# Patient Record
Sex: MALE | Race: WHITE | NOT HISPANIC OR LATINO | Employment: OTHER | URBAN - METROPOLITAN AREA
[De-identification: names, ages, dates, MRNs, and addresses within clinical notes are randomized per-mention and may not be internally consistent; named-entity substitution may affect disease eponyms.]

---

## 2017-04-17 ENCOUNTER — HOSPITAL ENCOUNTER (OUTPATIENT)
Dept: LAB | Facility: MEDICAL CENTER | Age: 77
End: 2017-04-17
Attending: INTERNAL MEDICINE
Payer: MEDICARE

## 2017-04-17 LAB
25(OH)D3 SERPL-MCNC: 30 NG/ML (ref 30–100)
AMMONIA PLAS-SCNC: 55 UMOL/L (ref 11–45)
APPEARANCE UR: CLEAR
BASOPHILS # BLD AUTO: 0.8 % (ref 0–1.8)
BASOPHILS # BLD: 0.03 K/UL (ref 0–0.12)
BILIRUB UR QL STRIP.AUTO: NEGATIVE
COLOR UR: YELLOW
COMMENT 1642: NORMAL
EOSINOPHIL # BLD AUTO: 0.07 K/UL (ref 0–0.51)
EOSINOPHIL NFR BLD: 1.8 % (ref 0–6.9)
ERYTHROCYTE [DISTWIDTH] IN BLOOD BY AUTOMATED COUNT: 42.8 FL (ref 35.9–50)
GLUCOSE UR STRIP.AUTO-MCNC: NEGATIVE MG/DL
HCT VFR BLD AUTO: 46.5 % (ref 42–52)
HGB BLD-MCNC: 15.8 G/DL (ref 14–18)
IMM GRANULOCYTES # BLD AUTO: 0.01 K/UL (ref 0–0.11)
IMM GRANULOCYTES NFR BLD AUTO: 0.3 % (ref 0–0.9)
KETONES UR STRIP.AUTO-MCNC: NEGATIVE MG/DL
LEUKOCYTE ESTERASE UR QL STRIP.AUTO: NEGATIVE
LYMPHOCYTES # BLD AUTO: 0.62 K/UL (ref 1–4.8)
LYMPHOCYTES NFR BLD: 16.1 % (ref 22–41)
MCH RBC QN AUTO: 29.8 PG (ref 27–33)
MCHC RBC AUTO-ENTMCNC: 34 G/DL (ref 33.7–35.3)
MCV RBC AUTO: 87.6 FL (ref 81.4–97.8)
MICRO URNS: NORMAL
MONOCYTES # BLD AUTO: 0.2 K/UL (ref 0–0.85)
MONOCYTES NFR BLD AUTO: 5.2 % (ref 0–13.4)
MORPHOLOGY BLD-IMP: NORMAL
NEUTROPHILS # BLD AUTO: 2.91 K/UL (ref 1.82–7.42)
NEUTROPHILS NFR BLD: 75.8 % (ref 44–72)
NITRITE UR QL STRIP.AUTO: NEGATIVE
NRBC # BLD AUTO: 0 K/UL
NRBC BLD AUTO-RTO: 0 /100 WBC
PH UR STRIP.AUTO: 6 [PH]
PLATELET # BLD AUTO: 80 K/UL (ref 164–446)
PMV BLD AUTO: 14 FL (ref 9–12.9)
PROT UR QL STRIP: NEGATIVE MG/DL
RBC # BLD AUTO: 5.31 M/UL (ref 4.7–6.1)
RBC UR QL AUTO: NEGATIVE
SP GR UR STRIP.AUTO: 1.01
T4 FREE SERPL-MCNC: 1.1 NG/DL (ref 0.53–1.43)
TSH SERPL DL<=0.005 MIU/L-ACNC: 2.48 UIU/ML (ref 0.3–3.7)
WBC # BLD AUTO: 3.7 K/UL (ref 4.8–10.8)

## 2017-04-17 PROCEDURE — 82306 VITAMIN D 25 HYDROXY: CPT

## 2017-04-17 PROCEDURE — 84439 ASSAY OF FREE THYROXINE: CPT

## 2017-04-17 PROCEDURE — 36415 COLL VENOUS BLD VENIPUNCTURE: CPT

## 2017-04-17 PROCEDURE — 85025 COMPLETE CBC W/AUTO DIFF WBC: CPT

## 2017-04-17 PROCEDURE — 84443 ASSAY THYROID STIM HORMONE: CPT

## 2017-04-17 PROCEDURE — 81003 URINALYSIS AUTO W/O SCOPE: CPT

## 2017-04-17 PROCEDURE — 82140 ASSAY OF AMMONIA: CPT

## 2017-05-02 ENCOUNTER — NON-PROVIDER VISIT (OUTPATIENT)
Dept: CARDIOLOGY | Facility: MEDICAL CENTER | Age: 77
End: 2017-05-02
Payer: MEDICARE

## 2017-05-02 ENCOUNTER — OFFICE VISIT (OUTPATIENT)
Dept: CARDIOLOGY | Facility: MEDICAL CENTER | Age: 77
End: 2017-05-02
Payer: MEDICARE

## 2017-05-02 VITALS
HEART RATE: 89 BPM | OXYGEN SATURATION: 94 % | BODY MASS INDEX: 30.34 KG/M2 | HEIGHT: 72 IN | SYSTOLIC BLOOD PRESSURE: 118 MMHG | WEIGHT: 224 LBS | DIASTOLIC BLOOD PRESSURE: 70 MMHG

## 2017-05-02 DIAGNOSIS — K74.60 CIRRHOSIS OF LIVER WITHOUT ASCITES, UNSPECIFIED HEPATIC CIRRHOSIS TYPE (HCC): ICD-10-CM

## 2017-05-02 DIAGNOSIS — I49.5 SICK SINUS SYNDROME (HCC): ICD-10-CM

## 2017-05-02 DIAGNOSIS — I49.5 SINOATRIAL NODE DYSFUNCTION (HCC): Chronic | ICD-10-CM

## 2017-05-02 DIAGNOSIS — D69.6 THROMBOCYTOPENIA (HCC): ICD-10-CM

## 2017-05-02 DIAGNOSIS — I48.0 PAROXYSMAL ATRIAL FIBRILLATION (HCC): Chronic | ICD-10-CM

## 2017-05-02 DIAGNOSIS — Z95.0 CARDIAC PACEMAKER IN SITU: ICD-10-CM

## 2017-05-02 PROCEDURE — 4040F PNEUMOC VAC/ADMIN/RCVD: CPT | Mod: 8P | Performed by: INTERNAL MEDICINE

## 2017-05-02 PROCEDURE — 99214 OFFICE O/P EST MOD 30 MIN: CPT | Mod: 25 | Performed by: INTERNAL MEDICINE

## 2017-05-02 PROCEDURE — 1101F PT FALLS ASSESS-DOCD LE1/YR: CPT | Mod: 8P | Performed by: INTERNAL MEDICINE

## 2017-05-02 PROCEDURE — G8419 CALC BMI OUT NRM PARAM NOF/U: HCPCS | Performed by: INTERNAL MEDICINE

## 2017-05-02 PROCEDURE — 93280 PM DEVICE PROGR EVAL DUAL: CPT | Performed by: INTERNAL MEDICINE

## 2017-05-02 PROCEDURE — 1036F TOBACCO NON-USER: CPT | Performed by: INTERNAL MEDICINE

## 2017-05-02 PROCEDURE — G8432 DEP SCR NOT DOC, RNG: HCPCS | Performed by: INTERNAL MEDICINE

## 2017-05-02 RX ORDER — ROPINIROLE 0.25 MG/1
0.5 TABLET, FILM COATED ORAL
Refills: 3 | Status: ON HOLD | COMMUNITY
Start: 2017-02-14 | End: 2022-04-15

## 2017-05-02 NOTE — Clinical Note
"     [facility]   [address], [facility] [address]  [place] [address]  Phone: [phone]  Fax: [phone]              Luigi Walters  1940    Encounter Date: 5/2/2017    Chaitanya Walters M.D.          PROGRESS NOTE:  Subjective:   Luigi Waletrs is a 76 y.o. male who presents today with recent esophageal dilatation. No bleeding. Low Plt count. No real a fib. No syncope or presyncope. Liver function stable. No alcohol for years.      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   • Pain       L shoulder=6/10>chronic   • Dental disorder      Poor dentition   • Jaundice    • Indigestion    • Prostate cancer (CMS-HCC) 2000         • Bronchitis 1970   • Pneumonia 1972   • Infectious disease 2008,2014     C. Difficile   • Seizure (CMS-HCC)      \"with Dobutamine Echo\"     Past Surgical History   Procedure Laterality Date   • Penile prosthesis ams inflatable     • Shoulder arthroplasty total     • Pacemaker insertion  2007         • Other orthopedic surgery  2010     L Shoulder Replacement   • Other       Endoscopy every 3-6 months to track esophageal varices   • Penile prosthesis ams inflatable  10/13/2014     Performed by Sedrick Pittman M.D. at SURGERY [facility]   • Recovery  3/18/2015     [place]formed [place] [facility] [place] [facility] [place]   • Pacemaker insertion  March 2015     Generator replacement with  Medtronic Adapta ADDRL1 implanted by Dr. Chaitanya Walters. Original device implanted in 2007.     Family History   Problem Relation Age of Onset   • Cancer Father      Prostate CA     History   Smoking status   • Former Smoker -- 0.50 packs/day for 14 years   • Types: Cigarettes   • Quit date: 01/01/1970   Smokeless tobacco   • Never Used     Comment: quit in 1970     Allergies   Allergen Reactions   • Dobutamine      siezures     Outpatient Encounter Prescriptions as of 5/2/2017   Medication Sig Dispense Refill   • Multiple Vitamin (MULTI VITAMIN DAILY PO) Take  by mouth.     • ropinirole (REQUIP) 0.25 MG Tab TK 1 T PO qd  3   • spironolactone (ALDACTONE) 50 MG Tab   0   • oxycodone immediate release (ROXICODONE) 10 MG immediate release tablet   0   • LORATADINE PO Take  by mouth.     • acetaminophen (TYLENOL) 325 MG TABS Take 325 mg by mouth 2 Times a Day.     • lactulose 10 GM/15ML SOLN Take 30 g by mouth every day. Indications: Impaired Brain Function due to Liver Disease     • meclizine (ANTIVERT) 25 MG TABS Take 25 mg by mouth 3 times a day as needed.     • tamsulosin (FLOMAX) 0.4 MG capsule Take 0.4 mg by mouth ONE-HALF HOUR AFTER BREAKFAST.     • omeprazole (PRILOSEC) 40 MG capsule Take 40 mg by mouth every day.     • calcitRIOL (ROCALTROL) 0.25 MCG CAPS Take 0.25 mcg by mouth every day.     • doxycycline (VIBRAMYCIN) 100 MG Tab Take 1 Tab by mouth 2 times a day. 20 Tab 0   • VITAMIN A PO Take 5,000 Units by mouth every day.     • Cholecalciferol (VITAMIN D3 PO) Take 500 Units by mouth.     • Non Formulary Request MG + Peotien 266mg PO daily       No facility-administered encounter medications on file as of 5/2/2017.     ROS     Objective:   /70 mmHg  Pulse 89  Ht 1.829 m (6' 0.01\")  Wt 101.606 kg (224 lb)  BMI 30.37 kg/m2  SpO2 94%    Physical Exam   Constitutional: He is oriented to person, place, and time. He " appears well-developed and well-nourished.   HENT:   Mouth/Throat: Oropharynx is clear and moist.   Eyes: Conjunctivae and EOM are normal.   Neck: No JVD present. No thyroid mass present.   Cardiovascular: Normal rate, regular rhythm, S1 normal, S2 normal and normal pulses.  PMI is not displaced.  Exam reveals no gallop.    No murmur heard.  Pulses:       Carotid pulses are 2+ on the right side, and 2+ on the left side.       Radial pulses are 2+ on the right side, and 2+ on the left side.        Femoral pulses are 2+ on the right side, and 2+ on the left side.       Dorsalis pedis pulses are 2+ on the right side, and 2+ on the left side.   No peripheral edema.   Pulmonary/Chest: Effort normal and breath sounds normal.   Abdominal: Soft. Normal appearance. He exhibits no abdominal bruit. There is no hepatosplenomegaly. There is no tenderness.   Musculoskeletal: Normal range of motion. He exhibits no edema.        Thoracic back: He exhibits no tenderness and no spasm.   Neurological: He is alert and oriented to person, place, and time.   Skin: No rash noted. No cyanosis. Nails show no clubbing.   Psychiatric: He has a normal mood and affect.       Assessment:     1. Cirrhosis of liver without ascites, unspecified hepatic cirrhosis type (CMS-HCC)     2. Thrombocytopenia (CMS-HCC)     3. Sinoatrial node dysfunction (CMS-HCC)     4. Paroxysmal atrial fibrillation (CMS-HCC)         Medical Decision Making:  Today's Assessment / Status / Plan:     1. SSS nl PPM function.  2. Hepatic cirrhosis in remission.  3. Low Plt stable,.  4. PAF minimal not a candidate for blood thinners.  5. Device check in 6 months.      Denver J Miller, M.D.  [address]  [place] [address]  VIA Facsimile: [phone]     Andrzej Darling M.D.  [address] [place] [address]  VIA Facsimile: [phone]

## 2017-05-02 NOTE — MR AVS SNAPSHOT
"        Luigi Inman Romeo   2017 1:20 PM   Office Visit   MRN: [id number]    Department:  [facility]   Dept Phone:  [phone]    Description:  Male : 1940   Provider:  Chaitanya Walters M.D.           Reason for Visit     Follow-Up           Allergies as of 2017     Allergen Noted Reactions    Dobutamine 09/10/2013       siezures      You were diagnosed with     Cirrhosis of liver without ascites, unspecified hepatic cirrhosis type (CMS-HCC)   [2290440]       Thrombocytopenia (CMS-HCC)   [425433]       Sinoatrial node dysfunction (CMS-HCC)   [427.81.ICD-9-CM]       Paroxysmal atrial fibrillation (CMS-HCC)   [028494]         Vital Signs     Blood Pressure Pulse Height Weight Body Mass Index Oxygen Saturation    118/70 mmHg 89 1.829 m (6' 0.01\") 101.606 kg (224 lb) 30.37 kg/m2 94%    Smoking Status                   Former Smoker           Basic Information     Date Of Birth Sex Race Ethnicity Preferred Language    1940 Male White Non- English      Problem List              ICD-10-CM Priority Class Noted - Resolved    Esophageal varices in alcoholic cirrhosis    Unknown - Present    Thrombocytopenia (CMS-HCC) D69.6   Unknown - Present    Osteoarthritis (Chronic) M19.90   Unknown - Present    Paroxysmal atrial fibrillation (CMS-HCC) (Chronic) I48.0 High  Unknown - Present    Presence of permanent cardiac pacemaker (Chronic) Z95.0 High  Unknown - Present    Sinoatrial node dysfunction (CMS-HCC) (Chronic) I49.5 High  Unknown - Present    Impotence of organic origin N52.9   10/13/2014 - Present    Cirrhosis of liver without ascites (CMS-HCC) K74.60   2017 - Present      Health Maintenance        Date Due Completion Dates    IMM DTaP/Tdap/Td Vaccine (1 - Tdap) 1959 ---    COLONOSCOPY 1990 ---    IMM ZOSTER VACCINE 2000 ---    IMM PNEUMOCOCCAL 65+ (ADULT) LOW/MEDIUM RISK SERIES (1 of 2 - PCV13) 2005 ---            Current Immunizations     No immunizations on " file.      Below and/or attached are the medications your provider expects you to take. Review all of your home medications and newly ordered medications with your provider and/or pharmacist. Follow medication instructions as directed by your provider and/or pharmacist. Please keep your medication list with you and share with your provider. Update the information when medications are discontinued, doses are changed, or new medications (including over-the-counter products) are added; and carry medication information at all times in the event of emergency situations     Allergies:  DOBUTAMINE - (reactions not documented)               Medications  Valid as of: May 02, 2017 -  5:05 PM    Generic Name Brand Name Tablet Size Instructions for use    Acetaminophen (Tab) TYLENOL 325 MG Take 325 mg by mouth 2 Times a Day.        Calcitriol (Cap) ROCALTROL 0.25 MCG Take 0.25 mcg by mouth every day.        Cholecalciferol   Take 500 Units by mouth.        Doxycycline Hyclate (Tab) VIBRAMYCIN 100 MG Take 1 Tab by mouth 2 times a day.        Lactulose (Solution) lactulose 10 GM/15ML Take 30 g by mouth every day. Indications: Impaired Brain Function due to Liver Disease        Loratadine   Take  by mouth.        Meclizine HCl (Tab) ANTIVERT 25 MG Take 25 mg by mouth 3 times a day as needed.        Multiple Vitamin   Take  by mouth.        Non Formulary Request Non Formulary Request  MG + Peotien 266mg PO daily        Omeprazole (CAPSULE DELAYED RELEASE) PRILOSEC 40 MG Take 40 mg by mouth every day.        OxyCODONE HCl (Tab) ROXICODONE 10 MG         ROPINIRole HCl (Tab) REQUIP 0.25 MG TK 1 T PO qd        Spironolactone (Tab) ALDACTONE 50 MG         Tamsulosin HCl (Cap) FLOMAX 0.4 MG Take 0.4 mg by mouth ONE-HALF HOUR AFTER BREAKFAST.        Vitamin A   Take 5,000 Units by mouth every day.        .                 Medicines prescribed today were sent to:     [id number] - [place], [place] - [address] AT [facility] OF  [address]    [address] [place] [address]    Phone: [phone] Fax: [phone]    Open 24 Hours?: No      Medication refill instructions:       If your prescription bottle indicates you have medication refills left, it is not necessary to call your provider’s office. Please contact your pharmacy and they will refill your medication.    If your prescription bottle indicates you do not have any refills left, you may request refills at any time through one of the following ways: The online [employer] system (except Urgent Care), by calling your provider’s office, or by asking your pharmacy to contact your provider’s office with a refill request. Medication refills are processed only during regular business hours and may not be available until the next business day. Your provider may request additional information or to have a follow-up visit with you prior to refilling your medication.   *Please Note: Medication refills are assigned a new Rx number when refilled electronically. Your pharmacy may indicate that no refills were authorized even though a new prescription for the same medication is available at the pharmacy. Please request the medicine by name with the pharmacy before contacting your provider for a refill.           [employer] Access Code: [email or web address]  Expires: 5/17/2017  1:05 PM    [employer]  A secure, online tool to manage your health information     [employer]® is a secure, online tool that connects you to your personalized health information from the privacy of your home -- day or night - making it very easy for you to manage your healthcare. Once the activation process is completed, you can even access your medical information using the [employer] jorge, which is available for free in the Apple Jorge store or Google Play store.     [employer] provides the following levels of access (as shown below):   My Chart Features   [facility] Primary [facility] Doctor [facility]  Specialists  [facility]  Urgent  Care [facility]  Primary Care  Doctor   Email your healthcare team securely and privately 24/7 X X X    Manage appointments: schedule your next appointment; view details of past/upcoming appointments X      Request prescription refills. X      View recent personal medical records, including lab and immunizations X X X X   View health record, including health history, allergies, medications X X X X   Read reports about your outpatient visits, procedures, consult and ER notes X X X X   See your discharge summary, which is a recap of your hospital and/or ER visit that includes your diagnosis, lab results, and care plan. X X       How to register for [employer]:  1. Go to  [email or web address].  2. Click on the Sign Up Now box, which takes you to the New Member Sign Up page. You will need to provide the following information:  a. Enter your [employer] Access Code exactly as it appears at the top of this page. (You will not need to use this code after you’ve completed the sign-up process. If you do not sign up before the expiration date, you must request a new code.)   b. Enter your date of birth.   c. Enter your home email address.   d. Click Submit, and follow the next screen’s instructions.  3. Create a [employer] ID. This will be your [employer] login ID and cannot be changed, so think of one that is secure and easy to remember.  4. Create a [employer] password. You can change your password at any time.  5. Enter your Password Reset Question and Answer. This can be used at a later time if you forget your password.   6. Enter your e-mail address. This allows you to receive e-mail notifications when new information is available in [employer].  7. Click Sign Up. You can now view your health information.    For assistance activating your [employer] account, call [phone]

## 2017-05-02 NOTE — PROGRESS NOTES
"Subjective:   Luigi Walters is a 76 y.o. male who presents today with recent esophageal dilatation. No bleeding. Low Plt count. No real a fib. No syncope or presyncope. Liver function stable. No alcohol for years.      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   • Pain       L shoulder=6/10>chronic   • Dental disorder      Poor dentition   • Jaundice    • Indigestion    • Prostate cancer (CMS-HCC) 2000         • Bronchitis 1970   • Pneumonia 1972   • Infectious disease 2008,2014     C. Difficile   • Seizure (CMS-HCC)      \"with Dobutamine Echo\"     Past Surgical History   Procedure Laterality Date   • Penile prosthesis ams inflatable     • Shoulder arthroplasty total     • Pacemaker insertion  2007         • Other orthopedic surgery  2010     L Shoulder Replacement   • Other       Endoscopy every 3-6 months to track esophageal varices   • Penile prosthesis ams inflatable  10/13/2014     Performed by Sedrick Pittman M.D. at SURGERY [facility]   • Recovery  3/18/2015     Performed [place] [facility] [place] [facility] [place]   • Pacemaker insertion  March 2015     Generator replacement with  MedActualSun Adapta ADDRL1 implanted by Dr. Chaitanya Walters. Original device implanted in 2007.     Family History   Problem Relation Age of Onset   • Cancer Father      Prostate CA " "    History   Smoking status   • Former Smoker -- 0.50 packs/day for 14 years   • Types: Cigarettes   • Quit date: 01/01/1970   Smokeless tobacco   • Never Used     Comment: quit in 1970     Allergies   Allergen Reactions   • Dobutamine      lupe     Outpatient Encounter Prescriptions as of 5/2/2017   Medication Sig Dispense Refill   • Multiple Vitamin (MULTI VITAMIN DAILY PO) Take  by mouth.     • ropinirole (REQUIP) 0.25 MG Tab TK 1 T PO qd  3   • spironolactone (ALDACTONE) 50 MG Tab   0   • oxycodone immediate release (ROXICODONE) 10 MG immediate release tablet   0   • LORATADINE PO Take  by mouth.     • acetaminophen (TYLENOL) 325 MG TABS Take 325 mg by mouth 2 Times a Day.     • lactulose 10 GM/15ML SOLN Take 30 g by mouth every day. Indications: Impaired Brain Function due to Liver Disease     • meclizine (ANTIVERT) 25 MG TABS Take 25 mg by mouth 3 times a day as needed.     • tamsulosin (FLOMAX) 0.4 MG capsule Take 0.4 mg by mouth ONE-HALF HOUR AFTER BREAKFAST.     • omeprazole (PRILOSEC) 40 MG capsule Take 40 mg by mouth every day.     • calcitRIOL (ROCALTROL) 0.25 MCG CAPS Take 0.25 mcg by mouth every day.     • doxycycline (VIBRAMYCIN) 100 MG Tab Take 1 Tab by mouth 2 times a day. 20 Tab 0   • VITAMIN A PO Take 5,000 Units by mouth every day.     • Cholecalciferol (VITAMIN D3 PO) Take 500 Units by mouth.     • Non Formulary Request MG + Peotien 266mg PO daily       No facility-administered encounter medications on file as of 5/2/2017.     ROS     Objective:   /70 mmHg  Pulse 89  Ht 1.829 m (6' 0.01\")  Wt 101.606 kg (224 lb)  BMI 30.37 kg/m2  SpO2 94%    Physical Exam   Constitutional: He is oriented to person, place, and time. He appears well-developed and well-nourished.   HENT:   Mouth/Throat: Oropharynx is clear and moist.   Eyes: Conjunctivae and EOM are normal.   Neck: No JVD present. No thyroid mass present.   Cardiovascular: Normal rate, regular rhythm, S1 normal, S2 normal and normal " pulses.  PMI is not displaced.  Exam reveals no gallop.    No murmur heard.  Pulses:       Carotid pulses are 2+ on the right side, and 2+ on the left side.       Radial pulses are 2+ on the right side, and 2+ on the left side.        Femoral pulses are 2+ on the right side, and 2+ on the left side.       Dorsalis pedis pulses are 2+ on the right side, and 2+ on the left side.   No peripheral edema.   Pulmonary/Chest: Effort normal and breath sounds normal.   Abdominal: Soft. Normal appearance. He exhibits no abdominal bruit. There is no hepatosplenomegaly. There is no tenderness.   Musculoskeletal: Normal range of motion. He exhibits no edema.        Thoracic back: He exhibits no tenderness and no spasm.   Neurological: He is alert and oriented to person, place, and time.   Skin: No rash noted. No cyanosis. Nails show no clubbing.   Psychiatric: He has a normal mood and affect.       Assessment:     1. Cirrhosis of liver without ascites, unspecified hepatic cirrhosis type (CMS-HCC)     2. Thrombocytopenia (CMS-HCC)     3. Sinoatrial node dysfunction (CMS-HCC)     4. Paroxysmal atrial fibrillation (CMS-HCC)         Medical Decision Making:  Today's Assessment / Status / Plan:     1. SSS nl PPM function.  2. Hepatic cirrhosis in remission.  3. Low Plt stable,.  4. PAF minimal not a candidate for blood thinners.  5. Device check in 6 months.

## 2017-09-20 ENCOUNTER — OFFICE VISIT (OUTPATIENT)
Dept: URGENT CARE | Facility: PHYSICIAN GROUP | Age: 77
End: 2017-09-20
Payer: MEDICARE

## 2017-09-20 ENCOUNTER — HOSPITAL ENCOUNTER (OUTPATIENT)
Facility: MEDICAL CENTER | Age: 77
End: 2017-09-20
Attending: EMERGENCY MEDICINE
Payer: MEDICARE

## 2017-09-20 VITALS
TEMPERATURE: 97.8 F | OXYGEN SATURATION: 96 % | RESPIRATION RATE: 14 BRPM | SYSTOLIC BLOOD PRESSURE: 130 MMHG | BODY MASS INDEX: 29.42 KG/M2 | HEART RATE: 76 BPM | DIASTOLIC BLOOD PRESSURE: 78 MMHG | HEIGHT: 73 IN | WEIGHT: 222 LBS

## 2017-09-20 DIAGNOSIS — Z23 FLU VACCINE NEED: ICD-10-CM

## 2017-09-20 DIAGNOSIS — R21 RASH: ICD-10-CM

## 2017-09-20 PROCEDURE — 87070 CULTURE OTHR SPECIMN AEROBIC: CPT

## 2017-09-20 PROCEDURE — G0008 ADMIN INFLUENZA VIRUS VAC: HCPCS | Performed by: EMERGENCY MEDICINE

## 2017-09-20 PROCEDURE — 87077 CULTURE AEROBIC IDENTIFY: CPT

## 2017-09-20 PROCEDURE — 87186 SC STD MICRODIL/AGAR DIL: CPT

## 2017-09-20 PROCEDURE — 90662 IIV NO PRSV INCREASED AG IM: CPT | Performed by: EMERGENCY MEDICINE

## 2017-09-20 PROCEDURE — 87205 SMEAR GRAM STAIN: CPT

## 2017-09-20 PROCEDURE — 99214 OFFICE O/P EST MOD 30 MIN: CPT | Mod: 25 | Performed by: EMERGENCY MEDICINE

## 2017-09-20 ASSESSMENT — ENCOUNTER SYMPTOMS
HEADACHES: 0
BACK PAIN: 0
EYE DISCHARGE: 0
SENSORY CHANGE: 0
CHILLS: 0
EYE REDNESS: 0
NAUSEA: 0
VOMITING: 0
NECK PAIN: 0
FEVER: 0

## 2017-09-20 NOTE — PROGRESS NOTES
Subjective:      Luigi Walters is a 76 y.o. male who presents with Rash (L Armpit, itchy x 10 days) and Flu Vaccine (Pt states would like flu shot today)            HPI  Patient is a pleasant 76-year-old male complaining of a pruritic red rash in his he's been using alcohol on it for the past 10 days and I recommended against that. Patient denies having diabetes. He has no lesions in his right axilla.    Social History     Social History   • Marital status:      Spouse name: N/A   • Number of children: N/A   • Years of education: N/A     Occupational History   • Not on file.     Social History Main Topics   • Smoking status: Former Smoker     Packs/day: 0.50     Years: 14.00     Types: Cigarettes     Quit date: 1/1/1970   • Smokeless tobacco: Never Used      Comment: quit in 1970   • Alcohol use 0.5 oz/week     1 Glasses of wine per week      Comment: Hx of ETOH abuse   • Drug use: No   • Sexual activity: Not Currently     Other Topics Concern   • Not on file     Social History Narrative   • No narrative on file   ......  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 Difficile   • Jaundice    • Melena    • Melena    • Osteoarthritis    • Pain      L shoulder=6/10>chronic   • Polycythemia    • Presence of permanent cardiac pacemaker 2007/2015   • Prostate cancer (CMS-HCC)    • Renal disorder      Insufficiency....med related?   • Seizure  "(CMS-HCC)     \"with Dobutamine Echo\"   • Thrombocytopenia (CMS-HCC)      Current Outpatient Prescriptions on File Prior to Visit   Medication Sig Dispense Refill   • Multiple Vitamin (MULTI VITAMIN DAILY PO) Take  by mouth.     • ropinirole (REQUIP) 0.25 MG Tab TK 1 T PO qd  3   • doxycycline (VIBRAMYCIN) 100 MG Tab Take 1 Tab by mouth 2 times a day. 20 Tab 0   • spironolactone (ALDACTONE) 50 MG Tab   0   • oxycodone immediate release (ROXICODONE) 10 MG immediate release tablet   0   • LORATADINE PO Take  by mouth.     • VITAMIN A PO Take 5,000 Units by mouth every day.     • Cholecalciferol (VITAMIN D3 PO) Take 500 Units by mouth.     • Non Formulary Request MG + Peotien 266mg PO daily     • acetaminophen (TYLENOL) 325 MG TABS Take 325 mg by mouth 2 Times a Day.     • lactulose 10 GM/15ML SOLN Take 30 g by mouth every day. Indications: Impaired Brain Function due to Liver Disease     • meclizine (ANTIVERT) 25 MG TABS Take 25 mg by mouth 3 times a day as needed.     • tamsulosin (FLOMAX) 0.4 MG capsule Take 0.4 mg by mouth ONE-HALF HOUR AFTER BREAKFAST.     • omeprazole (PRILOSEC) 40 MG capsule Take 40 mg by mouth every day.     • calcitRIOL (ROCALTROL) 0.25 MCG CAPS Take 0.25 mcg by mouth every day.       No current facility-administered medications on file prior to visit.      Family History   Problem Relation Age of Onset   • Cancer Father      Prostate CA   .a  Review of Systems   Constitutional: Negative for chills and fever.   Eyes: Negative for discharge and redness.   Cardiovascular: Negative for chest pain.   Gastrointestinal: Negative for nausea and vomiting.   Genitourinary: Negative.    Musculoskeletal: Negative for back pain and neck pain.   Skin: Positive for itching and rash.   Neurological: Negative for sensory change and headaches.          Objective:     /78   Pulse 76   Temp 36.6 °C (97.8 °F)   Resp 14   Ht 1.854 m (6' 1\")   Wt 100.7 kg (222 lb)   SpO2 96%   BMI 29.29 kg/m²  "     Physical Exam   Constitutional: He appears well-developed and well-nourished. No distress.       HENT:   Head: Normocephalic and atraumatic.   Right Ear: External ear normal.   Eyes: Right eye exhibits no discharge. Left eye exhibits no discharge.   Cardiovascular: Normal rate.    Pulmonary/Chest: Effort normal and breath sounds normal.   Musculoskeletal: He exhibits no edema.   Neurological: He is alert.   Skin: Skin is warm. Rash noted. He is not diaphoretic. There is erythema.   Patient has a 6 cm circular rash in his left axilla very erythematous and inflamed non-weeping.   Psychiatric: He has a normal mood and affect.   Vitals reviewed.              Assessment/Plan:     1. Rash    - CULTURE WOUND W/ GRAM STAIN; Future  - miconazole (MICOTIN) 2 % Cream; Apply 1 Application to affected area(s) 2 times a day.  Dispense: 1 Tube; Refill: 1  Patiently use miconazole twice a day I will call him with the culture results in 2-3 days. Patient will use warm water to clean the wound with avoid alcohol and hydrogen peroxide.  2. Flu vaccine need    - Flu Quad Inj >3 Year Pre-Filled PF

## 2017-09-21 LAB
GRAM STN SPEC: NORMAL
SIGNIFICANT IND 70042: NORMAL
SITE SITE: NORMAL
SOURCE SOURCE: NORMAL

## 2017-09-23 LAB
BACTERIA WND AEROBE CULT: ABNORMAL
BACTERIA WND AEROBE CULT: ABNORMAL
GRAM STN SPEC: ABNORMAL
SIGNIFICANT IND 70042: ABNORMAL
SITE SITE: ABNORMAL
SOURCE SOURCE: ABNORMAL

## 2017-09-25 ENCOUNTER — TELEPHONE (OUTPATIENT)
Dept: URGENT CARE | Facility: PHYSICIAN GROUP | Age: 77
End: 2017-09-25

## 2017-09-25 RX ORDER — AMOXICILLIN AND CLAVULANATE POTASSIUM 875; 125 MG/1; MG/1
1 TABLET, FILM COATED ORAL 2 TIMES DAILY
Qty: 20 TAB | Refills: 0 | Status: SHIPPED | OUTPATIENT
Start: 2017-09-25 | End: 2017-10-05

## 2017-09-25 NOTE — TELEPHONE ENCOUNTER
I contacted the patient with a rash under his left axilla he states is approximately 60% improved. He is currently being treated for yeast infection. Cultures grew a former staff will add Augmentin in that it is sensitive to Augmentin called into the pharmacy discussed with the patient.

## 2017-11-17 ENCOUNTER — HOSPITAL ENCOUNTER (OUTPATIENT)
Dept: LAB | Facility: MEDICAL CENTER | Age: 77
End: 2017-11-17
Attending: INTERNAL MEDICINE
Payer: MEDICARE

## 2017-11-17 LAB
ALBUMIN SERPL BCP-MCNC: 4.2 G/DL (ref 3.2–4.9)
ALBUMIN/GLOB SERPL: 1.1 G/DL
ALP SERPL-CCNC: 52 U/L (ref 30–99)
ALT SERPL-CCNC: 16 U/L (ref 2–50)
ANION GAP SERPL CALC-SCNC: 10 MMOL/L (ref 0–11.9)
AST SERPL-CCNC: 24 U/L (ref 12–45)
BASOPHILS # BLD AUTO: 0.6 % (ref 0–1.8)
BASOPHILS # BLD: 0.02 K/UL (ref 0–0.12)
BILIRUB SERPL-MCNC: 1 MG/DL (ref 0.1–1.5)
BUN SERPL-MCNC: 20 MG/DL (ref 8–22)
CALCIUM SERPL-MCNC: 10.1 MG/DL (ref 8.5–10.5)
CHLORIDE SERPL-SCNC: 104 MMOL/L (ref 96–112)
CO2 SERPL-SCNC: 24 MMOL/L (ref 20–33)
CREAT SERPL-MCNC: 1.22 MG/DL (ref 0.5–1.4)
EOSINOPHIL # BLD AUTO: 0.15 K/UL (ref 0–0.51)
EOSINOPHIL NFR BLD: 4.4 % (ref 0–6.9)
ERYTHROCYTE [DISTWIDTH] IN BLOOD BY AUTOMATED COUNT: 44.5 FL (ref 35.9–50)
GFR SERPL CREATININE-BSD FRML MDRD: 58 ML/MIN/1.73 M 2
GLOBULIN SER CALC-MCNC: 4 G/DL (ref 1.9–3.5)
GLUCOSE SERPL-MCNC: 100 MG/DL (ref 65–99)
HCT VFR BLD AUTO: 45.1 % (ref 42–52)
HGB BLD-MCNC: 15.3 G/DL (ref 14–18)
IMM GRANULOCYTES # BLD AUTO: 0.01 K/UL (ref 0–0.11)
IMM GRANULOCYTES NFR BLD AUTO: 0.3 % (ref 0–0.9)
LYMPHOCYTES # BLD AUTO: 0.57 K/UL (ref 1–4.8)
LYMPHOCYTES NFR BLD: 16.7 % (ref 22–41)
MCH RBC QN AUTO: 30.7 PG (ref 27–33)
MCHC RBC AUTO-ENTMCNC: 33.9 G/DL (ref 33.7–35.3)
MCV RBC AUTO: 90.6 FL (ref 81.4–97.8)
MONOCYTES # BLD AUTO: 0.21 K/UL (ref 0–0.85)
MONOCYTES NFR BLD AUTO: 6.1 % (ref 0–13.4)
NEUTROPHILS # BLD AUTO: 2.46 K/UL (ref 1.82–7.42)
NEUTROPHILS NFR BLD: 71.9 % (ref 44–72)
NRBC # BLD AUTO: 0 K/UL
NRBC BLD AUTO-RTO: 0 /100 WBC
PLATELET # BLD AUTO: 86 K/UL (ref 164–446)
PMV BLD AUTO: 12.7 FL (ref 9–12.9)
POTASSIUM SERPL-SCNC: 4.4 MMOL/L (ref 3.6–5.5)
PROT SERPL-MCNC: 8.2 G/DL (ref 6–8.2)
RBC # BLD AUTO: 4.98 M/UL (ref 4.7–6.1)
SODIUM SERPL-SCNC: 138 MMOL/L (ref 135–145)
WBC # BLD AUTO: 3.4 K/UL (ref 4.8–10.8)

## 2017-11-17 PROCEDURE — 82105 ALPHA-FETOPROTEIN SERUM: CPT

## 2017-11-17 PROCEDURE — 36415 COLL VENOUS BLD VENIPUNCTURE: CPT

## 2017-11-17 PROCEDURE — 85025 COMPLETE CBC W/AUTO DIFF WBC: CPT

## 2017-11-17 PROCEDURE — 80053 COMPREHEN METABOLIC PANEL: CPT

## 2017-11-19 LAB — AFP-TM SERPL-MCNC: 1 NG/ML (ref 0–9)

## 2017-11-29 ENCOUNTER — APPOINTMENT (RX ONLY)
Dept: URBAN - METROPOLITAN AREA CLINIC 4 | Facility: CLINIC | Age: 77
Setting detail: DERMATOLOGY
End: 2017-11-29

## 2017-11-29 DIAGNOSIS — L82.1 OTHER SEBORRHEIC KERATOSIS: ICD-10-CM

## 2017-11-29 DIAGNOSIS — L82.0 INFLAMED SEBORRHEIC KERATOSIS: ICD-10-CM

## 2017-11-29 PROBLEM — D48.5 NEOPLASM OF UNCERTAIN BEHAVIOR OF SKIN: Status: ACTIVE | Noted: 2017-11-29

## 2017-11-29 PROCEDURE — 11100: CPT | Mod: 59

## 2017-11-29 PROCEDURE — 99212 OFFICE O/P EST SF 10 MIN: CPT | Mod: 25

## 2017-11-29 PROCEDURE — ? LIQUID NITROGEN

## 2017-11-29 PROCEDURE — ? BIOPSY BY SHAVE METHOD

## 2017-11-29 ASSESSMENT — LOCATION DETAILED DESCRIPTION DERM
LOCATION DETAILED: LEFT LATERAL MALAR CHEEK
LOCATION DETAILED: LEFT INFERIOR LATERAL NECK
LOCATION DETAILED: LEFT DISTAL DORSAL FOREARM
LOCATION DETAILED: RIGHT PROXIMAL RADIAL DORSAL FOREARM

## 2017-11-29 ASSESSMENT — LOCATION SIMPLE DESCRIPTION DERM
LOCATION SIMPLE: RIGHT FOREARM
LOCATION SIMPLE: LEFT FOREARM
LOCATION SIMPLE: LEFT ANTERIOR NECK
LOCATION SIMPLE: LEFT CHEEK

## 2017-11-29 ASSESSMENT — LOCATION ZONE DERM
LOCATION ZONE: NECK
LOCATION ZONE: FACE
LOCATION ZONE: ARM

## 2017-11-29 NOTE — PROCEDURE: LIQUID NITROGEN

## 2017-11-29 NOTE — PROCEDURE: BIOPSY BY SHAVE METHOD
Electrodesiccation And Curettage Text: The wound bed was treated with electrodesiccation and curettage after the biopsy was performed.
X Size Of Lesion In Cm: 0
Biopsy Type: H and E
Bill 73802 For Specimen Handling/Conveyance To Laboratory?: no
Biopsy Method: Personna blade
Billing Type: Third-Party Bill
Anticipated Plan (Based On Presumed Biopsy Results): C+d if +.
Curettage Text: The wound bed was treated with curettage after the biopsy was performed.
Notification Instructions: Patient will be notified of biopsy results. However, patient instructed to call the office if not contacted within 2 weeks.
Electrodesiccation Text: The wound bed was treated with electrodesiccation after the biopsy was performed.
Render Post-Care Instructions In Note?: yes
Detail Level: Detailed
Consent: Written consent was obtained and risks were reviewed including but not limited to scarring, infection, bleeding, scabbing, incomplete removal, nerve damage and allergy to anesthesia.
Post-Care Instructions: I reviewed with the patient in detail post-care instructions. Patient is to keep the biopsy site dry overnight, and then apply vasaline twice daily until healed.
Type Of Destruction Used: Curettage
Anesthesia Type: 1% lidocaine with epinephrine and a 1:10 solution of 8.4% sodium bicarbonate
Anesthesia Volume In Cc: 3
Lab Facility: 
Dressing: Band-Aid
Lab: [id number]
Wound Care: Vaseline
Hemostasis: Drysol and Electrocautery
Path Notes (To The Dermatopathologist): Exc if +.

## 2017-12-04 ENCOUNTER — HOSPITAL ENCOUNTER (OUTPATIENT)
Dept: RADIOLOGY | Facility: MEDICAL CENTER | Age: 77
End: 2017-12-04
Attending: INTERNAL MEDICINE
Payer: MEDICARE

## 2017-12-04 DIAGNOSIS — K70.30 ALCOHOLIC CIRRHOSIS OF LIVER WITHOUT ASCITES (HCC): ICD-10-CM

## 2017-12-04 PROCEDURE — 76700 US EXAM ABDOM COMPLETE: CPT

## 2017-12-15 ENCOUNTER — HOSPITAL ENCOUNTER (OUTPATIENT)
Dept: RADIOLOGY | Facility: MEDICAL CENTER | Age: 77
End: 2017-12-15
Attending: INTERNAL MEDICINE
Payer: MEDICARE

## 2017-12-15 DIAGNOSIS — K74.60 CIRRHOSIS OF LIVER WITHOUT ASCITES, UNSPECIFIED HEPATIC CIRRHOSIS TYPE (HCC): ICD-10-CM

## 2017-12-15 PROCEDURE — 74160 CT ABDOMEN W/CONTRAST: CPT

## 2017-12-15 PROCEDURE — 700117 HCHG RX CONTRAST REV CODE 255: Performed by: INTERNAL MEDICINE

## 2017-12-15 RX ADMIN — IOHEXOL 100 ML: 350 INJECTION, SOLUTION INTRAVENOUS at 16:40

## 2018-01-09 ENCOUNTER — APPOINTMENT (RX ONLY)
Dept: URBAN - METROPOLITAN AREA CLINIC 4 | Facility: CLINIC | Age: 78
Setting detail: DERMATOLOGY
End: 2018-01-09

## 2018-01-09 PROBLEM — C44.629 SQUAMOUS CELL CARCINOMA OF SKIN OF LEFT UPPER LIMB, INCLUDING SHOULDER: Status: ACTIVE | Noted: 2018-01-09

## 2018-01-09 PROCEDURE — 11602 EXC TR-EXT MAL+MARG 1.1-2 CM: CPT

## 2018-01-09 PROCEDURE — 12032 INTMD RPR S/A/T/EXT 2.6-7.5: CPT

## 2018-01-09 PROCEDURE — ? EXCISION

## 2018-01-09 NOTE — PROCEDURE: EXCISION
Partial Purse String (Simple) Text: Given the location of the defect and the characteristics of the surrounding skin a simple purse string closure was deemed most appropriate.  Undermining was performed circumferentially around the surgical defect.  A purse string suture was then placed and tightened. Wound tension of the circular defect prevented complete closure of the wound.
Complex Repair Preamble Text (Leave Blank If You Do Not Want): Extensive wide undermining was performed.
Mucosal Advancement Flap Text: Given the location of the defect, shape of the defect and the proximity to free margins a mucosal advancement flap was deemed most appropriate. Incisions were made with a 15 blade scalpel in the appropriate fashion along the cutaneous vermilion border and the mucosal lip. The remaining actinically damaged mucosal tissue was excised.  The mucosal advancement flap was then elevated to the gingival sulcus with care taken to preserve the neurovascular structures and advanced into the primary defect. Care was taken to ensure that precise realignment of the vermilion border was achieved.
Estimated Blood Loss (Cc): minimal
W Plasty Text: The lesion was extirpated to the level of the fat with a #15 scalpel blade.  Given the location of the defect, shape of the defect and the proximity to free margins a W-plasty was deemed most appropriate for repair.  Using a sterile surgical marker, the appropriate transposition arms of the W-plasty were drawn incorporating the defect and placing the expected incisions within the relaxed skin tension lines where possible.    The area thus outlined was incised deep to adipose tissue with a #15 scalpel blade.  The skin margins were undermined to an appropriate distance in all directions utilizing iris scissors.  The opposing transposition arms were then transposed into place in opposite direction and anchored with interrupted buried subcutaneous sutures.
Anesthesia Type: 1% lidocaine with 1:100,000 epinephrine and a 1:10 solution of 8.4% sodium bicarbonate
Graft Donor Site Bandage (Optional-Leave Blank If You Don't Want In Note): Steri-strips and a pressure bandage were applied to the donor site.
Transposition Flap Text: The defect edges were debeveled with a #15 scalpel blade.  Given the location of the defect and the proximity to free margins a transposition flap was deemed most appropriate.  Using a sterile surgical marker, an appropriate transposition flap was drawn incorporating the defect.    The area thus outlined was incised deep to adipose tissue with a #15 scalpel blade.  The skin margins were undermined to an appropriate distance in all directions utilizing iris scissors.
X Size Of Lesion In Cm (Optional): 0
S Plasty Text: Given the location and shape of the defect, and the orientation of relaxed skin tension lines, an S-plasty was deemed most appropriate for repair.  Using a sterile surgical marker, the appropriate outline of the S-plasty was drawn, incorporating the defect and placing the expected incisions within the relaxed skin tension lines where possible.  The area thus outlined was incised deep to adipose tissue with a #15 scalpel blade.  The skin margins were undermined to an appropriate distance in all directions utilizing iris scissors. The skin flaps were advanced over the defect.  The opposing margins were then approximated with interrupted buried subcutaneous sutures.
Posterior Auricular Interpolation Flap Text: A decision was made to reconstruct the defect utilizing an interpolation axial flap and a staged reconstruction.  A telfa template was made of the defect.  This telfa template was then used to outline the posterior auricular interpolation flap.  The donor area for the pedicle flap was then injected with anesthesia.  The flap was excised through the skin and subcutaneous tissue down to the layer of the underlying musculature.  The pedicle flap was carefully excised within this deep plane to maintain its blood supply.  The edges of the donor site were undermined.   The donor site was closed in a primary fashion.  The pedicle was then rotated into position and sutured.  Once the tube was sutured into place, adequate blood supply was confirmed with blanching and refill.  The pedicle was then wrapped with xeroform gauze and dressed appropriately with a telfa and gauze bandage to ensure continued blood supply and protect the attached pedicle.
Bill For Surgical Tray: no
Interpolation Flap Text: A decision was made to reconstruct the defect utilizing an interpolation axial flap and a staged reconstruction.  A telfa template was made of the defect.  This telfa template was then used to outline the interpolation flap.  The donor area for the pedicle flap was then injected with anesthesia.  The flap was excised through the skin and subcutaneous tissue down to the layer of the underlying musculature.  The interpolation flap was carefully excised within this deep plane to maintain its blood supply.  The edges of the donor site were undermined.   The donor site was closed in a primary fashion.  The pedicle was then rotated into position and sutured.  Once the tube was sutured into place, adequate blood supply was confirmed with blanching and refill.  The pedicle was then wrapped with xeroform gauze and dressed appropriately with a telfa and gauze bandage to ensure continued blood supply and protect the attached pedicle.
Intermediate / Complex Repair - Final Wound Length In Cm: 3.3
Bilobed Flap Text: The defect edges were debeveled with a #15 scalpel blade.  Given the location of the defect and the proximity to free margins a bilobe flap was deemed most appropriate.  Using a sterile surgical marker, an appropriate bilobe flap drawn around the defect.    The area thus outlined was incised deep to adipose tissue with a #15 scalpel blade.  The skin margins were undermined to an appropriate distance in all directions utilizing iris scissors.
O-L Flap Text: The defect edges were debeveled with a #15 scalpel blade.  Given the location of the defect, shape of the defect and the proximity to free margins an O-L flap was deemed most appropriate.  Using a sterile surgical marker, an appropriate advancement flap was drawn incorporating the defect and placing the expected incisions within the relaxed skin tension lines where possible.    The area thus outlined was incised deep to adipose tissue with a #15 scalpel blade.  The skin margins were undermined to an appropriate distance in all directions utilizing iris scissors.
V-Y Plasty Text: The defect edges were debeveled with a #15 scalpel blade.  Given the location of the defect, shape of the defect and the proximity to free margins an V-Y advancement flap was deemed most appropriate.  Using a sterile surgical marker, an appropriate advancement flap was drawn incorporating the defect and placing the expected incisions within the relaxed skin tension lines where possible.    The area thus outlined was incised deep to adipose tissue with a #15 scalpel blade.  The skin margins were undermined to an appropriate distance in all directions utilizing iris scissors.
Complex Repair And Transposition Flap Text: The defect edges were debeveled with a #15 scalpel blade.  The primary defect was closed partially with a complex linear closure.  Given the location of the remaining defect, shape of the defect and the proximity to free margins a transposition flap was deemed most appropriate for complete closure of the defect.  Using a sterile surgical marker, an appropriate advancement flap was drawn incorporating the defect and placing the expected incisions within the relaxed skin tension lines where possible.    The area thus outlined was incised deep to adipose tissue with a #15 scalpel blade.  The skin margins were undermined to an appropriate distance in all directions utilizing iris scissors.
Show Repair Anesthesia Variables: Yes
Pre-Excision Curettage Text (Leave Blank If You Do Not Want): Prior to drawing the surgical margin the visible lesion was removed with electrodesiccation and curettage to clearly define the lesion size.
Burow's Advancement Flap Text: The defect edges were debeveled with a #15 scalpel blade.  Given the location of the defect and the proximity to free margins a Burow's advancement flap was deemed most appropriate.  Using a sterile surgical marker, the appropriate advancement flap was drawn incorporating the defect and placing the expected incisions within the relaxed skin tension lines where possible.    The area thus outlined was incised deep to adipose tissue with a #15 scalpel blade.  The skin margins were undermined to an appropriate distance in all directions utilizing iris scissors.
Complex Repair And V-Y Plasty Text: The defect edges were debeveled with a #15 scalpel blade.  The primary defect was closed partially with a complex linear closure.  Given the location of the remaining defect, shape of the defect and the proximity to free margins a V-Y plasty was deemed most appropriate for complete closure of the defect.  Using a sterile surgical marker, an appropriate advancement flap was drawn incorporating the defect and placing the expected incisions within the relaxed skin tension lines where possible.    The area thus outlined was incised deep to adipose tissue with a #15 scalpel blade.  The skin margins were undermined to an appropriate distance in all directions utilizing iris scissors.
Island Pedicle Flap With Canthal Suspension Text: The defect edges were debeveled with a #15 scalpel blade.  Given the location of the defect, shape of the defect and the proximity to free margins an island pedicle advancement flap was deemed most appropriate.  Using a sterile surgical marker, an appropriate advancement flap was drawn incorporating the defect, outlining the appropriate donor tissue and placing the expected incisions within the relaxed skin tension lines where possible. The area thus outlined was incised deep to adipose tissue with a #15 scalpel blade.  The skin margins were undermined to an appropriate distance in all directions around the primary defect and laterally outward around the island pedicle utilizing iris scissors.  There was minimal undermining beneath the pedicle flap. A suspension suture was placed in the canthal tendon to prevent tension and prevent ectropion.
Alar Island Pedicle Flap Text: The defect edges were debeveled with a #15 scalpel blade.  Given the location of the defect, shape of the defect and the proximity to the alar rim an island pedicle advancement flap was deemed most appropriate.  Using a sterile surgical marker, an appropriate advancement flap was drawn incorporating the defect, outlining the appropriate donor tissue and placing the expected incisions within the nasal ala running parallel to the alar rim. The area thus outlined was incised with a #15 scalpel blade.  The skin margins were undermined minimally to an appropriate distance in all directions around the primary defect and laterally outward around the island pedicle utilizing iris scissors.  There was minimal undermining beneath the pedicle flap.
Paramedian Forehead Flap Text: A decision was made to reconstruct the defect utilizing an interpolation axial flap and a staged reconstruction.  A telfa template was made of the defect.  This telfa template was then used to outline the paramedian forehead pedicle flap.  The donor area for the pedicle flap was then injected with anesthesia.  The flap was excised through the skin and subcutaneous tissue down to the layer of the underlying musculature.  The pedicle flap was carefully excised within this deep plane to maintain its blood supply.  The edges of the donor site were undermined.   The donor site was closed in a primary fashion.  The pedicle was then rotated into position and sutured.  Once the tube was sutured into place, adequate blood supply was confirmed with blanching and refill.  The pedicle was then wrapped with xeroform gauze and dressed appropriately with a telfa and gauze bandage to ensure continued blood supply and protect the attached pedicle.
Curvilinear Excision Additional Text (Leave Blank If You Do Not Want): The margin was drawn around the clinically apparent lesion.  A curvilinear shape was then drawn on the skin incorporating the lesion and margins.  Incisions were then made along these lines to the appropriate tissue plane and the lesion was extirpated.
Dermal Autograft Text: The defect edges were debeveled with a #15 scalpel blade.  Given the location of the defect, shape of the defect and the proximity to free margins a dermal autograft was deemed most appropriate.  Using a sterile surgical marker, the primary defect shape was transferred to the donor site. The area thus outlined was incised deep to adipose tissue with a #15 scalpel blade.  The harvested graft was then trimmed of adipose and epidermal tissue until only dermis was left.  The skin graft was then placed in the primary defect and oriented appropriately.
Tissue Cultured Epidermal Autograft Text: The defect edges were debeveled with a #15 scalpel blade.  Given the location of the defect, shape of the defect and the proximity to free margins a tissue cultured epidermal autograft was deemed most appropriate.  The graft was then trimmed to fit the size of the defect.  The graft was then placed in the primary defect and oriented appropriately.
Lab Facility: 
Surgeon Performing The Repair (Optional): Codie
Cheek-To-Nose Interpolation Flap Text: A decision was made to reconstruct the defect utilizing an interpolation axial flap and a staged reconstruction.  A telfa template was made of the defect.  This telfa template was then used to outline the Cheek-To-Nose Interpolation flap.  The donor area for the pedicle flap was then injected with anesthesia.  The flap was excised through the skin and subcutaneous tissue down to the layer of the underlying musculature.  The interpolation flap was carefully excised within this deep plane to maintain its blood supply.  The edges of the donor site were undermined.   The donor site was closed in a primary fashion.  The pedicle was then rotated into position and sutured.  Once the tube was sutured into place, adequate blood supply was confirmed with blanching and refill.  The pedicle was then wrapped with xeroform gauze and dressed appropriately with a telfa and gauze bandage to ensure continued blood supply and protect the attached pedicle.
H Plasty Text: Given the location of the defect, shape of the defect and the proximity to free margins a H-plasty was deemed most appropriate for repair.  Using a sterile surgical marker, the appropriate advancement arms of the H-plasty were drawn incorporating the defect and placing the expected incisions within the relaxed skin tension lines where possible. The area thus outlined was incised deep to adipose tissue with a #15 scalpel blade. The skin margins were undermined to an appropriate distance in all directions utilizing iris scissors.  The opposing advancement arms were then advanced into place in opposite direction and anchored with interrupted buried subcutaneous sutures.
Complex Repair And Ftsg Text: The defect edges were debeveled with a #15 scalpel blade.  The primary defect was closed partially with a complex linear closure.  Given the location of the defect, shape of the defect and the proximity to free margins a full thickness skin graft was deemed most appropriate to repair the remaining defect.  The graft was trimmed to fit the size of the remaining defect.  The graft was then placed in the primary defect, oriented appropriately, and sutured into place.
Perilesional Excision Additional Text (Leave Blank If You Do Not Want): The margin was drawn around the clinically apparent lesion. Incisions were then made along these lines to the appropriate tissue plane and the lesion was extirpated.
Advancement-Rotation Flap Text: The defect edges were debeveled with a #15 scalpel blade.  Given the location of the defect, shape of the defect and the proximity to free margins an advancement-rotation flap was deemed most appropriate.  Using a sterile surgical marker, an appropriate flap was drawn incorporating the defect and placing the expected incisions within the relaxed skin tension lines where possible. The area thus outlined was incised deep to adipose tissue with a #15 scalpel blade.  The skin margins were undermined to an appropriate distance in all directions utilizing iris scissors.
No Repair - Repaired With Adjacent Surgical Defect Text (Leave Blank If You Do Not Want): After the excision the defect was repaired concurrently with another surgical defect which was in close approximation.
Excisional Biopsy Additional Text (Leave Blank If You Do Not Want): The margin was drawn around the clinically apparent lesion. An elliptical shape was then drawn on the skin incorporating the lesion and margins.  Incisions were then made along these lines to the appropriate tissue plane and the lesion was extirpated.
Number Of Deep Sutures (Optional): 2
Size Of Lesion In Cm: 0.9
Island Pedicle Flap-Requiring Vessel Identification Text: The defect edges were debeveled with a #15 scalpel blade.  Given the location of the defect, shape of the defect and the proximity to free margins an island pedicle advancement flap was deemed most appropriate.  Using a sterile surgical marker, an appropriate advancement flap was drawn, based on the axial vessel mentioned above, incorporating the defect, outlining the appropriate donor tissue and placing the expected incisions within the relaxed skin tension lines where possible.    The area thus outlined was incised deep to adipose tissue with a #15 scalpel blade.  The skin margins were undermined to an appropriate distance in all directions around the primary defect and laterally outward around the island pedicle utilizing iris scissors.  There was minimal undermining beneath the pedicle flap.
Complex Repair And Skin Substitute Graft Text: The defect edges were debeveled with a #15 scalpel blade.  The primary defect was closed partially with a complex linear closure.  Given the location of the remaining defect, shape of the defect and the proximity to free margins a skin substitute graft was deemed most appropriate to repair the remaining defect.  The graft was trimmed to fit the size of the remaining defect.  The graft was then placed in the primary defect, oriented appropriately, and sutured into place.
Complex Repair And Dorsal Nasal Flap Text: The defect edges were debeveled with a #15 scalpel blade.  The primary defect was closed partially with a complex linear closure.  Given the location of the remaining defect, shape of the defect and the proximity to free margins a dorsal nasal flap was deemed most appropriate for complete closure of the defect.  Using a sterile surgical marker, an appropriate flap was drawn incorporating the defect and placing the expected incisions within the relaxed skin tension lines where possible.    The area thus outlined was incised deep to adipose tissue with a #15 scalpel blade.  The skin margins were undermined to an appropriate distance in all directions utilizing iris scissors.
Star Wedge Flap Text: The defect edges were debeveled with a #15 scalpel blade.  Given the location of the defect, shape of the defect and the proximity to free margins a star wedge flap was deemed most appropriate.  Using a sterile surgical marker, an appropriate rotation flap was drawn incorporating the defect and placing the expected incisions within the relaxed skin tension lines where possible. The area thus outlined was incised deep to adipose tissue with a #15 scalpel blade.  The skin margins were undermined to an appropriate distance in all directions utilizing iris scissors.
Complex Repair And O-T Advancement Flap Text: The defect edges were debeveled with a #15 scalpel blade.  The primary defect was closed partially with a complex linear closure.  Given the location of the remaining defect, shape of the defect and the proximity to free margins an O-T advancement flap was deemed most appropriate for complete closure of the defect.  Using a sterile surgical marker, an appropriate advancement flap was drawn incorporating the defect and placing the expected incisions within the relaxed skin tension lines where possible.    The area thus outlined was incised deep to adipose tissue with a #15 scalpel blade.  The skin margins were undermined to an appropriate distance in all directions utilizing iris scissors.
Epidermal Sutures: 4-0 Ethilon
Complex Repair And Tissue Cultured Epidermal Autograft Text: The defect edges were debeveled with a #15 scalpel blade.  The primary defect was closed partially with a complex linear closure.  Given the location of the defect, shape of the defect and the proximity to free margins an tissue cultured epidermal autograft was deemed most appropriate to repair the remaining defect.  The graft was trimmed to fit the size of the remaining defect.  The graft was then placed in the primary defect, oriented appropriately, and sutured into place.
Melolabial Transposition Flap Text: The defect edges were debeveled with a #15 scalpel blade.  Given the location of the defect and the proximity to free margins a melolabial flap was deemed most appropriate.  Using a sterile surgical marker, an appropriate melolabial transposition flap was drawn incorporating the defect.    The area thus outlined was incised deep to adipose tissue with a #15 scalpel blade.  The skin margins were undermined to an appropriate distance in all directions utilizing iris scissors.
Excision Depth: adipose tissue
Complex Repair And A-T Advancement Flap Text: The defect edges were debeveled with a #15 scalpel blade.  The primary defect was closed partially with a complex linear closure.  Given the location of the remaining defect, shape of the defect and the proximity to free margins an A-T advancement flap was deemed most appropriate for complete closure of the defect.  Using a sterile surgical marker, an appropriate advancement flap was drawn incorporating the defect and placing the expected incisions within the relaxed skin tension lines where possible.    The area thus outlined was incised deep to adipose tissue with a #15 scalpel blade.  The skin margins were undermined to an appropriate distance in all directions utilizing iris scissors.
Fusiform Excision Additional Text (Leave Blank If You Do Not Want): The margin was drawn around the clinically apparent lesion.  A fusiform shape was then drawn on the skin incorporating the lesion and margins.  Incisions were then made along these lines to the appropriate tissue plane and the lesion was extirpated.
Bilateral Helical Rim Advancement Flap Text: The defect edges were debeveled with a #15 blade scalpel.  Given the location of the defect and the proximity to free margins (helical rim) a bilateral helical rim advancement flap was deemed most appropriate.  Using a sterile surgical marker, the appropriate advancement flaps were drawn incorporating the defect and placing the expected incisions between the helical rim and antihelix where possible.  The area thus outlined was incised through and through with a #15 scalpel blade.  With a skin hook and iris scissors, the flaps were gently and sharply undermined and freed up.
Rhombic Flap Text: The defect edges were debeveled with a #15 scalpel blade.  Given the location of the defect and the proximity to free margins a rhombic flap was deemed most appropriate.  Using a sterile surgical marker, an appropriate rhombic flap was drawn incorporating the defect.    The area thus outlined was incised deep to adipose tissue with a #15 scalpel blade.  The skin margins were undermined to an appropriate distance in all directions utilizing iris scissors.
Elliptical Excision Additional Text (Leave Blank If You Do Not Want): The margin was drawn around the clinically apparent lesion.  An elliptical shape was then drawn on the skin incorporating the lesion and margins.  Incisions were then made along these lines to the appropriate tissue plane and the lesion was extirpated.
Lip Wedge Excision Repair Text: Given the location of the defect and the proximity to free margins a full thickness wedge repair was deemed most appropriate.  Using a sterile surgical marker, the appropriate repair was drawn incorporating the defect and placing the expected incisions perpendicular to the vermilion border.  The vermilion border was also meticulously outlined to ensure appropriate reapproximation during the repair.  The area thus outlined was incised through and through with a #15 scalpel blade.  The muscularis and dermis were reaproximated with deep sutures following hemostasis. Care was taken to realign the vermilion border before proceeding with the superficial closure.  Once the vermilion was realigned the superfical and mucosal closure was finished.
Island Pedicle Flap Text: The defect edges were debeveled with a #15 scalpel blade.  Given the location of the defect, shape of the defect and the proximity to free margins an island pedicle advancement flap was deemed most appropriate.  Using a sterile surgical marker, an appropriate advancement flap was drawn incorporating the defect, outlining the appropriate donor tissue and placing the expected incisions within the relaxed skin tension lines where possible.    The area thus outlined was incised deep to adipose tissue with a #15 scalpel blade.  The skin margins were undermined to an appropriate distance in all directions around the primary defect and laterally outward around the island pedicle utilizing iris scissors.  There was minimal undermining beneath the pedicle flap.
Complex Repair And M Plasty Text: The defect edges were debeveled with a #15 scalpel blade.  The primary defect was closed partially with a complex linear closure.  Given the location of the remaining defect, shape of the defect and the proximity to free margins an M plasty was deemed most appropriate for complete closure of the defect.  Using a sterile surgical marker, an appropriate advancement flap was drawn incorporating the defect and placing the expected incisions within the relaxed skin tension lines where possible.    The area thus outlined was incised deep to adipose tissue with a #15 scalpel blade.  The skin margins were undermined to an appropriate distance in all directions utilizing iris scissors.
Modified Advancement Flap Text: The defect edges were debeveled with a #15 scalpel blade.  Given the location of the defect, shape of the defect and the proximity to free margins a modified advancement flap was deemed most appropriate.  Using a sterile surgical marker, an appropriate advancement flap was drawn incorporating the defect and placing the expected incisions within the relaxed skin tension lines where possible.    The area thus outlined was incised deep to adipose tissue with a #15 scalpel blade.  The skin margins were undermined to an appropriate distance in all directions utilizing iris scissors.
Advancement Flap (Double) Text: The defect edges were debeveled with a #15 scalpel blade.  Given the location of the defect and the proximity to free margins a double advancement flap was deemed most appropriate.  Using a sterile surgical marker, the appropriate advancement flaps were drawn incorporating the defect and placing the expected incisions within the relaxed skin tension lines where possible.    The area thus outlined was incised deep to adipose tissue with a #15 scalpel blade.  The skin margins were undermined to an appropriate distance in all directions utilizing iris scissors.
Z Plasty Text: The lesion was extirpated to the level of the fat with a #15 scalpel blade.  Given the location of the defect, shape of the defect and the proximity to free margins a Z-plasty was deemed most appropriate for repair.  Using a sterile surgical marker, the appropriate transposition arms of the Z-plasty were drawn incorporating the defect and placing the expected incisions within the relaxed skin tension lines where possible.    The area thus outlined was incised deep to adipose tissue with a #15 scalpel blade.  The skin margins were undermined to an appropriate distance in all directions utilizing iris scissors.  The opposing transposition arms were then transposed into place in opposite direction and anchored with interrupted buried subcutaneous sutures.
Advancement Flap (Single) Text: The defect edges were debeveled with a #15 scalpel blade.  Given the location of the defect and the proximity to free margins a single advancement flap was deemed most appropriate.  Using a sterile surgical marker, an appropriate advancement flap was drawn incorporating the defect and placing the expected incisions within the relaxed skin tension lines where possible.    The area thus outlined was incised deep to adipose tissue with a #15 scalpel blade.  The skin margins were undermined to an appropriate distance in all directions utilizing iris scissors.
Path Notes (To The Dermatopathologist): Please check margins.
Ear Star Wedge Flap Text: The defect edges were debeveled with a #15 blade scalpel.  Given the location of the defect and the proximity to free margins (helical rim) an ear star wedge flap was deemed most appropriate.  Using a sterile surgical marker, the appropriate flap was drawn incorporating the defect and placing the expected incisions between the helical rim and antihelix where possible.  The area thus outlined was incised through and through with a #15 scalpel blade.
V-Y Flap Text: The defect edges were debeveled with a #15 scalpel blade.  Given the location of the defect, shape of the defect and the proximity to free margins a V-Y flap was deemed most appropriate.  Using a sterile surgical marker, an appropriate advancement flap was drawn incorporating the defect and placing the expected incisions within the relaxed skin tension lines where possible.    The area thus outlined was incised deep to adipose tissue with a #15 scalpel blade.  The skin margins were undermined to an appropriate distance in all directions utilizing iris scissors.
Complex Repair And Xenograft Text: The defect edges were debeveled with a #15 scalpel blade.  The primary defect was closed partially with a complex linear closure.  Given the location of the defect, shape of the defect and the proximity to free margins a xenograft was deemed most appropriate to repair the remaining defect.  The graft was trimmed to fit the size of the remaining defect.  The graft was then placed in the primary defect, oriented appropriately, and sutured into place.
Skin Substitute Text: The defect edges were debeveled with a #15 scalpel blade.  Given the location of the defect, shape of the defect and the proximity to free margins a skin substitute graft was deemed most appropriate.  The graft material was trimmed to fit the size of the defect. The graft was then placed in the primary defect and oriented appropriately.
Complex Repair And Bilobe Flap Text: The defect edges were debeveled with a #15 scalpel blade.  The primary defect was closed partially with a complex linear closure.  Given the location of the remaining defect, shape of the defect and the proximity to free margins a bilobe flap was deemed most appropriate for complete closure of the defect.  Using a sterile surgical marker, an appropriate advancement flap was drawn incorporating the defect and placing the expected incisions within the relaxed skin tension lines where possible.    The area thus outlined was incised deep to adipose tissue with a #15 scalpel blade.  The skin margins were undermined to an appropriate distance in all directions utilizing iris scissors.
Purse String (Simple) Text: Given the location of the defect and the characteristics of the surrounding skin a purse string simple closure was deemed most appropriate.  Undermining was performed circumferentially around the surgical defect.  A purse string suture was then placed and tightened.
Keystone Flap Text: The defect edges were debeveled with a #15 scalpel blade.  Given the location of the defect, shape of the defect a keystone flap was deemed most appropriate.  Using a sterile surgical marker, an appropriate keystone flap was drawn incorporating the defect, outlining the appropriate donor tissue and placing the expected incisions within the relaxed skin tension lines where possible. The area thus outlined was incised deep to adipose tissue with a #15 scalpel blade.  The skin margins were undermined to an appropriate distance in all directions around the primary defect and laterally outward around the flap utilizing iris scissors.
Billing Type: Third-Party Bill
Detail Level: Detailed
Spiral Flap Text: The defect edges were debeveled with a #15 scalpel blade.  Given the location of the defect, shape of the defect and the proximity to free margins a spiral flap was deemed most appropriate.  Using a sterile surgical marker, an appropriate rotation flap was drawn incorporating the defect and placing the expected incisions within the relaxed skin tension lines where possible. The area thus outlined was incised deep to adipose tissue with a #15 scalpel blade.  The skin margins were undermined to an appropriate distance in all directions utilizing iris scissors.
Complex Repair And O-L Flap Text: The defect edges were debeveled with a #15 scalpel blade.  The primary defect was closed partially with a complex linear closure.  Given the location of the remaining defect, shape of the defect and the proximity to free margins an O-L flap was deemed most appropriate for complete closure of the defect.  Using a sterile surgical marker, an appropriate flap was drawn incorporating the defect and placing the expected incisions within the relaxed skin tension lines where possible.    The area thus outlined was incised deep to adipose tissue with a #15 scalpel blade.  The skin margins were undermined to an appropriate distance in all directions utilizing iris scissors.
Complex Repair And Rotation Flap Text: The defect edges were debeveled with a #15 scalpel blade.  The primary defect was closed partially with a complex linear closure.  Given the location of the remaining defect, shape of the defect and the proximity to free margins a rotation flap was deemed most appropriate for complete closure of the defect.  Using a sterile surgical marker, an appropriate advancement flap was drawn incorporating the defect and placing the expected incisions within the relaxed skin tension lines where possible.    The area thus outlined was incised deep to adipose tissue with a #15 scalpel blade.  The skin margins were undermined to an appropriate distance in all directions utilizing iris scissors.
Dressing: pressure dressing
Additional Anesthesia Volume In Cc: 6
Complex Repair And W Plasty Text: The defect edges were debeveled with a #15 scalpel blade.  The primary defect was closed partially with a complex linear closure.  Given the location of the remaining defect, shape of the defect and the proximity to free margins a W plasty was deemed most appropriate for complete closure of the defect.  Using a sterile surgical marker, an appropriate advancement flap was drawn incorporating the defect and placing the expected incisions within the relaxed skin tension lines where possible.    The area thus outlined was incised deep to adipose tissue with a #15 scalpel blade.  The skin margins were undermined to an appropriate distance in all directions utilizing iris scissors.
Complex Repair And Z Plasty Text: The defect edges were debeveled with a #15 scalpel blade.  The primary defect was closed partially with a complex linear closure.  Given the location of the remaining defect, shape of the defect and the proximity to free margins a Z plasty was deemed most appropriate for complete closure of the defect.  Using a sterile surgical marker, an appropriate advancement flap was drawn incorporating the defect and placing the expected incisions within the relaxed skin tension lines where possible.    The area thus outlined was incised deep to adipose tissue with a #15 scalpel blade.  The skin margins were undermined to an appropriate distance in all directions utilizing iris scissors.
Cheek Interpolation Flap Text: A decision was made to reconstruct the defect utilizing an interpolation axial flap and a staged reconstruction.  A telfa template was made of the defect.  This telfa template was then used to outline the Cheek Interpolation flap.  The donor area for the pedicle flap was then injected with anesthesia.  The flap was excised through the skin and subcutaneous tissue down to the layer of the underlying musculature.  The interpolation flap was carefully excised within this deep plane to maintain its blood supply.  The edges of the donor site were undermined.   The donor site was closed in a primary fashion.  The pedicle was then rotated into position and sutured.  Once the tube was sutured into place, adequate blood supply was confirmed with blanching and refill.  The pedicle was then wrapped with xeroform gauze and dressed appropriately with a telfa and gauze bandage to ensure continued blood supply and protect the attached pedicle.
Hatchet Flap Text: The defect edges were debeveled with a #15 scalpel blade.  Given the location of the defect, shape of the defect and the proximity to free margins a hatchet flap was deemed most appropriate.  Using a sterile surgical marker, an appropriate hatchet flap was drawn incorporating the defect and placing the expected incisions within the relaxed skin tension lines where possible.    The area thus outlined was incised deep to adipose tissue with a #15 scalpel blade.  The skin margins were undermined to an appropriate distance in all directions utilizing iris scissors.
Split-Thickness Skin Graft Text: The defect edges were debeveled with a #15 scalpel blade.  Given the location of the defect, shape of the defect and the proximity to free margins a split thickness skin graft was deemed most appropriate.  Using a sterile surgical marker, the primary defect shape was transferred to the donor site. The split thickness graft was then harvested.  The skin graft was then placed in the primary defect and oriented appropriately.
Hemostasis: Electrocautery
Complex Repair And Double Advancement Flap Text: The defect edges were debeveled with a #15 scalpel blade.  The primary defect was closed partially with a complex linear closure.  Given the location of the remaining defect, shape of the defect and the proximity to free margins a double advancement flap was deemed most appropriate for complete closure of the defect.  Using a sterile surgical marker, an appropriate advancement flap was drawn incorporating the defect and placing the expected incisions within the relaxed skin tension lines where possible.    The area thus outlined was incised deep to adipose tissue with a #15 scalpel blade.  The skin margins were undermined to an appropriate distance in all directions utilizing iris scissors.
Composite Graft Text: The defect edges were debeveled with a #15 scalpel blade.  Given the location of the defect, shape of the defect, the proximity to free margins and the fact the defect was full thickness a composite graft was deemed most appropriate.  The defect was outline and then transferred to the donor site.  A full thickness graft was then excised from the donor site. The graft was then placed in the primary defect, oriented appropriately and then sutured into place.  The secondary defect was then repaired using a primary closure.
Complex Repair And Dermal Autograft Text: The defect edges were debeveled with a #15 scalpel blade.  The primary defect was closed partially with a complex linear closure.  Given the location of the defect, shape of the defect and the proximity to free margins an dermal autograft was deemed most appropriate to repair the remaining defect.  The graft was trimmed to fit the size of the remaining defect.  The graft was then placed in the primary defect, oriented appropriately, and sutured into place.
Excision Method: Fusiform
Partial Purse String (Intermediate) Text: Given the location of the defect and the characteristics of the surrounding skin an intermediate purse string closure was deemed most appropriate.  Undermining was performed circumferentially around the surgical defect.  A purse string suture was then placed and tightened. Wound tension of the circular defect prevented complete closure of the wound.
Purse String (Intermediate) Text: Given the location of the defect and the characteristics of the surrounding skin a purse string intermediate closure was deemed most appropriate.  Undermining was performed circumfirentially around the surgical defect.  A purse string suture was then placed and tightened.
Dermal Closure: simple interrupted
Intermediate Repair Preamble Text (Leave Blank If You Do Not Want): Undermining was performed with blunt dissection.
Post-Care Instructions: I reviewed with the patient in detail post-care instructions. Patient is not to engage in any heavy lifting, exercise, or swimming for the next 14 days. Should the patient develop any fevers, chills, bleeding, severe pain patient will contact the office immediately.
Crescentic Advancement Flap Text: The defect edges were debeveled with a #15 scalpel blade.  Given the location of the defect and the proximity to free margins a crescentic advancement flap was deemed most appropriate.  Using a sterile surgical marker, the appropriate advancement flap was drawn incorporating the defect and placing the expected incisions within the relaxed skin tension lines where possible.    The area thus outlined was incised deep to adipose tissue with a #15 scalpel blade.  The skin margins were undermined to an appropriate distance in all directions utilizing iris scissors.
Slit Excision Additional Text (Leave Blank If You Do Not Want): A linear line was drawn on the skin overlying the lesion. An incision was made slowly until the lesion was visualized.  Once visualized, the lesion was removed with blunt dissection.
Mastoid Interpolation Flap Text: A decision was made to reconstruct the defect utilizing an interpolation axial flap and a staged reconstruction.  A telfa template was made of the defect.  This telfa template was then used to outline the mastoid interpolation flap.  The donor area for the pedicle flap was then injected with anesthesia.  The flap was excised through the skin and subcutaneous tissue down to the layer of the underlying musculature.  The pedicle flap was carefully excised within this deep plane to maintain its blood supply.  The edges of the donor site were undermined.   The donor site was closed in a primary fashion.  The pedicle was then rotated into position and sutured.  Once the tube was sutured into place, adequate blood supply was confirmed with blanching and refill.  The pedicle was then wrapped with xeroform gauze and dressed appropriately with a telfa and gauze bandage to ensure continued blood supply and protect the attached pedicle.
Scalpel Size: 15 blade
Complex Repair And Modified Advancement Flap Text: The defect edges were debeveled with a #15 scalpel blade.  The primary defect was closed partially with a complex linear closure.  Given the location of the remaining defect, shape of the defect and the proximity to free margins a modified advancement flap was deemed most appropriate for complete closure of the defect.  Using a sterile surgical marker, an appropriate advancement flap was drawn incorporating the defect and placing the expected incisions within the relaxed skin tension lines where possible.    The area thus outlined was incised deep to adipose tissue with a #15 scalpel blade.  The skin margins were undermined to an appropriate distance in all directions utilizing iris scissors.
Cartilage Graft Text: The defect edges were debeveled with a #15 scalpel blade.  Given the location of the defect, shape of the defect, the fact the defect involved a full thickness cartilage defect a cartilage graft was deemed most appropriate.  An appropriate donor site was identified, cleansed, and anesthetized. The cartilage graft was then harvested and transferred to the recipient site, oriented appropriately and then sutured into place.  The secondary defect was then repaired using a primary closure.
Bi-Rhombic Flap Text: The defect edges were debeveled with a #15 scalpel blade.  Given the location of the defect and the proximity to free margins a bi-rhombic flap was deemed most appropriate.  Using a sterile surgical marker, an appropriate rhombic flap was drawn incorporating the defect. The area thus outlined was incised deep to adipose tissue with a #15 scalpel blade.  The skin margins were undermined to an appropriate distance in all directions utilizing iris scissors.
Saucerization Excision Additional Text (Leave Blank If You Do Not Want): The margin was drawn around the clinically apparent lesion.  Incisions were then made along these lines, in a tangential fashion, to the appropriate tissue plane and the lesion was extirpated.
O-T Plasty Text: The defect edges were debeveled with a #15 scalpel blade.  Given the location of the defect, shape of the defect and the proximity to free margins an O-T plasty was deemed most appropriate.  Using a sterile surgical marker, an appropriate O-T plasty was drawn incorporating the defect and placing the expected incisions within the relaxed skin tension lines where possible.    The area thus outlined was incised deep to adipose tissue with a #15 scalpel blade.  The skin margins were undermined to an appropriate distance in all directions utilizing iris scissors.
O-Z Plasty Text: The defect edges were debeveled with a #15 scalpel blade.  Given the location of the defect, shape of the defect and the proximity to free margins an O-Z plasty (double transposition flap) was deemed most appropriate.  Using a sterile surgical marker, the appropriate transposition flaps were drawn incorporating the defect and placing the expected incisions within the relaxed skin tension lines where possible.    The area thus outlined was incised deep to adipose tissue with a #15 scalpel blade.  The skin margins were undermined to an appropriate distance in all directions utilizing iris scissors.  Hemostasis was achieved with electrocautery.  The flaps were then transposed into place, one clockwise and the other counterclockwise, and anchored with interrupted buried subcutaneous sutures.
Complex Repair And Epidermal Autograft Text: The defect edges were debeveled with a #15 scalpel blade.  The primary defect was closed partially with a complex linear closure.  Given the location of the defect, shape of the defect and the proximity to free margins an epidermal autograft was deemed most appropriate to repair the remaining defect.  The graft was trimmed to fit the size of the remaining defect.  The graft was then placed in the primary defect, oriented appropriately, and sutured into place.
Melolabial Interpolation Flap Text: A decision was made to reconstruct the defect utilizing an interpolation axial flap and a staged reconstruction.  A telfa template was made of the defect.  This telfa template was then used to outline the melolabial interpolation flap.  The donor area for the pedicle flap was then injected with anesthesia.  The flap was excised through the skin and subcutaneous tissue down to the layer of the underlying musculature.  The pedicle flap was carefully excised within this deep plane to maintain its blood supply.  The edges of the donor site were undermined.   The donor site was closed in a primary fashion.  The pedicle was then rotated into position and sutured.  Once the tube was sutured into place, adequate blood supply was confirmed with blanching and refill.  The pedicle was then wrapped with xeroform gauze and dressed appropriately with a telfa and gauze bandage to ensure continued blood supply and protect the attached pedicle.
Suture Removal: 13 days
Trilobed Flap Text: The defect edges were debeveled with a #15 scalpel blade.  Given the location of the defect and the proximity to free margins a trilobed flap was deemed most appropriate.  Using a sterile surgical marker, an appropriate trilobed flap drawn around the defect.    The area thus outlined was incised deep to adipose tissue with a #15 scalpel blade.  The skin margins were undermined to an appropriate distance in all directions utilizing iris scissors.
Epidermal Autograft Text: The defect edges were debeveled with a #15 scalpel blade.  Given the location of the defect, shape of the defect and the proximity to free margins an epidermal autograft was deemed most appropriate.  Using a sterile surgical marker, the primary defect shape was transferred to the donor site. The epidermal graft was then harvested.  The skin graft was then placed in the primary defect and oriented appropriately.
Rotation Flap Text: The defect edges were debeveled with a #15 scalpel blade.  Given the location of the defect, shape of the defect and the proximity to free margins a rotation flap was deemed most appropriate.  Using a sterile surgical marker, an appropriate rotation flap was drawn incorporating the defect and placing the expected incisions within the relaxed skin tension lines where possible.    The area thus outlined was incised deep to adipose tissue with a #15 scalpel blade.  The skin margins were undermined to an appropriate distance in all directions utilizing iris scissors.
Size Of Margin In Cm: 0.3
Lab: [id number]
Complex Repair And Melolabial Flap Text: The defect edges were debeveled with a #15 scalpel blade.  The primary defect was closed partially with a complex linear closure.  Given the location of the remaining defect, shape of the defect and the proximity to free margins a melolabial flap was deemed most appropriate for complete closure of the defect.  Using a sterile surgical marker, an appropriate advancement flap was drawn incorporating the defect and placing the expected incisions within the relaxed skin tension lines where possible.    The area thus outlined was incised deep to adipose tissue with a #15 scalpel blade.  The skin margins were undermined to an appropriate distance in all directions utilizing iris scissors.
Repair Type: Intermediate
Complex Repair And Double M Plasty Text: The defect edges were debeveled with a #15 scalpel blade.  The primary defect was closed partially with a complex linear closure.  Given the location of the remaining defect, shape of the defect and the proximity to free margins a double M plasty was deemed most appropriate for complete closure of the defect.  Using a sterile surgical marker, an appropriate advancement flap was drawn incorporating the defect and placing the expected incisions within the relaxed skin tension lines where possible.    The area thus outlined was incised deep to adipose tissue with a #15 scalpel blade.  The skin margins were undermined to an appropriate distance in all directions utilizing iris scissors.
Positioning (Leave Blank If You Do Not Want): The patient was placed in a comfortable position exposing the surgical site.
Ftsg Text: The defect edges were debeveled with a #15 scalpel blade.  Given the location of the defect, shape of the defect and the proximity to free margins a full thickness skin graft was deemed most appropriate.  Using a sterile surgical marker, the primary defect shape was transferred to the donor site. The area thus outlined was incised deep to adipose tissue with a #15 scalpel blade.  The harvested graft was then trimmed of adipose tissue until only dermis and epidermis was left.  The skin margins of the secondary defect were undermined to an appropriate distance in all directions utilizing iris scissors.  The secondary defect was closed with interrupted buried subcutaneous sutures.  The skin edges were then re-apposed with running  sutures.  The skin graft was then placed in the primary defect and oriented appropriately.
Complex Repair And Split-Thickness Skin Graft Text: The defect edges were debeveled with a #15 scalpel blade.  The primary defect was closed partially with a complex linear closure.  Given the location of the defect, shape of the defect and the proximity to free margins a split thickness skin graft was deemed most appropriate to repair the remaining defect.  The graft was trimmed to fit the size of the remaining defect.  The graft was then placed in the primary defect, oriented appropriately, and sutured into place.
Complex Repair And Rhombic Flap Text: The defect edges were debeveled with a #15 scalpel blade.  The primary defect was closed partially with a complex linear closure.  Given the location of the remaining defect, shape of the defect and the proximity to free margins a rhombic flap was deemed most appropriate for complete closure of the defect.  Using a sterile surgical marker, an appropriate advancement flap was drawn incorporating the defect and placing the expected incisions within the relaxed skin tension lines where possible.    The area thus outlined was incised deep to adipose tissue with a #15 scalpel blade.  The skin margins were undermined to an appropriate distance in all directions utilizing iris scissors.
Epidermal Closure: running cuticular
Dorsal Nasal Flap Text: The defect edges were debeveled with a #15 scalpel blade.  Given the location of the defect and the proximity to free margins a dorsal nasal flap was deemed most appropriate.  Using a sterile surgical marker, an appropriate dorsal nasal flap was drawn around the defect.    The area thus outlined was incised deep to adipose tissue with a #15 scalpel blade.  The skin margins were undermined to an appropriate distance in all directions utilizing iris scissors.
Bilobed Transposition Flap Text: The defect edges were debeveled with a #15 scalpel blade.  Given the location of the defect and the proximity to free margins a bilobed transposition flap was deemed most appropriate.  Using a sterile surgical marker, an appropriate bilobe flap drawn around the defect.    The area thus outlined was incised deep to adipose tissue with a #15 scalpel blade.  The skin margins were undermined to an appropriate distance in all directions utilizing iris scissors.
Xenograft Text: The defect edges were debeveled with a #15 scalpel blade.  Given the location of the defect, shape of the defect and the proximity to free margins a xenograft was deemed most appropriate.  The graft was then trimmed to fit the size of the defect.  The graft was then placed in the primary defect and oriented appropriately.
Muscle Hinge Flap Text: The defect edges were debeveled with a #15 scalpel blade.  Given the size, depth and location of the defect and the proximity to free margins a muscle hinge flap was deemed most appropriate.  Using a sterile surgical marker, an appropriate hinge flap was drawn incorporating the defect. The area thus outlined was incised with a #15 scalpel blade.  The skin margins were undermined to an appropriate distance in all directions utilizing iris scissors.
Consent was obtained from the patient. The risks and benefits to therapy were discussed in detail. Specifically, the risks of infection, scarring, bleeding, prolonged wound healing, incomplete removal, allergy to anesthesia, nerve injury and recurrence were addressed. Prior to the procedure, the treatment site was clearly identified and confirmed by the patient. All components of Universal Protocol/PAUSE Rule completed.
Repair Performed By Another Provider Text (Leave Blank If You Do Not Want): After the tissue was excised the defect was repaired by another provider.
Complex Repair And Single Advancement Flap Text: The defect edges were debeveled with a #15 scalpel blade.  The primary defect was closed partially with a complex linear closure.  Given the location of the remaining defect, shape of the defect and the proximity to free margins a single advancement flap was deemed most appropriate for complete closure of the defect.  Using a sterile surgical marker, an appropriate advancement flap was drawn incorporating the defect and placing the expected incisions within the relaxed skin tension lines where possible.    The area thus outlined was incised deep to adipose tissue with a #15 scalpel blade.  The skin margins were undermined to an appropriate distance in all directions utilizing iris scissors.
Helical Rim Advancement Flap Text: The defect edges were debeveled with a #15 blade scalpel.  Given the location of the defect and the proximity to free margins (helical rim) a double helical rim advancement flap was deemed most appropriate.  Using a sterile surgical marker, the appropriate advancement flaps were drawn incorporating the defect and placing the expected incisions between the helical rim and antihelix where possible.  The area thus outlined was incised through and through with a #15 scalpel blade.  With a skin hook and iris scissors, the flaps were gently and sharply undermined and freed up.
Deep Sutures: 4-0 Vicryl
A-T Advancement Flap Text: The defect edges were debeveled with a #15 scalpel blade.  Given the location of the defect, shape of the defect and the proximity to free margins an A-T advancement flap was deemed most appropriate.  Using a sterile surgical marker, an appropriate advancement flap was drawn incorporating the defect and placing the expected incisions within the relaxed skin tension lines where possible.    The area thus outlined was incised deep to adipose tissue with a #15 scalpel blade.  The skin margins were undermined to an appropriate distance in all directions utilizing iris scissors.
Double Island Pedicle Flap Text: The defect edges were debeveled with a #15 scalpel blade.  Given the location of the defect, shape of the defect and the proximity to free margins a double island pedicle advancement flap was deemed most appropriate.  Using a sterile surgical marker, an appropriate advancement flap was drawn incorporating the defect, outlining the appropriate donor tissue and placing the expected incisions within the relaxed skin tension lines where possible.    The area thus outlined was incised deep to adipose tissue with a #15 scalpel blade.  The skin margins were undermined to an appropriate distance in all directions around the primary defect and laterally outward around the island pedicle utilizing iris scissors.  There was minimal undermining beneath the pedicle flap.
O-T Advancement Flap Text: The defect edges were debeveled with a #15 scalpel blade.  Given the location of the defect, shape of the defect and the proximity to free margins an O-T advancement flap was deemed most appropriate.  Using a sterile surgical marker, an appropriate advancement flap was drawn incorporating the defect and placing the expected incisions within the relaxed skin tension lines where possible.    The area thus outlined was incised deep to adipose tissue with a #15 scalpel blade.  The skin margins were undermined to an appropriate distance in all directions utilizing iris scissors.
Wound Care: Vaseline

## 2018-01-22 ENCOUNTER — APPOINTMENT (RX ONLY)
Dept: URBAN - METROPOLITAN AREA CLINIC 4 | Facility: CLINIC | Age: 78
Setting detail: DERMATOLOGY
End: 2018-01-22

## 2018-01-22 DIAGNOSIS — Z48.02 ENCOUNTER FOR REMOVAL OF SUTURES: ICD-10-CM

## 2018-01-22 PROCEDURE — ? SUTURE REMOVAL (GLOBAL PERIOD)

## 2018-01-22 ASSESSMENT — LOCATION ZONE DERM: LOCATION ZONE: ARM

## 2018-01-22 ASSESSMENT — LOCATION DETAILED DESCRIPTION DERM: LOCATION DETAILED: LEFT DISTAL POSTERIOR UPPER ARM

## 2018-01-22 ASSESSMENT — LOCATION SIMPLE DESCRIPTION DERM: LOCATION SIMPLE: LEFT UPPER ARM

## 2018-01-22 NOTE — PROCEDURE: SUTURE REMOVAL (GLOBAL PERIOD)
Detail Level: Detailed
Add 88050 Cpt? (Important Note: In 2017 The Use Of 82943 Is Being Tracked By Cms To Determine Future Global Period Reimbursement For Global Periods): no

## 2018-02-12 ENCOUNTER — NON-PROVIDER VISIT (OUTPATIENT)
Dept: CARDIOLOGY | Facility: MEDICAL CENTER | Age: 78
End: 2018-02-12
Payer: OTHER MISCELLANEOUS

## 2018-02-12 DIAGNOSIS — Z95.0 PRESENCE OF PERMANENT CARDIAC PACEMAKER: Chronic | ICD-10-CM

## 2018-02-12 DIAGNOSIS — I49.5 SINOATRIAL NODE DYSFUNCTION (HCC): Chronic | ICD-10-CM

## 2018-02-12 PROCEDURE — 93280 PM DEVICE PROGR EVAL DUAL: CPT | Performed by: INTERNAL MEDICINE

## 2018-03-07 ENCOUNTER — APPOINTMENT (RX ONLY)
Dept: URBAN - METROPOLITAN AREA CLINIC 4 | Facility: CLINIC | Age: 78
Setting detail: DERMATOLOGY
End: 2018-03-07

## 2018-03-07 DIAGNOSIS — L81.4 OTHER MELANIN HYPERPIGMENTATION: ICD-10-CM

## 2018-03-07 DIAGNOSIS — Z85.828 PERSONAL HISTORY OF OTHER MALIGNANT NEOPLASM OF SKIN: ICD-10-CM

## 2018-03-07 DIAGNOSIS — D18.0 HEMANGIOMA: ICD-10-CM

## 2018-03-07 DIAGNOSIS — L82.1 OTHER SEBORRHEIC KERATOSIS: ICD-10-CM

## 2018-03-07 PROBLEM — D18.01 HEMANGIOMA OF SKIN AND SUBCUTANEOUS TISSUE: Status: ACTIVE | Noted: 2018-03-07

## 2018-03-07 PROBLEM — D48.5 NEOPLASM OF UNCERTAIN BEHAVIOR OF SKIN: Status: ACTIVE | Noted: 2018-03-07

## 2018-03-07 PROCEDURE — 11100: CPT

## 2018-03-07 PROCEDURE — ? BIOPSY BY SHAVE METHOD

## 2018-03-07 PROCEDURE — 99212 OFFICE O/P EST SF 10 MIN: CPT | Mod: 25

## 2018-03-07 PROCEDURE — ? COUNSELING

## 2018-03-07 ASSESSMENT — LOCATION DETAILED DESCRIPTION DERM
LOCATION DETAILED: LEFT INFERIOR UPPER BACK
LOCATION DETAILED: RIGHT INFERIOR UPPER BACK
LOCATION DETAILED: RIGHT CLAVICULAR NECK
LOCATION DETAILED: RIGHT INFERIOR LATERAL NECK
LOCATION DETAILED: RIGHT SUPERIOR LATERAL MIDBACK
LOCATION DETAILED: RIGHT POSTERIOR SHOULDER
LOCATION DETAILED: RIGHT MEDIAL FRONTAL SCALP
LOCATION DETAILED: RIGHT LATERAL UPPER BACK
LOCATION DETAILED: RIGHT SUPERIOR OCCIPITAL SCALP
LOCATION DETAILED: LEFT MID-UPPER BACK
LOCATION DETAILED: RIGHT MEDIAL TRAPEZIAL NECK
LOCATION DETAILED: RIGHT SUPERIOR MEDIAL UPPER BACK
LOCATION DETAILED: RIGHT SUPERIOR LATERAL UPPER BACK
LOCATION DETAILED: RIGHT LATERAL TRAPEZIAL NECK
LOCATION DETAILED: LEFT PROXIMAL POSTERIOR UPPER ARM
LOCATION DETAILED: LEFT SUPERIOR LATERAL UPPER BACK

## 2018-03-07 ASSESSMENT — LOCATION ZONE DERM
LOCATION ZONE: ARM
LOCATION ZONE: TRUNK
LOCATION ZONE: SCALP
LOCATION ZONE: TRUNK
LOCATION ZONE: SCALP
LOCATION ZONE: NECK
LOCATION ZONE: NECK

## 2018-03-07 ASSESSMENT — LOCATION SIMPLE DESCRIPTION DERM
LOCATION SIMPLE: POSTERIOR NECK
LOCATION SIMPLE: LEFT UPPER ARM
LOCATION SIMPLE: LEFT UPPER BACK
LOCATION SIMPLE: RIGHT UPPER BACK
LOCATION SIMPLE: RIGHT SHOULDER
LOCATION SIMPLE: RIGHT SCALP
LOCATION SIMPLE: POSTERIOR SCALP
LOCATION SIMPLE: RIGHT UPPER BACK
LOCATION SIMPLE: RIGHT ANTERIOR NECK
LOCATION SIMPLE: LEFT UPPER BACK
LOCATION SIMPLE: POSTERIOR NECK
LOCATION SIMPLE: RIGHT LOWER BACK

## 2018-03-07 NOTE — PROCEDURE: BIOPSY BY SHAVE METHOD
Lab: [id number]
Destruction After The Procedure: No
Detail Level: Detailed
Hemostasis: Drysol and Electrocautery
Biopsy Method: Personna blade
Biopsy Type: H and E
Type Of Destruction Used: Curettage
Post-Care Instructions: I reviewed with the patient in detail post-care instructions. Patient is to keep the biopsy site dry overnight, and then apply vasaline twice daily until healed.
Lab Facility: 
Additional Anesthesia Volume In Cc (Will Not Render If 0): 0
Electrodesiccation And Curettage Text: The wound bed was treated with electrodesiccation and curettage after the biopsy was performed.
Electrodesiccation Text: The wound bed was treated with electrodesiccation after the biopsy was performed.
Curettage Text: The wound bed was treated with curettage after the biopsy was performed.
Billing Type: Third-Party Bill
Render Post-Care Instructions In Note?: yes
Consent: Written consent was obtained and risks were reviewed including but not limited to scarring, infection, bleeding, scabbing, incomplete removal, nerve damage and allergy to anesthesia.
Anesthesia Type: 1% lidocaine with epinephrine and a 1:10 solution of 8.4% sodium bicarbonate
Anesthesia Volume In Cc: 0.5
Dressing: Band-Aid
Notification Instructions: Patient will be notified of biopsy results. However, patient instructed to call the office if not contacted within 2 weeks.
Wound Care: Vaseline

## 2018-04-05 ENCOUNTER — APPOINTMENT (RX ONLY)
Dept: URBAN - METROPOLITAN AREA CLINIC 4 | Facility: CLINIC | Age: 78
Setting detail: DERMATOLOGY
End: 2018-04-05

## 2018-04-05 DIAGNOSIS — D17 BENIGN LIPOMATOUS NEOPLASM: ICD-10-CM

## 2018-04-05 DIAGNOSIS — D22 MELANOCYTIC NEVI: ICD-10-CM

## 2018-04-05 DIAGNOSIS — L82.0 INFLAMED SEBORRHEIC KERATOSIS: ICD-10-CM

## 2018-04-05 PROBLEM — D22.61 MELANOCYTIC NEVI OF RIGHT UPPER LIMB, INCLUDING SHOULDER: Status: ACTIVE | Noted: 2018-04-05

## 2018-04-05 PROBLEM — D17.0 BENIGN LIPOMATOUS NEOPLASM OF SKIN AND SUBCUTANEOUS TISSUE OF HEAD, FACE AND NECK: Status: ACTIVE | Noted: 2018-04-05

## 2018-04-05 PROCEDURE — ? LIQUID NITROGEN

## 2018-04-05 PROCEDURE — 12032 INTMD RPR S/A/T/EXT 2.6-7.5: CPT | Mod: 59

## 2018-04-05 PROCEDURE — 11401 EXC TR-EXT B9+MARG 0.6-1 CM: CPT | Mod: 59

## 2018-04-05 PROCEDURE — ? EXCISION

## 2018-04-05 PROCEDURE — ? DEFER

## 2018-04-05 ASSESSMENT — LOCATION DETAILED DESCRIPTION DERM
LOCATION DETAILED: INFERIOR MID FOREHEAD
LOCATION DETAILED: LEFT MEDIAL INFERIOR CHEST
LOCATION DETAILED: RIGHT POSTERIOR SHOULDER

## 2018-04-05 ASSESSMENT — LOCATION SIMPLE DESCRIPTION DERM
LOCATION SIMPLE: INFERIOR FOREHEAD
LOCATION SIMPLE: CHEST
LOCATION SIMPLE: RIGHT SHOULDER

## 2018-04-05 ASSESSMENT — LOCATION ZONE DERM
LOCATION ZONE: TRUNK
LOCATION ZONE: ARM
LOCATION ZONE: FACE

## 2018-04-05 NOTE — PROCEDURE: EXCISION
Suture Removal: 12 days
Mastoid Interpolation Flap Text: A decision was made to reconstruct the defect utilizing an interpolation axial flap and a staged reconstruction.  A telfa template was made of the defect.  This telfa template was then used to outline the mastoid interpolation flap.  The donor area for the pedicle flap was then injected with anesthesia.  The flap was excised through the skin and subcutaneous tissue down to the layer of the underlying musculature.  The pedicle flap was carefully excised within this deep plane to maintain its blood supply.  The edges of the donor site were undermined.   The donor site was closed in a primary fashion.  The pedicle was then rotated into position and sutured.  Once the tube was sutured into place, adequate blood supply was confirmed with blanching and refill.  The pedicle was then wrapped with xeroform gauze and dressed appropriately with a telfa and gauze bandage to ensure continued blood supply and protect the attached pedicle.
Trilobed Flap Text: The defect edges were debeveled with a #15 scalpel blade.  Given the location of the defect and the proximity to free margins a trilobed flap was deemed most appropriate.  Using a sterile surgical marker, an appropriate trilobed flap drawn around the defect.    The area thus outlined was incised deep to adipose tissue with a #15 scalpel blade.  The skin margins were undermined to an appropriate distance in all directions utilizing iris scissors.
Burow's Advancement Flap Text: The defect edges were debeveled with a #15 scalpel blade.  Given the location of the defect and the proximity to free margins a Burow's advancement flap was deemed most appropriate.  Using a sterile surgical marker, the appropriate advancement flap was drawn incorporating the defect and placing the expected incisions within the relaxed skin tension lines where possible.    The area thus outlined was incised deep to adipose tissue with a #15 scalpel blade.  The skin margins were undermined to an appropriate distance in all directions utilizing iris scissors.
Epidermal Closure Graft Donor Site (Optional): simple interrupted
Dorsal Nasal Flap Text: The defect edges were debeveled with a #15 scalpel blade.  Given the location of the defect and the proximity to free margins a dorsal nasal flap was deemed most appropriate.  Using a sterile surgical marker, an appropriate dorsal nasal flap was drawn around the defect.    The area thus outlined was incised deep to adipose tissue with a #15 scalpel blade.  The skin margins were undermined to an appropriate distance in all directions utilizing iris scissors.
Render Path Notes In Note?: no
Show Additional Anesthesia Variables: Yes
Saucerization Excision Additional Text (Leave Blank If You Do Not Want): The margin was drawn around the clinically apparent lesion.  Incisions were then made along these lines, in a tangential fashion, to the appropriate tissue plane and the lesion was extirpated.
Slit Excision Additional Text (Leave Blank If You Do Not Want): A linear line was drawn on the skin overlying the lesion. An incision was made slowly until the lesion was visualized.  Once visualized, the lesion was removed with blunt dissection.
Bilateral Helical Rim Advancement Flap Text: The defect edges were debeveled with a #15 blade scalpel.  Given the location of the defect and the proximity to free margins (helical rim) a bilateral helical rim advancement flap was deemed most appropriate.  Using a sterile surgical marker, the appropriate advancement flaps were drawn incorporating the defect and placing the expected incisions between the helical rim and antihelix where possible.  The area thus outlined was incised through and through with a #15 scalpel blade.  With a skin hook and iris scissors, the flaps were gently and sharply undermined and freed up.
Estimated Blood Loss (Cc): minimal
Repair Performed By Another Provider Text (Leave Blank If You Do Not Want): After the tissue was excised the defect was repaired by another provider.
Composite Graft Text: The defect edges were debeveled with a #15 scalpel blade.  Given the location of the defect, shape of the defect, the proximity to free margins and the fact the defect was full thickness a composite graft was deemed most appropriate.  The defect was outline and then transferred to the donor site.  A full thickness graft was then excised from the donor site. The graft was then placed in the primary defect, oriented appropriately and then sutured into place.  The secondary defect was then repaired using a primary closure.
Complex Repair And Transposition Flap Text: The defect edges were debeveled with a #15 scalpel blade.  The primary defect was closed partially with a complex linear closure.  Given the location of the remaining defect, shape of the defect and the proximity to free margins a transposition flap was deemed most appropriate for complete closure of the defect.  Using a sterile surgical marker, an appropriate advancement flap was drawn incorporating the defect and placing the expected incisions within the relaxed skin tension lines where possible.    The area thus outlined was incised deep to adipose tissue with a #15 scalpel blade.  The skin margins were undermined to an appropriate distance in all directions utilizing iris scissors.
S Plasty Text: Given the location and shape of the defect, and the orientation of relaxed skin tension lines, an S-plasty was deemed most appropriate for repair.  Using a sterile surgical marker, the appropriate outline of the S-plasty was drawn, incorporating the defect and placing the expected incisions within the relaxed skin tension lines where possible.  The area thus outlined was incised deep to adipose tissue with a #15 scalpel blade.  The skin margins were undermined to an appropriate distance in all directions utilizing iris scissors. The skin flaps were advanced over the defect.  The opposing margins were then approximated with interrupted buried subcutaneous sutures.
Size Of Margin In Cm: 0.2
Surgeon Performing Repair (Optional): Codie
Melolabial Transposition Flap Text: The defect edges were debeveled with a #15 scalpel blade.  Given the location of the defect and the proximity to free margins a melolabial flap was deemed most appropriate.  Using a sterile surgical marker, an appropriate melolabial transposition flap was drawn incorporating the defect.    The area thus outlined was incised deep to adipose tissue with a #15 scalpel blade.  The skin margins were undermined to an appropriate distance in all directions utilizing iris scissors.
Lazy S Complex Repair Preamble Text (Leave Blank If You Do Not Want): Extensive wide undermining was performed.
Consent was obtained from the patient. The risks and benefits to therapy were discussed in detail. Specifically, the risks of infection, scarring, bleeding, prolonged wound healing, incomplete removal, allergy to anesthesia, nerve injury and recurrence were addressed. Prior to the procedure, the treatment site was clearly identified and confirmed by the patient. All components of Universal Protocol/PAUSE Rule completed.
Crescentic Intermediate Repair Preamble Text (Leave Blank If You Do Not Want): Undermining was performed with blunt dissection.
X Size Of Lesion In Cm (Optional): 0
Complex Repair And Dermal Autograft Text: The defect edges were debeveled with a #15 scalpel blade.  The primary defect was closed partially with a complex linear closure.  Given the location of the defect, shape of the defect and the proximity to free margins an dermal autograft was deemed most appropriate to repair the remaining defect.  The graft was trimmed to fit the size of the remaining defect.  The graft was then placed in the primary defect, oriented appropriately, and sutured into place.
O-T Advancement Flap Text: The defect edges were debeveled with a #15 scalpel blade.  Given the location of the defect, shape of the defect and the proximity to free margins an O-T advancement flap was deemed most appropriate.  Using a sterile surgical marker, an appropriate advancement flap was drawn incorporating the defect and placing the expected incisions within the relaxed skin tension lines where possible.    The area thus outlined was incised deep to adipose tissue with a #15 scalpel blade.  The skin margins were undermined to an appropriate distance in all directions utilizing iris scissors.
O-T Plasty Text: The defect edges were debeveled with a #15 scalpel blade.  Given the location of the defect, shape of the defect and the proximity to free margins an O-T plasty was deemed most appropriate.  Using a sterile surgical marker, an appropriate O-T plasty was drawn incorporating the defect and placing the expected incisions within the relaxed skin tension lines where possible.    The area thus outlined was incised deep to adipose tissue with a #15 scalpel blade.  The skin margins were undermined to an appropriate distance in all directions utilizing iris scissors.
V-Y Flap Text: The defect edges were debeveled with a #15 scalpel blade.  Given the location of the defect, shape of the defect and the proximity to free margins a V-Y flap was deemed most appropriate.  Using a sterile surgical marker, an appropriate advancement flap was drawn incorporating the defect and placing the expected incisions within the relaxed skin tension lines where possible.    The area thus outlined was incised deep to adipose tissue with a #15 scalpel blade.  The skin margins were undermined to an appropriate distance in all directions utilizing iris scissors.
Lab Facility: 
Complex Repair And Ftsg Text: The defect edges were debeveled with a #15 scalpel blade.  The primary defect was closed partially with a complex linear closure.  Given the location of the defect, shape of the defect and the proximity to free margins a full thickness skin graft was deemed most appropriate to repair the remaining defect.  The graft was trimmed to fit the size of the remaining defect.  The graft was then placed in the primary defect, oriented appropriately, and sutured into place.
Number Of Deep Sutures (Optional): 2
Keystone Flap Text: The defect edges were debeveled with a #15 scalpel blade.  Given the location of the defect, shape of the defect a keystone flap was deemed most appropriate.  Using a sterile surgical marker, an appropriate keystone flap was drawn incorporating the defect, outlining the appropriate donor tissue and placing the expected incisions within the relaxed skin tension lines where possible. The area thus outlined was incised deep to adipose tissue with a #15 scalpel blade.  The skin margins were undermined to an appropriate distance in all directions around the primary defect and laterally outward around the flap utilizing iris scissors.
Complex Repair And Modified Advancement Flap Text: The defect edges were debeveled with a #15 scalpel blade.  The primary defect was closed partially with a complex linear closure.  Given the location of the remaining defect, shape of the defect and the proximity to free margins a modified advancement flap was deemed most appropriate for complete closure of the defect.  Using a sterile surgical marker, an appropriate advancement flap was drawn incorporating the defect and placing the expected incisions within the relaxed skin tension lines where possible.    The area thus outlined was incised deep to adipose tissue with a #15 scalpel blade.  The skin margins were undermined to an appropriate distance in all directions utilizing iris scissors.
Dressing: pressure dressing
Rhombic Flap Text: The defect edges were debeveled with a #15 scalpel blade.  Given the location of the defect and the proximity to free margins a rhombic flap was deemed most appropriate.  Using a sterile surgical marker, an appropriate rhombic flap was drawn incorporating the defect.    The area thus outlined was incised deep to adipose tissue with a #15 scalpel blade.  The skin margins were undermined to an appropriate distance in all directions utilizing iris scissors.
Complex Repair And Xenograft Text: The defect edges were debeveled with a #15 scalpel blade.  The primary defect was closed partially with a complex linear closure.  Given the location of the defect, shape of the defect and the proximity to free margins a xenograft was deemed most appropriate to repair the remaining defect.  The graft was trimmed to fit the size of the remaining defect.  The graft was then placed in the primary defect, oriented appropriately, and sutured into place.
Posterior Auricular Interpolation Flap Text: A decision was made to reconstruct the defect utilizing an interpolation axial flap and a staged reconstruction.  A telfa template was made of the defect.  This telfa template was then used to outline the posterior auricular interpolation flap.  The donor area for the pedicle flap was then injected with anesthesia.  The flap was excised through the skin and subcutaneous tissue down to the layer of the underlying musculature.  The pedicle flap was carefully excised within this deep plane to maintain its blood supply.  The edges of the donor site were undermined.   The donor site was closed in a primary fashion.  The pedicle was then rotated into position and sutured.  Once the tube was sutured into place, adequate blood supply was confirmed with blanching and refill.  The pedicle was then wrapped with xeroform gauze and dressed appropriately with a telfa and gauze bandage to ensure continued blood supply and protect the attached pedicle.
Path Notes (To The Dermatopathologist): Please check margins.
Complex Repair And M Plasty Text: The defect edges were debeveled with a #15 scalpel blade.  The primary defect was closed partially with a complex linear closure.  Given the location of the remaining defect, shape of the defect and the proximity to free margins an M plasty was deemed most appropriate for complete closure of the defect.  Using a sterile surgical marker, an appropriate advancement flap was drawn incorporating the defect and placing the expected incisions within the relaxed skin tension lines where possible.    The area thus outlined was incised deep to adipose tissue with a #15 scalpel blade.  The skin margins were undermined to an appropriate distance in all directions utilizing iris scissors.
V-Y Plasty Text: The defect edges were debeveled with a #15 scalpel blade.  Given the location of the defect, shape of the defect and the proximity to free margins an V-Y advancement flap was deemed most appropriate.  Using a sterile surgical marker, an appropriate advancement flap was drawn incorporating the defect and placing the expected incisions within the relaxed skin tension lines where possible.    The area thus outlined was incised deep to adipose tissue with a #15 scalpel blade.  The skin margins were undermined to an appropriate distance in all directions utilizing iris scissors.
Complex Repair And V-Y Plasty Text: The defect edges were debeveled with a #15 scalpel blade.  The primary defect was closed partially with a complex linear closure.  Given the location of the remaining defect, shape of the defect and the proximity to free margins a V-Y plasty was deemed most appropriate for complete closure of the defect.  Using a sterile surgical marker, an appropriate advancement flap was drawn incorporating the defect and placing the expected incisions within the relaxed skin tension lines where possible.    The area thus outlined was incised deep to adipose tissue with a #15 scalpel blade.  The skin margins were undermined to an appropriate distance in all directions utilizing iris scissors.
Eliptical Excision Additional Text (Leave Blank If You Do Not Want): The margin was drawn around the clinically apparent lesion.  An elliptical shape was then drawn on the skin incorporating the lesion and margins.  Incisions were then made along these lines to the appropriate tissue plane and the lesion was extirpated.
Complex Repair And O-T Advancement Flap Text: The defect edges were debeveled with a #15 scalpel blade.  The primary defect was closed partially with a complex linear closure.  Given the location of the remaining defect, shape of the defect and the proximity to free margins an O-T advancement flap was deemed most appropriate for complete closure of the defect.  Using a sterile surgical marker, an appropriate advancement flap was drawn incorporating the defect and placing the expected incisions within the relaxed skin tension lines where possible.    The area thus outlined was incised deep to adipose tissue with a #15 scalpel blade.  The skin margins were undermined to an appropriate distance in all directions utilizing iris scissors.
Melolabial Interpolation Flap Text: A decision was made to reconstruct the defect utilizing an interpolation axial flap and a staged reconstruction.  A telfa template was made of the defect.  This telfa template was then used to outline the melolabial interpolation flap.  The donor area for the pedicle flap was then injected with anesthesia.  The flap was excised through the skin and subcutaneous tissue down to the layer of the underlying musculature.  The pedicle flap was carefully excised within this deep plane to maintain its blood supply.  The edges of the donor site were undermined.   The donor site was closed in a primary fashion.  The pedicle was then rotated into position and sutured.  Once the tube was sutured into place, adequate blood supply was confirmed with blanching and refill.  The pedicle was then wrapped with xeroform gauze and dressed appropriately with a telfa and gauze bandage to ensure continued blood supply and protect the attached pedicle.
Complex Repair And Rotation Flap Text: The defect edges were debeveled with a #15 scalpel blade.  The primary defect was closed partially with a complex linear closure.  Given the location of the remaining defect, shape of the defect and the proximity to free margins a rotation flap was deemed most appropriate for complete closure of the defect.  Using a sterile surgical marker, an appropriate advancement flap was drawn incorporating the defect and placing the expected incisions within the relaxed skin tension lines where possible.    The area thus outlined was incised deep to adipose tissue with a #15 scalpel blade.  The skin margins were undermined to an appropriate distance in all directions utilizing iris scissors.
Complex Repair And Dorsal Nasal Flap Text: The defect edges were debeveled with a #15 scalpel blade.  The primary defect was closed partially with a complex linear closure.  Given the location of the remaining defect, shape of the defect and the proximity to free margins a dorsal nasal flap was deemed most appropriate for complete closure of the defect.  Using a sterile surgical marker, an appropriate flap was drawn incorporating the defect and placing the expected incisions within the relaxed skin tension lines where possible.    The area thus outlined was incised deep to adipose tissue with a #15 scalpel blade.  The skin margins were undermined to an appropriate distance in all directions utilizing iris scissors.
Double Island Pedicle Flap Text: The defect edges were debeveled with a #15 scalpel blade.  Given the location of the defect, shape of the defect and the proximity to free margins a double island pedicle advancement flap was deemed most appropriate.  Using a sterile surgical marker, an appropriate advancement flap was drawn incorporating the defect, outlining the appropriate donor tissue and placing the expected incisions within the relaxed skin tension lines where possible.    The area thus outlined was incised deep to adipose tissue with a #15 scalpel blade.  The skin margins were undermined to an appropriate distance in all directions around the primary defect and laterally outward around the island pedicle utilizing iris scissors.  There was minimal undermining beneath the pedicle flap.
Excision Method: Fusiform
Previous Accession (Optional): [id number]
Epidermal Autograft Text: The defect edges were debeveled with a #15 scalpel blade.  Given the location of the defect, shape of the defect and the proximity to free margins an epidermal autograft was deemed most appropriate.  Using a sterile surgical marker, the primary defect shape was transferred to the donor site. The epidermal graft was then harvested.  The skin graft was then placed in the primary defect and oriented appropriately.
Scalpel Size: 15 blade
Complex Repair And A-T Advancement Flap Text: The defect edges were debeveled with a #15 scalpel blade.  The primary defect was closed partially with a complex linear closure.  Given the location of the remaining defect, shape of the defect and the proximity to free margins an A-T advancement flap was deemed most appropriate for complete closure of the defect.  Using a sterile surgical marker, an appropriate advancement flap was drawn incorporating the defect and placing the expected incisions within the relaxed skin tension lines where possible.    The area thus outlined was incised deep to adipose tissue with a #15 scalpel blade.  The skin margins were undermined to an appropriate distance in all directions utilizing iris scissors.
Ftsg Text: The defect edges were debeveled with a #15 scalpel blade.  Given the location of the defect, shape of the defect and the proximity to free margins a full thickness skin graft was deemed most appropriate.  Using a sterile surgical marker, the primary defect shape was transferred to the donor site. The area thus outlined was incised deep to adipose tissue with a #15 scalpel blade.  The harvested graft was then trimmed of adipose tissue until only dermis and epidermis was left.  The skin margins of the secondary defect were undermined to an appropriate distance in all directions utilizing iris scissors.  The secondary defect was closed with interrupted buried subcutaneous sutures.  The skin edges were then re-apposed with running  sutures.  The skin graft was then placed in the primary defect and oriented appropriately.
Anesthesia Type: 1% lidocaine with 1:100,000 epinephrine and a 1:10 solution of 8.4% sodium bicarbonate
H Plasty Text: Given the location of the defect, shape of the defect and the proximity to free margins a H-plasty was deemed most appropriate for repair.  Using a sterile surgical marker, the appropriate advancement arms of the H-plasty were drawn incorporating the defect and placing the expected incisions within the relaxed skin tension lines where possible. The area thus outlined was incised deep to adipose tissue with a #15 scalpel blade. The skin margins were undermined to an appropriate distance in all directions utilizing iris scissors.  The opposing advancement arms were then advanced into place in opposite direction and anchored with interrupted buried subcutaneous sutures.
Complex Repair And Z Plasty Text: The defect edges were debeveled with a #15 scalpel blade.  The primary defect was closed partially with a complex linear closure.  Given the location of the remaining defect, shape of the defect and the proximity to free margins a Z plasty was deemed most appropriate for complete closure of the defect.  Using a sterile surgical marker, an appropriate advancement flap was drawn incorporating the defect and placing the expected incisions within the relaxed skin tension lines where possible.    The area thus outlined was incised deep to adipose tissue with a #15 scalpel blade.  The skin margins were undermined to an appropriate distance in all directions utilizing iris scissors.
Anesthesia Volume In Cc: 9
O-L Flap Text: The defect edges were debeveled with a #15 scalpel blade.  Given the location of the defect, shape of the defect and the proximity to free margins an O-L flap was deemed most appropriate.  Using a sterile surgical marker, an appropriate advancement flap was drawn incorporating the defect and placing the expected incisions within the relaxed skin tension lines where possible.    The area thus outlined was incised deep to adipose tissue with a #15 scalpel blade.  The skin margins were undermined to an appropriate distance in all directions utilizing iris scissors.
Intermediate / Complex Repair - Final Wound Length In Cm: 3.1
Size Of Lesion In Cm: 0.6
Excision Depth: adipose tissue
Epidermal Closure: running cuticular
Complex Repair And Tissue Cultured Epidermal Autograft Text: The defect edges were debeveled with a #15 scalpel blade.  The primary defect was closed partially with a complex linear closure.  Given the location of the defect, shape of the defect and the proximity to free margins an tissue cultured epidermal autograft was deemed most appropriate to repair the remaining defect.  The graft was trimmed to fit the size of the remaining defect.  The graft was then placed in the primary defect, oriented appropriately, and sutured into place.
O-Z Plasty Text: The defect edges were debeveled with a #15 scalpel blade.  Given the location of the defect, shape of the defect and the proximity to free margins an O-Z plasty (double transposition flap) was deemed most appropriate.  Using a sterile surgical marker, the appropriate transposition flaps were drawn incorporating the defect and placing the expected incisions within the relaxed skin tension lines where possible.    The area thus outlined was incised deep to adipose tissue with a #15 scalpel blade.  The skin margins were undermined to an appropriate distance in all directions utilizing iris scissors.  Hemostasis was achieved with electrocautery.  The flaps were then transposed into place, one clockwise and the other counterclockwise, and anchored with interrupted buried subcutaneous sutures.
No Repair - Repaired With Adjacent Surgical Defect Text (Leave Blank If You Do Not Want): After the excision the defect was repaired concurrently with another surgical defect which was in close approximation.
Fusiform Excision Additional Text (Leave Blank If You Do Not Want): The margin was drawn around the clinically apparent lesion.  A fusiform shape was then drawn on the skin incorporating the lesion and margins.  Incisions were then made along these lines to the appropriate tissue plane and the lesion was extirpated.
Post-Care Instructions: I reviewed with the patient in detail post-care instructions. Patient is not to engage in any heavy lifting, exercise, or swimming for the next 14 days. Should the patient develop any fevers, chills, bleeding, severe pain patient will contact the office immediately.
Tissue Cultured Epidermal Autograft Text: The defect edges were debeveled with a #15 scalpel blade.  Given the location of the defect, shape of the defect and the proximity to free margins a tissue cultured epidermal autograft was deemed most appropriate.  The graft was then trimmed to fit the size of the defect.  The graft was then placed in the primary defect and oriented appropriately.
Bilobed Flap Text: The defect edges were debeveled with a #15 scalpel blade.  Given the location of the defect and the proximity to free margins a bilobe flap was deemed most appropriate.  Using a sterile surgical marker, an appropriate bilobe flap drawn around the defect.    The area thus outlined was incised deep to adipose tissue with a #15 scalpel blade.  The skin margins were undermined to an appropriate distance in all directions utilizing iris scissors.
Xenograft Text: The defect edges were debeveled with a #15 scalpel blade.  Given the location of the defect, shape of the defect and the proximity to free margins a xenograft was deemed most appropriate.  The graft was then trimmed to fit the size of the defect.  The graft was then placed in the primary defect and oriented appropriately.
Medical Necessity Clause: This procedure was medically necessary because the lesion that was treated was:
Advancement Flap (Single) Text: The defect edges were debeveled with a #15 scalpel blade.  Given the location of the defect and the proximity to free margins a single advancement flap was deemed most appropriate.  Using a sterile surgical marker, an appropriate advancement flap was drawn incorporating the defect and placing the expected incisions within the relaxed skin tension lines where possible.    The area thus outlined was incised deep to adipose tissue with a #15 scalpel blade.  The skin margins were undermined to an appropriate distance in all directions utilizing iris scissors.
Hemostasis: Electrocautery
Complex Repair And Skin Substitute Graft Text: The defect edges were debeveled with a #15 scalpel blade.  The primary defect was closed partially with a complex linear closure.  Given the location of the remaining defect, shape of the defect and the proximity to free margins a skin substitute graft was deemed most appropriate to repair the remaining defect.  The graft was trimmed to fit the size of the remaining defect.  The graft was then placed in the primary defect, oriented appropriately, and sutured into place.
Epidermal Sutures: 5-0 Nylon
Dermal Autograft Text: The defect edges were debeveled with a #15 scalpel blade.  Given the location of the defect, shape of the defect and the proximity to free margins a dermal autograft was deemed most appropriate.  Using a sterile surgical marker, the primary defect shape was transferred to the donor site. The area thus outlined was incised deep to adipose tissue with a #15 scalpel blade.  The harvested graft was then trimmed of adipose and epidermal tissue until only dermis was left.  The skin graft was then placed in the primary defect and oriented appropriately.
Graft Donor Site Bandage (Optional-Leave Blank If You Don't Want In Note): Steri-strips and a pressure bandage were applied to the donor site.
W Plasty Text: The lesion was extirpated to the level of the fat with a #15 scalpel blade.  Given the location of the defect, shape of the defect and the proximity to free margins a W-plasty was deemed most appropriate for repair.  Using a sterile surgical marker, the appropriate transposition arms of the W-plasty were drawn incorporating the defect and placing the expected incisions within the relaxed skin tension lines where possible.    The area thus outlined was incised deep to adipose tissue with a #15 scalpel blade.  The skin margins were undermined to an appropriate distance in all directions utilizing iris scissors.  The opposing transposition arms were then transposed into place in opposite direction and anchored with interrupted buried subcutaneous sutures.
Alar Island Pedicle Flap Text: The defect edges were debeveled with a #15 scalpel blade.  Given the location of the defect, shape of the defect and the proximity to the alar rim an island pedicle advancement flap was deemed most appropriate.  Using a sterile surgical marker, an appropriate advancement flap was drawn incorporating the defect, outlining the appropriate donor tissue and placing the expected incisions within the nasal ala running parallel to the alar rim. The area thus outlined was incised with a #15 scalpel blade.  The skin margins were undermined minimally to an appropriate distance in all directions around the primary defect and laterally outward around the island pedicle utilizing iris scissors.  There was minimal undermining beneath the pedicle flap.
Bi-Rhombic Flap Text: The defect edges were debeveled with a #15 scalpel blade.  Given the location of the defect and the proximity to free margins a bi-rhombic flap was deemed most appropriate.  Using a sterile surgical marker, an appropriate rhombic flap was drawn incorporating the defect. The area thus outlined was incised deep to adipose tissue with a #15 scalpel blade.  The skin margins were undermined to an appropriate distance in all directions utilizing iris scissors.
Complex Repair And Double Advancement Flap Text: The defect edges were debeveled with a #15 scalpel blade.  The primary defect was closed partially with a complex linear closure.  Given the location of the remaining defect, shape of the defect and the proximity to free margins a double advancement flap was deemed most appropriate for complete closure of the defect.  Using a sterile surgical marker, an appropriate advancement flap was drawn incorporating the defect and placing the expected incisions within the relaxed skin tension lines where possible.    The area thus outlined was incised deep to adipose tissue with a #15 scalpel blade.  The skin margins were undermined to an appropriate distance in all directions utilizing iris scissors.
Muscle Hinge Flap Text: The defect edges were debeveled with a #15 scalpel blade.  Given the size, depth and location of the defect and the proximity to free margins a muscle hinge flap was deemed most appropriate.  Using a sterile surgical marker, an appropriate hinge flap was drawn incorporating the defect. The area thus outlined was incised with a #15 scalpel blade.  The skin margins were undermined to an appropriate distance in all directions utilizing iris scissors.
Mucosal Advancement Flap Text: Given the location of the defect, shape of the defect and the proximity to free margins a mucosal advancement flap was deemed most appropriate. Incisions were made with a 15 blade scalpel in the appropriate fashion along the cutaneous vermillion border and the mucosal lip. The remaining actinically damaged mucosal tissue was excised.  The mucosal advancement flap was then elevated to the gingival sulcus with care taken to preserve the neurovascular structures and advanced into the primary defect. Care was taken to ensure that precise realignment of the vermilion border was achieved.
Crescentic Advancement Flap Text: The defect edges were debeveled with a #15 scalpel blade.  Given the location of the defect and the proximity to free margins a crescentic advancement flap was deemed most appropriate.  Using a sterile surgical marker, the appropriate advancement flap was drawn incorporating the defect and placing the expected incisions within the relaxed skin tension lines where possible.    The area thus outlined was incised deep to adipose tissue with a #15 scalpel blade.  The skin margins were undermined to an appropriate distance in all directions utilizing iris scissors.
Rotation Flap Text: The defect edges were debeveled with a #15 scalpel blade.  Given the location of the defect, shape of the defect and the proximity to free margins a rotation flap was deemed most appropriate.  Using a sterile surgical marker, an appropriate rotation flap was drawn incorporating the defect and placing the expected incisions within the relaxed skin tension lines where possible.    The area thus outlined was incised deep to adipose tissue with a #15 scalpel blade.  The skin margins were undermined to an appropriate distance in all directions utilizing iris scissors.
Perilesional Excision Additional Text (Leave Blank If You Do Not Want): The margin was drawn around the clinically apparent lesion. Incisions were then made along these lines to the appropriate tissue plane and the lesion was extirpated.
Lab: [id number]
Complex Repair And Split-Thickness Skin Graft Text: The defect edges were debeveled with a #15 scalpel blade.  The primary defect was closed partially with a complex linear closure.  Given the location of the defect, shape of the defect and the proximity to free margins a split thickness skin graft was deemed most appropriate to repair the remaining defect.  The graft was trimmed to fit the size of the remaining defect.  The graft was then placed in the primary defect, oriented appropriately, and sutured into place.
Interpolation Flap Text: A decision was made to reconstruct the defect utilizing an interpolation axial flap and a staged reconstruction.  A telfa template was made of the defect.  This telfa template was then used to outline the interpolation flap.  The donor area for the pedicle flap was then injected with anesthesia.  The flap was excised through the skin and subcutaneous tissue down to the layer of the underlying musculature.  The interpolation flap was carefully excised within this deep plane to maintain its blood supply.  The edges of the donor site were undermined.   The donor site was closed in a primary fashion.  The pedicle was then rotated into position and sutured.  Once the tube was sutured into place, adequate blood supply was confirmed with blanching and refill.  The pedicle was then wrapped with xeroform gauze and dressed appropriately with a telfa and gauze bandage to ensure continued blood supply and protect the attached pedicle.
Complex Repair And O-L Flap Text: The defect edges were debeveled with a #15 scalpel blade.  The primary defect was closed partially with a complex linear closure.  Given the location of the remaining defect, shape of the defect and the proximity to free margins an O-L flap was deemed most appropriate for complete closure of the defect.  Using a sterile surgical marker, an appropriate flap was drawn incorporating the defect and placing the expected incisions within the relaxed skin tension lines where possible.    The area thus outlined was incised deep to adipose tissue with a #15 scalpel blade.  The skin margins were undermined to an appropriate distance in all directions utilizing iris scissors.
Transposition Flap Text: The defect edges were debeveled with a #15 scalpel blade.  Given the location of the defect and the proximity to free margins a transposition flap was deemed most appropriate.  Using a sterile surgical marker, an appropriate transposition flap was drawn incorporating the defect.    The area thus outlined was incised deep to adipose tissue with a #15 scalpel blade.  The skin margins were undermined to an appropriate distance in all directions utilizing iris scissors.
Complex Repair And Double M Plasty Text: The defect edges were debeveled with a #15 scalpel blade.  The primary defect was closed partially with a complex linear closure.  Given the location of the remaining defect, shape of the defect and the proximity to free margins a double M plasty was deemed most appropriate for complete closure of the defect.  Using a sterile surgical marker, an appropriate advancement flap was drawn incorporating the defect and placing the expected incisions within the relaxed skin tension lines where possible.    The area thus outlined was incised deep to adipose tissue with a #15 scalpel blade.  The skin margins were undermined to an appropriate distance in all directions utilizing iris scissors.
Helical Rim Advancement Flap Text: The defect edges were debeveled with a #15 blade scalpel.  Given the location of the defect and the proximity to free margins (helical rim) a double helical rim advancement flap was deemed most appropriate.  Using a sterile surgical marker, the appropriate advancement flaps were drawn incorporating the defect and placing the expected incisions between the helical rim and antihelix where possible.  The area thus outlined was incised through and through with a #15 scalpel blade.  With a skin hook and iris scissors, the flaps were gently and sharply undermined and freed up.
Complex Repair And W Plasty Text: The defect edges were debeveled with a #15 scalpel blade.  The primary defect was closed partially with a complex linear closure.  Given the location of the remaining defect, shape of the defect and the proximity to free margins a W plasty was deemed most appropriate for complete closure of the defect.  Using a sterile surgical marker, an appropriate advancement flap was drawn incorporating the defect and placing the expected incisions within the relaxed skin tension lines where possible.    The area thus outlined was incised deep to adipose tissue with a #15 scalpel blade.  The skin margins were undermined to an appropriate distance in all directions utilizing iris scissors.
Lip Wedge Excision Repair Text: Given the location of the defect and the proximity to free margins a full thickness wedge repair was deemed most appropriate.  Using a sterile surgical marker, the appropriate repair was drawn incorporating the defect and placing the expected incisions perpendicular to the vermilion border.  The vermilion border was also meticulously outlined to ensure appropriate reapproximation during the repair.  The area thus outlined was incised through and through with a #15 scalpel blade.  The muscularis and dermis were reaproximated with deep sutures following hemostasis. Care was taken to realign the vermilion border before proceeding with the superficial closure.  Once the vermilion was realigned the superfical and mucosal closure was finished.
Bilobed Transposition Flap Text: The defect edges were debeveled with a #15 scalpel blade.  Given the location of the defect and the proximity to free margins a bilobed transposition flap was deemed most appropriate.  Using a sterile surgical marker, an appropriate bilobe flap drawn around the defect.    The area thus outlined was incised deep to adipose tissue with a #15 scalpel blade.  The skin margins were undermined to an appropriate distance in all directions utilizing iris scissors.
Hatchet Flap Text: The defect edges were debeveled with a #15 scalpel blade.  Given the location of the defect, shape of the defect and the proximity to free margins a hatchet flap was deemed most appropriate.  Using a sterile surgical marker, an appropriate hatchet flap was drawn incorporating the defect and placing the expected incisions within the relaxed skin tension lines where possible.    The area thus outlined was incised deep to adipose tissue with a #15 scalpel blade.  The skin margins were undermined to an appropriate distance in all directions utilizing iris scissors.
Deep Sutures: 5-0 Vicryl
Cheek-To-Nose Interpolation Flap Text: A decision was made to reconstruct the defect utilizing an interpolation axial flap and a staged reconstruction.  A telfa template was made of the defect.  This telfa template was then used to outline the Cheek-To-Nose Interpolation flap.  The donor area for the pedicle flap was then injected with anesthesia.  The flap was excised through the skin and subcutaneous tissue down to the layer of the underlying musculature.  The interpolation flap was carefully excised within this deep plane to maintain its blood supply.  The edges of the donor site were undermined.   The donor site was closed in a primary fashion.  The pedicle was then rotated into position and sutured.  Once the tube was sutured into place, adequate blood supply was confirmed with blanching and refill.  The pedicle was then wrapped with xeroform gauze and dressed appropriately with a telfa and gauze bandage to ensure continued blood supply and protect the attached pedicle.
Skin Substitute Text: The defect edges were debeveled with a #15 scalpel blade.  Given the location of the defect, shape of the defect and the proximity to free margins a skin substitute graft was deemed most appropriate.  The graft material was trimmed to fit the size of the defect. The graft was then placed in the primary defect and oriented appropriately.
Modified Advancement Flap Text: The defect edges were debeveled with a #15 scalpel blade.  Given the location of the defect, shape of the defect and the proximity to free margins a modified advancement flap was deemed most appropriate.  Using a sterile surgical marker, an appropriate advancement flap was drawn incorporating the defect and placing the expected incisions within the relaxed skin tension lines where possible.    The area thus outlined was incised deep to adipose tissue with a #15 scalpel blade.  The skin margins were undermined to an appropriate distance in all directions utilizing iris scissors.
Purse String (Simple) Text: Given the location of the defect and the characteristics of the surrounding skin a purse string simple closure was deemed most appropriate.  Undermining was performed circumferentially around the surgical defect.  A purse string suture was then placed and tightened.
Advancement Flap (Double) Text: The defect edges were debeveled with a #15 scalpel blade.  Given the location of the defect and the proximity to free margins a double advancement flap was deemed most appropriate.  Using a sterile surgical marker, the appropriate advancement flaps were drawn incorporating the defect and placing the expected incisions within the relaxed skin tension lines where possible.    The area thus outlined was incised deep to adipose tissue with a #15 scalpel blade.  The skin margins were undermined to an appropriate distance in all directions utilizing iris scissors.
Split-Thickness Skin Graft Text: The defect edges were debeveled with a #15 scalpel blade.  Given the location of the defect, shape of the defect and the proximity to free margins a split thickness skin graft was deemed most appropriate.  Using a sterile surgical marker, the primary defect shape was transferred to the donor site. The split thickness graft was then harvested.  The skin graft was then placed in the primary defect and oriented appropriately.
Excisional Biopsy Additional Text (Leave Blank If You Do Not Want): The margin was drawn around the clinically apparent lesion. An elliptical shape was then drawn on the skin incorporating the lesion and margins.  Incisions were then made along these lines to the appropriate tissue plane and the lesion was extirpated.
Spiral Flap Text: The defect edges were debeveled with a #15 scalpel blade.  Given the location of the defect, shape of the defect and the proximity to free margins a spiral flap was deemed most appropriate.  Using a sterile surgical marker, an appropriate rotation flap was drawn incorporating the defect and placing the expected incisions within the relaxed skin tension lines where possible. The area thus outlined was incised deep to adipose tissue with a #15 scalpel blade.  The skin margins were undermined to an appropriate distance in all directions utilizing iris scissors.
Date Of Previous Biopsy (Optional): 3/7/18
Island Pedicle Flap With Canthal Suspension Text: The defect edges were debeveled with a #15 scalpel blade.  Given the location of the defect, shape of the defect and the proximity to free margins an island pedicle advancement flap was deemed most appropriate.  Using a sterile surgical marker, an appropriate advancement flap was drawn incorporating the defect, outlining the appropriate donor tissue and placing the expected incisions within the relaxed skin tension lines where possible. The area thus outlined was incised deep to adipose tissue with a #15 scalpel blade.  The skin margins were undermined to an appropriate distance in all directions around the primary defect and laterally outward around the island pedicle utilizing iris scissors.  There was minimal undermining beneath the pedicle flap. A suspension suture was placed in the canthal tendon to prevent tension and prevent ectropion.
Wound Care: Vaseline
Island Pedicle Flap Text: The defect edges were debeveled with a #15 scalpel blade.  Given the location of the defect, shape of the defect and the proximity to free margins an island pedicle advancement flap was deemed most appropriate.  Using a sterile surgical marker, an appropriate advancement flap was drawn incorporating the defect, outlining the appropriate donor tissue and placing the expected incisions within the relaxed skin tension lines where possible.    The area thus outlined was incised deep to adipose tissue with a #15 scalpel blade.  The skin margins were undermined to an appropriate distance in all directions around the primary defect and laterally outward around the island pedicle utilizing iris scissors.  There was minimal undermining beneath the pedicle flap.
Island Pedicle Flap-Requiring Vessel Identification Text: The defect edges were debeveled with a #15 scalpel blade.  Given the location of the defect, shape of the defect and the proximity to free margins an island pedicle advancement flap was deemed most appropriate.  Using a sterile surgical marker, an appropriate advancement flap was drawn, based on the axial vessel mentioned above, incorporating the defect, outlining the appropriate donor tissue and placing the expected incisions within the relaxed skin tension lines where possible.    The area thus outlined was incised deep to adipose tissue with a #15 scalpel blade.  The skin margins were undermined to an appropriate distance in all directions around the primary defect and laterally outward around the island pedicle utilizing iris scissors.  There was minimal undermining beneath the pedicle flap.
Complex Repair And Rhombic Flap Text: The defect edges were debeveled with a #15 scalpel blade.  The primary defect was closed partially with a complex linear closure.  Given the location of the remaining defect, shape of the defect and the proximity to free margins a rhombic flap was deemed most appropriate for complete closure of the defect.  Using a sterile surgical marker, an appropriate advancement flap was drawn incorporating the defect and placing the expected incisions within the relaxed skin tension lines where possible.    The area thus outlined was incised deep to adipose tissue with a #15 scalpel blade.  The skin margins were undermined to an appropriate distance in all directions utilizing iris scissors.
Star Wedge Flap Text: The defect edges were debeveled with a #15 scalpel blade.  Given the location of the defect, shape of the defect and the proximity to free margins a star wedge flap was deemed most appropriate.  Using a sterile surgical marker, an appropriate rotation flap was drawn incorporating the defect and placing the expected incisions within the relaxed skin tension lines where possible. The area thus outlined was incised deep to adipose tissue with a #15 scalpel blade.  The skin margins were undermined to an appropriate distance in all directions utilizing iris scissors.
Billing Type: Third-Party Bill
Complex Repair And Bilobe Flap Text: The defect edges were debeveled with a #15 scalpel blade.  The primary defect was closed partially with a complex linear closure.  Given the location of the remaining defect, shape of the defect and the proximity to free margins a bilobe flap was deemed most appropriate for complete closure of the defect.  Using a sterile surgical marker, an appropriate advancement flap was drawn incorporating the defect and placing the expected incisions within the relaxed skin tension lines where possible.    The area thus outlined was incised deep to adipose tissue with a #15 scalpel blade.  The skin margins were undermined to an appropriate distance in all directions utilizing iris scissors.
Cheek Interpolation Flap Text: A decision was made to reconstruct the defect utilizing an interpolation axial flap and a staged reconstruction.  A telfa template was made of the defect.  This telfa template was then used to outline the Cheek Interpolation flap.  The donor area for the pedicle flap was then injected with anesthesia.  The flap was excised through the skin and subcutaneous tissue down to the layer of the underlying musculature.  The interpolation flap was carefully excised within this deep plane to maintain its blood supply.  The edges of the donor site were undermined.   The donor site was closed in a primary fashion.  The pedicle was then rotated into position and sutured.  Once the tube was sutured into place, adequate blood supply was confirmed with blanching and refill.  The pedicle was then wrapped with xeroform gauze and dressed appropriately with a telfa and gauze bandage to ensure continued blood supply and protect the attached pedicle.
Complex Repair And Melolabial Flap Text: The defect edges were debeveled with a #15 scalpel blade.  The primary defect was closed partially with a complex linear closure.  Given the location of the remaining defect, shape of the defect and the proximity to free margins a melolabial flap was deemed most appropriate for complete closure of the defect.  Using a sterile surgical marker, an appropriate advancement flap was drawn incorporating the defect and placing the expected incisions within the relaxed skin tension lines where possible.    The area thus outlined was incised deep to adipose tissue with a #15 scalpel blade.  The skin margins were undermined to an appropriate distance in all directions utilizing iris scissors.
Repair Type: Intermediate
Purse String (Intermediate) Text: Given the location of the defect and the characteristics of the surrounding skin a purse string intermediate closure was deemed most appropriate.  Undermining was performed circumferentially around the surgical defect.  A purse string suture was then placed and tightened.
Cartilage Graft Text: The defect edges were debeveled with a #15 scalpel blade.  Given the location of the defect, shape of the defect, the fact the defect involved a full thickness cartilage defect a cartilage graft was deemed most appropriate.  An appropriate donor site was identified, cleansed, and anesthetized. The cartilage graft was then harvested and transferred to the recipient site, oriented appropriately and then sutured into place.  The secondary defect was then repaired using a primary closure.
Ear Star Wedge Flap Text: The defect edges were debeveled with a #15 blade scalpel.  Given the location of the defect and the proximity to free margins (helical rim) an ear star wedge flap was deemed most appropriate.  Using a sterile surgical marker, the appropriate flap was drawn incorporating the defect and placing the expected incisions between the helical rim and antihelix where possible.  The area thus outlined was incised through and through with a #15 scalpel blade.
A-T Advancement Flap Text: The defect edges were debeveled with a #15 scalpel blade.  Given the location of the defect, shape of the defect and the proximity to free margins an A-T advancement flap was deemed most appropriate.  Using a sterile surgical marker, an appropriate advancement flap was drawn incorporating the defect and placing the expected incisions within the relaxed skin tension lines where possible.    The area thus outlined was incised deep to adipose tissue with a #15 scalpel blade.  The skin margins were undermined to an appropriate distance in all directions utilizing iris scissors.
Complex Repair And Epidermal Autograft Text: The defect edges were debeveled with a #15 scalpel blade.  The primary defect was closed partially with a complex linear closure.  Given the location of the defect, shape of the defect and the proximity to free margins an epidermal autograft was deemed most appropriate to repair the remaining defect.  The graft was trimmed to fit the size of the remaining defect.  The graft was then placed in the primary defect, oriented appropriately, and sutured into place.
Z Plasty Text: The lesion was extirpated to the level of the fat with a #15 scalpel blade.  Given the location of the defect, shape of the defect and the proximity to free margins a Z-plasty was deemed most appropriate for repair.  Using a sterile surgical marker, the appropriate transposition arms of the Z-plasty were drawn incorporating the defect and placing the expected incisions within the relaxed skin tension lines where possible.    The area thus outlined was incised deep to adipose tissue with a #15 scalpel blade.  The skin margins were undermined to an appropriate distance in all directions utilizing iris scissors.  The opposing transposition arms were then transposed into place in opposite direction and anchored with interrupted buried subcutaneous sutures.
Medical Necessity Information: It is in your best interest to select a reason for this procedure from the list below. All of these items fulfill various CMS LCD requirements except lesion extends to a margin.
Complex Repair And Single Advancement Flap Text: The defect edges were debeveled with a #15 scalpel blade.  The primary defect was closed partially with a complex linear closure.  Given the location of the remaining defect, shape of the defect and the proximity to free margins a single advancement flap was deemed most appropriate for complete closure of the defect.  Using a sterile surgical marker, an appropriate advancement flap was drawn incorporating the defect and placing the expected incisions within the relaxed skin tension lines where possible.    The area thus outlined was incised deep to adipose tissue with a #15 scalpel blade.  The skin margins were undermined to an appropriate distance in all directions utilizing iris scissors.
Paramedian Forehead Flap Text: A decision was made to reconstruct the defect utilizing an interpolation axial flap and a staged reconstruction.  A telfa template was made of the defect.  This telfa template was then used to outline the paramedian forehead pedicle flap.  The donor area for the pedicle flap was then injected with anesthesia.  The flap was excised through the skin and subcutaneous tissue down to the layer of the underlying musculature.  The pedicle flap was carefully excised within this deep plane to maintain its blood supply.  The edges of the donor site were undermined.   The donor site was closed in a primary fashion.  The pedicle was then rotated into position and sutured.  Once the tube was sutured into place, adequate blood supply was confirmed with blanching and refill.  The pedicle was then wrapped with xeroform gauze and dressed appropriately with a telfa and gauze bandage to ensure continued blood supply and protect the attached pedicle.
Detail Level: Detailed

## 2018-04-19 ENCOUNTER — APPOINTMENT (RX ONLY)
Dept: URBAN - METROPOLITAN AREA CLINIC 4 | Facility: CLINIC | Age: 78
Setting detail: DERMATOLOGY
End: 2018-04-19

## 2018-04-19 DIAGNOSIS — Z48.02 ENCOUNTER FOR REMOVAL OF SUTURES: ICD-10-CM

## 2018-04-19 PROCEDURE — ? SUTURE REMOVAL (NO GLOBAL PERIOD)

## 2018-04-19 PROCEDURE — 99212 OFFICE O/P EST SF 10 MIN: CPT

## 2018-04-19 ASSESSMENT — LOCATION ZONE DERM: LOCATION ZONE: ARM

## 2018-04-19 ASSESSMENT — LOCATION DETAILED DESCRIPTION DERM: LOCATION DETAILED: RIGHT POSTERIOR SHOULDER

## 2018-04-19 ASSESSMENT — LOCATION SIMPLE DESCRIPTION DERM: LOCATION SIMPLE: RIGHT SHOULDER

## 2018-05-18 ENCOUNTER — OFFICE VISIT (OUTPATIENT)
Dept: CARDIOLOGY | Facility: MEDICAL CENTER | Age: 78
End: 2018-05-18
Payer: MEDICARE

## 2018-05-18 VITALS
SYSTOLIC BLOOD PRESSURE: 126 MMHG | DIASTOLIC BLOOD PRESSURE: 72 MMHG | WEIGHT: 225 LBS | HEIGHT: 73 IN | HEART RATE: 75 BPM | BODY MASS INDEX: 29.82 KG/M2 | OXYGEN SATURATION: 95 %

## 2018-05-18 DIAGNOSIS — D69.6 THROMBOCYTOPENIA (HCC): ICD-10-CM

## 2018-05-18 DIAGNOSIS — K74.60 CIRRHOSIS OF LIVER WITHOUT ASCITES, UNSPECIFIED HEPATIC CIRRHOSIS TYPE (HCC): ICD-10-CM

## 2018-05-18 DIAGNOSIS — I49.5 SINOATRIAL NODE DYSFUNCTION (HCC): Chronic | ICD-10-CM

## 2018-05-18 DIAGNOSIS — I48.0 PAROXYSMAL ATRIAL FIBRILLATION (HCC): Chronic | ICD-10-CM

## 2018-05-18 DIAGNOSIS — Z95.0 PRESENCE OF PERMANENT CARDIAC PACEMAKER: Chronic | ICD-10-CM

## 2018-05-18 PROCEDURE — 99214 OFFICE O/P EST MOD 30 MIN: CPT | Mod: 25 | Performed by: INTERNAL MEDICINE

## 2018-05-18 PROCEDURE — 93280 PM DEVICE PROGR EVAL DUAL: CPT | Performed by: INTERNAL MEDICINE

## 2018-05-18 RX ORDER — PROMETHAZINE HYDROCHLORIDE 25 MG/1
25 TABLET ORAL EVERY 6 HOURS PRN
Status: ON HOLD | COMMUNITY
End: 2022-04-15

## 2018-05-18 RX ORDER — ZOLPIDEM TARTRATE 10 MG/1
TABLET ORAL
Refills: 5 | COMMUNITY
Start: 2018-05-08 | End: 2021-04-08

## 2018-05-18 NOTE — LETTER
"     [facility]   [address], [address]  [place] [address]  Phone: [phone]  Fax: [phone]              Luigi Walters  1940    Encounter Date: 5/18/2018    Chaitanya Walters M.D.          PROGRESS NOTE:  Chief Complaint   Patient presents with   • Atrial Fibrillation     F/V DX:PAF       Subjective:   Luigi Walters is a 77 y.o. male who presents today with SSS. PPM nl function. Some DJD complaints. No chest pain or SOB. No bleeding. No real a fib on PPM.    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 Difficile   • Jaundice    • Melena    • Melena    • Osteoarthritis    • Pain      L shoulder=6/10>chronic   • Pneumonia 1972   • Polycythemia    • Presence of permanent cardiac pacemaker 2007/2015   • Prostate cancer (HCC)    • Prostate cancer (HCC) 2000        • Renal disorder      Insufficiency....med related?   • Seizure (HCC)     \"with Dobutamine Echo\"   • Thrombocytopenia (HCC)      Past Surgical History:   Procedure Laterality Date   • RECOVERY  3/18/2015    Performed by [facility] at SURGERY PRE-POST PROC UNIT [facility]   • PACEMAKER INSERTION  March 2015    Generator replacement with  Medtronic Adapta ADDRL1 implanted by Dr. Chaitanya Walters. Original device implanted in 2007.   • PENILE PROSTHESIS AMS INFLATABLE  10/13/2014    Performed by Sedrick Pittman M.D. at SURGERY [facility]   • OTHER ORTHOPEDIC SURGERY  2010    L Shoulder Replacement   • " PACEMAKER INSERTION  2007        • OTHER      Endoscopy every 3-6 months to track esophageal varices   • PENILE PROSTHESIS AMS INFLATABLE     • SHOULDER ARTHROPLASTY TOTAL       Family History   Problem Relation Age of Onset   • Cancer Father      Prostate CA     Social History     Social History   • Marital status:      Spouse name: N/A   • Number of children: N/A   • Years of education: N/A     Occupational History   • Not on file.     Social History Main Topics   • Smoking status: Former Smoker     Packs/day: 0.50     Years: 14.00     Types: Cigarettes     Quit date: 1/1/1970   • Smokeless tobacco: Never Used      Comment: quit in 1970   • Alcohol use 0.5 oz/week     1 Glasses of wine per week      Comment: Hx of ETOH abuse   • Drug use: No   • Sexual activity: Not Currently     Other Topics Concern   • Not on file     Social History Narrative   • No narrative on file     Allergies   Allergen Reactions   • Dobutamine      siezures   • Erythromycin      1999-09-21;GI     Outpatient Encounter Prescriptions as of 5/18/2018   Medication Sig Dispense Refill   • zolpidem (AMBIEN) 5 MG Tab TK 1 T PO QD HS  5   • promethazine (PHENERGAN) 25 MG Tab Take 25 mg by mouth every 6 hours as needed for Nausea/Vomiting.     • miconazole (MICOTIN) 2 % Cream Apply 1 Application to affected area(s) 2 times a day. 1 Tube 1   • Multiple Vitamin (MULTI VITAMIN DAILY PO) Take  by mouth. Combination d3,magnessium,vitamin a     • ropinirole (REQUIP) 0.25 MG Tab TK 1 T PO qd  3   • spironolactone (ALDACTONE) 50 MG Tab   0   • oxycodone immediate release (ROXICODONE) 10 MG immediate release tablet   0   • LORATADINE PO Take  by mouth.     • Non Formulary Request MG + Peotien 266mg PO daily     • acetaminophen (TYLENOL) 325 MG TABS Take 325 mg by mouth 2 Times a Day.     • lactulose 10 GM/15ML SOLN Take 30 g by mouth every day. Indications: Impaired Brain Function due to Liver Disease     • tamsulosin (FLOMAX) 0.4 MG capsule Take 0.4  "mg by mouth ONE-HALF HOUR AFTER BREAKFAST.     • omeprazole (PRILOSEC) 40 MG capsule Take 40 mg by mouth every day.     • calcitRIOL (ROCALTROL) 0.25 MCG CAPS Take 0.25 mcg by mouth every day.     • doxycycline (VIBRAMYCIN) 100 MG Tab Take 1 Tab by mouth 2 times a day. 20 Tab 0   • VITAMIN A PO Take 5,000 Units by mouth every day.     • Cholecalciferol (VITAMIN D3 PO) Take 500 Units by mouth.     • meclizine (ANTIVERT) 25 MG TABS Take 25 mg by mouth 3 times a day as needed.       No facility-administered encounter medications on file as of 5/18/2018.      ROS     Objective:   /72   Pulse 75   Ht 1.854 m (6' 0.99\")   Wt 102.1 kg (225 lb)   SpO2 95%   BMI 29.69 kg/m²      Physical Exam   Constitutional: He is oriented to person, place, and time. He appears well-developed and well-nourished.   HENT:   Head: Normocephalic and atraumatic.   Eyes: EOM are normal.   Neck: Normal range of motion. Neck supple.   Cardiovascular: Normal rate, regular rhythm and intact distal pulses.  Exam reveals no gallop and no friction rub.    No murmur heard.  Pulmonary/Chest: Effort normal and breath sounds normal.   Abdominal: Soft.   Musculoskeletal: Normal range of motion. He exhibits no edema.   Neurological: He is alert and oriented to person, place, and time.   Skin: Skin is warm and dry.   Psychiatric: He has a normal mood and affect. His behavior is normal. Judgment and thought content normal.       Assessment:     1. Cirrhosis of liver without ascites, unspecified hepatic cirrhosis type (HCC)     2. Presence of permanent cardiac pacemaker     3. Thrombocytopenia (HCC)     4. Sinoatrial node dysfunction (HCC)     5. Paroxysmal atrial fibrillation (HCC)         Medical Decision Making:  Today's Assessment / Status / Plan:   1. SSS nl PPM function.  2. PAF none really seen.  3. Cirrhosis stable.  4. F/U in device clinic in 6 months.      Denver J Miller, M.D.  [address]  [address]  [place] [id number]  VIA Facsimile: " [phone]

## 2018-05-18 NOTE — PROGRESS NOTES
"Chief Complaint   Patient presents with   • Atrial Fibrillation     F/V DX:PAF       Subjective:   Luigi Walters is a 77 y.o. male who presents today with SSS. PPM nl function. Some DJD complaints. No chest pain or SOB. No bleeding. No real a fib on PPM.    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 Difficile   • Jaundice    • Melena    • Melena    • Osteoarthritis    • Pain      L shoulder=6/10>chronic   • Pneumonia 1972   • Polycythemia    • Presence of permanent cardiac pacemaker 2007/2015   • Prostate cancer (HCC)    • Prostate cancer (HCC) 2000        • Renal disorder      Insufficiency....med related?   • Seizure (HCC)     \"with Dobutamine Echo\"   • Thrombocytopenia (HCC)      Past Surgical History:   Procedure Laterality Date   • RECOVERY  3/18/2015    Performed by [facility] at SURGERY PRE-POST PROC UNIT [facility]   • PACEMAKER INSERTION  March 2015    Generator replacement with  Medtronic Adapta ADDRL1 implanted by Dr. Chaitanya Walters. Original device implanted in 2007.   • PENILE PROSTHESIS AMS INFLATABLE  10/13/2014    Performed by Sedrick Pittman M.D. at SURGERY [facility]   • OTHER ORTHOPEDIC SURGERY  2010    L Shoulder Replacement   • PACEMAKER INSERTION  2007        • OTHER      Endoscopy every 3-6 months to track esophageal varices   • PENILE PROSTHESIS AMS INFLATABLE     • SHOULDER ARTHROPLASTY TOTAL       Family History   Problem Relation Age of Onset   • Cancer Father      Prostate CA "     Social History     Social History   • Marital status:      Spouse name: N/A   • Number of children: N/A   • Years of education: N/A     Occupational History   • Not on file.     Social History Main Topics   • Smoking status: Former Smoker     Packs/day: 0.50     Years: 14.00     Types: Cigarettes     Quit date: 1/1/1970   • Smokeless tobacco: Never Used      Comment: quit in 1970   • Alcohol use 0.5 oz/week     1 Glasses of wine per week      Comment: Hx of ETOH abuse   • Drug use: No   • Sexual activity: Not Currently     Other Topics Concern   • Not on file     Social History Narrative   • No narrative on file     Allergies   Allergen Reactions   • Dobutamine      siezures   • Erythromycin      1999-09-21;GI     Outpatient Encounter Prescriptions as of 5/18/2018   Medication Sig Dispense Refill   • zolpidem (AMBIEN) 5 MG Tab TK 1 T PO QD HS  5   • promethazine (PHENERGAN) 25 MG Tab Take 25 mg by mouth every 6 hours as needed for Nausea/Vomiting.     • miconazole (MICOTIN) 2 % Cream Apply 1 Application to affected area(s) 2 times a day. 1 Tube 1   • Multiple Vitamin (MULTI VITAMIN DAILY PO) Take  by mouth. Combination d3,magnessium,vitamin a     • ropinirole (REQUIP) 0.25 MG Tab TK 1 T PO qd  3   • spironolactone (ALDACTONE) 50 MG Tab   0   • oxycodone immediate release (ROXICODONE) 10 MG immediate release tablet   0   • LORATADINE PO Take  by mouth.     • Non Formulary Request MG + Peotien 266mg PO daily     • acetaminophen (TYLENOL) 325 MG TABS Take 325 mg by mouth 2 Times a Day.     • lactulose 10 GM/15ML SOLN Take 30 g by mouth every day. Indications: Impaired Brain Function due to Liver Disease     • tamsulosin (FLOMAX) 0.4 MG capsule Take 0.4 mg by mouth ONE-HALF HOUR AFTER BREAKFAST.     • omeprazole (PRILOSEC) 40 MG capsule Take 40 mg by mouth every day.     • calcitRIOL (ROCALTROL) 0.25 MCG CAPS Take 0.25 mcg by mouth every day.     • doxycycline (VIBRAMYCIN) 100 MG Tab Take 1 Tab by mouth 2  "times a day. 20 Tab 0   • VITAMIN A PO Take 5,000 Units by mouth every day.     • Cholecalciferol (VITAMIN D3 PO) Take 500 Units by mouth.     • meclizine (ANTIVERT) 25 MG TABS Take 25 mg by mouth 3 times a day as needed.       No facility-administered encounter medications on file as of 5/18/2018.      ROS     Objective:   /72   Pulse 75   Ht 1.854 m (6' 0.99\")   Wt 102.1 kg (225 lb)   SpO2 95%   BMI 29.69 kg/m²     Physical Exam   Constitutional: He is oriented to person, place, and time. He appears well-developed and well-nourished.   HENT:   Head: Normocephalic and atraumatic.   Eyes: EOM are normal.   Neck: Normal range of motion. Neck supple.   Cardiovascular: Normal rate, regular rhythm and intact distal pulses.  Exam reveals no gallop and no friction rub.    No murmur heard.  Pulmonary/Chest: Effort normal and breath sounds normal.   Abdominal: Soft.   Musculoskeletal: Normal range of motion. He exhibits no edema.   Neurological: He is alert and oriented to person, place, and time.   Skin: Skin is warm and dry.   Psychiatric: He has a normal mood and affect. His behavior is normal. Judgment and thought content normal.       Assessment:     1. Cirrhosis of liver without ascites, unspecified hepatic cirrhosis type (HCC)     2. Presence of permanent cardiac pacemaker     3. Thrombocytopenia (HCC)     4. Sinoatrial node dysfunction (HCC)     5. Paroxysmal atrial fibrillation (HCC)         Medical Decision Making:  Today's Assessment / Status / Plan:   1. SSS nl PPM function.  2. PAF none really seen.  3. Cirrhosis stable.  4. F/U in device clinic in 6 months.  "

## 2018-07-12 ENCOUNTER — HOSPITAL ENCOUNTER (OUTPATIENT)
Dept: LAB | Facility: MEDICAL CENTER | Age: 78
End: 2018-07-12
Attending: INTERNAL MEDICINE
Payer: MEDICARE

## 2018-07-12 LAB
BUN SERPL-MCNC: 24 MG/DL (ref 8–22)
CREAT SERPL-MCNC: 1.15 MG/DL (ref 0.5–1.4)

## 2018-07-12 PROCEDURE — 84520 ASSAY OF UREA NITROGEN: CPT

## 2018-07-12 PROCEDURE — 36415 COLL VENOUS BLD VENIPUNCTURE: CPT

## 2018-07-12 PROCEDURE — 82565 ASSAY OF CREATININE: CPT

## 2018-07-13 ENCOUNTER — HOSPITAL ENCOUNTER (OUTPATIENT)
Dept: RADIOLOGY | Facility: MEDICAL CENTER | Age: 78
End: 2018-07-13
Attending: INTERNAL MEDICINE
Payer: MEDICARE

## 2018-07-13 DIAGNOSIS — K74.60 CIRRHOSIS OF LIVER WITHOUT ASCITES, UNSPECIFIED HEPATIC CIRRHOSIS TYPE (HCC): ICD-10-CM

## 2018-07-13 PROCEDURE — 74160 CT ABDOMEN W/CONTRAST: CPT

## 2018-07-13 PROCEDURE — 700117 HCHG RX CONTRAST REV CODE 255: Performed by: INTERNAL MEDICINE

## 2018-07-13 RX ADMIN — IOHEXOL 100 ML: 350 INJECTION, SOLUTION INTRAVENOUS at 15:30

## 2018-07-16 ENCOUNTER — HOSPITAL ENCOUNTER (OUTPATIENT)
Dept: LAB | Facility: MEDICAL CENTER | Age: 78
End: 2018-07-16
Attending: NURSE PRACTITIONER
Payer: MEDICARE

## 2018-07-16 PROCEDURE — 80053 COMPREHEN METABOLIC PANEL: CPT

## 2018-07-16 PROCEDURE — 82306 VITAMIN D 25 HYDROXY: CPT

## 2018-07-16 PROCEDURE — 85025 COMPLETE CBC W/AUTO DIFF WBC: CPT

## 2018-07-16 PROCEDURE — 36415 COLL VENOUS BLD VENIPUNCTURE: CPT

## 2018-07-16 PROCEDURE — 83970 ASSAY OF PARATHORMONE: CPT

## 2018-07-17 LAB
25(OH)D3 SERPL-MCNC: 46 NG/ML (ref 30–100)
ALBUMIN SERPL BCP-MCNC: 4 G/DL (ref 3.2–4.9)
ALBUMIN/GLOB SERPL: 1 G/DL
ALP SERPL-CCNC: 43 U/L (ref 30–99)
ALT SERPL-CCNC: 11 U/L (ref 2–50)
ANION GAP SERPL CALC-SCNC: 9 MMOL/L (ref 0–11.9)
AST SERPL-CCNC: 20 U/L (ref 12–45)
BASOPHILS # BLD AUTO: 0.6 % (ref 0–1.8)
BASOPHILS # BLD: 0.02 K/UL (ref 0–0.12)
BILIRUB SERPL-MCNC: 0.8 MG/DL (ref 0.1–1.5)
BUN SERPL-MCNC: 20 MG/DL (ref 8–22)
CALCIUM SERPL-MCNC: 9.5 MG/DL (ref 8.5–10.5)
CHLORIDE SERPL-SCNC: 107 MMOL/L (ref 96–112)
CO2 SERPL-SCNC: 22 MMOL/L (ref 20–33)
CREAT SERPL-MCNC: 1.08 MG/DL (ref 0.5–1.4)
EOSINOPHIL # BLD AUTO: 0.2 K/UL (ref 0–0.51)
EOSINOPHIL NFR BLD: 6.5 % (ref 0–6.9)
ERYTHROCYTE [DISTWIDTH] IN BLOOD BY AUTOMATED COUNT: 42.3 FL (ref 35.9–50)
GLOBULIN SER CALC-MCNC: 4.1 G/DL (ref 1.9–3.5)
GLUCOSE SERPL-MCNC: 97 MG/DL (ref 65–99)
HCT VFR BLD AUTO: 41.7 % (ref 42–52)
HGB BLD-MCNC: 13.8 G/DL (ref 14–18)
IMM GRANULOCYTES # BLD AUTO: 0.01 K/UL (ref 0–0.11)
IMM GRANULOCYTES NFR BLD AUTO: 0.3 % (ref 0–0.9)
LYMPHOCYTES # BLD AUTO: 0.47 K/UL (ref 1–4.8)
LYMPHOCYTES NFR BLD: 15.2 % (ref 22–41)
MCH RBC QN AUTO: 30.5 PG (ref 27–33)
MCHC RBC AUTO-ENTMCNC: 33.1 G/DL (ref 33.7–35.3)
MCV RBC AUTO: 92.3 FL (ref 81.4–97.8)
MONOCYTES # BLD AUTO: 0.15 K/UL (ref 0–0.85)
MONOCYTES NFR BLD AUTO: 4.9 % (ref 0–13.4)
NEUTROPHILS # BLD AUTO: 2.24 K/UL (ref 1.82–7.42)
NEUTROPHILS NFR BLD: 72.5 % (ref 44–72)
NRBC # BLD AUTO: 0 K/UL
NRBC BLD-RTO: 0 /100 WBC
PLATELET # BLD AUTO: 76 K/UL (ref 164–446)
PMV BLD AUTO: 13.8 FL (ref 9–12.9)
POTASSIUM SERPL-SCNC: 4.3 MMOL/L (ref 3.6–5.5)
PROT SERPL-MCNC: 8.1 G/DL (ref 6–8.2)
PTH-INTACT SERPL-MCNC: 32.2 PG/ML (ref 14–72)
RBC # BLD AUTO: 4.52 M/UL (ref 4.7–6.1)
SODIUM SERPL-SCNC: 138 MMOL/L (ref 135–145)
WBC # BLD AUTO: 3.1 K/UL (ref 4.8–10.8)

## 2018-07-23 ENCOUNTER — HOSPITAL ENCOUNTER (OUTPATIENT)
Dept: LAB | Facility: MEDICAL CENTER | Age: 78
End: 2018-07-23
Attending: INTERNAL MEDICINE
Payer: MEDICARE

## 2018-07-23 PROCEDURE — 82105 ALPHA-FETOPROTEIN SERUM: CPT

## 2018-07-23 PROCEDURE — 36415 COLL VENOUS BLD VENIPUNCTURE: CPT

## 2018-07-25 LAB — AFP-TM SERPL-MCNC: 2 NG/ML (ref 0–9)

## 2018-09-11 ENCOUNTER — APPOINTMENT (RX ONLY)
Dept: URBAN - METROPOLITAN AREA CLINIC 4 | Facility: CLINIC | Age: 78
Setting detail: DERMATOLOGY
End: 2018-09-11

## 2018-09-11 DIAGNOSIS — Z85.828 PERSONAL HISTORY OF OTHER MALIGNANT NEOPLASM OF SKIN: ICD-10-CM

## 2018-09-11 DIAGNOSIS — L82.1 OTHER SEBORRHEIC KERATOSIS: ICD-10-CM

## 2018-09-11 DIAGNOSIS — L81.4 OTHER MELANIN HYPERPIGMENTATION: ICD-10-CM

## 2018-09-11 DIAGNOSIS — Z87.2 PERSONAL HISTORY OF DISEASES OF THE SKIN AND SUBCUTANEOUS TISSUE: ICD-10-CM

## 2018-09-11 DIAGNOSIS — D18.0 HEMANGIOMA: ICD-10-CM

## 2018-09-11 PROBLEM — D48.5 NEOPLASM OF UNCERTAIN BEHAVIOR OF SKIN: Status: ACTIVE | Noted: 2018-09-11

## 2018-09-11 PROBLEM — D18.01 HEMANGIOMA OF SKIN AND SUBCUTANEOUS TISSUE: Status: ACTIVE | Noted: 2018-09-11

## 2018-09-11 PROCEDURE — ? OBSERVATION

## 2018-09-11 PROCEDURE — 99213 OFFICE O/P EST LOW 20 MIN: CPT

## 2018-09-11 ASSESSMENT — LOCATION SIMPLE DESCRIPTION DERM
LOCATION SIMPLE: UPPER BACK
LOCATION SIMPLE: RIGHT SHOULDER
LOCATION SIMPLE: LOWER BACK

## 2018-09-11 ASSESSMENT — LOCATION ZONE DERM
LOCATION ZONE: ARM
LOCATION ZONE: TRUNK

## 2018-09-11 ASSESSMENT — LOCATION DETAILED DESCRIPTION DERM
LOCATION DETAILED: SUPERIOR LUMBAR SPINE
LOCATION DETAILED: INFERIOR THORACIC SPINE
LOCATION DETAILED: RIGHT POSTERIOR SHOULDER

## 2018-11-27 ENCOUNTER — NON-PROVIDER VISIT (OUTPATIENT)
Dept: CARDIOLOGY | Facility: MEDICAL CENTER | Age: 78
End: 2018-11-27
Payer: MEDICARE

## 2018-11-27 DIAGNOSIS — I49.5 SINOATRIAL NODE DYSFUNCTION (HCC): Chronic | ICD-10-CM

## 2018-11-27 DIAGNOSIS — Z95.0 PRESENCE OF PERMANENT CARDIAC PACEMAKER: Chronic | ICD-10-CM

## 2018-11-27 PROCEDURE — 93280 PM DEVICE PROGR EVAL DUAL: CPT | Performed by: INTERNAL MEDICINE

## 2019-01-31 ENCOUNTER — HOSPITAL ENCOUNTER (OUTPATIENT)
Dept: LAB | Facility: MEDICAL CENTER | Age: 79
End: 2019-01-31
Attending: INTERNAL MEDICINE
Payer: MEDICARE

## 2019-01-31 LAB
ALBUMIN SERPL BCP-MCNC: 4.3 G/DL (ref 3.2–4.9)
ALBUMIN/GLOB SERPL: 1.2 G/DL
ALP SERPL-CCNC: 61 U/L (ref 30–99)
ALT SERPL-CCNC: 13 U/L (ref 2–50)
ANION GAP SERPL CALC-SCNC: 10 MMOL/L (ref 0–11.9)
AST SERPL-CCNC: 18 U/L (ref 12–45)
BILIRUB SERPL-MCNC: 0.9 MG/DL (ref 0.1–1.5)
BUN SERPL-MCNC: 17 MG/DL (ref 8–22)
CALCIUM SERPL-MCNC: 9.8 MG/DL (ref 8.5–10.5)
CHLORIDE SERPL-SCNC: 105 MMOL/L (ref 96–112)
CO2 SERPL-SCNC: 23 MMOL/L (ref 20–33)
CREAT SERPL-MCNC: 1.11 MG/DL (ref 0.5–1.4)
GLOBULIN SER CALC-MCNC: 3.6 G/DL (ref 1.9–3.5)
GLUCOSE SERPL-MCNC: 85 MG/DL (ref 65–99)
HIV 1+2 AB+HIV1 P24 AG SERPL QL IA: NON REACTIVE
LDH SERPL L TO P-CCNC: 128 U/L (ref 107–266)
POTASSIUM SERPL-SCNC: 4.1 MMOL/L (ref 3.6–5.5)
PROT SERPL-MCNC: 7.9 G/DL (ref 6–8.2)
SODIUM SERPL-SCNC: 138 MMOL/L (ref 135–145)
URATE SERPL-MCNC: 5.5 MG/DL (ref 2.5–8.3)

## 2019-01-31 PROCEDURE — G0475 HIV COMBINATION ASSAY: HCPCS | Mod: GZ

## 2019-01-31 PROCEDURE — 83615 LACTATE (LD) (LDH) ENZYME: CPT

## 2019-01-31 PROCEDURE — 80074 ACUTE HEPATITIS PANEL: CPT | Mod: GZ

## 2019-01-31 PROCEDURE — 80053 COMPREHEN METABOLIC PANEL: CPT

## 2019-01-31 PROCEDURE — 84550 ASSAY OF BLOOD/URIC ACID: CPT

## 2019-02-01 LAB
HAV IGM SERPL QL IA: NEGATIVE
HBV CORE IGM SER QL: NEGATIVE
HBV SURFACE AG SER QL: NEGATIVE
HCV AB SER QL: NEGATIVE

## 2019-02-11 ENCOUNTER — HOSPITAL ENCOUNTER (OUTPATIENT)
Dept: CARDIOLOGY | Facility: MEDICAL CENTER | Age: 79
End: 2019-02-11
Attending: RADIOLOGY
Payer: MEDICARE

## 2019-02-11 LAB
INR PPP: 1.2 (ref 0.87–1.13)
PLATELET # BLD AUTO: 91 K/UL (ref 164–446)
PROTHROMBIN TIME: 15.3 SEC (ref 12–14.6)

## 2019-02-11 PROCEDURE — 36415 COLL VENOUS BLD VENIPUNCTURE: CPT

## 2019-02-11 PROCEDURE — 85610 PROTHROMBIN TIME: CPT

## 2019-02-11 PROCEDURE — 85049 AUTOMATED PLATELET COUNT: CPT

## 2019-02-11 RX ORDER — FLUTICASONE PROPIONATE 50 MCG
1 SPRAY, SUSPENSION (ML) NASAL DAILY
COMMUNITY
End: 2020-05-15

## 2019-02-11 RX ORDER — ACETAMINOPHEN 500 MG
500-1000 TABLET ORAL EVERY 6 HOURS PRN
COMMUNITY

## 2019-02-11 RX ORDER — DIPHENHYDRAMINE HCL 50 MG
50 CAPSULE ORAL
COMMUNITY
End: 2021-11-17

## 2019-02-11 RX ORDER — TRAMADOL HYDROCHLORIDE 50 MG/1
50 TABLET ORAL EVERY 8 HOURS PRN
Status: ON HOLD | COMMUNITY
End: 2022-04-15

## 2019-02-12 ENCOUNTER — APPOINTMENT (OUTPATIENT)
Dept: RADIOLOGY | Facility: MEDICAL CENTER | Age: 79
End: 2019-02-12
Attending: INTERNAL MEDICINE
Payer: MEDICARE

## 2019-02-12 ENCOUNTER — HOSPITAL ENCOUNTER (OUTPATIENT)
Facility: MEDICAL CENTER | Age: 79
End: 2019-02-12
Attending: RADIOLOGY | Admitting: RADIOLOGY
Payer: MEDICARE

## 2019-02-12 VITALS
HEIGHT: 71 IN | WEIGHT: 222 LBS | RESPIRATION RATE: 16 BRPM | DIASTOLIC BLOOD PRESSURE: 67 MMHG | HEART RATE: 76 BPM | OXYGEN SATURATION: 98 % | BODY MASS INDEX: 31.08 KG/M2 | SYSTOLIC BLOOD PRESSURE: 145 MMHG

## 2019-02-12 DIAGNOSIS — C88.4 EXTRANODAL MARGINAL ZONE LYMPHOMA OF MUCOSA-ASSOCIATED LYMPHOID TISSUE (MALT) OF STOMACH (HCC): ICD-10-CM

## 2019-02-12 PROCEDURE — 700111 HCHG RX REV CODE 636 W/ 250 OVERRIDE (IP)

## 2019-02-12 PROCEDURE — 700111 HCHG RX REV CODE 636 W/ 250 OVERRIDE (IP): Performed by: RADIOLOGY

## 2019-02-12 PROCEDURE — 700105 HCHG RX REV CODE 258: Performed by: RADIOLOGY

## 2019-02-12 PROCEDURE — 700101 HCHG RX REV CODE 250

## 2019-02-12 PROCEDURE — 160002 HCHG RECOVERY MINUTES (STAT)

## 2019-02-12 PROCEDURE — 99153 MOD SED SAME PHYS/QHP EA: CPT

## 2019-02-12 RX ORDER — MIDAZOLAM HYDROCHLORIDE 1 MG/ML
.5-2 INJECTION INTRAMUSCULAR; INTRAVENOUS PRN
Status: DISCONTINUED | OUTPATIENT
Start: 2019-02-12 | End: 2019-02-12 | Stop reason: HOSPADM

## 2019-02-12 RX ORDER — SODIUM CHLORIDE 9 MG/ML
500 INJECTION, SOLUTION INTRAVENOUS
Status: DISCONTINUED | OUTPATIENT
Start: 2019-02-12 | End: 2019-02-12 | Stop reason: HOSPADM

## 2019-02-12 RX ORDER — ONDANSETRON 2 MG/ML
4 INJECTION INTRAMUSCULAR; INTRAVENOUS PRN
Status: DISCONTINUED | OUTPATIENT
Start: 2019-02-12 | End: 2019-02-12 | Stop reason: HOSPADM

## 2019-02-12 RX ORDER — ONDANSETRON 2 MG/ML
4 INJECTION INTRAMUSCULAR; INTRAVENOUS EVERY 8 HOURS PRN
Status: CANCELLED | OUTPATIENT
Start: 2019-02-12 | End: 2019-02-13

## 2019-02-12 RX ORDER — LIDOCAINE HYDROCHLORIDE AND EPINEPHRINE BITARTRATE 20; .01 MG/ML; MG/ML
INJECTION, SOLUTION SUBCUTANEOUS
Status: COMPLETED
Start: 2019-02-12 | End: 2019-02-12

## 2019-02-12 RX ORDER — CEFAZOLIN SODIUM 2 G/100ML
2 INJECTION, SOLUTION INTRAVENOUS ONCE
Status: COMPLETED | OUTPATIENT
Start: 2019-02-12 | End: 2019-02-12

## 2019-02-12 RX ORDER — OXYCODONE HYDROCHLORIDE 5 MG/1
2.5 TABLET ORAL
Status: CANCELLED | OUTPATIENT
Start: 2019-02-12 | End: 2019-02-13

## 2019-02-12 RX ORDER — CEFAZOLIN SODIUM 1 G/3ML
INJECTION, POWDER, FOR SOLUTION INTRAMUSCULAR; INTRAVENOUS
Status: COMPLETED
Start: 2019-02-12 | End: 2019-02-12

## 2019-02-12 RX ORDER — MIDAZOLAM HYDROCHLORIDE 1 MG/ML
INJECTION INTRAMUSCULAR; INTRAVENOUS
Status: COMPLETED
Start: 2019-02-12 | End: 2019-02-12

## 2019-02-12 RX ORDER — HYDROMORPHONE HYDROCHLORIDE 2 MG/ML
0.25 INJECTION, SOLUTION INTRAMUSCULAR; INTRAVENOUS; SUBCUTANEOUS
Status: CANCELLED | OUTPATIENT
Start: 2019-02-12 | End: 2019-02-13

## 2019-02-12 RX ADMIN — CEFAZOLIN SODIUM 2 G: 2 INJECTION, SOLUTION INTRAVENOUS at 15:27

## 2019-02-12 RX ADMIN — HEPARIN 500 UNITS: 100 SYRINGE at 15:47

## 2019-02-12 RX ADMIN — FENTANYL CITRATE 50 MCG: 50 INJECTION, SOLUTION INTRAMUSCULAR; INTRAVENOUS at 15:27

## 2019-02-12 RX ADMIN — LIDOCAINE HYDROCHLORIDE AND EPINEPHRINE: 20; 10 INJECTION, SOLUTION INFILTRATION; PERINEURAL at 15:30

## 2019-02-12 RX ADMIN — MIDAZOLAM 2 MG: 1 INJECTION INTRAMUSCULAR; INTRAVENOUS at 15:27

## 2019-02-12 RX ADMIN — MIDAZOLAM 2 MG: 1 INJECTION INTRAMUSCULAR; INTRAVENOUS at 15:40

## 2019-02-12 NOTE — PROGRESS NOTES
OP IR RN note:    Site Confirmed with MD, patient and RN pre procedure   Right tunneled port placement by MD Vizcaino assisted by RT dali, Right chest and IJ access site;  End tidal CO2 range 38-41 during procedure   Port placed   BARD powerport implantable port 8 fr 22 cm open ended single lumen REF# [id number] LOT# [id number]   Patient tolerated procedure, hemodynamically stable; pt awake and talking post procedure; see flow sheet for vitals and post op print out    patient transported back to OP IR pod 2 via IR RN monitored      No bleeding or complications noted at this time

## 2019-02-13 NOTE — PROGRESS NOTES
Discharge instructions reviewed with patient and family, all questions answered. IV removed and belongings returned.  Patient ambulated out in a stable condition.     ACTIVITY: Rest and take it easy for the first 24 hours.  A responsible adult is recommended to remain with you during that time.  It is normal to feel sleepy.  We encourage you to not do anything that requires balance, judgment or coordination.    MILD FLU-LIKE SYMPTOMS ARE NORMAL. YOU MAY EXPERIENCE GENERALIZED MUSCLE ACHES, THROAT IRRITATION, HEADACHE AND/OR SOME NAUSEA.    FOR 24 HOURS DO NOT:  Drive, operate machinery or run household appliances.  Drink beer or alcoholic beverages.   Make important decisions or sign legal documents.    SPECIAL INSTRUCTIONS: No shower or bath for 1 week. Sponge Bath only.  Keep surgical site clean dry and covered until fully healed. Do not lift right arm above head for 1 week, Do not lift over 10 lbs for 1 week with right arm.     DIET: To avoid nausea, slowly advance diet as tolerated, avoiding spicy or greasy foods for the first day.  Add more substantial food to your diet according to your physician's instructions.  Babies can be fed formula or breast milk as soon as they are hungry.  INCREASE FLUIDS AND FIBER TO AVOID CONSTIPATION.    SURGICAL DRESSING/BATHING: No shower or bath for 1 week. Sponge Bath only.  Keep surgical site clean dry and covered until fully healed.    FOLLOW-UP APPOINTMENT:  A follow-up appointment should be arranged with your doctor in 1 week; call to schedule.    You should CALL YOUR PHYSICIAN if you develop:  Fever greater than 101 degrees F.  Pain not relieved by medication, or persistent nausea or vomiting.  Excessive bleeding (blood soaking through dressing) or unexpected drainage from the wound.  Extreme redness or swelling around the incision site, drainage of pus or foul smelling drainage.  Inability to urinate or empty your bladder within 8 hours.  Problems with breathing or chest  pain.    You should call 911 if you develop problems with breathing or chest pain.  If you are unable to contact your doctor or surgical center, you should go to the nearest emergency room or urgent care center.     If any questions arise, call your doctor.  If your doctor is not available, please feel free to call the Surgical Center at [phone].  The Center is open Monday through Friday from 7AM to 7PM.  You can also call the HEALTH HOTLINE open 24 hours/day, 7 days/week and speak to a nurse at [phone], or toll free at [phone].    A registered nurse may call you a few days after your surgery to see how you are doing after your procedure.    MEDICATIONS: Resume taking daily medication.  Take prescribed pain medication with food.  If no medication is prescribed, you may take non-aspirin pain medication if needed.  PAIN MEDICATION CAN BE VERY CONSTIPATING.  Take a stool softener or laxative such as senokot, pericolace, or milk of magnesia if needed.      If your physician has prescribed pain medication that includes Acetaminophen (Tylenol), do not take additional Acetaminophen (Tylenol) while taking the prescribed medication.    Depression / Suicide Risk    As you are discharged from this [facility] facility, it is important to learn how to keep safe from harming yourself.    Recognize the warning signs:  · Abrupt changes in personality, positive or negative- including increase in energy   · Giving away possessions  · Change in eating patterns- significant weight changes-  positive or negative  · Change in sleeping patterns- unable to sleep or sleeping all the time   · Unwillingness or inability to communicate  · Depression  · Unusual sadness, discouragement and loneliness  · Talk of wanting to die  · Neglect of personal appearance   · Rebelliousness- reckless behavior  · Withdrawal from people/activities they love  · Confusion- inability to concentrate     If you or a loved one observes any of  these behaviors or has concerns about self-harm, here's what you can do:  · Talk about it- your feelings and reasons for harming yourself  · Remove any means that you might use to hurt yourself (examples: pills, rope, extension cords, firearm)  · Get professional help from the community (Mental Health, Substance Abuse, psychological counseling)  · Do not be alone:Call your Safe Contact- someone whom you trust who will be there for you.  · Call your local CRISIS HOTLINE 490-2853 or 388-557-3773  · Call your local Children's Mobile Crisis Response Team [place] (115) 941-8476 or www.[employer]  · Call the toll free National Suicide Prevention Hotlines   · National Suicide Prevention Lifeline 506-359-KZBS (7410)  · National Hope Line Network 800-SUICIDE (240-8622)      Implanted Port Home Guide  An implanted port is a type of central line that is placed under the skin. Central lines are used to provide IV access when treatment or nutrition needs to be given through a person's veins. Implanted ports are used for long-term IV access. An implanted port may be placed because:   · You need IV medicine that would be irritating to the small veins in your hands or arms.    · You need long-term IV medicines, such as antibiotics.    · You need IV nutrition for a long period.    · You need frequent blood draws for lab tests.    · You need dialysis.    Implanted ports are usually placed in the chest area, but they can also be placed in the upper arm, the abdomen, or the leg. An implanted port has two main parts:   · [place]. The reservoir is round and will appear as a small, raised area under your skin. The reservoir is the part where a needle is inserted to give medicines or draw blood.    · Catheter. The catheter is a thin, flexible tube that extends from the reservoir. The catheter is placed into a large vein. Medicine that is inserted into the reservoir goes into the catheter and then into the vein.    HOW WILL I  "CARE FOR MY INCISION SITE?  Do not get the incision site wet. Bathe or shower as directed by your health care provider.   HOW IS MY PORT ACCESSED?  Special steps must be taken to access the port:   · Before the port is accessed, a numbing cream can be placed on the skin. This helps numb the skin over the port site.    · Your health care provider uses a sterile technique to access the port.  ¨ Your health care provider must put on a mask and sterile gloves.  ¨ The skin over your port is cleaned carefully with an antiseptic and allowed to dry.  ¨ The port is gently pinched between sterile gloves, and a needle is inserted into the port.  · Only \"non-coring\" port needles should be used to access the port. Once the port is accessed, a blood return should be checked. This helps ensure that the port is in the vein and is not clogged.    · If your port needs to remain accessed for a constant infusion, a clear (transparent) bandage will be placed over the needle site. The bandage and needle will need to be changed every week, or as directed by your health care provider.    · Keep the bandage covering the needle clean and dry. Do not get it wet. Follow your health care provider's instructions on how to take a shower or bath while the port is accessed.    · If your port does not need to stay accessed, no bandage is needed over the port.    WHAT IS FLUSHING?  Flushing helps keep the port from getting clogged. Follow your health care provider's instructions on how and when to flush the port. Ports are usually flushed with saline solution or a medicine called heparin. The need for flushing will depend on how the port is used.   · If the port is used for intermittent medicines or blood draws, the port will need to be flushed:    ¨ After medicines have been given.    ¨ After blood has been drawn.    ¨ As part of routine maintenance.    · If a constant infusion is running, the port may not need to be flushed.    HOW LONG WILL MY " PORT STAY IMPLANTED?  The port can stay in for as long as your health care provider thinks it is needed. When it is time for the port to come out, surgery will be done to remove it. The procedure is similar to the one performed when the port was put in.   WHEN SHOULD I SEEK IMMEDIATE MEDICAL CARE?  When you have an implanted port, you should seek immediate medical care if:   · You notice a bad smell coming from the incision site.    · You have swelling, redness, or drainage at the incision site.    · You have more swelling or pain at the port site or the surrounding area.    · You have a fever that is not controlled with medicine.     This information is not intended to replace advice given to you by your health care provider. Make sure you discuss any questions you have with your health care provider.     Document Released: 12/18/2006 Document Revised: 10/08/2014 Document Reviewed: 08/25/2014  [employer] Interactive Patient Education ©2016 [employer]    Implanted Port Insertion, Care After  Refer to this sheet in the next few weeks. These instructions provide you with information on caring for yourself after your procedure. Your health care provider may also give you more specific instructions. Your treatment has been planned according to current medical practices, but problems sometimes occur. Call your health care provider if you have any problems or questions after your procedure.  WHAT TO EXPECT AFTER THE PROCEDURE  After your procedure, it is typical to have the following:   · Discomfort at the port insertion site. Ice packs to the area will help.  · Bruising on the skin over the port. This will subside in 3-4 days.  HOME CARE INSTRUCTIONS  · After your port is placed, you will get a 's information card. The card has information about your port. Keep this card with you at all times.    · Know what kind of port you have. There are many types of ports available.    · Wear a medical alert bracelet in  case of an emergency. This can help alert health care workers that you have a port.    · The port can stay in for as long as your health care provider believes it is necessary.    · A home health care nurse may give medicines and take care of the port.    · You or a family member can get special training and directions for giving medicine and taking care of the port at home.    SEEK MEDICAL CARE IF:   · Your port does not flush or you are unable to get a blood return.    · You have a fever or chills.  SEEK IMMEDIATE MEDICAL CARE IF:  · You have new fluid or pus coming from your incision.    · You notice a bad smell coming from your incision site.    · You have swelling, pain, or more redness at the incision or port site.    · You have chest pain or shortness of breath.     This information is not intended to replace advice given to you by your health care provider. Make sure you discuss any questions you have with your health care provider.     Document Released: 10/08/2014 Document Revised: 12/23/2014 Document Reviewed: 10/08/2014  Elsevier Interactive Patient Education ©2016 [employer]

## 2019-02-13 NOTE — DISCHARGE INSTRUCTIONS
ACTIVITY: Rest and take it easy for the first 24 hours.  A responsible adult is recommended to remain with you during that time.  It is normal to feel sleepy.  We encourage you to not do anything that requires balance, judgment or coordination.    MILD FLU-LIKE SYMPTOMS ARE NORMAL. YOU MAY EXPERIENCE GENERALIZED MUSCLE ACHES, THROAT IRRITATION, HEADACHE AND/OR SOME NAUSEA.    FOR 24 HOURS DO NOT:  Drive, operate machinery or run household appliances.  Drink beer or alcoholic beverages.   Make important decisions or sign legal documents.    SPECIAL INSTRUCTIONS: No shower or bath for 1 week. Sponge Bath only.  Keep surgical site clean dry and covered until fully healed. Do not lift right arm above head for 1 week, Do not lift over 10 lbs for 1 week with right arm.     DIET: To avoid nausea, slowly advance diet as tolerated, avoiding spicy or greasy foods for the first day.  Add more substantial food to your diet according to your physician's instructions.  Babies can be fed formula or breast milk as soon as they are hungry.  INCREASE FLUIDS AND FIBER TO AVOID CONSTIPATION.    SURGICAL DRESSING/BATHING: No shower or bath for 1 week. Sponge Bath only.  Keep surgical site clean dry and covered until fully healed.    FOLLOW-UP APPOINTMENT:  A follow-up appointment should be arranged with your doctor in 1 week; call to schedule.    You should CALL YOUR PHYSICIAN if you develop:  Fever greater than 101 degrees F.  Pain not relieved by medication, or persistent nausea or vomiting.  Excessive bleeding (blood soaking through dressing) or unexpected drainage from the wound.  Extreme redness or swelling around the incision site, drainage of pus or foul smelling drainage.  Inability to urinate or empty your bladder within 8 hours.  Problems with breathing or chest pain.    You should call 911 if you develop problems with breathing or chest pain.  If you are unable to contact your doctor or surgical center, you should go to the  nearest emergency room or urgent care center.     If any questions arise, call your doctor.  If your doctor is not available, please feel free to call the Surgical Center at [phone].  The Center is open Monday through Friday from 7AM to 7PM.  You can also call the HEALTH HOTLINE open 24 hours/day, 7 days/week and speak to a nurse at [phone], or toll free at [phone].    A registered nurse may call you a few days after your surgery to see how you are doing after your procedure.    MEDICATIONS: Resume taking daily medication.  Take prescribed pain medication with food.  If no medication is prescribed, you may take non-aspirin pain medication if needed.  PAIN MEDICATION CAN BE VERY CONSTIPATING.  Take a stool softener or laxative such as senokot, pericolace, or milk of magnesia if needed.      If your physician has prescribed pain medication that includes Acetaminophen (Tylenol), do not take additional Acetaminophen (Tylenol) while taking the prescribed medication.    Depression / Suicide Risk    As you are discharged from this [facility] facility, it is important to learn how to keep safe from harming yourself.    Recognize the warning signs:  · Abrupt changes in personality, positive or negative- including increase in energy   · Giving away possessions  · Change in eating patterns- significant weight changes-  positive or negative  · Change in sleeping patterns- unable to sleep or sleeping all the time   · Unwillingness or inability to communicate  · Depression  · Unusual sadness, discouragement and loneliness  · Talk of wanting to die  · Neglect of personal appearance   · Rebelliousness- reckless behavior  · Withdrawal from people/activities they love  · Confusion- inability to concentrate     If you or a loved one observes any of these behaviors or has concerns about self-harm, here's what you can do:  · Talk about it- your feelings and reasons for harming yourself  · Remove any means that you  might use to hurt yourself (examples: pills, rope, extension cords, firearm)  · Get professional help from the community (Mental Health, Substance Abuse, psychological counseling)  · Do not be alone:Call your Safe Contact- someone whom you trust who will be there for you.  · Call your local CRISIS HOTLINE 445-7635 or 316-735-3103  · Call your local Children's Mobile Crisis Response Team [place] (210) 629-7293 or www.[employer]  · Call the toll free National Suicide Prevention Hotlines   · National Suicide Prevention Lifeline 021-415-XFRN (8683)  · National Hope Line Network 800-SUICIDE (904-1604)      Implanted Port Home Guide  An implanted port is a type of central line that is placed under the skin. Central lines are used to provide IV access when treatment or nutrition needs to be given through a person's veins. Implanted ports are used for long-term IV access. An implanted port may be placed because:   · You need IV medicine that would be irritating to the small veins in your hands or arms.    · You need long-term IV medicines, such as antibiotics.    · You need IV nutrition for a long period.    · You need frequent blood draws for lab tests.    · You need dialysis.    Implanted ports are usually placed in the chest area, but they can also be placed in the upper arm, the abdomen, or the leg. An implanted port has two main parts:   · [place]. The reservoir is round and will appear as a small, raised area under your skin. The reservoir is the part where a needle is inserted to give medicines or draw blood.    · Catheter. The catheter is a thin, flexible tube that extends from the reservoir. The catheter is placed into a large vein. Medicine that is inserted into the reservoir goes into the catheter and then into the vein.    HOW WILL I CARE FOR MY INCISION SITE?  Do not get the incision site wet. Bathe or shower as directed by your health care provider.   HOW IS MY PORT ACCESSED?  Special steps must  "be taken to access the port:   · Before the port is accessed, a numbing cream can be placed on the skin. This helps numb the skin over the port site.    · Your health care provider uses a sterile technique to access the port.  ¨ Your health care provider must put on a mask and sterile gloves.  ¨ The skin over your port is cleaned carefully with an antiseptic and allowed to dry.  ¨ The port is gently pinched between sterile gloves, and a needle is inserted into the port.  · Only \"non-coring\" port needles should be used to access the port. Once the port is accessed, a blood return should be checked. This helps ensure that the port is in the vein and is not clogged.    · If your port needs to remain accessed for a constant infusion, a clear (transparent) bandage will be placed over the needle site. The bandage and needle will need to be changed every week, or as directed by your health care provider.    · Keep the bandage covering the needle clean and dry. Do not get it wet. Follow your health care provider's instructions on how to take a shower or bath while the port is accessed.    · If your port does not need to stay accessed, no bandage is needed over the port.    WHAT IS FLUSHING?  Flushing helps keep the port from getting clogged. Follow your health care provider's instructions on how and when to flush the port. Ports are usually flushed with saline solution or a medicine called heparin. The need for flushing will depend on how the port is used.   · If the port is used for intermittent medicines or blood draws, the port will need to be flushed:    ¨ After medicines have been given.    ¨ After blood has been drawn.    ¨ As part of routine maintenance.    · If a constant infusion is running, the port may not need to be flushed.    HOW LONG WILL MY PORT STAY IMPLANTED?  The port can stay in for as long as your health care provider thinks it is needed. When it is time for the port to come out, surgery will be done to " remove it. The procedure is similar to the one performed when the port was put in.   WHEN SHOULD I SEEK IMMEDIATE MEDICAL CARE?  When you have an implanted port, you should seek immediate medical care if:   · You notice a bad smell coming from the incision site.    · You have swelling, redness, or drainage at the incision site.    · You have more swelling or pain at the port site or the surrounding area.    · You have a fever that is not controlled with medicine.     This information is not intended to replace advice given to you by your health care provider. Make sure you discuss any questions you have with your health care provider.     Document Released: 12/18/2006 Document Revised: 10/08/2014 Document Reviewed: 08/25/2014  [employer] Interactive Patient Education ©2016 [employer]    Implanted Port Insertion, Care After  Refer to this sheet in the next few weeks. These instructions provide you with information on caring for yourself after your procedure. Your health care provider may also give you more specific instructions. Your treatment has been planned according to current medical practices, but problems sometimes occur. Call your health care provider if you have any problems or questions after your procedure.  WHAT TO EXPECT AFTER THE PROCEDURE  After your procedure, it is typical to have the following:   · Discomfort at the port insertion site. Ice packs to the area will help.  · Bruising on the skin over the port. This will subside in 3-4 days.  HOME CARE INSTRUCTIONS  · After your port is placed, you will get a 's information card. The card has information about your port. Keep this card with you at all times.    · Know what kind of port you have. There are many types of ports available.    · Wear a medical alert bracelet in case of an emergency. This can help alert health care workers that you have a port.    · The port can stay in for as long as your health care provider believes it is  necessary.    · A home health care nurse may give medicines and take care of the port.    · You or a family member can get special training and directions for giving medicine and taking care of the port at home.    SEEK MEDICAL CARE IF:   · Your port does not flush or you are unable to get a blood return.    · You have a fever or chills.  SEEK IMMEDIATE MEDICAL CARE IF:  · You have new fluid or pus coming from your incision.    · You notice a bad smell coming from your incision site.    · You have swelling, pain, or more redness at the incision or port site.    · You have chest pain or shortness of breath.     This information is not intended to replace advice given to you by your health care provider. Make sure you discuss any questions you have with your health care provider.     Document Released: 10/08/2014 Document Revised: 12/23/2014 Document Reviewed: 10/08/2014  Elsevier Interactive Patient Education ©2016 [employer].

## 2019-02-13 NOTE — OR SURGEON
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

## 2019-02-19 ENCOUNTER — HOSPITAL ENCOUNTER (OUTPATIENT)
Dept: LAB | Facility: MEDICAL CENTER | Age: 79
End: 2019-02-19
Attending: INTERNAL MEDICINE
Payer: MEDICARE

## 2019-02-19 LAB
ALBUMIN SERPL BCP-MCNC: 4.1 G/DL (ref 3.2–4.9)
ALBUMIN/GLOB SERPL: 1.2 G/DL
ALP SERPL-CCNC: 59 U/L (ref 30–99)
ALT SERPL-CCNC: 12 U/L (ref 2–50)
ANION GAP SERPL CALC-SCNC: 11 MMOL/L (ref 0–11.9)
AST SERPL-CCNC: 21 U/L (ref 12–45)
BILIRUB SERPL-MCNC: 0.8 MG/DL (ref 0.1–1.5)
BUN SERPL-MCNC: 18 MG/DL (ref 8–22)
CALCIUM SERPL-MCNC: 9.4 MG/DL (ref 8.5–10.5)
CHLORIDE SERPL-SCNC: 105 MMOL/L (ref 96–112)
CO2 SERPL-SCNC: 23 MMOL/L (ref 20–33)
CREAT SERPL-MCNC: 1.07 MG/DL (ref 0.5–1.4)
GLOBULIN SER CALC-MCNC: 3.4 G/DL (ref 1.9–3.5)
GLUCOSE SERPL-MCNC: 88 MG/DL (ref 65–99)
LDH SERPL L TO P-CCNC: 132 U/L (ref 107–266)
POTASSIUM SERPL-SCNC: 4.4 MMOL/L (ref 3.6–5.5)
PROT SERPL-MCNC: 7.5 G/DL (ref 6–8.2)
SODIUM SERPL-SCNC: 139 MMOL/L (ref 135–145)
URATE SERPL-MCNC: 6.1 MG/DL (ref 2.5–8.3)

## 2019-02-19 PROCEDURE — 84550 ASSAY OF BLOOD/URIC ACID: CPT

## 2019-02-19 PROCEDURE — 80053 COMPREHEN METABOLIC PANEL: CPT

## 2019-02-19 PROCEDURE — 83615 LACTATE (LD) (LDH) ENZYME: CPT

## 2019-02-21 ENCOUNTER — HOSPITAL ENCOUNTER (OUTPATIENT)
Dept: LAB | Facility: MEDICAL CENTER | Age: 79
End: 2019-02-21
Attending: INTERNAL MEDICINE
Payer: MEDICARE

## 2019-02-21 PROCEDURE — 36415 COLL VENOUS BLD VENIPUNCTURE: CPT

## 2019-02-21 PROCEDURE — 82105 ALPHA-FETOPROTEIN SERUM: CPT

## 2019-02-23 LAB — AFP-TM SERPL-MCNC: 2 NG/ML (ref 0–9)

## 2019-02-26 ENCOUNTER — HOSPITAL ENCOUNTER (OUTPATIENT)
Dept: LAB | Facility: MEDICAL CENTER | Age: 79
End: 2019-02-26
Attending: INTERNAL MEDICINE
Payer: MEDICARE

## 2019-02-26 LAB
ALBUMIN SERPL BCP-MCNC: 4.2 G/DL (ref 3.2–4.9)
ALBUMIN/GLOB SERPL: 1.4 G/DL
ALP SERPL-CCNC: 70 U/L (ref 30–99)
ALT SERPL-CCNC: 16 U/L (ref 2–50)
ANION GAP SERPL CALC-SCNC: 6 MMOL/L (ref 0–11.9)
AST SERPL-CCNC: 18 U/L (ref 12–45)
BILIRUB SERPL-MCNC: 1 MG/DL (ref 0.1–1.5)
BUN SERPL-MCNC: 22 MG/DL (ref 8–22)
CALCIUM SERPL-MCNC: 10.1 MG/DL (ref 8.5–10.5)
CHLORIDE SERPL-SCNC: 109 MMOL/L (ref 96–112)
CO2 SERPL-SCNC: 22 MMOL/L (ref 20–33)
CREAT SERPL-MCNC: 1.09 MG/DL (ref 0.5–1.4)
GLOBULIN SER CALC-MCNC: 3.1 G/DL (ref 1.9–3.5)
GLUCOSE SERPL-MCNC: 106 MG/DL (ref 65–99)
LDH SERPL L TO P-CCNC: 112 U/L (ref 107–266)
POTASSIUM SERPL-SCNC: 4.2 MMOL/L (ref 3.6–5.5)
PROT SERPL-MCNC: 7.3 G/DL (ref 6–8.2)
SODIUM SERPL-SCNC: 137 MMOL/L (ref 135–145)
URATE SERPL-MCNC: 6.5 MG/DL (ref 2.5–8.3)

## 2019-02-26 PROCEDURE — 80053 COMPREHEN METABOLIC PANEL: CPT

## 2019-02-26 PROCEDURE — 84550 ASSAY OF BLOOD/URIC ACID: CPT

## 2019-02-26 PROCEDURE — 83615 LACTATE (LD) (LDH) ENZYME: CPT

## 2019-03-01 ENCOUNTER — APPOINTMENT (OUTPATIENT)
Dept: RADIOLOGY | Facility: MEDICAL CENTER | Age: 79
End: 2019-03-01
Attending: INTERNAL MEDICINE
Payer: MEDICARE

## 2019-03-18 ENCOUNTER — HOSPITAL ENCOUNTER (OUTPATIENT)
Dept: LAB | Facility: MEDICAL CENTER | Age: 79
End: 2019-03-18
Attending: NURSE PRACTITIONER
Payer: MEDICARE

## 2019-03-18 LAB
ALBUMIN SERPL BCP-MCNC: 4.2 G/DL (ref 3.2–4.9)
ALBUMIN/GLOB SERPL: 1.3 G/DL
ALP SERPL-CCNC: 68 U/L (ref 30–99)
ALT SERPL-CCNC: 17 U/L (ref 2–50)
ANION GAP SERPL CALC-SCNC: 6 MMOL/L (ref 0–11.9)
AST SERPL-CCNC: 23 U/L (ref 12–45)
BILIRUB SERPL-MCNC: 0.8 MG/DL (ref 0.1–1.5)
BUN SERPL-MCNC: 19 MG/DL (ref 8–22)
CALCIUM SERPL-MCNC: 9.7 MG/DL (ref 8.5–10.5)
CHLORIDE SERPL-SCNC: 105 MMOL/L (ref 96–112)
CO2 SERPL-SCNC: 25 MMOL/L (ref 20–33)
CREAT SERPL-MCNC: 1.1 MG/DL (ref 0.5–1.4)
GLOBULIN SER CALC-MCNC: 3.3 G/DL (ref 1.9–3.5)
GLUCOSE SERPL-MCNC: 104 MG/DL (ref 65–99)
LDH SERPL L TO P-CCNC: 173 U/L (ref 107–266)
POTASSIUM SERPL-SCNC: 4.1 MMOL/L (ref 3.6–5.5)
PROT SERPL-MCNC: 7.5 G/DL (ref 6–8.2)
SODIUM SERPL-SCNC: 136 MMOL/L (ref 135–145)
URATE SERPL-MCNC: 5.4 MG/DL (ref 2.5–8.3)

## 2019-03-18 PROCEDURE — 80053 COMPREHEN METABOLIC PANEL: CPT

## 2019-03-18 PROCEDURE — 84550 ASSAY OF BLOOD/URIC ACID: CPT

## 2019-03-18 PROCEDURE — 36415 COLL VENOUS BLD VENIPUNCTURE: CPT

## 2019-03-18 PROCEDURE — 83615 LACTATE (LD) (LDH) ENZYME: CPT

## 2019-04-12 ENCOUNTER — HOSPITAL ENCOUNTER (OUTPATIENT)
Dept: LAB | Facility: MEDICAL CENTER | Age: 79
End: 2019-04-12
Attending: NURSE PRACTITIONER
Payer: MEDICARE

## 2019-04-12 PROCEDURE — 83615 LACTATE (LD) (LDH) ENZYME: CPT

## 2019-04-12 PROCEDURE — 80053 COMPREHEN METABOLIC PANEL: CPT

## 2019-04-12 PROCEDURE — 36415 COLL VENOUS BLD VENIPUNCTURE: CPT

## 2019-04-12 PROCEDURE — 84550 ASSAY OF BLOOD/URIC ACID: CPT

## 2019-04-13 LAB
ALBUMIN SERPL BCP-MCNC: 4 G/DL (ref 3.2–4.9)
ALBUMIN/GLOB SERPL: 1.3 G/DL
ALP SERPL-CCNC: 64 U/L (ref 30–99)
ALT SERPL-CCNC: 19 U/L (ref 2–50)
ANION GAP SERPL CALC-SCNC: 9 MMOL/L (ref 0–11.9)
AST SERPL-CCNC: 29 U/L (ref 12–45)
BILIRUB SERPL-MCNC: 1.1 MG/DL (ref 0.1–1.5)
BUN SERPL-MCNC: 18 MG/DL (ref 8–22)
CALCIUM SERPL-MCNC: 9.4 MG/DL (ref 8.5–10.5)
CHLORIDE SERPL-SCNC: 106 MMOL/L (ref 96–112)
CO2 SERPL-SCNC: 24 MMOL/L (ref 20–33)
CREAT SERPL-MCNC: 1.1 MG/DL (ref 0.5–1.4)
FASTING STATUS PATIENT QL REPORTED: NORMAL
GLOBULIN SER CALC-MCNC: 3.2 G/DL (ref 1.9–3.5)
GLUCOSE SERPL-MCNC: 102 MG/DL (ref 65–99)
LDH SERPL L TO P-CCNC: 162 U/L (ref 107–266)
POTASSIUM SERPL-SCNC: 4.3 MMOL/L (ref 3.6–5.5)
PROT SERPL-MCNC: 7.2 G/DL (ref 6–8.2)
SODIUM SERPL-SCNC: 139 MMOL/L (ref 135–145)
URATE SERPL-MCNC: 5.7 MG/DL (ref 2.5–8.3)

## 2019-05-17 ENCOUNTER — HOSPITAL ENCOUNTER (OUTPATIENT)
Dept: LAB | Facility: MEDICAL CENTER | Age: 79
End: 2019-05-17
Attending: NURSE PRACTITIONER
Payer: MEDICARE

## 2019-05-17 LAB
ALBUMIN SERPL BCP-MCNC: 3.8 G/DL (ref 3.2–4.9)
ALBUMIN/GLOB SERPL: 1.3 G/DL
ALP SERPL-CCNC: 72 U/L (ref 30–99)
ALT SERPL-CCNC: 15 U/L (ref 2–50)
ANION GAP SERPL CALC-SCNC: 5 MMOL/L (ref 0–11.9)
AST SERPL-CCNC: 26 U/L (ref 12–45)
BILIRUB SERPL-MCNC: 0.8 MG/DL (ref 0.1–1.5)
BUN SERPL-MCNC: 19 MG/DL (ref 8–22)
CALCIUM SERPL-MCNC: 9.2 MG/DL (ref 8.5–10.5)
CHLORIDE SERPL-SCNC: 107 MMOL/L (ref 96–112)
CO2 SERPL-SCNC: 26 MMOL/L (ref 20–33)
CREAT SERPL-MCNC: 1.11 MG/DL (ref 0.5–1.4)
GLOBULIN SER CALC-MCNC: 3 G/DL (ref 1.9–3.5)
GLUCOSE SERPL-MCNC: 101 MG/DL (ref 65–99)
LDH SERPL L TO P-CCNC: 178 U/L (ref 107–266)
POTASSIUM SERPL-SCNC: 4.4 MMOL/L (ref 3.6–5.5)
PROT SERPL-MCNC: 6.8 G/DL (ref 6–8.2)
SODIUM SERPL-SCNC: 138 MMOL/L (ref 135–145)
URATE SERPL-MCNC: 5.6 MG/DL (ref 2.5–8.3)

## 2019-05-17 PROCEDURE — 84550 ASSAY OF BLOOD/URIC ACID: CPT

## 2019-05-17 PROCEDURE — 83615 LACTATE (LD) (LDH) ENZYME: CPT

## 2019-05-17 PROCEDURE — 80053 COMPREHEN METABOLIC PANEL: CPT

## 2019-05-17 PROCEDURE — 36415 COLL VENOUS BLD VENIPUNCTURE: CPT

## 2019-05-31 ENCOUNTER — NON-PROVIDER VISIT (OUTPATIENT)
Dept: CARDIOLOGY | Facility: MEDICAL CENTER | Age: 79
End: 2019-05-31
Payer: OTHER MISCELLANEOUS

## 2019-05-31 ENCOUNTER — OFFICE VISIT (OUTPATIENT)
Dept: CARDIOLOGY | Facility: MEDICAL CENTER | Age: 79
End: 2019-05-31
Payer: MEDICARE

## 2019-05-31 VITALS
HEART RATE: 70 BPM | WEIGHT: 221 LBS | DIASTOLIC BLOOD PRESSURE: 80 MMHG | OXYGEN SATURATION: 95 % | SYSTOLIC BLOOD PRESSURE: 118 MMHG | BODY MASS INDEX: 30.82 KG/M2

## 2019-05-31 DIAGNOSIS — I49.5 SINOATRIAL NODE DYSFUNCTION (HCC): Chronic | ICD-10-CM

## 2019-05-31 DIAGNOSIS — I48.0 PAROXYSMAL ATRIAL FIBRILLATION (HCC): Chronic | ICD-10-CM

## 2019-05-31 DIAGNOSIS — C82.81 OTHER TYPE OF FOLLICULAR LYMPHOMA OF LYMPH NODES OF HEAD (HCC): ICD-10-CM

## 2019-05-31 DIAGNOSIS — Z95.0 PRESENCE OF PERMANENT CARDIAC PACEMAKER: Chronic | ICD-10-CM

## 2019-05-31 DIAGNOSIS — K70.30 ALCOHOLIC CIRRHOSIS OF LIVER WITHOUT ASCITES (HCC): ICD-10-CM

## 2019-05-31 PROBLEM — C85.91: Status: ACTIVE | Noted: 2019-05-31

## 2019-05-31 PROCEDURE — 99214 OFFICE O/P EST MOD 30 MIN: CPT | Performed by: INTERNAL MEDICINE

## 2019-05-31 PROCEDURE — 93280 PM DEVICE PROGR EVAL DUAL: CPT | Performed by: INTERNAL MEDICINE

## 2019-05-31 RX ORDER — BENDAMUSTINE HYDROCHLORIDE 100 MG/20ML
INJECTION, POWDER, LYOPHILIZED, FOR SOLUTION INTRAVENOUS
COMMUNITY
End: 2020-05-15

## 2019-05-31 NOTE — LETTER
"     [facility]   [address], [facility] [address]  [place] [address]  Phone: [phone]  Fax: [phone]              Luigi Walters  1940    Encounter Date: 5/31/2019    Chaitanya Walters M.D.          PROGRESS NOTE:  Chief Complaint   Patient presents with   • Atrial Fibrillation     F/V DX:PAF       Subjective:   Luigi Walters is a 78 y.o. male who presents today with permanent pacemaker for sick sinus syndrome.  Recent diagnosis of lymphoma has a permacath and receiving chemotherapy.  Some atrial fibrillation does have thrombocytopenia.  Low cholesterol.  Denies any chest pain or shortness of breath overall doing well minimal palpitations.    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 Difficile   • Jaundice    • Melena    • Melena    • Osteoarthritis    • Pacemaker 2007    SSS   • Pain      L shoulder=6/10>chronic   • Pneumonia 1972   • Polycythemia    • Presence of permanent cardiac pacemaker 2007/2015   • Prostate cancer (HCC)    • Prostate cancer (HCC) 2000        • Renal disorder      Insufficiency....med related?; pt denies 02/19   • Seizure (HCC)     \"with Dobutamine Echo\"   • Thrombocytopenia (HCC)      Past Surgical History:   Procedure Laterality Date   • RECOVERY  3/18/2015    Performed by [facility] at SURGERY PRE-POST PROC [facility]IT [facility]   • PACEMAKER " INSERTION  March 2015    Generator replacement with  [employer] Adapta ADDRL1 implanted by Dr. Chaitanya Walters. Original device implanted in 2007.   • PENILE PROSTHESIS AMS INFLATABLE  10/13/2014    Performed by Sedrick Pittman M.D. at SURGERY [facility] ORS   • OTHER ORTHOPEDIC SURGERY  2010    L Shoulder Replacement   • PACEMAKER INSERTION  2007        • OTHER      Endoscopy every 3-6 months to track esophageal varices   • PENILE PROSTHESIS AMS INFLATABLE     • SHOULDER ARTHROPLASTY TOTAL       Family History   Problem Relation Age of Onset   • Cancer Father         Prostate CA     Social History     Social History   • Marital status:      Spouse name: N/A   • Number of children: N/A   • Years of education: N/A     Occupational History   • Not on file.     Social History Main Topics   • Smoking status: Former Smoker     Packs/day: 0.50     Years: 14.00     Types: Cigarettes     Quit date: 1/1/1970   • Smokeless tobacco: Never Used      Comment: quit in 1970   • Alcohol use No      Comment: Hx of ETOH abuse; quit 2006   • Drug use: No   • Sexual activity: Not Currently     Other Topics Concern   • Not on file     Social History Narrative   • No narrative on file     Allergies   Allergen Reactions   • Dobutamine      Seizures; taken abruptly off a Dobutamine gtt-no taper   • Erythromycin      1999-09-21;GI  1999-09-21;GI     Outpatient Encounter Prescriptions as of 5/31/2019   Medication Sig Dispense Refill   • RIFAXIMIN PO Take  by mouth.     • bendamustine (TREANDA) 100 MG Recon Soln by Intravenous route.     • NON SPECIFIED L-orinthate 500mg PO daily     • diphenhydrAMINE (BENADRYL) 50 MG Cap Take 50 mg by mouth every bedtime.     • NON SPECIFIED L-aspartate 500mg Po daily     • zolpidem (AMBIEN) 10 MG Tab TK 1 T PO QD HS  5   • Multiple Vitamin (MULTI VITAMIN DAILY PO) Take  by mouth. Combination d3,magnessium,vitamin a     • ropinirole (REQUIP) 0.25 MG Tab TK 2T PO at bedtime  3   • spironolactone (ALDACTONE)  50 MG Tab   0   • LORATADINE PO Take 8 mg by mouth every day.     • lactulose 10 GM/15ML SOLN Take 30 g by mouth as needed.     • tamsulosin (FLOMAX) 0.4 MG capsule Take 0.4 mg by mouth ONE-HALF HOUR AFTER BREAKFAST.     • omeprazole (PRILOSEC) 40 MG capsule Take 40 mg by mouth every day.     • acetaminophen (TYLENOL) 500 MG Tab Take 500-1,000 mg by mouth every 6 hours as needed.     • fluticasone (FLONASE) 50 MCG/ACT nasal spray Spray 1 Spray in nose every day.     • tramadol (ULTRAM) 50 MG Tab Take 50 mg by mouth every 8 hours as needed.     • promethazine (PHENERGAN) 25 MG Tab Take 25 mg by mouth every 6 hours as needed for Nausea/Vomiting.     • meclizine (ANTIVERT) 25 MG TABS Take 25 mg by mouth 3 times a day as needed.       No facility-administered encounter medications on file as of 5/31/2019.      ROS     Objective:   /80 (BP Location: Left arm, Patient Position: Sitting, BP Cuff Size: Large adult)   Pulse 70   Wt 100.2 kg (221 lb)   SpO2 95%   BMI 30.82 kg/m²      Physical Exam   Constitutional: He is oriented to person, place, and time. He appears well-developed and well-nourished.   HENT:   Head: Normocephalic and atraumatic.   Eyes: EOM are normal.   Neck: Normal range of motion. Neck supple.   Cardiovascular: Normal rate, regular rhythm and intact distal pulses.  Exam reveals no gallop and no friction rub.    No murmur heard.  Pulmonary/Chest: Effort normal and breath sounds normal.   Abdominal: Soft.   Musculoskeletal: Normal range of motion. He exhibits no edema.   Neurological: He is alert and oriented to person, place, and time.   Skin: Skin is warm and dry.   Psychiatric: He has a normal mood and affect. His behavior is normal. Judgment and thought content normal.       Assessment:     1. Paroxysmal atrial fibrillation (HCC)     2. Alcoholic cirrhosis of liver without ascites (HCC)     3. Presence of permanent cardiac pacemaker     4. Sinoatrial node dysfunction (HCC)     5. 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  Paroxysmal atrial fibrillation longest episode 3 hours.  Discussed anticoagulation with a NOAC patient deferred despite indication.  2.  Lymphoma being treated with chemotherapy.  3.  Sick sinus syndrome normal pacemaker function.  4.  Device check in 6 months follow-up me in 1 year.      Denver J Miller, M.D.  [address] [address]  VIA Facsimile: [phone]

## 2019-05-31 NOTE — PROGRESS NOTES
"Chief Complaint   Patient presents with   • Atrial Fibrillation     F/V DX:PAF       Subjective:   Luigi Walters is a 78 y.o. male who presents today with permanent pacemaker for sick sinus syndrome.  Recent diagnosis of lymphoma has a permacath and receiving chemotherapy.  Some atrial fibrillation does have thrombocytopenia.  Low cholesterol.  Denies any chest pain or shortness of breath overall doing well minimal palpitations.    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 Difficile   • Jaundice    • Melena    • Melena    • Osteoarthritis    • Pacemaker 2007    SSS   • Pain      L shoulder=6/10>chronic   • Pneumonia 1972   • Polycythemia    • Presence of permanent cardiac pacemaker 2007/2015   • Prostate cancer (HCC)    • Prostate cancer (HCC) 2000        • Renal disorder      Insufficiency....med related?; pt denies 02/19   • Seizure (HCC)     \"with Dobutamine Echo\"   • Thrombocytopenia (HCC)      Past Surgical History:   Procedure Laterality Date   • RECOVERY  3/18/2015    Performed by [facility] [place] [facility] [facility]   • PACEMAKER INSERTION  March 2015    Generator replacement with  Medtronic Adapta ADDRL1 implanted by Dr. Chaitanya Walters. Original device implanted in 2007.   • PENILE PROSTHESIS AMS INFLATABLE  10/13/2014    Performed by Sedrick Pittman M.D. at SURGERY [facility] ORS   • OTHER " ORTHOPEDIC SURGERY  2010    L Shoulder Replacement   • PACEMAKER INSERTION  2007        • OTHER      Endoscopy every 3-6 months to track esophageal varices   • PENILE PROSTHESIS AMS INFLATABLE     • SHOULDER ARTHROPLASTY TOTAL       Family History   Problem Relation Age of Onset   • Cancer Father         Prostate CA     Social History     Social History   • Marital status:      Spouse name: N/A   • Number of children: N/A   • Years of education: N/A     Occupational History   • Not on file.     Social History Main Topics   • Smoking status: Former Smoker     Packs/day: 0.50     Years: 14.00     Types: Cigarettes     Quit date: 1/1/1970   • Smokeless tobacco: Never Used      Comment: quit in 1970   • Alcohol use No      Comment: Hx of ETOH abuse; quit 2006   • Drug use: No   • Sexual activity: Not Currently     Other Topics Concern   • Not on file     Social History Narrative   • No narrative on file     Allergies   Allergen Reactions   • Dobutamine      Seizures; taken abruptly off a Dobutamine gtt-no taper   • Erythromycin      1999-09-21;GI  1999-09-21;GI     Outpatient Encounter Prescriptions as of 5/31/2019   Medication Sig Dispense Refill   • RIFAXIMIN PO Take  by mouth.     • bendamustine (TREANDA) 100 MG Recon Soln by Intravenous route.     • NON SPECIFIED L-orinthate 500mg PO daily     • diphenhydrAMINE (BENADRYL) 50 MG Cap Take 50 mg by mouth every bedtime.     • NON SPECIFIED L-aspartate 500mg Po daily     • zolpidem (AMBIEN) 10 MG Tab TK 1 T PO QD HS  5   • Multiple Vitamin (MULTI VITAMIN DAILY PO) Take  by mouth. Combination d3,magnessium,vitamin a     • ropinirole (REQUIP) 0.25 MG Tab TK 2T PO at bedtime  3   • spironolactone (ALDACTONE) 50 MG Tab   0   • LORATADINE PO Take 8 mg by mouth every day.     • lactulose 10 GM/15ML SOLN Take 30 g by mouth as needed.     • tamsulosin (FLOMAX) 0.4 MG capsule Take 0.4 mg by mouth ONE-HALF HOUR AFTER BREAKFAST.     • omeprazole (PRILOSEC) 40 MG capsule Take  40 mg by mouth every day.     • acetaminophen (TYLENOL) 500 MG Tab Take 500-1,000 mg by mouth every 6 hours as needed.     • fluticasone (FLONASE) 50 MCG/ACT nasal spray Spray 1 Spray in nose every day.     • tramadol (ULTRAM) 50 MG Tab Take 50 mg by mouth every 8 hours as needed.     • promethazine (PHENERGAN) 25 MG Tab Take 25 mg by mouth every 6 hours as needed for Nausea/Vomiting.     • meclizine (ANTIVERT) 25 MG TABS Take 25 mg by mouth 3 times a day as needed.       No facility-administered encounter medications on file as of 5/31/2019.      ROS     Objective:   /80 (BP Location: Left arm, Patient Position: Sitting, BP Cuff Size: Large adult)   Pulse 70   Wt 100.2 kg (221 lb)   SpO2 95%   BMI 30.82 kg/m²     Physical Exam   Constitutional: He is oriented to person, place, and time. He appears well-developed and well-nourished.   HENT:   Head: Normocephalic and atraumatic.   Eyes: EOM are normal.   Neck: Normal range of motion. Neck supple.   Cardiovascular: Normal rate, regular rhythm and intact distal pulses.  Exam reveals no gallop and no friction rub.    No murmur heard.  Pulmonary/Chest: Effort normal and breath sounds normal.   Abdominal: Soft.   Musculoskeletal: Normal range of motion. He exhibits no edema.   Neurological: He is alert and oriented to person, place, and time.   Skin: Skin is warm and dry.   Psychiatric: He has a normal mood and affect. His behavior is normal. Judgment and thought content normal.       Assessment:     1. Paroxysmal atrial fibrillation (HCC)     2. Alcoholic cirrhosis of liver without ascites (HCC)     3. Presence of permanent cardiac pacemaker     4. Sinoatrial node dysfunction (HCC)     5. Other type of follicular lymphoma of lymph nodes of head (HCC)         Medical Decision Making:  Today's Assessment / Status / Plan:   1.  Paroxysmal atrial fibrillation longest episode 3 hours.  Discussed anticoagulation with a NOAC patient deferred despite indication.  2.   Lymphoma being treated with chemotherapy.  3.  Sick sinus syndrome normal pacemaker function.  4.  Device check in 6 months follow-up me in 1 year.

## 2019-06-14 ENCOUNTER — HOSPITAL ENCOUNTER (OUTPATIENT)
Dept: LAB | Facility: MEDICAL CENTER | Age: 79
End: 2019-06-14
Attending: NURSE PRACTITIONER
Payer: MEDICARE

## 2019-06-14 PROCEDURE — 83615 LACTATE (LD) (LDH) ENZYME: CPT

## 2019-06-14 PROCEDURE — 36415 COLL VENOUS BLD VENIPUNCTURE: CPT

## 2019-06-14 PROCEDURE — 80053 COMPREHEN METABOLIC PANEL: CPT

## 2019-06-14 PROCEDURE — 84550 ASSAY OF BLOOD/URIC ACID: CPT

## 2019-06-15 LAB
ALBUMIN SERPL BCP-MCNC: 4.3 G/DL (ref 3.2–4.9)
ALBUMIN/GLOB SERPL: 1.4 G/DL
ALP SERPL-CCNC: 62 U/L (ref 30–99)
ALT SERPL-CCNC: 17 U/L (ref 2–50)
ANION GAP SERPL CALC-SCNC: 9 MMOL/L (ref 0–11.9)
AST SERPL-CCNC: 30 U/L (ref 12–45)
BILIRUB SERPL-MCNC: 1.1 MG/DL (ref 0.1–1.5)
BUN SERPL-MCNC: 18 MG/DL (ref 8–22)
CALCIUM SERPL-MCNC: 9.5 MG/DL (ref 8.5–10.5)
CHLORIDE SERPL-SCNC: 105 MMOL/L (ref 96–112)
CO2 SERPL-SCNC: 25 MMOL/L (ref 20–33)
CREAT SERPL-MCNC: 1.24 MG/DL (ref 0.5–1.4)
GLOBULIN SER CALC-MCNC: 3 G/DL (ref 1.9–3.5)
GLUCOSE SERPL-MCNC: 105 MG/DL (ref 65–99)
LDH SERPL L TO P-CCNC: 222 U/L (ref 107–266)
POTASSIUM SERPL-SCNC: 4.3 MMOL/L (ref 3.6–5.5)
PROT SERPL-MCNC: 7.3 G/DL (ref 6–8.2)
SODIUM SERPL-SCNC: 139 MMOL/L (ref 135–145)
URATE SERPL-MCNC: 6 MG/DL (ref 2.5–8.3)

## 2019-06-25 ENCOUNTER — OFFICE VISIT (OUTPATIENT)
Dept: URGENT CARE | Facility: PHYSICIAN GROUP | Age: 79
End: 2019-06-25
Payer: MEDICARE

## 2019-06-25 VITALS
RESPIRATION RATE: 12 BRPM | BODY MASS INDEX: 31.08 KG/M2 | WEIGHT: 222 LBS | TEMPERATURE: 96.9 F | HEART RATE: 80 BPM | OXYGEN SATURATION: 96 % | HEIGHT: 71 IN

## 2019-06-25 DIAGNOSIS — Z87.19 HISTORY OF DIVERTICULOSIS: ICD-10-CM

## 2019-06-25 DIAGNOSIS — R10.32 LLQ ABDOMINAL PAIN: ICD-10-CM

## 2019-06-25 LAB
APPEARANCE UR: CLEAR
BILIRUB UR STRIP-MCNC: NEGATIVE MG/DL
COLOR UR AUTO: YELLOW
GLUCOSE UR STRIP.AUTO-MCNC: NEGATIVE MG/DL
KETONES UR STRIP.AUTO-MCNC: NEGATIVE MG/DL
LEUKOCYTE ESTERASE UR QL STRIP.AUTO: NEGATIVE
NITRITE UR QL STRIP.AUTO: NEGATIVE
PH UR STRIP.AUTO: 6 [PH] (ref 5–8)
PROT UR QL STRIP: NEGATIVE MG/DL
RBC UR QL AUTO: NEGATIVE
SP GR UR STRIP.AUTO: 1.01
UROBILINOGEN UR STRIP-MCNC: 0.2 MG/DL

## 2019-06-25 PROCEDURE — 99214 OFFICE O/P EST MOD 30 MIN: CPT | Performed by: FAMILY MEDICINE

## 2019-06-25 PROCEDURE — 81002 URINALYSIS NONAUTO W/O SCOPE: CPT | Performed by: FAMILY MEDICINE

## 2019-06-25 RX ORDER — AMOXICILLIN AND CLAVULANATE POTASSIUM 875; 125 MG/1; MG/1
1 TABLET, FILM COATED ORAL 2 TIMES DAILY
Qty: 20 TAB | Refills: 0 | Status: SHIPPED | OUTPATIENT
Start: 2019-06-25 | End: 2019-07-05

## 2019-06-25 NOTE — PROGRESS NOTES
"Subjective:      Luigi Walters is a 78 y.o. male who presents with Abdominal Pain (Lt abdominal stabbing pain, 3-5 bowel movements daily (ending chemo) x2weeks )            2 weeks left lower quadrant abdominal pain.  Waxing and waning.  He has had more frequent BMs recently.  No blood or pus in the stool.  No past medical history of IBD.  He has had imaging for lymphoma surveillance noting diverticulosis in the sigmoid colon.  He suspects this is the case.  No fever.  Minimal relief with OTC medications.  Symptoms are moderate severity.  No other aggravating or alleviating factors.        Review of Systems   Constitutional: Negative for malaise/fatigue and weight loss.   Eyes: Negative for discharge and redness.   Gastrointestinal: Negative for nausea and vomiting.   Musculoskeletal: Negative for joint pain and myalgias.   Skin: Negative for itching and rash.     .  Medications, Allergies, and current problem list reviewed today in Epic       Objective:     Pulse 80   Temp 36.1 °C (96.9 °F) (Temporal)   Resp 12   Ht 1.803 m (5' 11\")   Wt 100.7 kg (222 lb)   SpO2 96%   BMI 30.96 kg/m²      Physical Exam   Constitutional: He is oriented to person, place, and time. He appears well-developed and well-nourished. No distress.   HENT:   Head: Normocephalic and atraumatic.   Eyes: Conjunctivae are normal.   Cardiovascular: Normal rate, regular rhythm and normal heart sounds.    Pulmonary/Chest: Effort normal and breath sounds normal.   Abdominal: Soft. Bowel sounds are normal. He exhibits no mass. There is tenderness (LLQ). There is no rebound and no guarding.   Neurological: He is alert and oriented to person, place, and time.   Skin: Skin is warm and dry.               Assessment/Plan:     1. LLQ abdominal pain  POCT Urinalysis    amoxicillin-clavulanate (AUGMENTIN) 875-125 MG Tab   2. History of diverticulosis  amoxicillin-clavulanate (AUGMENTIN) 875-125 MG Tab     Differential diagnosis, natural history, " supportive care, and indications for immediate follow-up discussed at length.     Discussed options for evaluation including imaging and lab.  Patient prefers a trial of antibiotic given duration of symptoms and would like to avoid further imaging due to significant burden due to his lymphoma.  I think this is reasonable.  Contact me if symptoms do not resolve or worsen.

## 2019-06-28 ENCOUNTER — HOSPITAL ENCOUNTER (OUTPATIENT)
Dept: LAB | Facility: MEDICAL CENTER | Age: 79
End: 2019-06-28
Attending: INTERNAL MEDICINE
Payer: MEDICARE

## 2019-06-28 PROCEDURE — 82105 ALPHA-FETOPROTEIN SERUM: CPT

## 2019-06-28 PROCEDURE — 36415 COLL VENOUS BLD VENIPUNCTURE: CPT

## 2019-06-30 LAB — AFP-TM SERPL-MCNC: 2 NG/ML (ref 0–9)

## 2019-06-30 ASSESSMENT — ENCOUNTER SYMPTOMS
NAUSEA: 0
VOMITING: 0
WEIGHT LOSS: 0
EYE REDNESS: 0
MYALGIAS: 0
EYE DISCHARGE: 0

## 2019-07-12 ENCOUNTER — HOSPITAL ENCOUNTER (OUTPATIENT)
Dept: LAB | Facility: MEDICAL CENTER | Age: 79
End: 2019-07-12
Attending: NURSE PRACTITIONER
Payer: MEDICARE

## 2019-07-12 LAB
ALBUMIN SERPL BCP-MCNC: 4 G/DL (ref 3.2–4.9)
ALBUMIN/GLOB SERPL: 1.3 G/DL
ALP SERPL-CCNC: 63 U/L (ref 30–99)
ALT SERPL-CCNC: 15 U/L (ref 2–50)
ANION GAP SERPL CALC-SCNC: 10 MMOL/L (ref 0–11.9)
AST SERPL-CCNC: 25 U/L (ref 12–45)
BILIRUB SERPL-MCNC: 0.8 MG/DL (ref 0.1–1.5)
BUN SERPL-MCNC: 19 MG/DL (ref 8–22)
CALCIUM SERPL-MCNC: 9.7 MG/DL (ref 8.5–10.5)
CHLORIDE SERPL-SCNC: 108 MMOL/L (ref 96–112)
CO2 SERPL-SCNC: 23 MMOL/L (ref 20–33)
CREAT SERPL-MCNC: 1.05 MG/DL (ref 0.5–1.4)
FASTING STATUS PATIENT QL REPORTED: NORMAL
GLOBULIN SER CALC-MCNC: 3 G/DL (ref 1.9–3.5)
GLUCOSE SERPL-MCNC: 102 MG/DL (ref 65–99)
LDH SERPL L TO P-CCNC: 165 U/L (ref 107–266)
POTASSIUM SERPL-SCNC: 4.1 MMOL/L (ref 3.6–5.5)
PROT SERPL-MCNC: 7 G/DL (ref 6–8.2)
SODIUM SERPL-SCNC: 141 MMOL/L (ref 135–145)
URATE SERPL-MCNC: 5.2 MG/DL (ref 2.5–8.3)

## 2019-07-12 PROCEDURE — 36415 COLL VENOUS BLD VENIPUNCTURE: CPT

## 2019-07-12 PROCEDURE — 80053 COMPREHEN METABOLIC PANEL: CPT

## 2019-07-12 PROCEDURE — 83615 LACTATE (LD) (LDH) ENZYME: CPT

## 2019-07-12 PROCEDURE — 84550 ASSAY OF BLOOD/URIC ACID: CPT

## 2019-07-26 ENCOUNTER — OFFICE VISIT (OUTPATIENT)
Dept: URGENT CARE | Facility: PHYSICIAN GROUP | Age: 79
End: 2019-07-26
Payer: MEDICARE

## 2019-07-26 VITALS
RESPIRATION RATE: 16 BRPM | BODY MASS INDEX: 31.64 KG/M2 | TEMPERATURE: 98.3 F | HEIGHT: 71 IN | DIASTOLIC BLOOD PRESSURE: 60 MMHG | WEIGHT: 226 LBS | OXYGEN SATURATION: 96 % | SYSTOLIC BLOOD PRESSURE: 134 MMHG | HEART RATE: 79 BPM

## 2019-07-26 DIAGNOSIS — T07.XXXA: ICD-10-CM

## 2019-07-26 DIAGNOSIS — R89.4 ABNORMALITY OF IMMUNE SYSTEM: ICD-10-CM

## 2019-07-26 PROCEDURE — 99213 OFFICE O/P EST LOW 20 MIN: CPT | Performed by: FAMILY MEDICINE

## 2019-07-26 RX ORDER — SILDENAFIL 100 MG/1
100 TABLET, FILM COATED ORAL
Refills: 0 | COMMUNITY
Start: 2019-06-20 | End: 2020-05-15

## 2019-07-26 RX ORDER — RANITIDINE 150 MG/1
TABLET ORAL
Refills: 10 | COMMUNITY
Start: 2019-07-10 | End: 2020-05-15

## 2019-07-26 RX ORDER — CALCITRIOL 0.25 UG/1
0.25 CAPSULE, LIQUID FILLED ORAL DAILY
Refills: 4 | COMMUNITY
Start: 2019-07-08

## 2019-07-26 RX ORDER — OXYCODONE HYDROCHLORIDE 10 MG/1
TABLET ORAL
Refills: 0 | COMMUNITY
Start: 2019-06-17 | End: 2020-05-15

## 2019-07-26 RX ORDER — TESTOSTERONE CYPIONATE 200 MG/ML
INJECTION, SOLUTION INTRAMUSCULAR
Refills: 3 | COMMUNITY
Start: 2019-06-18 | End: 2021-04-08

## 2019-07-26 RX ORDER — CEPHALEXIN 500 MG/1
500 CAPSULE ORAL 3 TIMES DAILY
Qty: 21 CAP | Refills: 0 | Status: SHIPPED | OUTPATIENT
Start: 2019-07-26 | End: 2020-05-15

## 2019-07-26 RX ORDER — 1.1% SODIUM FLUORIDE PRESCRIPTION DENTAL CREAM 5 MG/G
1 CREAM DENTAL EVERY EVENING
Refills: 1 | COMMUNITY
Start: 2019-04-29

## 2019-07-26 ASSESSMENT — ENCOUNTER SYMPTOMS
SHORTNESS OF BREATH: 0
ROS SKIN COMMENTS: MULTIPLE WOUNDS
FEVER: 0
VOMITING: 0

## 2019-07-26 NOTE — PROGRESS NOTES
Subjective:     Luigi Walters is a 78 y.o. male who presents for evaluation after a fall (Fell causing wounds toNose/R thigh/R. [place]/ L Forearm)    HPI  Pt presents for evaluation of a new problem   Pt with a trip and fall about 36 hours go   Tripped over an ottoman in the dark and fell   Having bruises and cuts along the bridge of the nose, left arm, right thigh, and right shin   Was able to stop bleeding at all sites on his own  Patient has very low platelets and white blood cells due to chemotherapy and very concerned about infection    Review of Systems   Constitutional: Negative for fever.   Respiratory: Negative for shortness of breath.    Cardiovascular: Negative for chest pain.   Gastrointestinal: Negative for vomiting.   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  MEDS:   Current Outpatient Prescriptions:   •  raNITidine (ZANTAC) 150 MG Tab, TK 1 T PO BID, Disp: , Rfl: 10  •  SF 5000 PLUS 1.1 % Cream, USE FOR BRUSHING UTD, Disp: , Rfl: 1  •  RIFAXIMIN PO, Take  by mouth., Disp: , Rfl:   •  bendamustine (TREANDA) 100 MG Recon Soln, by Intravenous route., Disp: , Rfl:   •  fluticasone (FLONASE) 50 MCG/ACT nasal spray, Spray 1 Spray in nose every day., Disp: , Rfl:   •  tramadol (ULTRAM) 50 MG Tab, Take 50 mg  by mouth every 8 hours as needed., Disp: , Rfl:   •  NON SPECIFIED, L-orinthate 500mg PO daily, Disp: , Rfl:   •  diphenhydrAMINE (BENADRYL) 50 MG Cap, Take 50 mg by mouth every bedtime., Disp: , Rfl:   •  NON SPECIFIED, L-aspartate 500mg Po daily, Disp: , Rfl:   •  zolpidem (AMBIEN) 10 MG Tab, TK 1 T PO QD HS, Disp: , Rfl: 5  •  promethazine (PHENERGAN) 25 MG Tab, Take 25 mg by mouth every 6 hours as needed for Nausea/Vomiting., Disp: , Rfl:   •  Multiple Vitamin (MULTI VITAMIN DAILY PO), Take  by mouth. Combination d3,magnessium,vitamin a, Disp: , Rfl:   •  ropinirole (REQUIP) 0.25 MG Tab, TK 2T PO at bedtime, Disp: , Rfl: 3  •  spironolactone (ALDACTONE) 50 MG Tab, , Disp: , Rfl: 0  •  LORATADINE PO, Take 8 mg by mouth every day., Disp: , Rfl:   •  lactulose 10 GM/15ML SOLN, Take 30 g by mouth as needed., Disp: , Rfl:   •  tamsulosin (FLOMAX) 0.4 MG capsule, Take 0.4 mg by mouth ONE-HALF HOUR AFTER BREAKFAST., Disp: , Rfl:   •  omeprazole (PRILOSEC) 40 MG capsule, Take 40 mg by mouth every day., Disp: , Rfl:   •  calcitRIOL (ROCALTROL) 0.25 MCG Cap, TK 1 C PO QD, Disp: , Rfl: 4  •  oxyCODONE immediate release (ROXICODONE) 10 MG immediate release tablet, TK 1 T PO QD FOR 30 DAYS, Disp: , Rfl: 0  •  sildenafil citrate (VIAGRA) 100 MG tablet, Take 100 mg by mouth. AS NEEDED, Disp: , Rfl: 0  •  testosterone cypionate (DEPO-TESTOSTERONE) 200 MG/ML Solution injection, , Disp: , Rfl: 3  •  acetaminophen (TYLENOL) 500 MG Tab, Take 500-1,000 mg by mouth every 6 hours as needed., Disp: , Rfl:   •  meclizine (ANTIVERT) 25 MG TABS, Take 25 mg by mouth 3 times a day as needed., Disp: , Rfl:   ALLERGIES:   Allergies   Allergen Reactions   • Dobutamine      Seizures; taken abruptly off a Dobutamine gtt-no taper   • Latex      SURGHX:   Past Surgical History:   Procedure Laterality Date   • RECOVERY  3/18/2015    Performed by [facility] at [facility]   • PACEMAKER INSERTION  March 2015    Generator  "replacement with  [employer]onic Adapta ADDRL1 implanted by Dr. Chaitanya Walters. Original device implanted in 2007.   • PENILE PROSTHESIS AMS INFLATABLE  10/13/2014    Performed by Sedrick Pittman M.D. at SURGERY [facility]   • OTHER ORTHOPEDIC SURGERY  2010    L Shoulder Replacement   • PACEMAKER INSERTION  2007        • OTHER      Endoscopy every 3-6 months to track esophageal varices   • PENILE PROSTHESIS AMS INFLATABLE     • SHOULDER ARTHROPLASTY TOTAL       SOCHX:  reports that he quit smoking about 49 years ago. His smoking use included Cigarettes. He has a 7.00 pack-year smoking history. He has never used smokeless tobacco. He reports that he does not drink alcohol or use drugs.  FH: Family history was reviewed, not contributing to acute injury      Objective:   /60 (BP Location: Right arm, Patient Position: Sitting, BP Cuff Size: Adult)   Pulse 79   Temp 36.8 °C (98.3 °F) (Temporal)   Resp 16   Ht 1.803 m (5' 11\")   Wt 102.5 kg (226 lb)   SpO2 96%   BMI 31.52 kg/m²     Physical Exam   Constitutional: He is oriented to person, place, and time. He appears well-developed and well-nourished. No distress.   HENT:   Head: Normocephalic and atraumatic.   Neurological: He is alert and oriented to person, place, and time.   Skin: Skin is warm and dry. He is not diaphoretic.   Small laceration over bridge of nose  Contusions under bilateral eyes  2 deep abrasions over left forearm  Linear deep abrasions over right anterior shin  No surrounding erythema, exudate, or active bleeding from any injuries   Psychiatric: He has a normal mood and affect. His behavior is normal. Judgment and thought content normal.     Assessment/Plan:   Assessment    1. Laceration of multiple sites  2. Abnormality of immune system  Patient is a 78-year-old male with laceration and deep abrasion of multiple sites after a fall.  None appear infected at this point.  Because patient is severely immune compromised due to chemo, will " prophylactically start antibiotics in order to help wounds heal.  Extensively reviewed signs and symptoms of infection and return to clinic precautions.  Follow-up as needed.  - cephALEXin (KEFLEX) 500 MG Cap; Take 1 Cap by mouth 3 times a day.  Dispense: 21 Cap; Refill: 0

## 2019-08-02 ENCOUNTER — HOSPITAL ENCOUNTER (OUTPATIENT)
Dept: LAB | Facility: MEDICAL CENTER | Age: 79
End: 2019-08-02
Attending: INTERNAL MEDICINE
Payer: MEDICARE

## 2019-08-02 LAB
ALBUMIN SERPL BCP-MCNC: 4.1 G/DL (ref 3.2–4.9)
ALBUMIN/GLOB SERPL: 1.3 G/DL
ALP SERPL-CCNC: 76 U/L (ref 30–99)
ALT SERPL-CCNC: 14 U/L (ref 2–50)
ANION GAP SERPL CALC-SCNC: 7 MMOL/L (ref 0–11.9)
AST SERPL-CCNC: 27 U/L (ref 12–45)
BASOPHILS # BLD AUTO: 1.8 % (ref 0–1.8)
BASOPHILS # BLD: 0.03 K/UL (ref 0–0.12)
BILIRUB SERPL-MCNC: 0.8 MG/DL (ref 0.1–1.5)
BUN SERPL-MCNC: 21 MG/DL (ref 8–22)
CALCIUM SERPL-MCNC: 9.6 MG/DL (ref 8.5–10.5)
CHLORIDE SERPL-SCNC: 107 MMOL/L (ref 96–112)
CO2 SERPL-SCNC: 25 MMOL/L (ref 20–33)
CREAT SERPL-MCNC: 1.34 MG/DL (ref 0.5–1.4)
EOSINOPHIL # BLD AUTO: 0.1 K/UL (ref 0–0.51)
EOSINOPHIL NFR BLD: 5.9 % (ref 0–6.9)
ERYTHROCYTE [DISTWIDTH] IN BLOOD BY AUTOMATED COUNT: 45.3 FL (ref 35.9–50)
GLOBULIN SER CALC-MCNC: 3.1 G/DL (ref 1.9–3.5)
GLUCOSE SERPL-MCNC: 104 MG/DL (ref 65–99)
HCT VFR BLD AUTO: 43.4 % (ref 42–52)
HGB BLD-MCNC: 14.4 G/DL (ref 14–18)
IMM GRANULOCYTES # BLD AUTO: 0.02 K/UL (ref 0–0.11)
IMM GRANULOCYTES NFR BLD AUTO: 1.2 % (ref 0–0.9)
LDH SERPL L TO P-CCNC: 229 U/L (ref 107–266)
LYMPHOCYTES # BLD AUTO: 0.35 K/UL (ref 1–4.8)
LYMPHOCYTES NFR BLD: 20.6 % (ref 22–41)
MCH RBC QN AUTO: 30.7 PG (ref 27–33)
MCHC RBC AUTO-ENTMCNC: 33.2 G/DL (ref 33.7–35.3)
MCV RBC AUTO: 92.5 FL (ref 81.4–97.8)
MONOCYTES # BLD AUTO: 0.23 K/UL (ref 0–0.85)
MONOCYTES NFR BLD AUTO: 13.5 % (ref 0–13.4)
NEUTROPHILS # BLD AUTO: 0.97 K/UL (ref 1.82–7.42)
NEUTROPHILS NFR BLD: 57 % (ref 44–72)
NRBC # BLD AUTO: 0 K/UL
NRBC BLD-RTO: 0 /100 WBC
PLATELET # BLD AUTO: 99 K/UL (ref 164–446)
PMV BLD AUTO: 11.6 FL (ref 9–12.9)
POTASSIUM SERPL-SCNC: 4.2 MMOL/L (ref 3.6–5.5)
PROT SERPL-MCNC: 7.2 G/DL (ref 6–8.2)
RBC # BLD AUTO: 4.69 M/UL (ref 4.7–6.1)
SODIUM SERPL-SCNC: 139 MMOL/L (ref 135–145)
URATE SERPL-MCNC: 6.7 MG/DL (ref 2.5–8.3)
WBC # BLD AUTO: 1.8 K/UL (ref 4.8–10.8)

## 2019-08-02 PROCEDURE — 83615 LACTATE (LD) (LDH) ENZYME: CPT

## 2019-08-02 PROCEDURE — 80053 COMPREHEN METABOLIC PANEL: CPT

## 2019-08-02 PROCEDURE — 84550 ASSAY OF BLOOD/URIC ACID: CPT

## 2019-08-02 PROCEDURE — 85025 COMPLETE CBC W/AUTO DIFF WBC: CPT

## 2019-08-02 PROCEDURE — 36415 COLL VENOUS BLD VENIPUNCTURE: CPT

## 2019-09-13 ENCOUNTER — HOSPITAL ENCOUNTER (OUTPATIENT)
Dept: LAB | Facility: MEDICAL CENTER | Age: 79
End: 2019-09-13
Attending: INTERNAL MEDICINE
Payer: MEDICARE

## 2019-09-13 LAB
ALBUMIN SERPL BCP-MCNC: 4.3 G/DL (ref 3.2–4.9)
ALBUMIN/GLOB SERPL: 1.5 G/DL
ALP SERPL-CCNC: 68 U/L (ref 30–99)
ALT SERPL-CCNC: 15 U/L (ref 2–50)
ANION GAP SERPL CALC-SCNC: 7 MMOL/L (ref 0–11.9)
AST SERPL-CCNC: 24 U/L (ref 12–45)
BILIRUB SERPL-MCNC: 0.6 MG/DL (ref 0.1–1.5)
BUN SERPL-MCNC: 23 MG/DL (ref 8–22)
CALCIUM SERPL-MCNC: 9.5 MG/DL (ref 8.5–10.5)
CHLORIDE SERPL-SCNC: 108 MMOL/L (ref 96–112)
CO2 SERPL-SCNC: 26 MMOL/L (ref 20–33)
CREAT SERPL-MCNC: 1.47 MG/DL (ref 0.5–1.4)
GLOBULIN SER CALC-MCNC: 2.8 G/DL (ref 1.9–3.5)
GLUCOSE SERPL-MCNC: 91 MG/DL (ref 65–99)
LDH SERPL L TO P-CCNC: 137 U/L (ref 107–266)
POTASSIUM SERPL-SCNC: 4.4 MMOL/L (ref 3.6–5.5)
PROT SERPL-MCNC: 7.1 G/DL (ref 6–8.2)
SODIUM SERPL-SCNC: 141 MMOL/L (ref 135–145)
URATE SERPL-MCNC: 6.6 MG/DL (ref 2.5–8.3)

## 2019-09-13 PROCEDURE — 36415 COLL VENOUS BLD VENIPUNCTURE: CPT

## 2019-09-13 PROCEDURE — 80053 COMPREHEN METABOLIC PANEL: CPT

## 2019-09-13 PROCEDURE — 84550 ASSAY OF BLOOD/URIC ACID: CPT

## 2019-09-13 PROCEDURE — 83615 LACTATE (LD) (LDH) ENZYME: CPT

## 2019-10-04 ENCOUNTER — HOSPITAL ENCOUNTER (OUTPATIENT)
Dept: LAB | Facility: MEDICAL CENTER | Age: 79
End: 2019-10-04
Attending: INTERNAL MEDICINE
Payer: MEDICARE

## 2019-10-04 ENCOUNTER — NON-PROVIDER VISIT (OUTPATIENT)
Dept: URGENT CARE | Facility: PHYSICIAN GROUP | Age: 79
End: 2019-10-04
Payer: MEDICARE

## 2019-10-04 DIAGNOSIS — Z23 NEED FOR VACCINATION: ICD-10-CM

## 2019-10-04 PROCEDURE — 36415 COLL VENOUS BLD VENIPUNCTURE: CPT

## 2019-10-04 PROCEDURE — 90662 IIV NO PRSV INCREASED AG IM: CPT | Performed by: FAMILY MEDICINE

## 2019-10-04 PROCEDURE — G0008 ADMIN INFLUENZA VIRUS VAC: HCPCS | Performed by: FAMILY MEDICINE

## 2019-10-04 PROCEDURE — 84403 ASSAY OF TOTAL TESTOSTERONE: CPT

## 2019-10-07 LAB — TESTOST FREE SERPL-MCNC: 31 PG/ML (ref 47–244)

## 2019-11-18 ENCOUNTER — DOCUMENTATION (OUTPATIENT)
Dept: CARDIOLOGY | Facility: MEDICAL CENTER | Age: 79
End: 2019-11-18

## 2019-11-18 NOTE — PROGRESS NOTES
Task sent to [facility]  Per  last note 5/31/19 patient needed to have a 6mo Device check and a 1yr with .  The current appointment scheduled should be a device check not DS appointment. Let me know if you have any questions.    Thanks   Mita

## 2020-01-08 ENCOUNTER — HOSPITAL ENCOUNTER (OUTPATIENT)
Dept: LAB | Facility: MEDICAL CENTER | Age: 80
End: 2020-01-08
Attending: INTERNAL MEDICINE
Payer: MEDICARE

## 2020-01-08 PROCEDURE — 36415 COLL VENOUS BLD VENIPUNCTURE: CPT

## 2020-01-08 PROCEDURE — 82105 ALPHA-FETOPROTEIN SERUM: CPT

## 2020-01-10 LAB — AFP-TM SERPL-MCNC: 2 NG/ML (ref 0–9)

## 2020-03-11 ENCOUNTER — HOSPITAL ENCOUNTER (OUTPATIENT)
Dept: LAB | Facility: MEDICAL CENTER | Age: 80
End: 2020-03-11
Attending: INTERNAL MEDICINE
Payer: MEDICARE

## 2020-03-11 PROCEDURE — 83615 LACTATE (LD) (LDH) ENZYME: CPT

## 2020-03-11 PROCEDURE — 80053 COMPREHEN METABOLIC PANEL: CPT

## 2020-03-12 LAB
ALBUMIN SERPL BCP-MCNC: 3.9 G/DL (ref 3.2–4.9)
ALBUMIN/GLOB SERPL: 1.4 G/DL
ALP SERPL-CCNC: 75 U/L (ref 30–99)
ALT SERPL-CCNC: 16 U/L (ref 2–50)
ANION GAP SERPL CALC-SCNC: 14 MMOL/L (ref 7–16)
AST SERPL-CCNC: 22 U/L (ref 12–45)
BILIRUB SERPL-MCNC: 0.7 MG/DL (ref 0.1–1.5)
BUN SERPL-MCNC: 22 MG/DL (ref 8–22)
CALCIUM SERPL-MCNC: 9.8 MG/DL (ref 8.5–10.5)
CHLORIDE SERPL-SCNC: 103 MMOL/L (ref 96–112)
CO2 SERPL-SCNC: 22 MMOL/L (ref 20–33)
CREAT SERPL-MCNC: 1.35 MG/DL (ref 0.5–1.4)
GLOBULIN SER CALC-MCNC: 2.8 G/DL (ref 1.9–3.5)
GLUCOSE SERPL-MCNC: 92 MG/DL (ref 65–99)
LDH SERPL L TO P-CCNC: 127 U/L (ref 107–266)
POTASSIUM SERPL-SCNC: 4.9 MMOL/L (ref 3.6–5.5)
PROT SERPL-MCNC: 6.7 G/DL (ref 6–8.2)
SODIUM SERPL-SCNC: 139 MMOL/L (ref 135–145)

## 2020-05-04 ENCOUNTER — TELEPHONE (OUTPATIENT)
Dept: CARDIOLOGY | Facility: MEDICAL CENTER | Age: 80
End: 2020-05-04

## 2020-05-04 NOTE — TELEPHONE ENCOUNTER
New monitor request from [employer]-- patient unable to locate previous monitor.  Patient advised--verbalized understanding.     ----- Message from Tamara Cardona, Med Ass't sent at 5/4/2020  2:40 PM PDT -----  Regarding: Home monitoring system  This patient needs to be set up with a home monitoring system. He is scheduled to do a virtual appointment with Dr. Walters on 5-15-20.      Thank You,  Tamara

## 2020-05-15 ENCOUNTER — TELEPHONE (OUTPATIENT)
Dept: CARDIOLOGY | Facility: MEDICAL CENTER | Age: 80
End: 2020-05-15

## 2020-05-15 ENCOUNTER — APPOINTMENT (OUTPATIENT)
Dept: CARDIOLOGY | Facility: MEDICAL CENTER | Age: 80
End: 2020-05-15
Payer: MEDICARE

## 2020-05-15 ENCOUNTER — TELEMEDICINE (OUTPATIENT)
Dept: CARDIOLOGY | Facility: MEDICAL CENTER | Age: 80
End: 2020-05-15
Payer: OTHER MISCELLANEOUS

## 2020-05-15 VITALS
SYSTOLIC BLOOD PRESSURE: 157 MMHG | WEIGHT: 226 LBS | DIASTOLIC BLOOD PRESSURE: 83 MMHG | TEMPERATURE: 96.3 F | BODY MASS INDEX: 31.64 KG/M2 | HEART RATE: 63 BPM | HEIGHT: 71 IN

## 2020-05-15 DIAGNOSIS — Z95.0 PRESENCE OF PERMANENT CARDIAC PACEMAKER: Chronic | ICD-10-CM

## 2020-05-15 DIAGNOSIS — C82.81 OTHER TYPE OF FOLLICULAR LYMPHOMA OF LYMPH NODES OF HEAD (HCC): ICD-10-CM

## 2020-05-15 DIAGNOSIS — I48.0 PAROXYSMAL ATRIAL FIBRILLATION (HCC): Chronic | ICD-10-CM

## 2020-05-15 PROCEDURE — 99214 OFFICE O/P EST MOD 30 MIN: CPT | Mod: 95,CR | Performed by: INTERNAL MEDICINE

## 2020-05-15 ASSESSMENT — FIBROSIS 4 INDEX: FIB4 SCORE: 4.39

## 2020-05-15 NOTE — PROGRESS NOTES
Telemedicine Visit: Established Patient     This encounter was conducted via Zoom .   Verbal consent was obtained. Patient's identity was verified.    Subjective:   CC: Permanent pacemaker for sick sinus syndrome and PAF  Luigi Walters is a 79 y.o. male presenting for evaluation and management of: SSS    Recent treatment for non-Hodgkin's lymphoma.  Received chemotherapy with the last cycle had to be stopped secondary to neutropenia.  Follows up with oncology.  Cardiac has been stable.  Supposed to receive [facility] but has not received it yet.      Denies any recent fevers or chills. No nausea or vomiting. No chest pains or shortness of breath.     Allergies   Allergen Reactions   • Dobutamine      Seizures; taken abruptly off a Dobutamine gtt-no taper   • Latex        Current medicines (including changes today)  Current Outpatient Medications   Medication Sig Dispense Refill   • calcitRIOL (ROCALTROL) 0.25 MCG Cap TK 1 C PO QD  4   • SF 5000 PLUS 1.1 % Cream USE FOR BRUSHING UTD  1   • testosterone cypionate (DEPO-TESTOSTERONE) 200 MG/ML Solution injection   3   • acetaminophen (TYLENOL) 500 MG Tab Take 500-1,000 mg by mouth every 6 hours as needed.     • tramadol (ULTRAM) 50 MG Tab Take 50 mg by mouth every 8 hours as needed.     • NON SPECIFIED L-orinthate 500mg PO daily     • diphenhydrAMINE (BENADRYL) 50 MG Cap Take 50 mg by mouth every bedtime.     • NON SPECIFIED L-aspartate 500mg Po daily     • zolpidem (AMBIEN) 10 MG Tab TK 1 T PO QD HS  5   • promethazine (PHENERGAN) 25 MG Tab Take 25 mg by mouth every 6 hours as needed for Nausea/Vomiting.     • Multiple Vitamin (MULTI VITAMIN DAILY PO) Take  by mouth. Combination d3,magnessium,vitamin a     • ropinirole (REQUIP) 0.25 MG Tab TK 2T PO at bedtime  3   • spironolactone (ALDACTONE) 50 MG Tab   0   • lactulose 10 GM/15ML SOLN Take 30 g by mouth as needed.     • meclizine (ANTIVERT) 25 MG TABS Take 25 mg by mouth 3 times a day as needed.     • tamsulosin  "(FLOMAX) 0.4 MG capsule Take 0.4 mg by mouth ONE-HALF HOUR AFTER BREAKFAST.     • omeprazole (PRILOSEC) 40 MG capsule Take 40 mg by mouth every day.       No current facility-administered medications for this visit.        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/13/2014   • Osteoarthritis    • Esophageal varices in alcoholic cirrhosis    • Thrombocytopenia (HCC)        Family History   Problem Relation Age of Onset   • Cancer Father         Prostate CA       He  has a past medical history of Arthritis, Ascites, Bronchitis (1970), Cancer (HCC) (1999), Cataract, Chronic diarrhea, Chronic nausea, Chronic vomiting, 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  He  has a past surgical history that includes penile prosthesis ams inflatable; shoulder arthroplasty total; pacemaker insertion (2007); other orthopedic surgery (2010); other; penile prosthesis ams inflatable (10/13/2014); recovery (3/18/2015); and pacemaker insertion (March 2015).       Objective:   /83 (BP Location: Left arm)   Pulse 63   Temp (!) 35.7 °C (96.3 °F)   Ht 1.803 m (5' 11\")   Wt 102.5 kg (226 " lb)   BMI 31.52 kg/m²         Assessment and Plan:   The following treatment plan was discussed:     1. Presence of permanent cardiac pacemaker    2. Paroxysmal atrial fibrillation (HCC)    3. 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  Sick sinus syndrome.  Arrange home monitor.  2.  Non-Hodgkin's lymphoma follow-up oncology.  3.  Paroxysmal atrial fibrillation.  Not on anticoagulation per patient's request.      Follow-up: Return in about 6 months (around 11/15/2020).

## 2020-05-15 NOTE — LETTER
[facility]   [address], [address]  [place] [address]  Phone: [phone]  Fax: [phone]              Luigi Walters  1940    Encounter Date: 5/15/2020    Chaitanya Walters M.D.          PROGRESS NOTE:  Telemedicine Visit: Established Patient     This encounter was conducted via Zoom .   Verbal consent was obtained. Patient's identity was verified.    Subjective:   CC: Permanent pacemaker for sick sinus syndrome and PAF  Luigi Walters is a 79 y.o. male presenting for evaluation and management of:    Recent treatment for non-Hodgkin's lymphoma.  Received chemotherapy with the last cycle had to be stopped secondary to neutropenia.  Follows up with oncology.  Jose is been stable.  Supposed to receive [facility] but has not received it yet.      Denies any recent fevers or chills. No nausea or vomiting. No chest pains or shortness of breath.     Allergies   Allergen Reactions   • Dobutamine      Seizures; taken abruptly off a Dobutamine gtt-no taper   • Latex        Current medicines (including changes today)  Current Outpatient Medications   Medication Sig Dispense Refill   • calcitRIOL (ROCALTROL) 0.25 MCG Cap TK 1 C PO QD  4   • SF 5000 PLUS 1.1 % Cream USE FOR BRUSHING UTD  1   • testosterone cypionate (DEPO-TESTOSTERONE) 200 MG/ML Solution injection   3   • acetaminophen (TYLENOL) 500 MG Tab Take 500-1,000 mg by mouth every 6 hours as needed.     • tramadol (ULTRAM) 50 MG Tab Take 50 mg by mouth every 8 hours as needed.     • NON SPECIFIED L-orinthate 500mg PO daily     • diphenhydrAMINE (BENADRYL) 50 MG Cap Take 50 mg by mouth every bedtime.     • NON SPECIFIED L-aspartate 500mg Po daily     • zolpidem (AMBIEN) 10 MG Tab TK 1 T PO QD HS  5   • promethazine (PHENERGAN) 25 MG Tab Take 25 mg by mouth every 6 hours as needed for Nausea/Vomiting.     • Multiple Vitamin (MULTI VITAMIN DAILY PO) Take  by mouth. Combination d3,magnessium,vitamin a     •  ropinirole (REQUIP) 0.25 MG Tab TK 2T PO at bedtime  3   • spironolactone (ALDACTONE) 50 MG Tab   0   • lactulose 10 GM/15ML SOLN Take 30 g by mouth as needed.     • meclizine (ANTIVERT) 25 MG TABS Take 25 mg by mouth 3 times a day as needed.     • tamsulosin (FLOMAX) 0.4 MG capsule Take 0.4 mg by mouth ONE-HALF HOUR AFTER BREAKFAST.     • omeprazole (PRILOSEC) 40 MG capsule Take 40 mg by mouth every day.       No current facility-administered medications for this visit.        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/13/2014   • Osteoarthritis    • Esophageal varices in alcoholic cirrhosis    • Thrombocytopenia (HCC)        Family History   Problem Relation Age of Onset   • Cancer Father         Prostate CA       He  has a past medical history of Arthritis, Ascites, Bronchitis (1970), Cancer (HCC) (1999), Cataract, Chronic diarrhea, Chronic nausea, Chronic vomiting, 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  He  has a past surgical history that includes penile prosthesis ams inflatable; shoulder arthroplasty total; pacemaker insertion (2007); other orthopedic surgery  "(2010); other; penile prosthesis ams inflatable (10/13/2014); recovery (3/18/2015); and pacemaker insertion (March 2015).       Objective:   /83 (BP Location: Left arm)   Pulse 63   Temp (!) 35.7 °C (96.3 °F)   Ht 1.803 m (5' 11\")   Wt 102.5 kg (226 lb)   BMI 31.52 kg/m²         Assessment and Plan:   The following treatment plan was discussed:     1. Presence of permanent cardiac pacemaker    2. Paroxysmal atrial fibrillation (HCC)    3. 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  Sick sinus syndrome.  Arrange home monitor.  2.  Non-Hodgkin's lymphoma follow-up oncology.  3.  Paroxysmal atrial fibrillation.  Not on anticoagulation per patient's request.      Follow-up: Return in about 6 months (around 11/15/2020).             Denver J Miller, M.D.  [address] [address]  VIA Facsimile: [phone]              "

## 2020-05-29 NOTE — TELEPHONE ENCOUNTER
Initial monitor request canceled by [employer]-- new request made--confirmed monitor shipment 5/27/2020

## 2020-07-15 ENCOUNTER — HOSPITAL ENCOUNTER (OUTPATIENT)
Dept: LAB | Facility: MEDICAL CENTER | Age: 80
End: 2020-07-15
Attending: INTERNAL MEDICINE
Payer: MEDICARE

## 2020-07-15 LAB
ALBUMIN SERPL BCP-MCNC: 4.6 G/DL (ref 3.2–4.9)
ALBUMIN/GLOB SERPL: 1.7 G/DL
ALP SERPL-CCNC: 87 U/L (ref 30–99)
ALT SERPL-CCNC: 19 U/L (ref 2–50)
ANION GAP SERPL CALC-SCNC: 17 MMOL/L (ref 7–16)
AST SERPL-CCNC: 25 U/L (ref 12–45)
BILIRUB SERPL-MCNC: 0.9 MG/DL (ref 0.1–1.5)
BUN SERPL-MCNC: 27 MG/DL (ref 8–22)
CALCIUM SERPL-MCNC: 10.1 MG/DL (ref 8.5–10.5)
CHLORIDE SERPL-SCNC: 104 MMOL/L (ref 96–112)
CO2 SERPL-SCNC: 20 MMOL/L (ref 20–33)
CREAT SERPL-MCNC: 1.39 MG/DL (ref 0.5–1.4)
GLOBULIN SER CALC-MCNC: 2.7 G/DL (ref 1.9–3.5)
GLUCOSE SERPL-MCNC: 117 MG/DL (ref 65–99)
LDH SERPL L TO P-CCNC: 216 U/L (ref 107–266)
POTASSIUM SERPL-SCNC: 4.3 MMOL/L (ref 3.6–5.5)
PROT SERPL-MCNC: 7.3 G/DL (ref 6–8.2)
SODIUM SERPL-SCNC: 141 MMOL/L (ref 135–145)

## 2020-07-15 PROCEDURE — 80053 COMPREHEN METABOLIC PANEL: CPT

## 2020-07-15 PROCEDURE — 83615 LACTATE (LD) (LDH) ENZYME: CPT

## 2020-08-21 ENCOUNTER — HOSPITAL ENCOUNTER (OUTPATIENT)
Dept: LAB | Facility: MEDICAL CENTER | Age: 80
End: 2020-08-21
Attending: INTERNAL MEDICINE
Payer: MEDICARE

## 2020-08-21 LAB — TSH SERPL DL<=0.005 MIU/L-ACNC: 1.78 UIU/ML (ref 0.38–5.33)

## 2020-08-21 PROCEDURE — 84443 ASSAY THYROID STIM HORMONE: CPT

## 2020-08-26 ENCOUNTER — HOSPITAL ENCOUNTER (OUTPATIENT)
Dept: RADIOLOGY | Facility: MEDICAL CENTER | Age: 80
End: 2020-08-26
Attending: INTERNAL MEDICINE
Payer: MEDICARE

## 2020-08-27 ENCOUNTER — HOSPITAL ENCOUNTER (OUTPATIENT)
Dept: RADIOLOGY | Facility: MEDICAL CENTER | Age: 80
End: 2020-08-27
Attending: INTERNAL MEDICINE
Payer: MEDICARE

## 2020-08-27 DIAGNOSIS — C88.4 MALT LYMPHOMA (HCC): ICD-10-CM

## 2020-08-27 PROCEDURE — 700117 HCHG RX CONTRAST REV CODE 255: Performed by: INTERNAL MEDICINE

## 2020-08-27 PROCEDURE — 71260 CT THORAX DX C+: CPT

## 2020-08-27 RX ADMIN — IOHEXOL 100 ML: 350 INJECTION, SOLUTION INTRAVENOUS at 15:30

## 2020-08-31 ENCOUNTER — TELEPHONE (OUTPATIENT)
Dept: CARDIOLOGY | Facility: MEDICAL CENTER | Age: 80
End: 2020-08-31

## 2020-08-31 NOTE — TELEPHONE ENCOUNTER
"Spoke to wife. She states pt is very weak, cant drive anymore, gets \"dizzy sick\" confused, sleeping 18 hours, down 12#, she states she feels like he should be in the hospital, that he is going to die on her. He cant wait for wed appt. She went to [facility] last week and did not get anyanswers. Advisaed ER if he is that weak and cant wait for appt.   "

## 2020-08-31 NOTE — TELEPHONE ENCOUNTER
DS       Pt's wife reports pt has been very sick & would like to discuss details of what's going on. [phone]

## 2020-09-02 ENCOUNTER — HOSPITAL ENCOUNTER (EMERGENCY)
Facility: MEDICAL CENTER | Age: 80
End: 2020-09-02
Attending: EMERGENCY MEDICINE
Payer: MEDICARE

## 2020-09-02 ENCOUNTER — APPOINTMENT (OUTPATIENT)
Dept: RADIOLOGY | Facility: MEDICAL CENTER | Age: 80
End: 2020-09-02
Attending: EMERGENCY MEDICINE
Payer: MEDICARE

## 2020-09-02 ENCOUNTER — OFFICE VISIT (OUTPATIENT)
Dept: CARDIOLOGY | Facility: MEDICAL CENTER | Age: 80
End: 2020-09-02
Payer: OTHER MISCELLANEOUS

## 2020-09-02 VITALS
BODY MASS INDEX: 29.93 KG/M2 | HEART RATE: 75 BPM | SYSTOLIC BLOOD PRESSURE: 149 MMHG | HEIGHT: 72 IN | RESPIRATION RATE: 16 BRPM | WEIGHT: 221 LBS | DIASTOLIC BLOOD PRESSURE: 72 MMHG | OXYGEN SATURATION: 96 % | TEMPERATURE: 97.1 F

## 2020-09-02 VITALS
SYSTOLIC BLOOD PRESSURE: 80 MMHG | WEIGHT: 221 LBS | DIASTOLIC BLOOD PRESSURE: 68 MMHG | HEIGHT: 71 IN | OXYGEN SATURATION: 93 % | BODY MASS INDEX: 30.94 KG/M2

## 2020-09-02 DIAGNOSIS — D69.6 THROMBOCYTOPENIA (HCC): ICD-10-CM

## 2020-09-02 DIAGNOSIS — Z95.0 PRESENCE OF PERMANENT CARDIAC PACEMAKER: Chronic | ICD-10-CM

## 2020-09-02 DIAGNOSIS — R42 DIZZINESS: ICD-10-CM

## 2020-09-02 DIAGNOSIS — I49.5 SINOATRIAL NODE DYSFUNCTION (HCC): Chronic | ICD-10-CM

## 2020-09-02 DIAGNOSIS — C82.81 OTHER TYPE OF FOLLICULAR LYMPHOMA OF LYMPH NODES OF HEAD (HCC): ICD-10-CM

## 2020-09-02 DIAGNOSIS — I48.0 PAROXYSMAL ATRIAL FIBRILLATION (HCC): Chronic | ICD-10-CM

## 2020-09-02 DIAGNOSIS — R42 VERTIGO: ICD-10-CM

## 2020-09-02 DIAGNOSIS — K70.30 ALCOHOLIC CIRRHOSIS OF LIVER WITHOUT ASCITES (HCC): ICD-10-CM

## 2020-09-02 LAB
ALBUMIN SERPL BCP-MCNC: 4.2 G/DL (ref 3.2–4.9)
ALBUMIN/GLOB SERPL: 1.6 G/DL
ALP SERPL-CCNC: 69 U/L (ref 30–99)
ALT SERPL-CCNC: 39 U/L (ref 2–50)
AMMONIA PLAS-SCNC: 29 UMOL/L (ref 11–45)
ANION GAP SERPL CALC-SCNC: 12 MMOL/L (ref 7–16)
AST SERPL-CCNC: 45 U/L (ref 12–45)
BASOPHILS # BLD AUTO: 0.3 % (ref 0–1.8)
BASOPHILS # BLD: 0.01 K/UL (ref 0–0.12)
BILIRUB SERPL-MCNC: 0.7 MG/DL (ref 0.1–1.5)
BUN SERPL-MCNC: 25 MG/DL (ref 8–22)
CALCIUM SERPL-MCNC: 9.8 MG/DL (ref 8.5–10.5)
CHLORIDE SERPL-SCNC: 104 MMOL/L (ref 96–112)
CO2 SERPL-SCNC: 24 MMOL/L (ref 20–33)
CREAT SERPL-MCNC: 1.25 MG/DL (ref 0.5–1.4)
EKG IMPRESSION: NORMAL
EKG IMPRESSION: NORMAL
EOSINOPHIL # BLD AUTO: 0.06 K/UL (ref 0–0.51)
EOSINOPHIL NFR BLD: 1.7 % (ref 0–6.9)
ERYTHROCYTE [DISTWIDTH] IN BLOOD BY AUTOMATED COUNT: 46.8 FL (ref 35.9–50)
GLOBULIN SER CALC-MCNC: 2.6 G/DL (ref 1.9–3.5)
GLUCOSE SERPL-MCNC: 104 MG/DL (ref 65–99)
HCT VFR BLD AUTO: 47.7 % (ref 42–52)
HGB BLD-MCNC: 15.6 G/DL (ref 14–18)
IMM GRANULOCYTES # BLD AUTO: 0.03 K/UL (ref 0–0.11)
IMM GRANULOCYTES NFR BLD AUTO: 0.9 % (ref 0–0.9)
LYMPHOCYTES # BLD AUTO: 0.41 K/UL (ref 1–4.8)
LYMPHOCYTES NFR BLD: 11.6 % (ref 22–41)
MCH RBC QN AUTO: 30.1 PG (ref 27–33)
MCHC RBC AUTO-ENTMCNC: 32.7 G/DL (ref 33.7–35.3)
MCV RBC AUTO: 92.1 FL (ref 81.4–97.8)
MONOCYTES # BLD AUTO: 0.26 K/UL (ref 0–0.85)
MONOCYTES NFR BLD AUTO: 7.4 % (ref 0–13.4)
NEUTROPHILS # BLD AUTO: 2.75 K/UL (ref 1.82–7.42)
NEUTROPHILS NFR BLD: 78.1 % (ref 44–72)
NRBC # BLD AUTO: 0 K/UL
NRBC BLD-RTO: 0 /100 WBC
PLATELET # BLD AUTO: 85 K/UL (ref 164–446)
PMV BLD AUTO: 12.2 FL (ref 9–12.9)
POTASSIUM SERPL-SCNC: 4.3 MMOL/L (ref 3.6–5.5)
PROT SERPL-MCNC: 6.8 G/DL (ref 6–8.2)
RBC # BLD AUTO: 5.18 M/UL (ref 4.7–6.1)
SODIUM SERPL-SCNC: 140 MMOL/L (ref 135–145)
WBC # BLD AUTO: 3.5 K/UL (ref 4.8–10.8)

## 2020-09-02 PROCEDURE — 99214 OFFICE O/P EST MOD 30 MIN: CPT | Mod: 25 | Performed by: INTERNAL MEDICINE

## 2020-09-02 PROCEDURE — 93280 PM DEVICE PROGR EVAL DUAL: CPT | Performed by: INTERNAL MEDICINE

## 2020-09-02 PROCEDURE — 70450 CT HEAD/BRAIN W/O DYE: CPT

## 2020-09-02 PROCEDURE — 99284 EMERGENCY DEPT VISIT MOD MDM: CPT

## 2020-09-02 PROCEDURE — 82140 ASSAY OF AMMONIA: CPT

## 2020-09-02 PROCEDURE — 93005 ELECTROCARDIOGRAM TRACING: CPT

## 2020-09-02 PROCEDURE — 700105 HCHG RX REV CODE 258: Performed by: STUDENT IN AN ORGANIZED HEALTH CARE EDUCATION/TRAINING PROGRAM

## 2020-09-02 PROCEDURE — 80053 COMPREHEN METABOLIC PANEL: CPT

## 2020-09-02 PROCEDURE — 85025 COMPLETE CBC W/AUTO DIFF WBC: CPT

## 2020-09-02 PROCEDURE — 93005 ELECTROCARDIOGRAM TRACING: CPT | Performed by: EMERGENCY MEDICINE

## 2020-09-02 RX ORDER — SODIUM CHLORIDE 9 MG/ML
1000 INJECTION, SOLUTION INTRAVENOUS ONCE
Status: COMPLETED | OUTPATIENT
Start: 2020-09-02 | End: 2020-09-02

## 2020-09-02 RX ORDER — AZITHROMYCIN 250 MG/1
TABLET, FILM COATED ORAL
COMMUNITY
Start: 2020-08-25 | End: 2021-04-08

## 2020-09-02 RX ORDER — OXYCODONE HYDROCHLORIDE 5 MG/1
10 TABLET ORAL EVERY 6 HOURS PRN
Status: ON HOLD | COMMUNITY
End: 2022-04-15 | Stop reason: SDUPTHER

## 2020-09-02 RX ORDER — METHYLPREDNISOLONE 4 MG/1
TABLET ORAL
COMMUNITY
Start: 2020-08-25 | End: 2021-04-08

## 2020-09-02 RX ORDER — ONDANSETRON 4 MG/1
TABLET, FILM COATED ORAL
COMMUNITY
Start: 2020-08-25 | End: 2021-11-17

## 2020-09-02 RX ADMIN — SODIUM CHLORIDE 1000 ML: 9 INJECTION, SOLUTION INTRAVENOUS at 17:37

## 2020-09-02 ASSESSMENT — FIBROSIS 4 INDEX
FIB4 SCORE: 4.58
FIB4 SCORE: 4.58

## 2020-09-02 NOTE — ED TRIAGE NOTES
80 y/o male bib wheelchair to triage with c/o dizziness x 6 months, multiple falls over the past 6 months (last fall 3 nights ago), and failure to thrive. Pt was sent by Dr. Walters with Cardiology for further evaluation. Per wife, pt has been to [facility] (last weekend) for similar complaints. Pt drowsy during triage, intermittently falling asleep during triage process but wakes up to verbal stimuli, pinpoint pupils noted, reports taking 10 mg oxycodone (last dose at noon today).

## 2020-09-03 ENCOUNTER — TELEPHONE (OUTPATIENT)
Dept: CARDIOLOGY | Facility: MEDICAL CENTER | Age: 80
End: 2020-09-03

## 2020-09-03 DIAGNOSIS — Z95.0 PRESENCE OF PERMANENT CARDIAC PACEMAKER: ICD-10-CM

## 2020-09-03 DIAGNOSIS — I48.0 PAROXYSMAL ATRIAL FIBRILLATION (HCC): Chronic | ICD-10-CM

## 2020-09-03 DIAGNOSIS — I49.5 SINOATRIAL NODE DYSFUNCTION (HCC): ICD-10-CM

## 2020-09-03 NOTE — ED NOTES
Patient returned from imaging, placed back on monitoring, resting on cart, visitor at bedside, call light within reach, will continue to monitor.

## 2020-09-03 NOTE — TELEPHONE ENCOUNTER
Echo order in [facility].  I called the patient and will have him call to schedule at [phone].

## 2020-09-03 NOTE — ED NOTES
Patient to ED room with via wheelchair with wife accompanying. Pt positioned on cart for comfort, updated on pending ERP evaluation & POC. Pt denies needs or questions, call light within reach, cart in low, locked position. Will continue to monitor. placed on monitoring.

## 2020-09-03 NOTE — ED NOTES
Patient resting on cart, awake, alert, oriented x 4. Patient updated on pending test results and further POC determination, states he wants to go home, pt verbalizes understanding of awaiting results. Wife remains at bedside. Assessment unchanged. Denies needs or questions, call light within reach, will continue to monitor. Patient remains on monitoring. Positions self on left side for comfort. Instructed on need for urine specimen and to notify staff if pt can attempt to void.

## 2020-09-03 NOTE — ED NOTES
Patient resting on cart, awake, alert, wife remains at bedside. Patient updated on POC, verbalizes understanding. Assessment unchanged. Denies needs or questions, call light within reach, will continue to monitor. Patient remains on monitoring. Labs previously obtained, NS infusion initiated per orders. Denies needs, siderailsx2, cart in low, locked position.

## 2020-09-03 NOTE — ED PROVIDER NOTES
ED Provider Note    CHIEF COMPLAINT  Chief Complaint   Patient presents with   • Dizziness     x 6 weeks   • Fall     last fall 3 nights ago   • Failure to Thrive       HPI  Luigi Walters is a 79 y.o. male who presents to the emergency department complaining of dizziness, vertigo, fall 3 nights ago.  The patient states this is going for 6 weeks, he has been to ENT twice, primary care physician twice, emergency department twice without significant relief except for when Dr. Grimaldo gave him antibiotics the other day.  He states his symptoms now are less than they were a few weeks before.  He was diagnosed with vertigo previously by ENT does not believe he has vertigo now.  He does have a pacemaker and has pacemaker check today at the cardiologist office and was normal and the cardiologist told him to come the emergency part for evaluation.  Denies fever, shakes, chills, sweats, loss of sensation or strength in arms or legs.  She states when he moves his head certain directions it causes him to feel extremely dizzy and have vertigo.  He did fall 3 days ago and hit his head.  REVIEW OF SYSTEMS  Positives as above. Pertinent negatives include fever, shakes, chills, sweats, nausea, vomiting, cough.  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 Difficile   •  "Jaundice    • Melena    • Melena    • Osteoarthritis    • Pacemaker     SSS   • Pain      L shoulder=6/10>chronic   • Pneumonia    • Polycythemia    • Presence of permanent cardiac pacemaker    • Prostate cancer (HCC)    • Prostate cancer (HCC)         • Renal disorder      Insufficiency....med related?; pt denies    • Seizure (HCC)     \"with Dobutamine Echo\"   • Thrombocytopenia (HCC)        FAMILY HISTORY  Noncontributory    SOCIAL HISTORY  Social History     Socioeconomic History   • Marital status:      Spouse name: Not on file   • Number of children: Not on file   • Years of education: Not on file   • Highest education level: Not on file   Occupational History   • Not on file   Social Needs   • Financial resource strain: Not on file   • Food insecurity     Worry: Not on file     Inability: Not on file   • Transportation needs     Medical: Not on file     Non-medical: Not on file   Tobacco Use   • Smoking status: Former Smoker     Packs/day: 0.50     Years: 14.00     Pack years: 7.00     Types: Cigarettes     Quit date: 1970     Years since quittin.7   • Smokeless tobacco: Never Used   • Tobacco comment: quit in    Substance and Sexual Activity   • Alcohol use: No     Alcohol/week: 0.5 oz     Types: 1 Glasses of wine per week     Comment: Hx of ETOH abuse; quit    • Drug use: No     Types: Cocaine   • Sexual activity: Not Currently   Lifestyle   • Physical activity     Days per week: Not on file     Minutes per session: Not on file   • Stress: Not on file   Relationships   • Social connections     Talks on phone: Not on file     Gets together: Not on file     Attends Zoroastrianism service: Not on file     Active member of club or organization: Not on file     Attends meetings of clubs or organizations: Not on file     Relationship status: Not on file   • Intimate partner violence     Fear of current or ex partner: Not on file     Emotionally abused: Not on file     " Physically abused: Not on file     Forced sexual activity: Not on file   Other Topics Concern   • Not on file   Social History Narrative   • Not on file       SURGICAL HISTORY  Past Surgical History:   Procedure Laterality Date   • RECOVERY  3/18/2015    Performed by [facility] at SURGERY PRE-POST PROC UNIT [facility]   • PACEMAKER INSERTION  March 2015    Generator replacement with  Medtronic Adapta ADDRL1 implanted by Dr. Chaitanya Walters. Original device implanted in 2007.   • PENILE PROSTHESIS AMS INFLATABLE  10/13/2014    Performed by Sedrick Pittman M.D. at SURGERY [facility]   • OTHER ORTHOPEDIC SURGERY  2010    L Shoulder Replacement   • PACEMAKER INSERTION  2007        • OTHER      Endoscopy every 3-6 months to track esophageal varices   • PENILE PROSTHESIS AMS INFLATABLE     • SHOULDER ARTHROPLASTY TOTAL         CURRENT MEDICATIONS  Home Medications     Reviewed by Garrett Modi R.N. (Registered Nurse) on 09/02/20 at 1612  Med List Status: Not Addressed   Medication Last Dose Status   acetaminophen (TYLENOL) 500 MG Tab  Active   azithromycin (ZITHROMAX) 250 MG Tab  Active   calcitRIOL (ROCALTROL) 0.25 MCG Cap  Active   diphenhydrAMINE (BENADRYL) 50 MG Cap  Active   lactulose 10 GM/15ML SOLN  Active   meclizine (ANTIVERT) 25 MG TABS  Active   methylPREDNISolone (MEDROL DOSEPAK) 4 MG Tablet Therapy Pack  Active   Multiple Vitamin (MULTI VITAMIN DAILY PO)  Active   NON SPECIFIED  Active   NON SPECIFIED  Active   omeprazole (PRILOSEC) 40 MG capsule  Active   ondansetron (ZOFRAN) 4 MG Tab tablet  Active   oxyCODONE immediate-release (ROXICODONE) 5 MG Tab  Active   promethazine (PHENERGAN) 25 MG Tab  Active   ropinirole (REQUIP) 0.25 MG Tab  Active   SF 5000 PLUS 1.1 % Cream  Active   spironolactone (ALDACTONE) 50 MG Tab  Active   tamsulosin (FLOMAX) 0.4 MG capsule  Active   testosterone cypionate (DEPO-TESTOSTERONE) 200 MG/ML Solution injection  Active   tramadol (ULTRAM) 50 MG Tab  Active   zolpidem  (AMBIEN) 10 MG Tab  Active                ALLERGIES  Allergies   Allergen Reactions   • Dobutamine      Seizures; taken abruptly off a Dobutamine gtt-no taper   • Latex        PHYSICAL EXAM  VITAL SIGNS: /72   Pulse 75   Temp 36.2 °C (97.1 °F) (Temporal)   Resp 16   Ht 1.829 m (6')   Wt 100.2 kg (221 lb)   SpO2 96%   BMI 29.97 kg/m²      Constitutional: Well developed, Well nourished, No acute distress, Non-toxic appearance.   Eyes: PERRLA, EOMI, Conjunctiva normal, No discharge.   HENT: No hemotympanum bilaterally, no tympanic membrane bulging, no external auditory canal abnormality, no mastoid tenderness  Cardiovascular: Normal heart rate, Normal rhythm, No murmurs, No rubs, No gallops, and intact distal pulses.   Thorax & Lungs:  No respiratory distress, no rales, no rhonchi, No wheezing, No chest wall tenderness.   Abdomen: Bowel sounds normal, Soft, No tenderness, No guarding, No rebound, No pulsatile masses.   Skin: Warm, Dry, No erythema, No rash.   Extremities: Full range of motion, no deformity, no edema.  Neurologic:  Alert & oriented to month and age, Normal cognition, Cranial nerves II-XII are intact, No slurred speech, Negative finger to nose bilaterally, No pronator drift bilaterally,   strength 5/5 bilaterally, Leg raise strength 5/5 bilaterally, Plantarflexion strength 5/5 bilaterally, Dorsiflexion strength 5/5 bilaterally, Deep tendon reflexes 2/4 upper and lower extremities bilaterally, Sensation intact throughout, No Nystagmus.  Negative HENSE exam  Psychiatric: Affect normal for clinical presentation.      RADIOLOGY/PROCEDURES  CT-HEAD W/O   Final Result      1.  Cerebral atrophy.      2.  White matter lucencies most consistent with small vessel ischemic change versus demyelination or gliosis.      3.  Otherwise, Head CT without contrast with no acute findings. No evidence of acute cerebral infarction, hemorrhage or mass lesion.        Results for orders placed or performed  during the hospital encounter of 09/02/20   CBC WITH DIFFERENTIAL   Result Value Ref Range    WBC 3.5 (L) 4.8 - 10.8 K/uL    RBC 5.18 4.70 - 6.10 M/uL    Hemoglobin 15.6 14.0 - 18.0 g/dL    Hematocrit 47.7 42.0 - 52.0 %    MCV 92.1 81.4 - 97.8 fL    MCH 30.1 27.0 - 33.0 pg    MCHC 32.7 (L) 33.7 - 35.3 g/dL    RDW 46.8 35.9 - 50.0 fL    Platelet Count 85 (L) 164 - 446 K/uL    MPV 12.2 9.0 - 12.9 fL    Neutrophils-Polys 78.10 (H) 44.00 - 72.00 %    Lymphocytes 11.60 (L) 22.00 - 41.00 %    Monocytes 7.40 0.00 - 13.40 %    Eosinophils 1.70 0.00 - 6.90 %    Basophils 0.30 0.00 - 1.80 %    Immature Granulocytes 0.90 0.00 - 0.90 %    Nucleated RBC 0.00 /100 WBC    Neutrophils (Absolute) 2.75 1.82 - 7.42 K/uL    Lymphs (Absolute) 0.41 (L) 1.00 - 4.80 K/uL    Monos (Absolute) 0.26 0.00 - 0.85 K/uL    Eos (Absolute) 0.06 0.00 - 0.51 K/uL    Baso (Absolute) 0.01 0.00 - 0.12 K/uL    Immature Granulocytes (abs) 0.03 0.00 - 0.11 K/uL    NRBC (Absolute) 0.00 K/uL   COMP METABOLIC PANEL   Result Value Ref Range    Sodium 140 135 - 145 mmol/L    Potassium 4.3 3.6 - 5.5 mmol/L    Chloride 104 96 - 112 mmol/L    Co2 24 20 - 33 mmol/L    Anion Gap 12.0 7.0 - 16.0    Glucose 104 (H) 65 - 99 mg/dL    Bun 25 (H) 8 - 22 mg/dL    Creatinine 1.25 0.50 - 1.40 mg/dL    Calcium 9.8 8.5 - 10.5 mg/dL    AST(SGOT) 45 12 - 45 U/L    ALT(SGPT) 39 2 - 50 U/L    Alkaline Phosphatase 69 30 - 99 U/L    Total Bilirubin 0.7 0.1 - 1.5 mg/dL    Albumin 4.2 3.2 - 4.9 g/dL    Total Protein 6.8 6.0 - 8.2 g/dL    Globulin 2.6 1.9 - 3.5 g/dL    A-G Ratio 1.6 g/dL   AMMONIA   Result Value Ref Range    Ammonia 29 11 - 45 umol/L   ESTIMATED GFR   Result Value Ref Range    GFR If African American >60 >60 mL/min/1.73 m 2    GFR If Non  56 (A) >60 mL/min/1.73 m 2   EKG   Result Value Ref Range    Report       [facility] Emergency Dept.    Test Date:  2020-09-02  Pt Name:    PERNELL MITTAL               Department: ER  MRN:         [id number]                      Room:  Gender:     Male                         Technician: [id number]  :        [id number]                   Requested By:ER TRIAGE PROTOCOL  Order #:    [id number]                    Reading MD: ARLEY ABRAHAM DO    Measurements  Intervals                                Axis  Rate:       78                           P:          0  MT:                                      QRS:        -33  QRSD:       88                           T:          -41  QT:         384  QTc:        438    Interpretive Statements  ATRIAL-PACED COMPLEXES  LATE PRECORDIAL R/S TRANSITION  PROBABLE INFERIOR INFARCT, AGE INDETERMINATE  Compared to ECG 10/17/2014 14:48:56  Ectopic atrial rhythm no longer present  Sinus rhythm no longer present  Myocardial infarct finding still present  Electronically Signed On 2020 19:59:35 PDT by DANIELLE ABRAHAM DO         COURSE & MEDICAL DECISION MAKING  Pertinent Labs & Imaging studies reviewed. (See chart for details)  This is a pleasant 79-year-old gentleman presents with vertigo-like symptoms.  The patient does have a history of vertigo in the past and has had these symptoms for 6 weeks.  He states his symptoms are actually less severe than they were before.  CT scan of the brain was completed secondary to recent fall was negative for intracranial hemorrhage, he has no significant lecture abnormality, no evidence of leukocytosis and afebrile and not suspect meningitis or encephalitis.  The patient and the patient's wife were frustrated and they would like to leave the hospital and follow-up with an outpatient neurology appointment.  For this reason I referred him to neurology.  I do not believe there is much we can add here in the emergency department as he does not have an acute emergency medicine condition.  He might of had a CVA several weeks ago this point time he has had multiple evaluations and work-ups that have been negative.  For this reason I will be  discharging the patient with return precautions and refer him to neurology.    FINAL IMPRESSION     1. Vertigo Active   2. Dizziness        DISPOSITION:  Patient will be discharged home in stable condition.    FOLLOW UP:  [facility], Emergency Dept  [address] [place] [id number] [phone]    If symptoms worsen      Electronically signed by: Ruiz Ernst D.O., 9/2/2020 6:11 PM

## 2020-09-03 NOTE — ED NOTES
First contact with pt. Pt discharged home with wife. Pt denies all medical complaints at this time. Pt ambulates self, wheeled to exit for comfort. Pt verbalized understanding discharge instructions.

## 2020-11-06 ENCOUNTER — TELEPHONE (OUTPATIENT)
Dept: CARDIOLOGY | Facility: MEDICAL CENTER | Age: 80
End: 2020-11-06

## 2020-11-06 NOTE — TELEPHONE ENCOUNTER
"DS    Received message from [facility]:    \"Patient is having severe  orthostatic hypotension and nurse is  requesting a call back. \"    Thank you,  Maria Dolores HWANG   "

## 2020-11-09 NOTE — TELEPHONE ENCOUNTER
Called pt home. PT there. She states pt has problems with orthostatic hypotsn, not on aldactone, med list updated, BP today 150/80. Aivised this is a known issue with pt, get up slowly, hydrate well, use salt if needed, will see if DS has any further advice, echo next week.

## 2020-11-09 NOTE — TELEPHONE ENCOUNTER
DS    TO: 5p/Fri/Ofc  NM: Luigi Walters   PH: [phone]   ALT PH: [phone]   PT NM: Luigi Walters   : 40   REG DR: Dr Walters   RE: Returning call from [facility] HIST: 2020 04:44P     Thank you

## 2020-11-12 ENCOUNTER — HOSPITAL ENCOUNTER (OUTPATIENT)
Dept: CARDIOLOGY | Facility: MEDICAL CENTER | Age: 80
End: 2020-11-12
Attending: INTERNAL MEDICINE
Payer: OTHER MISCELLANEOUS

## 2020-11-12 DIAGNOSIS — I48.0 PAROXYSMAL ATRIAL FIBRILLATION (HCC): Chronic | ICD-10-CM

## 2020-11-12 PROCEDURE — 93306 TTE W/DOPPLER COMPLETE: CPT

## 2020-11-12 PROCEDURE — 700117 HCHG RX CONTRAST REV CODE 255: Performed by: INTERNAL MEDICINE

## 2020-11-12 RX ADMIN — HUMAN ALBUMIN MICROSPHERES AND PERFLUTREN 3 ML: 10; .22 INJECTION, SOLUTION INTRAVENOUS at 14:15

## 2020-11-13 LAB — LV EJECT FRACT  99904: 65

## 2020-11-13 PROCEDURE — 93306 TTE W/DOPPLER COMPLETE: CPT | Mod: 26 | Performed by: INTERNAL MEDICINE

## 2021-01-07 ENCOUNTER — OFFICE VISIT (OUTPATIENT)
Dept: NEUROLOGY | Facility: MEDICAL CENTER | Age: 81
End: 2021-01-07
Attending: PSYCHIATRY & NEUROLOGY
Payer: MEDICARE

## 2021-01-07 VITALS
BODY MASS INDEX: 30.16 KG/M2 | TEMPERATURE: 98 F | WEIGHT: 222.66 LBS | SYSTOLIC BLOOD PRESSURE: 101 MMHG | HEART RATE: 71 BPM | OXYGEN SATURATION: 98 % | HEIGHT: 72 IN | DIASTOLIC BLOOD PRESSURE: 72 MMHG

## 2021-01-07 DIAGNOSIS — Z91.81 PERSONAL HISTORY OF FALL: ICD-10-CM

## 2021-01-07 PROCEDURE — 99204 OFFICE O/P NEW MOD 45 MIN: CPT | Performed by: PSYCHIATRY & NEUROLOGY

## 2021-01-07 PROCEDURE — 99211 OFF/OP EST MAY X REQ PHY/QHP: CPT | Performed by: PSYCHIATRY & NEUROLOGY

## 2021-01-07 ASSESSMENT — FIBROSIS 4 INDEX: FIB4 SCORE: 6.78

## 2021-01-07 ASSESSMENT — PATIENT HEALTH QUESTIONNAIRE - PHQ9: CLINICAL INTERPRETATION OF PHQ2 SCORE: 0

## 2021-01-07 NOTE — PATIENT INSTRUCTIONS
Let's follow up in 2-3 months.    2 weeks before your next appointment, please call the clinic to make sure that we have received the records from your September hospitalization at [facility].

## 2021-01-07 NOTE — PROGRESS NOTES
"[facility] NEUROLOGY  NEW PATIENT VISIT    Referral source: Sis Ernst DO    CC: vertigo, dizziness    HISTORY OF ILLNESS:  Luigi Walters is a 80 y.o. man with a history most notable for AF, SA node dysfunction, s/p pacemaker placement, non-Hodgkin lymphoma, cirrhosis, esophageal varices, and BPPV.  Today, he was accompanied by his wife (Shanon), and together they provided the following history:    7/14/2020:  Around 10:00 Justin felt an \"elephant stampede\" on his chest.  There was tremendous pressure and pounding.  He lay in bed and thought he was going to die.  This lasted several hours.  He went to sleep, and the following morning when he woke up the pressure was gone.    Some time afterward he developed \"dizziness.\"  He described this as a proclivity to falls.  Justin would be standing and suddenly he would find himself on the ground.  Shanon has witnessed 3 events.    9/2020:  Justin and his wife were at home when he fell.  He got up with help and sat in his chair.  His language didn't make sense, and the words were \"slurred.\"  His wife took him to the ER due to concern for stroke.  He was admitted at [facility] for three days and underwent a reportedly extensive workup.  He couldn't have an MRI due to the pacemaker (they investigated and it isn't compatible).  He was apparently not given a definitive diagnosis.  At the time of discharge Ambien was discontinued, and his pain medication (oxycodone) was reduced.  Justin started home therapies (speech, cognitive, and physical).    Over the last few months he has enjoyed a fairly complete improvement.  He graduated from a walker to a cane.  He has not had any falls since 9/2020.    He was tested for BPPV and was told this was not the issue.    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 Difficile   • Jaundice    • Melena    • Melena    • Osteoarthritis    • Pacemaker 2007    SSS   • Pain      L shoulder=6/10>chronic   • Pneumonia 1972   • Polycythemia    • Presence of permanent cardiac pacemaker 2007/2015   • Prostate cancer (HCC)    • Prostate cancer (HCC) 2000        • Renal disorder      Insufficiency....med related?; pt denies 02/19   • Seizure (HCC)     \"with Dobutamine Echo\"   • Thrombocytopenia (HCC)      Past Surgical History:   Procedure Laterality Date   • RECOVERY  3/18/2015    Performed by [facility] Surgery at SURGERY PRE-POST PROC UNIT [facility]   • PACEMAKER INSERTION  March 2015    Generator replacement with  Vision [employer]iencestronic Adapta ADDRL1 implanted by Dr. Chaitanya Walters. Original device implanted in 2007.   • PENILE PROSTHESIS AMS INFLATABLE  10/13/2014    Performed by Sedrick Pittman M.D. at SURGERY [facility]   • OTHER ORTHOPEDIC SURGERY  2010    L Shoulder Replacement   • PACEMAKER INSERTION  2007        • OTHER      Endoscopy every 3-6 months to track esophageal varices   • PENILE PROSTHESIS AMS INFLATABLE     • SHOULDER ARTHROPLASTY TOTAL       MEDICATIONS:  Current Outpatient Medications   Medication Sig   • methylPREDNISolone (MEDROL DOSEPAK) 4 MG Tablet Therapy Pack UTD   • oxyCODONE immediate-release (ROXICODONE) 5 MG Tab Take 10 mg by mouth every 6 hours as needed for Severe Pain.   • SF 5000 PLUS 1.1 % Cream USE FOR BRUSHING UTD   • acetaminophen (TYLENOL) 500 MG Tab Take 500-1,000 mg by mouth every " 6 hours as needed.   • tramadol (ULTRAM) 50 MG Tab Take 50 mg by mouth every 8 hours as needed.   • NON SPECIFIED L-orinthate 500mg PO daily   • diphenhydrAMINE (BENADRYL) 50 MG Cap Take 50 mg by mouth every bedtime.   • NON SPECIFIED L-aspartate 500mg Po daily   • zolpidem (AMBIEN) 10 MG Tab TK 1 T PO QD HS   • promethazine (PHENERGAN) 25 MG Tab Take 25 mg by mouth every 6 hours as needed for Nausea/Vomiting.   • Multiple Vitamin (MULTI VITAMIN DAILY PO) Take  by mouth. Combination d3,magnessium,vitamin a   • ropinirole (REQUIP) 0.25 MG Tab TK 2T PO at bedtime   • spironolactone (ALDACTONE) 50 MG Tab    • meclizine (ANTIVERT) 25 MG TABS Take 25 mg by mouth 3 times a day as needed.   • tamsulosin (FLOMAX) 0.4 MG capsule Take 0.4 mg by mouth ONE-HALF HOUR AFTER BREAKFAST.   • omeprazole (PRILOSEC) 40 MG capsule Take 40 mg by mouth every day.   • ondansetron (ZOFRAN) 4 MG Tab tablet TK 1 T PO Q 6 H FOR 30 DAYS.   • azithromycin (ZITHROMAX) 250 MG Tab TK 2 TS PO FOR 1 DAY THEN TK 1 T PO D FOR 4 DAYS   • calcitRIOL (ROCALTROL) 0.25 MCG Cap TK 1 C PO QD   • testosterone cypionate (DEPO-TESTOSTERONE) 200 MG/ML Solution injection    • lactulose 10 GM/15ML SOLN Take 30 g by mouth as needed.     SOCIAL HISTORY:  Social History     Tobacco Use   • Smoking status: Former Smoker     Packs/day: 0.50     Years: 14.00     Pack years: 7.00     Types: Cigarettes     Quit date: 1970     Years since quittin.0   • Smokeless tobacco: Never Used   • Tobacco comment: quit in    Substance Use Topics   • Alcohol use: No     Alcohol/week: 0.5 oz     Types: 1 Glasses of wine per week     Comment: Hx of ETOH abuse; quit      Social History     Social History Narrative   • Not on file     FAMILY HISTORY:  Family History   Problem Relation Age of Onset   • Cancer Father         Prostate CA     REVIEW OF SYSTEMS:  A ROS was completed.  Pertinent positives and negatives were included in the HPI, above.  All other systems were  "reviewed and are negative.    PHYSICAL EXAM:  General/Medical:  - NAD  - hair, skin, nails, and joints were normal    Neuro:  MENTAL STATUS: awake and alert; no deficits of speech or language; oriented to person, place, and time; affect was appropriate to situation; pleasant, cooperative    CRANIAL NERVES:    II: acuity was: J2/J2; fields intact to confrontation; pupils 3/3 and sluggish; discs were difficult to visualize    III/IV/VI: versions intact without nystagmus    V: facial sensation symmetric to light touch    VII: facial expression symmetric    VIII: hearing intact to finger rub    IX/X: palate elevates symmetrically    XI: shoulder shrug symmetric    XII: tongue midline    MOTOR:  - bulk was normal  - paratonia in the upper extremities, bilaterally; no rigidity  Upper Extremity Strength  (R/L)    5/5   Elbow flexion 5/5   Elbow extension 5/5   Shoulder abduction 5/5     Lower Extremity Strength  (R/L)   Hip flexion 5/5   Knee extension 5/5   Knee flexion 5/5   Ankle plantarflexion 5/5   Ankle dorsiflexion 5/5     - can walk on toes and heels  - no pronator drift  - action tremor, bilaterally    SENSATION:  - light touch: intact over the upper and lower extremities  - vibration (R/L, seconds): NT at the great toes  - pinprick: NT  - proprioception: NT  - Romberg: absent    COORDINATION:  - finger to nose was normal, no ataxia on exam  - finger tapping was rapid and accurate, bilaterally    REFLEXES:  Reflex Right Left   BR 1+ 1+   Biceps 1+ 1+   Triceps 1+ 1+   Patellae 1+ 1+   Achilles 1+ 1+   Toes NT NT     GAIT:  - walked without cane  - narrow base and normal  - heel-raised and toe-raised gait: intact  - tandem gait: intact    REVIEW OF IMAGING STUDIES: I reviewed the following studies:  CT Head:  Date: 9/2/2020  W/o and w/ contrast?: without  Indication: \"dizziness for 6 months, multiple falls\"  Comparison: 6/2/2008  Impression:  \"1) Cerebral atrophy.  2) White matter lucencies most consistent with " "small vessel ischemic change versus demyelination or gliosis.  3) Otherwise, Head CT without contrast with no acute findings. No evidence of acute cerebral infarction, hemorrhage or mass lesion.\"    REVIEW OF LABORATORY STUDIES:  9/2/2020:  - CBC w/ diff: within acceptable limits  - CMP: within acceptable limits    ASSESSMENT:  Luigi Walters is a 80 y.o. man with a history of unexplained falls and possible stroke/TIA with a history most notable for AF, SA node dysfunction, s/p pacemaker placement, non-Hodgkin lymphoma, cirrhosis, esophageal varices, and BPPV.  At this point I'm not certain what caused the falls and hospitalization in 9/2020.  I have asked our staff to reach out to [facility] in order to obtain imaging results and a discharge summary.  According to the family, the only changes that were made to his medication regimen were elimination of Ambien and reduction in oxycodone.  This suggests that the team may have attributed his symptoms to ?polypharmacy.  We will reconvene in approximately 3 months once we have received the outside records.  Fortunately, Justin seems to be doing very well now.  We discussed additional physical therapy, and Justin declined since he doesn't think this is necessary.  He will alert the clinic if he develops any new or worsened neurologic symptoms.    PLAN:  History of Falls:  - obtain outside records from [facility]  - no medication changes today    Follow-Up:  - Return in about 3 months (around 4/7/2021).    Signed: Ivan Leung M.D. at 7:03 AM on 01/07/21  "

## 2021-01-12 ENCOUNTER — HOSPITAL ENCOUNTER (OUTPATIENT)
Facility: MEDICAL CENTER | Age: 81
End: 2021-01-12
Attending: INTERNAL MEDICINE
Payer: MEDICARE

## 2021-01-12 DIAGNOSIS — Z23 NEED FOR VACCINATION: ICD-10-CM

## 2021-01-12 PROCEDURE — 83615 LACTATE (LD) (LDH) ENZYME: CPT

## 2021-01-12 PROCEDURE — 80053 COMPREHEN METABOLIC PANEL: CPT

## 2021-01-13 LAB
ALBUMIN SERPL BCP-MCNC: 4.5 G/DL (ref 3.2–4.9)
ALBUMIN/GLOB SERPL: 1.5 G/DL
ALP SERPL-CCNC: 92 U/L (ref 30–99)
ALT SERPL-CCNC: 17 U/L (ref 2–50)
ANION GAP SERPL CALC-SCNC: 8 MMOL/L (ref 7–16)
AST SERPL-CCNC: 30 U/L (ref 12–45)
BILIRUB SERPL-MCNC: 0.9 MG/DL (ref 0.1–1.5)
BUN SERPL-MCNC: 20 MG/DL (ref 8–22)
CALCIUM SERPL-MCNC: 10.5 MG/DL (ref 8.5–10.5)
CHLORIDE SERPL-SCNC: 104 MMOL/L (ref 96–112)
CO2 SERPL-SCNC: 26 MMOL/L (ref 20–33)
CREAT SERPL-MCNC: 1.23 MG/DL (ref 0.5–1.4)
GLOBULIN SER CALC-MCNC: 3.1 G/DL (ref 1.9–3.5)
GLUCOSE SERPL-MCNC: 91 MG/DL (ref 65–99)
LDH SERPL L TO P-CCNC: 205 U/L (ref 107–266)
POTASSIUM SERPL-SCNC: 4.8 MMOL/L (ref 3.6–5.5)
PROT SERPL-MCNC: 7.6 G/DL (ref 6–8.2)
SODIUM SERPL-SCNC: 138 MMOL/L (ref 135–145)

## 2021-01-18 RX ORDER — SODIUM CHLORIDE 9 MG/ML
INJECTION, SOLUTION INTRAVENOUS CONTINUOUS
Status: CANCELLED | OUTPATIENT
Start: 2021-02-04

## 2021-01-21 ENCOUNTER — APPOINTMENT (RX ONLY)
Dept: URBAN - METROPOLITAN AREA CLINIC 22 | Facility: CLINIC | Age: 81
Setting detail: DERMATOLOGY
End: 2021-01-21

## 2021-01-21 ENCOUNTER — PRE-ADMISSION TESTING (OUTPATIENT)
Dept: ADMISSIONS | Facility: MEDICAL CENTER | Age: 81
End: 2021-01-21
Attending: INTERNAL MEDICINE
Payer: MEDICARE

## 2021-01-21 DIAGNOSIS — Z87.2 PERSONAL HISTORY OF DISEASES OF THE SKIN AND SUBCUTANEOUS TISSUE: ICD-10-CM

## 2021-01-21 DIAGNOSIS — D69.2 OTHER NONTHROMBOCYTOPENIC PURPURA: ICD-10-CM

## 2021-01-21 DIAGNOSIS — L85.3 XEROSIS CUTIS: ICD-10-CM

## 2021-01-21 DIAGNOSIS — Z85.828 PERSONAL HISTORY OF OTHER MALIGNANT NEOPLASM OF SKIN: ICD-10-CM

## 2021-01-21 DIAGNOSIS — L57.0 ACTINIC KERATOSIS: ICD-10-CM

## 2021-01-21 DIAGNOSIS — Z71.89 OTHER SPECIFIED COUNSELING: ICD-10-CM

## 2021-01-21 PROBLEM — D48.5 NEOPLASM OF UNCERTAIN BEHAVIOR OF SKIN: Status: ACTIVE | Noted: 2021-01-21

## 2021-01-21 PROCEDURE — ? OBSERVATION

## 2021-01-21 PROCEDURE — 11102 TANGNTL BX SKIN SINGLE LES: CPT | Mod: 59

## 2021-01-21 PROCEDURE — ? BIOPSY BY SHAVE METHOD

## 2021-01-21 PROCEDURE — ? COUNSELING

## 2021-01-21 PROCEDURE — 17004 DESTROY PREMAL LESIONS 15/>: CPT

## 2021-01-21 PROCEDURE — 99213 OFFICE O/P EST LOW 20 MIN: CPT | Mod: 25

## 2021-01-21 PROCEDURE — ? SUNSCREEN RECOMMENDATIONS

## 2021-01-21 PROCEDURE — ? LIQUID NITROGEN

## 2021-01-21 PROCEDURE — ? PHOTO-DOCUMENTATION

## 2021-01-21 PROCEDURE — ? TREATMENT REGIMEN

## 2021-01-21 ASSESSMENT — LOCATION SIMPLE DESCRIPTION DERM
LOCATION SIMPLE: LEFT UPPER ARM
LOCATION SIMPLE: LEFT FOREARM
LOCATION SIMPLE: LEFT EAR
LOCATION SIMPLE: RIGHT EAR
LOCATION SIMPLE: NECK
LOCATION SIMPLE: LEFT TEMPLE
LOCATION SIMPLE: LEFT HAND
LOCATION SIMPLE: RIGHT TEMPLE
LOCATION SIMPLE: RIGHT SHOULDER
LOCATION SIMPLE: CHEST
LOCATION SIMPLE: RIGHT FOREARM
LOCATION SIMPLE: RIGHT HAND

## 2021-01-21 ASSESSMENT — LOCATION DETAILED DESCRIPTION DERM
LOCATION DETAILED: RIGHT POSTERIOR SHOULDER
LOCATION DETAILED: RIGHT SUPERIOR CRUS OF ANTIHELIX
LOCATION DETAILED: RIGHT SUPERIOR LATERAL NECK
LOCATION DETAILED: LEFT INFERIOR CRUS OF ANTIHELIX
LOCATION DETAILED: LEFT PROXIMAL DORSAL FOREARM
LOCATION DETAILED: RIGHT RADIAL DORSAL HAND
LOCATION DETAILED: LEFT INFERIOR TEMPLE
LOCATION DETAILED: LEFT DISTAL POSTERIOR UPPER ARM
LOCATION DETAILED: RIGHT MEDIAL INFERIOR CHEST
LOCATION DETAILED: RIGHT DISTAL DORSAL FOREARM
LOCATION DETAILED: LEFT RADIAL DORSAL HAND
LOCATION DETAILED: RIGHT INFERIOR TEMPLE
LOCATION DETAILED: RIGHT PROXIMAL DORSAL FOREARM

## 2021-01-21 ASSESSMENT — LOCATION ZONE DERM
LOCATION ZONE: HAND
LOCATION ZONE: ARM
LOCATION ZONE: TRUNK
LOCATION ZONE: FACE
LOCATION ZONE: NECK
LOCATION ZONE: EAR

## 2021-01-21 NOTE — PROCEDURE: LIQUID NITROGEN
Consent: The patient's consent was obtained including but not limited to risks of crusting, scabbing, blistering, scarring, darker or lighter pigmentary change, recurrence, incomplete removal and infection.
Duration Of Freeze Thaw-Cycle (Seconds): 0
Render In Bullet Format When Appropriate: No
Number Of Freeze-Thaw Cycles: 2 freeze-thaw cycles
Post-Care Instructions: I reviewed with the patient in detail post-care instructions. Patient is to wear sunprotection, and avoid picking at any of the treated lesions. Pt may apply Vaseline to crusted or scabbing areas.
Detail Level: Zone
Total Number Of Aks Treated: 15

## 2021-01-21 NOTE — PROCEDURE: BIOPSY BY SHAVE METHOD
Detail Level: Detailed
Depth Of Biopsy: dermis
Was A Bandage Applied: Yes
Size Of Lesion In Cm: 0.5
X Size Of Lesion In Cm: 0
Biopsy Type: H and E
Biopsy Method: Personna blade
Anesthesia Type: 1% lidocaine with 1:100,000 epinephrine and a 1:3 solution of 8.4% sodium bicarbonate
Anesthesia Volume In Cc: 1
Hemostasis: Drysol
Wound Care: Vaseline
Dressing: bandage
Destruction After The Procedure: No
Type Of Destruction Used: Curettage
Curettage Text: The wound bed was treated with curettage after the biopsy was performed.
Cryotherapy Text: The wound bed was treated with cryotherapy after the biopsy was performed.
Electrodesiccation Text: The wound bed was treated with electrodesiccation after the biopsy was performed.
Electrodesiccation And Curettage Text: The wound bed was treated with electrodesiccation and curettage after the biopsy was performed.
Silver Nitrate Text: The wound bed was treated with silver nitrate after the biopsy was performed.
Lab: [id number]
Lab Facility: 
Consent: Written consent was obtained and risks were reviewed including but not limited to scarring, infection, bleeding, scabbing, incomplete removal, nerve damage and allergy to anesthesia.
Post-Care Instructions: I reviewed with the patient in detail post-care instructions. Patient is to keep the biopsy site dry overnight, and then apply bacitracin twice daily until healed. Patient may apply hydrogen peroxide soaks to remove any crusting.
Notification Instructions: Patient will be notified of biopsy results. However, patient instructed to call the office if not contacted within 2 weeks.
Billing Type: Third-Party Bill
Information: Selecting Yes will display possible errors in your note based on the variables you have selected. This validation is only offered as a suggestion for you. PLEASE NOTE THAT THE VALIDATION TEXT WILL BE REMOVED WHEN YOU FINALIZE YOUR NOTE. IF YOU WANT TO FAX A PRELIMINARY NOTE YOU WILL NEED TO TOGGLE THIS TO 'NO' IF YOU DO NOT WANT IT IN YOUR FAXED NOTE.

## 2021-02-04 ENCOUNTER — HOSPITAL ENCOUNTER (OUTPATIENT)
Facility: MEDICAL CENTER | Age: 81
End: 2021-02-04
Attending: INTERNAL MEDICINE | Admitting: RADIOLOGY
Payer: MEDICARE

## 2021-02-04 ENCOUNTER — APPOINTMENT (OUTPATIENT)
Dept: RADIOLOGY | Facility: MEDICAL CENTER | Age: 81
End: 2021-02-04
Attending: INTERNAL MEDICINE
Payer: MEDICARE

## 2021-02-04 VITALS
HEIGHT: 72 IN | WEIGHT: 220 LBS | SYSTOLIC BLOOD PRESSURE: 122 MMHG | HEART RATE: 64 BPM | DIASTOLIC BLOOD PRESSURE: 64 MMHG | BODY MASS INDEX: 29.8 KG/M2 | RESPIRATION RATE: 16 BRPM

## 2021-02-04 DIAGNOSIS — C88.4 EXTRANODAL MARGINAL ZONE B-CELL LYMPHOMA (HCC): ICD-10-CM

## 2021-02-04 PROCEDURE — 36590 REMOVAL TUNNELED CV CATH: CPT

## 2021-02-04 RX ORDER — LIDOCAINE HYDROCHLORIDE AND EPINEPHRINE 10; 10 MG/ML; UG/ML
INJECTION, SOLUTION INFILTRATION; PERINEURAL
Status: DISCONTINUED
Start: 2021-02-04 | End: 2021-02-04 | Stop reason: HOSPADM

## 2021-02-04 RX ORDER — SODIUM CHLORIDE 9 MG/ML
INJECTION, SOLUTION INTRAVENOUS CONTINUOUS
Status: DISCONTINUED | OUTPATIENT
Start: 2021-02-04 | End: 2021-02-04 | Stop reason: HOSPADM

## 2021-02-04 ASSESSMENT — FIBROSIS 4 INDEX: FIB4 SCORE: 6.85

## 2021-02-04 NOTE — DISCHARGE INSTRUCTIONS
Implanted Port Removal, Care After  This sheet gives you information about how to care for yourself after your procedure. Your health care provider may also give you more specific instructions. If you have problems or questions, contact your health care provider.  What can I expect after the procedure?  After the procedure, it is common to have:  · Soreness or pain near your incision.  · Some swelling or bruising near your incision.  Follow these instructions at home:  Medicines  · Take over-the-counter and prescription medicines only as told by your health care provider.  · If you were prescribed an antibiotic medicine, take it as told by your health care provider. Do not stop taking the antibiotic even if you start to feel better.  Bathing  · Do not take baths, swim, or use a hot tub until your health care provider approves. Ask your health care provider if you can take showers. You may only be allowed to take sponge baths.  Incision care    · Follow instructions from your health care provider about how to take care of your incision. Make sure you:  ? Wash your hands with soap and water before you change your bandage (dressing). If soap and water are not available, use hand .  ? Change your dressing as told by your health care provider.  ? Keep your dressing dry.  ? Leave stitches (sutures), skin glue, or adhesive strips in place. These skin closures may need to stay in place for 2 weeks or longer. If adhesive strip edges start to loosen and curl up, you may trim the loose edges. Do not remove adhesive strips completely unless your health care provider tells you to do that.  · Check your incision area every day for signs of infection. Check for:  ? More redness, swelling, or pain.  ? More fluid or blood.  ? Warmth.  ? Pus or a bad smell.  Driving    · Do not drive for 24 hours if you were given a medicine to help you relax (sedative) during your procedure.  · If you did not receive a sedative, ask your  health care provider when it is safe to drive.  Activity  · Return to your normal activities as told by your health care provider. Ask your health care provider what activities are safe for you.  · Do not lift anything that is heavier than 10 lb (4.5 kg), or the limit that you are told, until your health care provider says that it is safe.  · Do not do activities that involve lifting your arms over your head.  General instructions  · Do not use any products that contain nicotine or tobacco, such as cigarettes and e-cigarettes. These can delay healing. If you need help quitting, ask your health care provider.  · Keep all follow-up visits as told by your health care provider. This is important.  Contact a health care provider if:  · You have more redness, swelling, or pain around your incision.  · You have more fluid or blood coming from your incision.  · Your incision feels warm to the touch.  · You have pus or a bad smell coming from your incision.  · You have pain that is not relieved by your pain medicine.  Get help right away if you have:  · A fever or chills.  · Chest pain.  · Difficulty breathing.  Summary  · After the procedure, it is common to have pain, soreness, swelling, or bruising near your incision.  · If you were prescribed an antibiotic medicine, take it as told by your health care provider. Do not stop taking the antibiotic even if you start to feel better.  · Do not drive for 24 hours if you were given a sedative during your procedure.  · Return to your normal activities as told by your health care provider. Ask your health care provider what activities are safe for you.  This information is not intended to replace advice given to you by your health care provider. Make sure you discuss any questions you have with your health care provider.  Document Released: 11/28/2016 Document Revised: 01/31/2019 Document Reviewed: 01/31/2019  Elsevier Patient Education © 2020 [employer]          Moderate  Conscious Sedation, Adult, Care After  These instructions provide you with information about caring for yourself after your procedure. Your health care provider may also give you more specific instructions. Your treatment has been planned according to current medical practices, but problems sometimes occur. Call your health care provider if you have any problems or questions after your procedure.  What can I expect after the procedure?  After your procedure, it is common:  · To feel sleepy for several hours.  · To feel clumsy and have poor balance for several hours.  · To have poor judgment for several hours.  · To vomit if you eat too soon.  Follow these instructions at home:  For at least 24 hours after the procedure:    · Do not:  ? Participate in activities where you could fall or become injured.  ? Drive.  ? Use heavy machinery.  ? Drink alcohol.  ? Take sleeping pills or medicines that cause drowsiness.  ? Make important decisions or sign legal documents.  ? Take care of children on your own.  · Rest.  Eating and drinking  · Follow the diet recommended by your health care provider.  · If you vomit:  ? Drink water, juice, or soup when you can drink without vomiting.  ? Make sure you have little or no nausea before eating solid foods.  General instructions  · Have a responsible adult stay with you until you are awake and alert.  · Take over-the-counter and prescription medicines only as told by your health care provider.  · If you smoke, do not smoke without supervision.  · Keep all follow-up visits as told by your health care provider. This is important.  Contact a health care provider if:  · You keep feeling nauseous or you keep vomiting.  · You feel light-headed.  · You develop a rash.  · You have a fever.  Get help right away if:  · You have trouble breathing.  This information is not intended to replace advice given to you by your health care provider. Make sure you discuss any questions you have with your  health care provider.  Document Released: 10/08/2014 Document Revised: 11/30/2018 Document Reviewed: 04/08/2017  Elsevier Patient Education © 2020 [employer]

## 2021-02-04 NOTE — PROGRESS NOTES
[facility] D/C Note      Pt given discharge instructions, asked appropriate questions and stated full understanding.  Pt stable, meets criteria for discharge, and ambulated out of [facility] in stable condition.  All belongings sent with pt.          Implanted Port Removal, Care After  This sheet gives you information about how to care for yourself after your procedure. Your health care provider may also give you more specific instructions. If you have problems or questions, contact your health care provider.  What can I expect after the procedure?  After the procedure, it is common to have:  · Soreness or pain near your incision.  · Some swelling or bruising near your incision.  Follow these instructions at home:  Medicines  · Take over-the-counter and prescription medicines only as told by your health care provider.  · If you were prescribed an antibiotic medicine, take it as told by your health care provider. Do not stop taking the antibiotic even if you start to feel better.  Bathing  · Do not take baths, swim, or use a hot tub until your health care provider approves. Ask your health care provider if you can take showers. You may only be allowed to take sponge baths.  Incision care    · Follow instructions from your health care provider about how to take care of your incision. Make sure you:  ? Wash your hands with soap and water before you change your bandage (dressing). If soap and water are not available, use hand .  ? Change your dressing as told by your health care provider.  ? Keep your dressing dry.  ? Leave stitches (sutures), skin glue, or adhesive strips in place. These skin closures may need to stay in place for 2 weeks or longer. If adhesive strip edges start to loosen and curl up, you may trim the loose edges. Do not remove adhesive strips completely unless your health care provider tells you to do that.  · Check your incision area every day for signs of infection. Check for:  ? More redness,  swelling, or pain.  ? More fluid or blood.  ? Warmth.  ? Pus or a bad smell.  Driving    · Do not drive for 24 hours if you were given a medicine to help you relax (sedative) during your procedure.  · If you did not receive a sedative, ask your health care provider when it is safe to drive.  Activity  · Return to your normal activities as told by your health care provider. Ask your health care provider what activities are safe for you.  · Do not lift anything that is heavier than 10 lb (4.5 kg), or the limit that you are told, until your health care provider says that it is safe.  · Do not do activities that involve lifting your arms over your head.  General instructions  · Do not use any products that contain nicotine or tobacco, such as cigarettes and e-cigarettes. These can delay healing. If you need help quitting, ask your health care provider.  · Keep all follow-up visits as told by your health care provider. This is important.  Contact a health care provider if:  · You have more redness, swelling, or pain around your incision.  · You have more fluid or blood coming from your incision.  · Your incision feels warm to the touch.  · You have pus or a bad smell coming from your incision.  · You have pain that is not relieved by your pain medicine.  Get help right away if you have:  · A fever or chills.  · Chest pain.  · Difficulty breathing.  Summary  · After the procedure, it is common to have pain, soreness, swelling, or bruising near your incision.  · If you were prescribed an antibiotic medicine, take it as told by your health care provider. Do not stop taking the antibiotic even if you start to feel better.  · Do not drive for 24 hours if you were given a sedative during your procedure.  · Return to your normal activities as told by your health care provider. Ask your health care provider what activities are safe for you.  This information is not intended to replace advice given to you by your health care  provider. Make sure you discuss any questions you have with your health care provider.  Document Released: 11/28/2016 Document Revised: 01/31/2019 Document Reviewed: 01/31/2019  [employer] Patient Education © 2020 [employer]          Moderate Conscious Sedation, Adult, Care After  These instructions provide you with information about caring for yourself after your procedure. Your health care provider may also give you more specific instructions. Your treatment has been planned according to current medical practices, but problems sometimes occur. Call your health care provider if you have any problems or questions after your procedure.  What can I expect after the procedure?  After your procedure, it is common:  · To feel sleepy for several hours.  · To feel clumsy and have poor balance for several hours.  · To have poor judgment for several hours.  · To vomit if you eat too soon.  Follow these instructions at home:  For at least 24 hours after the procedure:    · Do not:  ? Participate in activities where you could fall or become injured.  ? Drive.  ? Use heavy machinery.  ? Drink alcohol.  ? Take sleeping pills or medicines that cause drowsiness.  ? Make important decisions or sign legal documents.  ? Take care of children on your own.  · Rest.  Eating and drinking  · Follow the diet recommended by your health care provider.  · If you vomit:  ? Drink water, juice, or soup when you can drink without vomiting.  ? Make sure you have little or no nausea before eating solid foods.  General instructions  · Have a responsible adult stay with you until you are awake and alert.  · Take over-the-counter and prescription medicines only as told by your health care provider.  · If you smoke, do not smoke without supervision.  · Keep all follow-up visits as told by your health care provider. This is important.  Contact a health care provider if:  · You keep feeling nauseous or you keep vomiting.  · You feel light-headed.  · You  develop a rash.  · You have a fever.  Get help right away if:  · You have trouble breathing.  This information is not intended to replace advice given to you by your health care provider. Make sure you discuss any questions you have with your health care provider.  Document Released: 10/08/2014 Document Revised: 11/30/2018 Document Reviewed: 04/08/2017  Elsevier Patient Education © 2020 [employer]

## 2021-02-04 NOTE — PROGRESS NOTES
IR Nursing Note      Tunneled Port removal by MD Vizcaino assisted by RT Brandon, Right Anterior Chest access site.  Non-sedation case.    Access site, sealed with internal sutures/dermabond/steristrips, covered with gauze/tegaderm, C/D/I.    Patient tolerated procedure, hemodynamically stable; pt transported to [facility] pod #1 post-procedure.

## 2021-02-06 ENCOUNTER — IMMUNIZATION (OUTPATIENT)
Dept: FAMILY PLANNING/WOMEN'S HEALTH CLINIC | Facility: IMMUNIZATION CENTER | Age: 81
End: 2021-02-06
Attending: INTERNAL MEDICINE
Payer: MEDICARE

## 2021-02-06 DIAGNOSIS — Z23 NEED FOR VACCINATION: ICD-10-CM

## 2021-02-06 DIAGNOSIS — Z23 ENCOUNTER FOR VACCINATION: Primary | ICD-10-CM

## 2021-02-06 PROCEDURE — 0011A MODERNA SARS-COV-2 VACCINE: CPT | Performed by: INTERNAL MEDICINE

## 2021-02-06 PROCEDURE — 91301 MODERNA SARS-COV-2 VACCINE: CPT | Performed by: INTERNAL MEDICINE

## 2021-02-25 ENCOUNTER — APPOINTMENT (RX ONLY)
Dept: URBAN - METROPOLITAN AREA CLINIC 22 | Facility: CLINIC | Age: 81
Setting detail: DERMATOLOGY
End: 2021-02-25

## 2021-02-25 VITALS — SYSTOLIC BLOOD PRESSURE: 136 MMHG | DIASTOLIC BLOOD PRESSURE: 78 MMHG

## 2021-02-25 PROBLEM — D04.62 CARCINOMA IN SITU OF SKIN OF LEFT UPPER LIMB, INCLUDING SHOULDER: Status: ACTIVE | Noted: 2021-02-25

## 2021-02-25 PROCEDURE — 17312 MOHS ADDL STAGE: CPT

## 2021-02-25 PROCEDURE — 17311 MOHS 1 STAGE H/N/HF/G: CPT

## 2021-02-25 PROCEDURE — ? MOHS SURGERY

## 2021-02-25 PROCEDURE — 13132 CMPLX RPR F/C/C/M/N/AX/G/H/F: CPT

## 2021-02-25 NOTE — PROCEDURE: MOHS SURGERY
Mohs Case Number: [id number]
Previous Accession (Optional): [id number]
Biopsy Photograph Reviewed: Yes
Referring Physician (Optional): Robert Edwards, PAC
Consent Type: Consent 1 (Standard)
Eye Shield Used: No
Surgeon Performing Repair (Optional): Usman Gil M.D.
Initial Size Of Lesion: 0.4
Number Of Stages: 3
Primary Defect Length In Cm (Final Defect Size - Required For Flaps/Grafts): 1.7
Primary Defect Width In Cm (Final Defect Size - Required For Flaps/Grafts): 1.2
Repair Type: Complex Repair
Oculoplastic Surgeon Procedure Text (A): After obtaining clear surgical margins the patient was sent to oculoplastics for surgical repair.  The patient understands they will receive post-surgical care and follow-up from the referring physician's office.
Otolaryngologist Procedure Text (A): After obtaining clear surgical margins the patient was sent to otolaryngology for surgical repair.  The patient understands they will receive post-surgical care and follow-up from the referring physician's office.
Plastic Surgeon Procedure Text (A): After obtaining clear surgical margins the patient was sent to plastics for surgical repair.  The patient understands they will receive post-surgical care and follow-up from the referring physician's office.
Mid-Level Procedure Text (A): After obtaining clear surgical margins the patient was sent to a mid-level provider for surgical repair.  The patient understands they will receive post-surgical care and follow-up from the mid-level provider.
Provider Procedure Text (A): After obtaining clear surgical margins the defect was repaired by another provider.
Asc Procedure Text (A): After obtaining clear surgical margins the patient was sent to an ASC for surgical repair.  The patient understands they will receive post-surgical care and follow-up from the ASC physician.
Suturegard Retention Suture: 2-0 Nylon
Retention Suture Bite Size: 3 mm
Length To Time In Minutes Device Was In Place: 10
Number Of Hemigard Strips Per Side: 1
Simple / Intermediate / Complex Repair - Final Wound Length In Cm: 2.7
Width Of Defect Perpendicular To Closure In Cm (Required): 1.3
Distance Of Undermining In Cm (Required): 1.5
Undermining Type: Entire Wound
Debridement Text: The wound edges were debrided prior to proceeding with the closure to facilitate wound healing.
Helical Rim Text: The closure involved the helical rim.
Vermilion Border Text: The closure involved the vermilion border.
Nostril Rim Text: The closure involved the nostril rim.
Retention Suture Text: Retention sutures were placed to support the closure and prevent dehiscence.
Secondary Defect Length In Cm (Required For Flaps): 0
Area H Indication Text: Tumors in this location are included in Area H (eyelids, eyebrows, nose, lips, chin, ear, pre-auricular, post-auricular, temple, genitalia, hands, feet, ankles and areola).  Tissue conservation is critical in these anatomic locations.
Area M Indication Text: Tumors in this location are included in Area M (cheek, forehead, scalp, neck, jawline and pretibial skin).  Mohs surgery is indicated for tumors in these anatomic locations.
Area L Indication Text: Tumors in this location are included in Area L (trunk and extremities).  Mohs surgery is indicated for larger tumors, or tumors with aggressive histologic features, in these anatomic locations.
Tumor Debulked?: curette
Depth Of Tumor Invasion (For Histology): epidermis
Surgical Defect Length In Cm (Optional): 0.9
Surgical Defect Width In Cm (Optional): 0.6
Special Stains Stage 1 - Results: Base On Clearance Noted Above
Stage 2: Additional Anesthesia Type: 1% lidocaine with epinephrine and a 1:10 solution of 8.4% sodium bicarbonate
Surgical Defect Length In Cm (Optional): 1.4
Surgical Defect Width In Cm (Optional): 1.1
Stage 4: Additional Anesthesia Type: 1% lidocaine with epinephrine
Include Tumor Staging In Mohs Note?: Please Select the Appropriate Response
Staging Info: By selecting yes to the question above you will include information on AJCC 8 tumor staging in your Mohs note. Information on tumor staging will be automatically added for SCCs on the head and neck. AJCC 8 includes tumor size, tumor depth, perineural involvement and bone invasion.
Tumor Depth: Less than 6mm from granular layer and no invasion beyond the subcutaneous fat
Medical Necessity Statement: Based on my medical judgement, Mohs surgery is the most appropriate treatment for this cancer compared to other treatments.
Alternatives Discussed Intro (Do Not Add Period): I discussed alternative treatments to Mohs surgery and specifically discussed the risks and benefits of
Consent 1/Introductory Paragraph: The rationale for Mohs was explained to the patient and consent was obtained. The risks, benefits and alternatives to therapy were discussed in detail. Specifically, the risks of infection, scarring, bleeding, prolonged wound healing, incomplete removal, allergy to anesthesia, nerve injury and recurrence were addressed. Prior to the procedure, the treatment site was clearly identified and confirmed by the patient. All components of Universal Protocol/PAUSE Rule completed.
Consent 2/Introductory Paragraph: Mohs surgery was explained to the patient and consent was obtained. The risks, benefits and alternatives to therapy were discussed in detail. Specifically, the risks of infection, scarring, bleeding, prolonged wound healing, incomplete removal, allergy to anesthesia, nerve injury and recurrence were addressed. Prior to the procedure, the treatment site was clearly identified and confirmed by the patient. All components of Universal Protocol/PAUSE Rule completed.
Consent 3/Introductory Paragraph: I gave the patient a chance to ask questions they had about the procedure.  Following this I explained the Mohs procedure and consent was obtained. The risks, benefits and alternatives to therapy were discussed in detail. Specifically, the risks of infection, scarring, bleeding, prolonged wound healing, incomplete removal, allergy to anesthesia, nerve injury and recurrence were addressed. Prior to the procedure, the treatment site was clearly identified and confirmed by the patient. All components of Universal Protocol/PAUSE Rule completed.
Consent (Temporal Branch)/Introductory Paragraph: The rationale for Mohs was explained to the patient and consent was obtained. The risks, benefits and alternatives to therapy were discussed in detail. Specifically, the risks of damage to the temporal branch of the facial nerve, infection, scarring, bleeding, prolonged wound healing, incomplete removal, allergy to anesthesia, and recurrence were addressed. Prior to the procedure, the treatment site was clearly identified and confirmed by the patient. All components of Universal Protocol/PAUSE Rule completed.
Consent (Marginal Mandibular)/Introductory Paragraph: The rationale for Mohs was explained to the patient and consent was obtained. The risks, benefits and alternatives to therapy were discussed in detail. Specifically, the risks of damage to the marginal mandibular branch of the facial nerve, infection, scarring, bleeding, prolonged wound healing, incomplete removal, allergy to anesthesia, and recurrence were addressed. Prior to the procedure, the treatment site was clearly identified and confirmed by the patient. All components of Universal Protocol/PAUSE Rule completed.
Consent (Spinal Accessory)/Introductory Paragraph: The rationale for Mohs was explained to the patient and consent was obtained. The risks, benefits and alternatives to therapy were discussed in detail. Specifically, the risks of damage to the spinal accessory nerve, infection, scarring, bleeding, prolonged wound healing, incomplete removal, allergy to anesthesia, and recurrence were addressed. Prior to the procedure, the treatment site was clearly identified and confirmed by the patient. All components of Universal Protocol/PAUSE Rule completed.
Consent (Near Eyelid Margin)/Introductory Paragraph: The rationale for Mohs was explained to the patient and consent was obtained. The risks, benefits and alternatives to therapy were discussed in detail. Specifically, the risks of ectropion or eyelid deformity, infection, scarring, bleeding, prolonged wound healing, incomplete removal, allergy to anesthesia, nerve injury and recurrence were addressed. Prior to the procedure, the treatment site was clearly identified and confirmed by the patient. All components of Universal Protocol/PAUSE Rule completed.
Consent (Ear)/Introductory Paragraph: The rationale for Mohs was explained to the patient and consent was obtained. The risks, benefits and alternatives to therapy were discussed in detail. Specifically, the risks of ear deformity, infection, scarring, bleeding, prolonged wound healing, incomplete removal, allergy to anesthesia, nerve injury and recurrence were addressed. Prior to the procedure, the treatment site was clearly identified and confirmed by the patient. All components of Universal Protocol/PAUSE Rule completed.
Consent (Nose)/Introductory Paragraph: The rationale for Mohs was explained to the patient and consent was obtained. The risks, benefits and alternatives to therapy were discussed in detail. Specifically, the risks of nasal deformity, changes in the flow of air through the nose, infection, scarring, bleeding, prolonged wound healing, incomplete removal, allergy to anesthesia, nerve injury and recurrence were addressed. Prior to the procedure, the treatment site was clearly identified and confirmed by the patient. All components of Universal Protocol/PAUSE Rule completed.
Consent (Lip)/Introductory Paragraph: The rationale for Mohs was explained to the patient and consent was obtained. The risks, benefits and alternatives to therapy were discussed in detail. Specifically, the risks of lip deformity, changes in the oral aperture, infection, scarring, bleeding, prolonged wound healing, incomplete removal, allergy to anesthesia, nerve injury and recurrence were addressed. Prior to the procedure, the treatment site was clearly identified and confirmed by the patient. All components of Universal Protocol/PAUSE Rule completed.
Consent (Scalp)/Introductory Paragraph: The rationale for Mohs was explained to the patient and consent was obtained. The risks, benefits and alternatives to therapy were discussed in detail. Specifically, the risks of changes in hair growth pattern secondary to repair, infection, scarring, bleeding, prolonged wound healing, incomplete removal, allergy to anesthesia, nerve injury and recurrence were addressed. Prior to the procedure, the treatment site was clearly identified and confirmed by the patient. All components of Universal Protocol/PAUSE Rule completed.
Detail Level: Detailed
Postop Diagnosis: same
Anesthesia Volume In Cc: 7
Additional Anesthesia Volume In Cc: 6
Hemostasis: Electricator
Estimated Blood Loss (Cc): minimal
Repair Anesthesia Method: local infiltration
Undermining Location (Optional): in the fascial plane
Brow Lift Text: A midfrontal incision was made medially to the defect to allow access to the tissues just superior to the left eyebrow. Following careful dissection inferiorly in a supraperiosteal plane to the level of the left eyebrow, several 3-0 monocryl sutures were used to resuspend the eyebrow orbicularis oculi muscular unit to the superior frontal bone periosteum. This resulted in an appropriate reapproximation of static eyebrow symmetry and correction of the left brow ptosis.
Deep Sutures: 4-0 Maxon
Epidermal Sutures: 4-0 Surgipro
Epidermal Closure: simple interrupted
Suturegard Intro: Intraoperative tissue expansion was performed, utilizing the SUTUREGARD device, in order to reduce wound tension.
Suturegard Body: The suture ends were repeatedly re-tightened and re-clamped to achieve the desired tissue expansion.
Hemigard Intro: Due to skin fragility and wound tension, it was decided to use HEMIGARD adhesive retention suture devices to permit a linear closure. The skin was cleaned and dried for a 6cm distance away from the wound. Excessive hair, if present, was removed to allow for adhesion.
Hemigard Postcare Instructions: The HEMIGARD strips are to remain completely dry for at least 5-7 days.
Donor Site Anesthesia Type: same as repair anesthesia
Graft Donor Site Bandage (Optional-Leave Blank If You Don't Want In Note): Steri-strips and a pressure bandage were applied to the donor site.
Closure 2 Information: This tab is for additional flaps and grafts, including complex repair and grafts and complex repair and flaps. You can also specify a different location for the additional defect, if the location is the same you do not need to select a new one. We will insert the automated text for the repair you select below just as we do for solitary flaps and grafts. Please note that at this time if you select a location with a different insurance zone you will need to override the ICD10 and CPT if appropriate.
Closure 3 Information: This tab is for additional flaps and grafts above and beyond our usual structured repairs.  Please note if you enter information here it will not currently bill and you will need to add the billing information manually.
Wound Care: Petrolatum
Dressing: pressure dressing with telfa
Dressing (No Sutures): dry sterile dressing
Suture Removal: 14 days
Unna Boot Text: An Unna boot was placed to help immobilize the limb and facilitate more rapid healing.
Home Suture Removal Text: Patient was provided instructions on removing sutures and will remove their sutures at home.  If they have any questions or difficulties they will call the office.
Post-Care Instructions: I reviewed with the patient in detail post-care instructions. Patient is not to engage in any heavy lifting, exercise, or swimming for the next 14 days. Should the patient develop any fevers, chills, bleeding, severe pain patient will contact the office immediately.
Pain Refusal Text: I offered to prescribe pain medication but the patient refused to take this medication.
Mauc Instructions: By selecting yes to the question below the MAUC number will be added into the note.  This will be calculated automatically based on the diagnosis chosen, the size entered, the body zone selected (H,M,L) and the specific indications you chose. You will also have the option to override the Mohs AUC if you disagree with the automatically calculated number and this option is found in the Case Summary tab.
Where Do You Want The Question To Include Opioid Counseling Located?: Case Summary Tab
Eye Protection Verbiage: Before proceeding with the stage, a plastic scleral shield was inserted. The globe was anesthetized with a few drops of 1% lidocaine with 1:100,000 epinephrine. Then, an appropriate sized scleral shield was chosen and coated with lacrilube ointment. The shield was gently inserted and left in place for the duration of each stage. After the stage was completed, the shield was gently removed.
Mohs Method Verbiage: An incision at a 45 degree angle following the standard Mohs approach was done and the specimen was harvested as a microscopic controlled layer.
Surgeon/Pathologist Verbiage (Will Incorporate Name Of Surgeon From Intro If Not Blank): operated in two distinct and integrated capacities as the surgeon and pathologist.
Mohs Histo Method Verbiage: Each section was then chromacoded and processed in the Mohs lab using the Mohs protocol and submitted for frozen section.
Subsequent Stages Histo Method Verbiage: Using a similar technique to that described above, a thin layer of tissue was removed from all areas where tumor was visible on the previous stage.  The tissue was again oriented, mapped, dyed, and processed as above.
Mohs Rapid Report Verbiage: The area of clinically evident tumor was marked with skin marking ink and appropriately hatched.  The initial incision was made following the Mohs approach through the skin.  The specimen was taken to the lab, divided into the necessary number of pieces, chromacoded and processed according to the Mohs protocol.  This was repeated in successive stages until a tumor free defect was achieved.
Complex Repair Preamble Text (Leave Blank If You Do Not Want): Extensive wide undermining was performed.
Intermediate Repair Preamble Text (Leave Blank If You Do Not Want): Undermining was performed with blunt dissection.
Non-Graft Cartilage Fenestration Text: The cartilage was fenestrated with a 2mm punch biopsy to help facilitate healing.
Graft Cartilage Fenestration Text: The cartilage was fenestrated with a 2mm punch biopsy to help facilitate graft survival and healing.
Secondary Intention Text (Leave Blank If You Do Not Want): The defect will heal with secondary intention.
No Repair - Repaired With Adjacent Surgical Defect Text (Leave Blank If You Do Not Want): After obtaining clear surgical margins the defect was repaired concurrently with another surgical defect which was in close approximation.
Advancement Flap (Single) Text: The defect edges were debeveled with a #15 scalpel blade.  Given the location of the defect and the proximity to free margins a single advancement flap was deemed most appropriate.  Using a sterile surgical marker, an appropriate advancement flap was drawn incorporating the defect and placing the expected incisions within the relaxed skin tension lines where possible.    The area thus outlined was incised deep to adipose tissue with a #15 scalpel blade.  The skin margins were undermined to an appropriate distance in all directions utilizing iris scissors.
Advancement Flap (Double) Text: The defect edges were debeveled with a #15 scalpel blade.  Given the location of the defect and the proximity to free margins a double advancement flap was deemed most appropriate.  Using a sterile surgical marker, the appropriate advancement flaps were drawn incorporating the defect and placing the expected incisions within the relaxed skin tension lines where possible.    The area thus outlined was incised deep to adipose tissue with a #15 scalpel blade.  The skin margins were undermined to an appropriate distance in all directions utilizing iris scissors.
Burow's Advancement Flap Text: The defect edges were debeveled with a #15 scalpel blade.  Given the location of the defect and the proximity to free margins a Burow's advancement flap was deemed most appropriate.  Using a sterile surgical marker, the appropriate advancement flap was drawn incorporating the defect and placing the expected incisions within the relaxed skin tension lines where possible.    The area thus outlined was incised deep to adipose tissue with a #15 scalpel blade.  The skin margins were undermined to an appropriate distance in all directions utilizing iris scissors.
Chonodrocutaneous Helical Advancement Flap Text: The defect edges were debeveled with a #15 scalpel blade.  Given the location of the defect and the proximity to free margins a chondrocutaneous helical advancement flap was deemed most appropriate.  Using a sterile surgical marker, the appropriate advancement flap was drawn incorporating the defect and placing the expected incisions within the relaxed skin tension lines where possible.    The area thus outlined was incised deep to adipose tissue with a #15 scalpel blade.  The skin margins were undermined to an appropriate distance in all directions utilizing iris scissors.
Crescentic Advancement Flap Text: The defect edges were debeveled with a #15 scalpel blade.  Given the location of the defect and the proximity to free margins a crescentic advancement flap was deemed most appropriate.  Using a sterile surgical marker, the appropriate advancement flap was drawn incorporating the defect and placing the expected incisions within the relaxed skin tension lines where possible.    The area thus outlined was incised deep to adipose tissue with a #15 scalpel blade.  The skin margins were undermined to an appropriate distance in all directions utilizing iris scissors.
A-T Advancement Flap Text: The defect edges were debeveled with a #15 scalpel blade.  Given the location of the defect, shape of the defect and the proximity to free margins an A-T advancement flap was deemed most appropriate.  Using a sterile surgical marker, an appropriate advancement flap was drawn incorporating the defect and placing the expected incisions within the relaxed skin tension lines where possible.    The area thus outlined was incised deep to adipose tissue with a #15 scalpel blade.  The skin margins were undermined to an appropriate distance in all directions utilizing iris scissors.
O-T Advancement Flap Text: The defect edges were debeveled with a #15 scalpel blade.  Given the location of the defect, shape of the defect and the proximity to free margins an O-T advancement flap was deemed most appropriate.  Using a sterile surgical marker, an appropriate advancement flap was drawn incorporating the defect and placing the expected incisions within the relaxed skin tension lines where possible.    The area thus outlined was incised deep to adipose tissue with a #15 scalpel blade.  The skin margins were undermined to an appropriate distance in all directions utilizing iris scissors.
O-L Flap Text: The defect edges were debeveled with a #15 scalpel blade.  Given the location of the defect, shape of the defect and the proximity to free margins an O-L flap was deemed most appropriate.  Using a sterile surgical marker, an appropriate advancement flap was drawn incorporating the defect and placing the expected incisions within the relaxed skin tension lines where possible.    The area thus outlined was incised deep to adipose tissue with a #15 scalpel blade.  The skin margins were undermined to an appropriate distance in all directions utilizing iris scissors.
O-Z Flap Text: The defect edges were debeveled with a #15 scalpel blade.  Given the location of the defect, shape of the defect and the proximity to free margins an O-Z flap was deemed most appropriate.  Using a sterile surgical marker, an appropriate transposition flap was drawn incorporating the defect and placing the expected incisions within the relaxed skin tension lines where possible. The area thus outlined was incised deep to adipose tissue with a #15 scalpel blade.  The skin margins were undermined to an appropriate distance in all directions utilizing iris scissors.
Double O-Z Flap Text: The defect edges were debeveled with a #15 scalpel blade.  Given the location of the defect, shape of the defect and the proximity to free margins a Double O-Z flap was deemed most appropriate.  Using a sterile surgical marker, an appropriate transposition flap was drawn incorporating the defect and placing the expected incisions within the relaxed skin tension lines where possible. The area thus outlined was incised deep to adipose tissue with a #15 scalpel blade.  The skin margins were undermined to an appropriate distance in all directions utilizing iris scissors.
V-Y Flap Text: The defect edges were debeveled with a #15 scalpel blade.  Given the location of the defect, shape of the defect and the proximity to free margins a V-Y flap was deemed most appropriate.  Using a sterile surgical marker, an appropriate advancement flap was drawn incorporating the defect and placing the expected incisions within the relaxed skin tension lines where possible.    The area thus outlined was incised deep to adipose tissue with a #15 scalpel blade.  The skin margins were undermined to an appropriate distance in all directions utilizing iris scissors.
Advancement-Rotation Flap Text: The defect edges were debeveled with a #15 scalpel blade.  Given the location of the defect, shape of the defect and the proximity to free margins an advancement-rotation flap was deemed most appropriate.  Using a sterile surgical marker, an appropriate flap was drawn incorporating the defect and placing the expected incisions within the relaxed skin tension lines where possible. The area thus outlined was incised deep to adipose tissue with a #15 scalpel blade.  The skin margins were undermined to an appropriate distance in all directions utilizing iris scissors.
Mercedes Flap Text: The defect edges were debeveled with a #15 scalpel blade.  Given the location of the defect, shape of the defect and the proximity to free margins a Mercedes flap was deemed most appropriate.  Using a sterile surgical marker, an appropriate advancement flap was drawn incorporating the defect and placing the expected incisions within the relaxed skin tension lines where possible. The area thus outlined was incised deep to adipose tissue with a #15 scalpel blade.  The skin margins were undermined to an appropriate distance in all directions utilizing iris scissors.
Modified Advancement Flap Text: The defect edges were debeveled with a #15 scalpel blade.  Given the location of the defect, shape of the defect and the proximity to free margins a modified advancement flap was deemed most appropriate.  Using a sterile surgical marker, an appropriate advancement flap was drawn incorporating the defect and placing the expected incisions within the relaxed skin tension lines where possible.    The area thus outlined was incised deep to adipose tissue with a #15 scalpel blade.  The skin margins were undermined to an appropriate distance in all directions utilizing iris scissors.
Mucosal Advancement Flap Text: Given the location of the defect, shape of the defect and the proximity to free margins a mucosal advancement flap was deemed most appropriate. Incisions were made with a 15 blade scalpel in the appropriate fashion along the cutaneous vermilion border and the mucosal lip. The remaining actinically damaged mucosal tissue was excised.  The mucosal advancement flap was then elevated to the gingival sulcus with care taken to preserve the neurovascular structures and advanced into the primary defect. Care was taken to ensure that precise realignment of the vermilion border was achieved.
Peng Advancement Flap Text: The defect edges were debeveled with a #15 scalpel blade.  Given the location of the defect, shape of the defect and the proximity to free margins a Peng advancement flap was deemed most appropriate.  Using a sterile surgical marker, an appropriate advancement flap was drawn incorporating the defect and placing the expected incisions within the relaxed skin tension lines where possible. The area thus outlined was incised deep to adipose tissue with a #15 scalpel blade.  The skin margins were undermined to an appropriate distance in all directions utilizing iris scissors.
Hatchet Flap Text: The defect edges were debeveled with a #15 scalpel blade.  Given the location of the defect, shape of the defect and the proximity to free margins a hatchet flap was deemed most appropriate.  Using a sterile surgical marker, an appropriate hatchet flap was drawn incorporating the defect and placing the expected incisions within the relaxed skin tension lines where possible.    The area thus outlined was incised deep to adipose tissue with a #15 scalpel blade.  The skin margins were undermined to an appropriate distance in all directions utilizing iris scissors.
Rotation Flap Text: The defect edges were debeveled with a #15 scalpel blade.  Given the location of the defect, shape of the defect and the proximity to free margins a rotation flap was deemed most appropriate.  Using a sterile surgical marker, an appropriate rotation flap was drawn incorporating the defect and placing the expected incisions within the relaxed skin tension lines where possible.    The area thus outlined was incised deep to adipose tissue with a #15 scalpel blade.  The skin margins were undermined to an appropriate distance in all directions utilizing iris scissors.
Spiral Flap Text: The defect edges were debeveled with a #15 scalpel blade.  Given the location of the defect, shape of the defect and the proximity to free margins a spiral flap was deemed most appropriate.  Using a sterile surgical marker, an appropriate rotation flap was drawn incorporating the defect and placing the expected incisions within the relaxed skin tension lines where possible. The area thus outlined was incised deep to adipose tissue with a #15 scalpel blade.  The skin margins were undermined to an appropriate distance in all directions utilizing iris scissors.
Star Wedge Flap Text: The defect edges were debeveled with a #15 scalpel blade.  Given the location of the defect, shape of the defect and the proximity to free margins a star wedge flap was deemed most appropriate.  Using a sterile surgical marker, an appropriate rotation flap was drawn incorporating the defect and placing the expected incisions within the relaxed skin tension lines where possible. The area thus outlined was incised deep to adipose tissue with a #15 scalpel blade.  The skin margins were undermined to an appropriate distance in all directions utilizing iris scissors.
Transposition Flap Text: The defect edges were debeveled with a #15 scalpel blade.  Given the location of the defect and the proximity to free margins a transposition flap was deemed most appropriate.  Using a sterile surgical marker, an appropriate transposition flap was drawn incorporating the defect.    The area thus outlined was incised deep to adipose tissue with a #15 scalpel blade.  The skin margins were undermined to an appropriate distance in all directions utilizing iris scissors.
Muscle Hinge Flap Text: The defect edges were debeveled with a #15 scalpel blade.  Given the size, depth and location of the defect and the proximity to free margins a muscle hinge flap was deemed most appropriate.  Using a sterile surgical marker, an appropriate hinge flap was drawn incorporating the defect. The area thus outlined was incised with a #15 scalpel blade.  The skin margins were undermined to an appropriate distance in all directions utilizing iris scissors.
Nasal Turnover Hinge Flap Text: The defect edges were debeveled with a #15 scalpel blade.  Given the size, depth, location of the defect and the defect being full thickness a nasal turnover hinge flap was deemed most appropriate.  Using a sterile surgical marker, an appropriate hinge flap was drawn incorporating the defect. The area thus outlined was incised with a #15 scalpel blade. The flap was designed to recreate the nasal mucosal lining and the alar rim. The skin margins were undermined to an appropriate distance in all directions utilizing iris scissors.
Nasalis-Muscle-Based Myocutaneous Island Pedicle Flap Text: Using a #15 blade, an incision was made around the donor flap to the level of the nasalis muscle. Wide lateral undermining was then performed in both the subcutaneous plane above the nasalis muscle, and in a submuscular plane just above periosteum. This allowed the formation of a free nasalis muscle axial pedicle (based on the angular artery) which was still attached to the actual cutaneous flap, increasing its mobility and vascular viability. Hemostasis was obtained with pinpoint electrocoagulation. The flap was mobilized into position and the pivotal anchor points positioned and stabilized with buried interrupted sutures. Subcutaneous and dermal tissues were closed in a multilayered fashion with sutures. Tissue redundancies were excised, and the epidermal edges were apposed without significant tension and sutured with sutures.
Orbicularis Oris Muscle Flap Text: The defect edges were debeveled with a #15 scalpel blade.  Given that the defect affected the competency of the oral sphincter an obicularis oris muscle flap was deemed most appropriate to restore this competency and normal muscle function.  Using a sterile surgical marker, an appropriate flap was drawn incorporating the defect. The area thus outlined was incised with a #15 scalpel blade.
Melolabial Transposition Flap Text: The defect edges were debeveled with a #15 scalpel blade.  Given the location of the defect and the proximity to free margins a melolabial flap was deemed most appropriate.  Using a sterile surgical marker, an appropriate melolabial transposition flap was drawn incorporating the defect.    The area thus outlined was incised deep to adipose tissue with a #15 scalpel blade.  The skin margins were undermined to an appropriate distance in all directions utilizing iris scissors.
Rhombic Flap Text: The defect edges were debeveled with a #15 scalpel blade.  Given the location of the defect and the proximity to free margins a rhombic flap was deemed most appropriate.  Using a sterile surgical marker, an appropriate rhombic flap was drawn incorporating the defect.    The area thus outlined was incised deep to adipose tissue with a #15 scalpel blade.  The skin margins were undermined to an appropriate distance in all directions utilizing iris scissors.
Rhomboid Transposition Flap Text: The defect edges were debeveled with a #15 scalpel blade.  Given the location of the defect and the proximity to free margins a rhomboid transposition flap was deemed most appropriate.  Using a sterile surgical marker, an appropriate rhomboid flap was drawn incorporating the defect.    The area thus outlined was incised deep to adipose tissue with a #15 scalpel blade.  The skin margins were undermined to an appropriate distance in all directions utilizing iris scissors.
Bi-Rhombic Flap Text: The defect edges were debeveled with a #15 scalpel blade.  Given the location of the defect and the proximity to free margins a bi-rhombic flap was deemed most appropriate.  Using a sterile surgical marker, an appropriate rhombic flap was drawn incorporating the defect. The area thus outlined was incised deep to adipose tissue with a #15 scalpel blade.  The skin margins were undermined to an appropriate distance in all directions utilizing iris scissors.
Helical Rim Advancement Flap Text: The defect edges were debeveled with a #15 blade scalpel.  Given the location of the defect and the proximity to free margins (helical rim) a double helical rim advancement flap was deemed most appropriate.  Using a sterile surgical marker, the appropriate advancement flaps were drawn incorporating the defect and placing the expected incisions between the helical rim and antihelix where possible.  The area thus outlined was incised through and through with a #15 scalpel blade.  With a skin hook and iris scissors, the flaps were gently and sharply undermined and freed up.
Bilateral Helical Rim Advancement Flap Text: The defect edges were debeveled with a #15 blade scalpel.  Given the location of the defect and the proximity to free margins (helical rim) a bilateral helical rim advancement flap was deemed most appropriate.  Using a sterile surgical marker, the appropriate advancement flaps were drawn incorporating the defect and placing the expected incisions between the helical rim and antihelix where possible.  The area thus outlined was incised through and through with a #15 scalpel blade.  With a skin hook and iris scissors, the flaps were gently and sharply undermined and freed up.
Ear Star Wedge Flap Text: The defect edges were debeveled with a #15 blade scalpel.  Given the location of the defect and the proximity to free margins (helical rim) an ear star wedge flap was deemed most appropriate.  Using a sterile surgical marker, the appropriate flap was drawn incorporating the defect and placing the expected incisions between the helical rim and antihelix where possible.  The area thus outlined was incised through and through with a #15 scalpel blade.
Banner Transposition Flap Text: The defect edges were debeveled with a #15 scalpel blade.  Given the location of the defect and the proximity to free margins a Banner transposition flap was deemed most appropriate.  Using a sterile surgical marker, an appropriate flap drawn around the defect. The area thus outlined was incised deep to adipose tissue with a #15 scalpel blade.  The skin margins were undermined to an appropriate distance in all directions utilizing iris scissors.
Bilobed Flap Text: The defect edges were debeveled with a #15 scalpel blade.  Given the location of the defect and the proximity to free margins a bilobe flap was deemed most appropriate.  Using a sterile surgical marker, an appropriate bilobe flap drawn around the defect.    The area thus outlined was incised deep to adipose tissue with a #15 scalpel blade.  The skin margins were undermined to an appropriate distance in all directions utilizing iris scissors.
Bilobed Transposition Flap Text: The defect edges were debeveled with a #15 scalpel blade.  Given the location of the defect and the proximity to free margins a bilobed transposition flap was deemed most appropriate.  Using a sterile surgical marker, an appropriate bilobe flap drawn around the defect.    The area thus outlined was incised deep to adipose tissue with a #15 scalpel blade.  The skin margins were undermined to an appropriate distance in all directions utilizing iris scissors.
Trilobed Flap Text: The defect edges were debeveled with a #15 scalpel blade.  Given the location of the defect and the proximity to free margins a trilobed flap was deemed most appropriate.  Using a sterile surgical marker, an appropriate trilobed flap drawn around the defect.    The area thus outlined was incised deep to adipose tissue with a #15 scalpel blade.  The skin margins were undermined to an appropriate distance in all directions utilizing iris scissors.
Dorsal Nasal Flap Text: The defect edges were debeveled with a #15 scalpel blade.  Given the location of the defect and the proximity to free margins a dorsal nasal flap was deemed most appropriate.  Using a sterile surgical marker, an appropriate dorsal nasal flap was drawn around the defect.    The area thus outlined was incised deep to adipose tissue with a #15 scalpel blade.  The skin margins were undermined to an appropriate distance in all directions utilizing iris scissors.
Island Pedicle Flap Text: The defect edges were debeveled with a #15 scalpel blade.  Given the location of the defect, shape of the defect and the proximity to free margins an island pedicle advancement flap was deemed most appropriate.  Using a sterile surgical marker, an appropriate advancement flap was drawn incorporating the defect, outlining the appropriate donor tissue and placing the expected incisions within the relaxed skin tension lines where possible.    The area thus outlined was incised deep to adipose tissue with a #15 scalpel blade.  The skin margins were undermined to an appropriate distance in all directions around the primary defect and laterally outward around the island pedicle utilizing iris scissors.  There was minimal undermining beneath the pedicle flap.
Island Pedicle Flap With Canthal Suspension Text: The defect edges were debeveled with a #15 scalpel blade.  Given the location of the defect, shape of the defect and the proximity to free margins an island pedicle advancement flap was deemed most appropriate.  Using a sterile surgical marker, an appropriate advancement flap was drawn incorporating the defect, outlining the appropriate donor tissue and placing the expected incisions within the relaxed skin tension lines where possible. The area thus outlined was incised deep to adipose tissue with a #15 scalpel blade.  The skin margins were undermined to an appropriate distance in all directions around the primary defect and laterally outward around the island pedicle utilizing iris scissors.  There was minimal undermining beneath the pedicle flap. A suspension suture was placed in the canthal tendon to prevent tension and prevent ectropion.
Alar Island Pedicle Flap Text: The defect edges were debeveled with a #15 scalpel blade.  Given the location of the defect, shape of the defect and the proximity to the alar rim an island pedicle advancement flap was deemed most appropriate.  Using a sterile surgical marker, an appropriate advancement flap was drawn incorporating the defect, outlining the appropriate donor tissue and placing the expected incisions within the nasal ala running parallel to the alar rim. The area thus outlined was incised with a #15 scalpel blade.  The skin margins were undermined minimally to an appropriate distance in all directions around the primary defect and laterally outward around the island pedicle utilizing iris scissors.  There was minimal undermining beneath the pedicle flap.
Double Island Pedicle Flap Text: The defect edges were debeveled with a #15 scalpel blade.  Given the location of the defect, shape of the defect and the proximity to free margins a double island pedicle advancement flap was deemed most appropriate.  Using a sterile surgical marker, an appropriate advancement flap was drawn incorporating the defect, outlining the appropriate donor tissue and placing the expected incisions within the relaxed skin tension lines where possible.    The area thus outlined was incised deep to adipose tissue with a #15 scalpel blade.  The skin margins were undermined to an appropriate distance in all directions around the primary defect and laterally outward around the island pedicle utilizing iris scissors.  There was minimal undermining beneath the pedicle flap.
Island Pedicle Flap-Requiring Vessel Identification Text: The defect edges were debeveled with a #15 scalpel blade.  Given the location of the defect, shape of the defect and the proximity to free margins an island pedicle advancement flap was deemed most appropriate.  Using a sterile surgical marker, an appropriate advancement flap was drawn, based on the axial vessel mentioned above, incorporating the defect, outlining the appropriate donor tissue and placing the expected incisions within the relaxed skin tension lines where possible.    The area thus outlined was incised deep to adipose tissue with a #15 scalpel blade.  The skin margins were undermined to an appropriate distance in all directions around the primary defect and laterally outward around the island pedicle utilizing iris scissors.  There was minimal undermining beneath the pedicle flap.
Keystone Flap Text: The defect edges were debeveled with a #15 scalpel blade.  Given the location of the defect, shape of the defect a keystone flap was deemed most appropriate.  Using a sterile surgical marker, an appropriate keystone flap was drawn incorporating the defect, outlining the appropriate donor tissue and placing the expected incisions within the relaxed skin tension lines where possible. The area thus outlined was incised deep to adipose tissue with a #15 scalpel blade.  The skin margins were undermined to an appropriate distance in all directions around the primary defect and laterally outward around the flap utilizing iris scissors.
O-T Plasty Text: The defect edges were debeveled with a #15 scalpel blade.  Given the location of the defect, shape of the defect and the proximity to free margins an O-T plasty was deemed most appropriate.  Using a sterile surgical marker, an appropriate O-T plasty was drawn incorporating the defect and placing the expected incisions within the relaxed skin tension lines where possible.    The area thus outlined was incised deep to adipose tissue with a #15 scalpel blade.  The skin margins were undermined to an appropriate distance in all directions utilizing iris scissors.
O-Z Plasty Text: The defect edges were debeveled with a #15 scalpel blade.  Given the location of the defect, shape of the defect and the proximity to free margins an O-Z plasty (double transposition flap) was deemed most appropriate.  Using a sterile surgical marker, the appropriate transposition flaps were drawn incorporating the defect and placing the expected incisions within the relaxed skin tension lines where possible.    The area thus outlined was incised deep to adipose tissue with a #15 scalpel blade.  The skin margins were undermined to an appropriate distance in all directions utilizing iris scissors.  Hemostasis was achieved with electrocautery.  The flaps were then transposed into place, one clockwise and the other counterclockwise, and anchored with interrupted buried subcutaneous sutures.
Double O-Z Plasty Text: The defect edges were debeveled with a #15 scalpel blade.  Given the location of the defect, shape of the defect and the proximity to free margins a Double O-Z plasty (double transposition flap) was deemed most appropriate.  Using a sterile surgical marker, the appropriate transposition flaps were drawn incorporating the defect and placing the expected incisions within the relaxed skin tension lines where possible. The area thus outlined was incised deep to adipose tissue with a #15 scalpel blade.  The skin margins were undermined to an appropriate distance in all directions utilizing iris scissors.  Hemostasis was achieved with electrocautery.  The flaps were then transposed into place, one clockwise and the other counterclockwise, and anchored with interrupted buried subcutaneous sutures.
V-Y Plasty Text: The defect edges were debeveled with a #15 scalpel blade.  Given the location of the defect, shape of the defect and the proximity to free margins an V-Y advancement flap was deemed most appropriate.  Using a sterile surgical marker, an appropriate advancement flap was drawn incorporating the defect and placing the expected incisions within the relaxed skin tension lines where possible.    The area thus outlined was incised deep to adipose tissue with a #15 scalpel blade.  The skin margins were undermined to an appropriate distance in all directions utilizing iris scissors.
H Plasty Text: Given the location of the defect, shape of the defect and the proximity to free margins a H-plasty was deemed most appropriate for repair.  Using a sterile surgical marker, the appropriate advancement arms of the H-plasty were drawn incorporating the defect and placing the expected incisions within the relaxed skin tension lines where possible. The area thus outlined was incised deep to adipose tissue with a #15 scalpel blade. The skin margins were undermined to an appropriate distance in all directions utilizing iris scissors.  The opposing advancement arms were then advanced into place in opposite direction and anchored with interrupted buried subcutaneous sutures.
W Plasty Text: The lesion was extirpated to the level of the fat with a #15 scalpel blade.  Given the location of the defect, shape of the defect and the proximity to free margins a W-plasty was deemed most appropriate for repair.  Using a sterile surgical marker, the appropriate transposition arms of the W-plasty were drawn incorporating the defect and placing the expected incisions within the relaxed skin tension lines where possible.    The area thus outlined was incised deep to adipose tissue with a #15 scalpel blade.  The skin margins were undermined to an appropriate distance in all directions utilizing iris scissors.  The opposing transposition arms were then transposed into place in opposite direction and anchored with interrupted buried subcutaneous sutures.
Z Plasty Text: The lesion was extirpated to the level of the fat with a #15 scalpel blade.  Given the location of the defect, shape of the defect and the proximity to free margins a Z-plasty was deemed most appropriate for repair.  Using a sterile surgical marker, the appropriate transposition arms of the Z-plasty were drawn incorporating the defect and placing the expected incisions within the relaxed skin tension lines where possible.    The area thus outlined was incised deep to adipose tissue with a #15 scalpel blade.  The skin margins were undermined to an appropriate distance in all directions utilizing iris scissors.  The opposing transposition arms were then transposed into place in opposite direction and anchored with interrupted buried subcutaneous sutures.
Zygomaticofacial Flap Text: Given the location of the defect, shape of the defect and the proximity to free margins a zygomaticofacial flap was deemed most appropriate for repair.  Using a sterile surgical marker, the appropriate flap was drawn incorporating the defect and placing the expected incisions within the relaxed skin tension lines where possible. The area thus outlined was incised deep to adipose tissue with a #15 scalpel blade with preservation of a vascular pedicle.  The skin margins were undermined to an appropriate distance in all directions utilizing iris scissors.  The flap was then placed into the defect and anchored with interrupted buried subcutaneous sutures.
Cheek Interpolation Flap Text: A decision was made to reconstruct the defect utilizing an interpolation axial flap and a staged reconstruction.  A telfa template was made of the defect.  This telfa template was then used to outline the Cheek Interpolation flap.  The donor area for the pedicle flap was then injected with anesthesia.  The flap was excised through the skin and subcutaneous tissue down to the layer of the underlying musculature.  The interpolation flap was carefully excised within this deep plane to maintain its blood supply.  The edges of the donor site were undermined.   The donor site was closed in a primary fashion.  The pedicle was then rotated into position and sutured.  Once the tube was sutured into place, adequate blood supply was confirmed with blanching and refill.  The pedicle was then wrapped with xeroform gauze and dressed appropriately with a telfa and gauze bandage to ensure continued blood supply and protect the attached pedicle.
Cheek-To-Nose Interpolation Flap Text: A decision was made to reconstruct the defect utilizing an interpolation axial flap and a staged reconstruction.  A telfa template was made of the defect.  This telfa template was then used to outline the Cheek-To-Nose Interpolation flap.  The donor area for the pedicle flap was then injected with anesthesia.  The flap was excised through the skin and subcutaneous tissue down to the layer of the underlying musculature.  The interpolation flap was carefully excised within this deep plane to maintain its blood supply.  The edges of the donor site were undermined.   The donor site was closed in a primary fashion.  The pedicle was then rotated into position and sutured.  Once the tube was sutured into place, adequate blood supply was confirmed with blanching and refill.  The pedicle was then wrapped with xeroform gauze and dressed appropriately with a telfa and gauze bandage to ensure continued blood supply and protect the attached pedicle.
Interpolation Flap Text: A decision was made to reconstruct the defect utilizing an interpolation axial flap and a staged reconstruction.  A telfa template was made of the defect.  This telfa template was then used to outline the interpolation flap.  The donor area for the pedicle flap was then injected with anesthesia.  The flap was excised through the skin and subcutaneous tissue down to the layer of the underlying musculature.  The interpolation flap was carefully excised within this deep plane to maintain its blood supply.  The edges of the donor site were undermined.   The donor site was closed in a primary fashion.  The pedicle was then rotated into position and sutured.  Once the tube was sutured into place, adequate blood supply was confirmed with blanching and refill.  The pedicle was then wrapped with xeroform gauze and dressed appropriately with a telfa and gauze bandage to ensure continued blood supply and protect the attached pedicle.
Melolabial Interpolation Flap Text: A decision was made to reconstruct the defect utilizing an interpolation axial flap and a staged reconstruction.  A telfa template was made of the defect.  This telfa template was then used to outline the melolabial interpolation flap.  The donor area for the pedicle flap was then injected with anesthesia.  The flap was excised through the skin and subcutaneous tissue down to the layer of the underlying musculature.  The pedicle flap was carefully excised within this deep plane to maintain its blood supply.  The edges of the donor site were undermined.   The donor site was closed in a primary fashion.  The pedicle was then rotated into position and sutured.  Once the tube was sutured into place, adequate blood supply was confirmed with blanching and refill.  The pedicle was then wrapped with xeroform gauze and dressed appropriately with a telfa and gauze bandage to ensure continued blood supply and protect the attached pedicle.
Mastoid Interpolation Flap Text: A decision was made to reconstruct the defect utilizing an interpolation axial flap and a staged reconstruction.  A telfa template was made of the defect.  This telfa template was then used to outline the mastoid interpolation flap.  The donor area for the pedicle flap was then injected with anesthesia.  The flap was excised through the skin and subcutaneous tissue down to the layer of the underlying musculature.  The pedicle flap was carefully excised within this deep plane to maintain its blood supply.  The edges of the donor site were undermined.   The donor site was closed in a primary fashion.  The pedicle was then rotated into position and sutured.  Once the tube was sutured into place, adequate blood supply was confirmed with blanching and refill.  The pedicle was then wrapped with xeroform gauze and dressed appropriately with a telfa and gauze bandage to ensure continued blood supply and protect the attached pedicle.
Posterior Auricular Interpolation Flap Text: A decision was made to reconstruct the defect utilizing an interpolation axial flap and a staged reconstruction.  A telfa template was made of the defect.  This telfa template was then used to outline the posterior auricular interpolation flap.  The donor area for the pedicle flap was then injected with anesthesia.  The flap was excised through the skin and subcutaneous tissue down to the layer of the underlying musculature.  The pedicle flap was carefully excised within this deep plane to maintain its blood supply.  The edges of the donor site were undermined.   The donor site was closed in a primary fashion.  The pedicle was then rotated into position and sutured.  Once the tube was sutured into place, adequate blood supply was confirmed with blanching and refill.  The pedicle was then wrapped with xeroform gauze and dressed appropriately with a telfa and gauze bandage to ensure continued blood supply and protect the attached pedicle.
Paramedian Forehead Flap Text: A decision was made to reconstruct the defect utilizing an interpolation axial flap and a staged reconstruction.  A telfa template was made of the defect.  This telfa template was then used to outline the paramedian forehead pedicle flap.  The donor area for the pedicle flap was then injected with anesthesia.  The flap was excised through the skin and subcutaneous tissue down to the layer of the underlying musculature.  The pedicle flap was carefully excised within this deep plane to maintain its blood supply.  The edges of the donor site were undermined.   The donor site was closed in a primary fashion.  The pedicle was then rotated into position and sutured.  Once the tube was sutured into place, adequate blood supply was confirmed with blanching and refill.  The pedicle was then wrapped with xeroform gauze and dressed appropriately with a telfa and gauze bandage to ensure continued blood supply and protect the attached pedicle.
Cheiloplasty (Less Than 50%) Text: A decision was made to reconstruct the defect with a  cheiloplasty.  The defect was undermined extensively.  Additional obicularis oris muscle was excised with a 15 blade scalpel.  The defect was converted into a full thickness wedge, of less than 50% of the vertical height of the lip, to facilite a better cosmetic result.  Small vessels were then tied off with 5-0 monocyrl. The obicularis oris, superficial fascia, adipose and dermis were then reapproximated.  After the deeper layers were approximated the epidermis was reapproximated with particular care given to realign the vermilion border.
Cheiloplasty (Complex) Text: A decision was made to reconstruct the defect with a  cheiloplasty.  The defect was undermined extensively.  Additional obicularis oris muscle was excised with a 15 blade scalpel.  The defect was converted into a full thickness wedge to facilite a better cosmetic result.  Small vessels were then tied off with 5-0 monocyrl. The obicularis oris, superficial fascia, adipose and dermis were then reapproximated.  After the deeper layers were approximated the epidermis was reapproximated with particular care given to realign the vermilion border.
Ear Wedge Repair Text: A wedge excision was completed by carrying down an excision through the full thickness of the ear and cartilage with an inward facing Burow's triangle. The wound was then closed in a layered fashion.
Full Thickness Lip Wedge Repair (Flap) Text: Given the location of the defect and the proximity to free margins a full thickness wedge repair was deemed most appropriate.  Using a sterile surgical marker, the appropriate repair was drawn incorporating the defect and placing the expected incisions perpendicular to the vermilion border.  The vermilion border was also meticulously outlined to ensure appropriate reapproximation during the repair.  The area thus outlined was incised through and through with a #15 scalpel blade.  The muscularis and dermis were reaproximated with deep sutures following hemostasis. Care was taken to realign the vermilion border before proceeding with the superficial closure.  Once the vermilion was realigned the superfical and mucosal closure was finished.
Ftsg Text: The defect edges were debeveled with a #15 scalpel blade.  Given the location of the defect, shape of the defect and the proximity to free margins a full thickness skin graft was deemed most appropriate.  Using a sterile surgical marker, the primary defect shape was transferred to the donor site. The area thus outlined was incised deep to adipose tissue with a #15 scalpel blade.  The harvested graft was then trimmed of adipose tissue until only dermis and epidermis was left.  The skin margins of the secondary defect were undermined to an appropriate distance in all directions utilizing iris scissors.  The secondary defect was closed with interrupted buried subcutaneous sutures.  The skin edges were then re-apposed with running  sutures.  The skin graft was then placed in the primary defect and oriented appropriately.
Split-Thickness Skin Graft Text: The defect edges were debeveled with a #15 scalpel blade.  Given the location of the defect, shape of the defect and the proximity to free margins a split thickness skin graft was deemed most appropriate.  Using a sterile surgical marker, the primary defect shape was transferred to the donor site. The split thickness graft was then harvested.  The skin graft was then placed in the primary defect and oriented appropriately.
Burow's Graft Text: The defect edges were debeveled with a #15 scalpel blade.  Given the location of the defect, shape of the defect, the proximity to free margins and the presence of a standing cone deformity a Burow's skin graft was deemed most appropriate. The standing cone was removed and this tissue was then trimmed to the shape of the primary defect. The adipose tissue was also removed until only dermis and epidermis were left.  The skin margins of the secondary defect were undermined to an appropriate distance in all directions utilizing iris scissors.  The secondary defect was closed with interrupted buried subcutaneous sutures.  The skin edges were then re-apposed with running  sutures.  The skin graft was then placed in the primary defect and oriented appropriately.
Cartilage Graft Text: The defect edges were debeveled with a #15 scalpel blade.  Given the location of the defect, shape of the defect, the fact the defect involved a full thickness cartilage defect a cartilage graft was deemed most appropriate.  An appropriate donor site was identified, cleansed, and anesthetized. The cartilage graft was then harvested and transferred to the recipient site, oriented appropriately and then sutured into place.  The secondary defect was then repaired using a primary closure.
Composite Graft Text: The defect edges were debeveled with a #15 scalpel blade.  Given the location of the defect, shape of the defect, the proximity to free margins and the fact the defect was full thickness a composite graft was deemed most appropriate.  The defect was outline and then transferred to the donor site.  A full thickness graft was then excised from the donor site. The graft was then placed in the primary defect, oriented appropriately and then sutured into place.  The secondary defect was then repaired using a primary closure.
Epidermal Autograft Text: The defect edges were debeveled with a #15 scalpel blade.  Given the location of the defect, shape of the defect and the proximity to free margins an epidermal autograft was deemed most appropriate.  Using a sterile surgical marker, the primary defect shape was transferred to the donor site. The epidermal graft was then harvested.  The skin graft was then placed in the primary defect and oriented appropriately.
Dermal Autograft Text: The defect edges were debeveled with a #15 scalpel blade.  Given the location of the defect, shape of the defect and the proximity to free margins a dermal autograft was deemed most appropriate.  Using a sterile surgical marker, the primary defect shape was transferred to the donor site. The area thus outlined was incised deep to adipose tissue with a #15 scalpel blade.  The harvested graft was then trimmed of adipose and epidermal tissue until only dermis was left.  The skin graft was then placed in the primary defect and oriented appropriately.
Skin Substitute Text: The defect edges were debeveled with a #15 scalpel blade.  Given the location of the defect, shape of the defect and the proximity to free margins a skin substitute graft was deemed most appropriate.  The graft material was trimmed to fit the size of the defect. The graft was then placed in the primary defect and oriented appropriately.
Tissue Cultured Epidermal Autograft Text: The defect edges were debeveled with a #15 scalpel blade.  Given the location of the defect, shape of the defect and the proximity to free margins a tissue cultured epidermal autograft was deemed most appropriate.  The graft was then trimmed to fit the size of the defect.  The graft was then placed in the primary defect and oriented appropriately.
Xenograft Text: The defect edges were debeveled with a #15 scalpel blade.  Given the location of the defect, shape of the defect and the proximity to free margins a xenograft was deemed most appropriate.  The graft was then trimmed to fit the size of the defect.  The graft was then placed in the primary defect and oriented appropriately.
Purse String (Simple) Text: Given the location of the defect and the characteristics of the surrounding skin a purse string closure was deemed most appropriate.  Undermining was performed circumfirentially around the surgical defect.  A purse string suture was then placed and tightened.
Purse String (Intermediate) Text: Given the location of the defect and the characteristics of the surrounding skin a purse string intermediate closure was deemed most appropriate.  Undermining was performed circumfirentially around the surgical defect.  A purse string suture was then placed and tightened.
Partial Purse String (Simple) Text: Given the location of the defect and the characteristics of the surrounding skin a simple purse string closure was deemed most appropriate.  Undermining was performed circumfirentially around the surgical defect.  A purse string suture was then placed and tightened. Wound tension only allowed a partial closure of the circular defect.
Partial Purse String (Intermediate) Text: Given the location of the defect and the characteristics of the surrounding skin an intermediate purse string closure was deemed most appropriate.  Undermining was performed circumfirentially around the surgical defect.  A purse string suture was then placed and tightened. Wound tension only allowed a partial closure of the circular defect.
Localized Dermabrasion With Wire Brush Text: The patient was draped in routine manner.  Localized dermabrasion using 3 x 17 mm wire brush was performed in routine manner to papillary dermis. This spot dermabrasion is being performed to complete skin cancer reconstruction. It also will eliminate the other sun damaged precancerous cells that are known to be part of the regional effect of a lifetime's worth of sun exposure. This localized dermabrasion is therapeutic and should not be considered cosmetic in any regard.
Tarsorrhaphy Text: A tarsorrhaphy was performed using Frost sutures.
Complex Repair And Flap Additional Text (Will Appearing After The Standard Complex Repair Text): The complex repair was not sufficient to completely close the primary defect. The remaining additional defect was repaired with the flap mentioned below.
Complex Repair And Graft Additional Text (Will Appearing After The Standard Complex Repair Text): The complex repair was not sufficient to completely close the primary defect. The remaining additional defect was repaired with the graft mentioned below.
Manual Repair Warning Statement: We plan on removing the manually selected variable below in favor of our much easier automatic structured text blocks found in the previous tab. We decided to do this to help make the flow better and give you the full power of structured data. Manual selection is never going to be ideal in our platform and I would encourage you to avoid using manual selection from this point on, especially since I will be sunsetting this feature. It is important that you do one of two things with the customized text below. First, you can save all of the text in a word file so you can have it for future reference. Second, transfer the text to the appropriate area in the Library tab. Lastly, if there is a flap or graft type which we do not have you need to let us know right away so I can add it in before the variable is hidden. No need to panic, we plan to give you roughly 6 months to make the change.
Same Histology In Subsequent Stages Text: The pattern and morphology of the tumor is as described in the first stage.
No Residual Tumor Seen Histology Text: There were no malignant cells seen in the sections examined.
Inflammation Suggestive Of Cancer Camouflage Histology Text: There was a dense lymphocytic infiltrate which prevented adequate histologic evaluation of adjacent structures.
Bcc Histology Text: There were numerous aggregates of basaloid cells.
Bcc Infiltrative Histology Text: There were numerous aggregates of basaloid cells demonstrating an infiltrative pattern.
Mart-1 - Positive Histology Text: MART-1 staining demonstrates areas of higher density and clustering of melanocytes with Pagetoid spread upwards within the epidermis. The surgical margins are positive for tumor cells.
Mart-1 - Negative Histology Text: MART-1 staining demonstrates a normal density and pattern of melanocytes along the dermal-epidermal junction. The surgical margins are negative for tumor cells.
Information: Selecting Yes will display possible errors in your note based on the variables you have selected. This validation is only offered as a suggestion for you. PLEASE NOTE THAT THE VALIDATION TEXT WILL BE REMOVED WHEN YOU FINALIZE YOUR NOTE. IF YOU WANT TO FAX A PRELIMINARY NOTE YOU WILL NEED TO TOGGLE THIS TO 'NO' IF YOU DO NOT WANT IT IN YOUR FAXED NOTE.

## 2021-03-05 ENCOUNTER — IMMUNIZATION (OUTPATIENT)
Dept: FAMILY PLANNING/WOMEN'S HEALTH CLINIC | Facility: IMMUNIZATION CENTER | Age: 81
End: 2021-03-05
Attending: INTERNAL MEDICINE
Payer: MEDICARE

## 2021-03-05 DIAGNOSIS — Z23 ENCOUNTER FOR VACCINATION: Primary | ICD-10-CM

## 2021-03-05 PROCEDURE — 0012A MODERNA SARS-COV-2 VACCINE: CPT | Performed by: INTERNAL MEDICINE

## 2021-03-05 PROCEDURE — 91301 MODERNA SARS-COV-2 VACCINE: CPT | Performed by: INTERNAL MEDICINE

## 2021-03-11 ENCOUNTER — APPOINTMENT (RX ONLY)
Dept: URBAN - METROPOLITAN AREA CLINIC 22 | Facility: CLINIC | Age: 81
Setting detail: DERMATOLOGY
End: 2021-03-11

## 2021-03-11 DIAGNOSIS — Z48.817 ENCOUNTER FOR SURGICAL AFTERCARE FOLLOWING SURGERY ON THE SKIN AND SUBCUTANEOUS TISSUE: ICD-10-CM

## 2021-03-11 PROCEDURE — 99024 POSTOP FOLLOW-UP VISIT: CPT

## 2021-03-11 PROCEDURE — ? POST-OP WOUND EVALUATION

## 2021-03-11 ASSESSMENT — LOCATION ZONE DERM: LOCATION ZONE: HAND

## 2021-03-11 ASSESSMENT — LOCATION SIMPLE DESCRIPTION DERM: LOCATION SIMPLE: LEFT HAND

## 2021-03-11 ASSESSMENT — LOCATION DETAILED DESCRIPTION DERM: LOCATION DETAILED: 3RD WEB SPACE LEFT HAND

## 2021-03-11 NOTE — PROCEDURE: POST-OP WOUND EVALUATION
Detail Level: Detailed
Add 97480 Cpt? (Important Note: In 2017 The Use Of 28166 Is Being Tracked By Cms To Determine Future Global Period Reimbursement For Global Periods): yes
Wound Evaluated By (Optional): Usman Gil MD
Wound Diameter In Cm(Optional): 0
Wound Crusting?: clean
Sutures?: intact
Wound Color?: pink
Any New Or Residual Neoplasm?: No

## 2021-04-08 ENCOUNTER — OFFICE VISIT (OUTPATIENT)
Dept: NEUROLOGY | Facility: MEDICAL CENTER | Age: 81
End: 2021-04-08
Attending: PSYCHIATRY & NEUROLOGY
Payer: MEDICARE

## 2021-04-08 VITALS
HEART RATE: 89 BPM | RESPIRATION RATE: 16 BRPM | OXYGEN SATURATION: 95 % | DIASTOLIC BLOOD PRESSURE: 76 MMHG | HEIGHT: 72 IN | WEIGHT: 220.46 LBS | SYSTOLIC BLOOD PRESSURE: 118 MMHG | TEMPERATURE: 97.5 F | BODY MASS INDEX: 29.86 KG/M2

## 2021-04-08 DIAGNOSIS — Z91.81 PERSONAL HISTORY OF FALL: ICD-10-CM

## 2021-04-08 PROCEDURE — 99214 OFFICE O/P EST MOD 30 MIN: CPT | Performed by: PSYCHIATRY & NEUROLOGY

## 2021-04-08 PROCEDURE — 99211 OFF/OP EST MAY X REQ PHY/QHP: CPT | Performed by: PSYCHIATRY & NEUROLOGY

## 2021-04-08 ASSESSMENT — FIBROSIS 4 INDEX: FIB4 SCORE: 6.85

## 2021-04-08 NOTE — PROGRESS NOTES
[facility] NEUROLOGY  FOLLOW-UP VISIT    CC: falls, stroke/TIA    INTERVAL HISTORY:  Luigi Walters is a 80 y.o. man with a history of unexplained falls and possible stroke/TIA with a history most notable for AF, SA node dysfunction, s/p pacemaker placement (not MRI-compatible), non-Hodgkin lymphoma, cirrhosis, esophageal varices, and BPPV.  I last saw him in the clinic on 1/7/2021.  At that time I recommended obtaining outside records.  Today, he was unaccompanied, and he provided the following interval history:    Justin continues to experience daily episodes of momentary (<1 minute at a time) dizziness.  He can provoke the dizziness by bending own and then twisting upon standing.  These symptoms are not severe.  There have not been any falls.    MEDICATIONS:  Current Outpatient Medications   Medication Sig   • ondansetron (ZOFRAN) 4 MG Tab tablet TK 1 T PO Q 6 H FOR 30 DAYS.   • oxyCODONE immediate-release (ROXICODONE) 5 MG Tab Take 10 mg by mouth every 6 hours as needed for Severe Pain.   • calcitRIOL (ROCALTROL) 0.25 MCG Cap TK 1 C PO QD   • SF 5000 PLUS 1.1 % Cream USE FOR BRUSHING UTD   • acetaminophen (TYLENOL) 500 MG Tab Take 500-1,000 mg by mouth every 6 hours as needed.   • tramadol (ULTRAM) 50 MG Tab Take 50 mg by mouth every 8 hours as needed.   • NON SPECIFIED L-orinthate 500mg PO daily   • diphenhydrAMINE (BENADRYL) 50 MG Cap Take 50 mg by mouth every bedtime.   • NON SPECIFIED L-aspartate 500mg Po daily   • promethazine (PHENERGAN) 25 MG Tab Take 25 mg by mouth every 6 hours as needed for Nausea/Vomiting.   • Multiple Vitamin (MULTI VITAMIN DAILY PO) Take  by mouth. Combination d3,magnessium,vitamin a   • ropinirole (REQUIP) 0.25 MG Tab TK 2T PO at bedtime   • spironolactone (ALDACTONE) 50 MG Tab    • lactulose 10 GM/15ML SOLN Take 30 g by mouth as needed.   • meclizine (ANTIVERT) 25 MG TABS Take 25 mg by mouth 3 times a day as needed.   • tamsulosin (FLOMAX) 0.4 MG capsule Take 0.4  mg by mouth ONE-HALF HOUR AFTER BREAKFAST.   • omeprazole (PRILOSEC) 40 MG capsule Take 40 mg by mouth every day.     MEDICAL, SOCIAL, AND FAMILY HISTORY:  There is no change in the patient's ROS or PFSH from their previous visit on 1/7/2021.    REVIEW OF SYSTEMS:  A ROS was completed.  Pertinent positives and negatives were included in the HPI, above.  All other systems were reviewed and are negative.    PHYSICAL EXAM:  General/Medical:  - NAD  - dry skin over the upper extremities  - s/p left shoulder surgery     Neuro:  MENTAL STATUS: awake and alert; no deficits of speech or language; oriented to person, place, and time; affect was appropriate to situation; pleasant, cooperative     CRANIAL NERVES:    II: acuity was: J1/J1 with glasses; fields intact to confrontation; pupils 3/3 and sluggish; discs were: NT    III/IV/VI: versions intact without nystagmus    V: facial sensation symmetric to light touch    VII: facial expression symmetric    VIII: hard of hearing on the right    IX/X: palate elevates symmetrically    XI: shoulder shrug symmetric    XII: tongue midline     MOTOR:  - bulk was normal  - paratonia in the upper extremities, bilaterally; no rigidity  Upper Extremity Strength  (R/L)    5/5   Elbow flexion 5/5   Elbow extension 5/5   Shoulder abduction 5/2-4      Lower Extremity Strength  (R/L)   Hip flexion 5/5   Knee extension 5/5   Knee flexion 5/5   Ankle plantarflexion 5/5   Ankle dorsiflexion 5/5      - can walk on toes and heels  - no pronator drift  - action tremor, bilaterally     SENSATION:  - light touch: intact over the upper and lower extremities  - vibration (R/L, seconds): NT at the great toes  - pinprick: NT  - proprioception: NT  - Romberg: absent     COORDINATION:  - finger to nose was normal, no ataxia on exam  - finger tapping was rapid and accurate, bilaterally     REFLEXES:  Reflex Right Left   BR 1+ 1+   Biceps 1+ 1+   Triceps 1+ 1+   Patellae 1+ 1+   Achilles 1+ 1+   Toes NT NT  "     GAIT:  - walked without cane  - narrow base and normal  - heel-raised and toe-raised gait: intact  - tandem gait: intact    REVIEW OF IMAGING STUDIES:  No new neuro-imaging since the last visit.    REVIEW OF LABORATORY STUDIES:  1/12/2021:  - CMP: WNL    ASSESSMENT:  Luigi Walters is a 80 y.o. man with dizziness, unexplained falls, possible stroke/TIA, and a history otherwise notable for AF, SA node dysfunction, s/p pacemaker placement (not MRI-compatible), non-Hodgkin lymphoma, cirrhosis, esophageal varices, and BPPV.  I reviewed Justin's medical records from [facility] including the discharge summary and neurology consult notes.  The events leading up to his hospitalization were attributed to (likely viral) GI illness and syncope, while a stroke workup (which couldn't include MRI brain) was unrevealing.  At this point I suspect Mateos symptoms are probably due to mild pre-syncope (especially since symptoms are provoked by standing).  No additional workup recommended at this time.  We will follow up on an as-needed basis.    PLAN:  Likely Pre-Syncope:  - no additional workup at this time    Follow-Up:  - Return if symptoms worsen or fail to improve.    Signed: Ivan Leung M.D. at 1:01 PM on 04/08/21    BILLING DOCUMENTATION:   I spent 35 minutes reviewing the medical record, interviewing and examining the patient, discussing my impression (see \"assessment\" above), and coordinating care.  "

## 2021-04-09 ENCOUNTER — APPOINTMENT (RX ONLY)
Dept: URBAN - METROPOLITAN AREA CLINIC 22 | Facility: CLINIC | Age: 81
Setting detail: DERMATOLOGY
End: 2021-04-09

## 2021-04-09 DIAGNOSIS — Z48.817 ENCOUNTER FOR SURGICAL AFTERCARE FOLLOWING SURGERY ON THE SKIN AND SUBCUTANEOUS TISSUE: ICD-10-CM

## 2021-04-09 PROCEDURE — 99024 POSTOP FOLLOW-UP VISIT: CPT

## 2021-04-09 PROCEDURE — ? POST-OP WOUND EVALUATION

## 2021-04-09 ASSESSMENT — LOCATION ZONE DERM: LOCATION ZONE: HAND

## 2021-04-09 ASSESSMENT — LOCATION SIMPLE DESCRIPTION DERM: LOCATION SIMPLE: LEFT HAND

## 2021-04-09 ASSESSMENT — LOCATION DETAILED DESCRIPTION DERM: LOCATION DETAILED: 3RD WEB SPACE LEFT HAND

## 2021-04-09 NOTE — PROCEDURE: POST-OP WOUND EVALUATION
Detail Level: Detailed
Add 06756 Cpt? (Important Note: In 2017 The Use Of 30141 Is Being Tracked By Cms To Determine Future Global Period Reimbursement For Global Periods): yes
Wound Evaluated By (Optional): Usman Gil MD
Wound Diameter In Cm(Optional): 0
Wound Crusting?: clean
Wound Color?: pink
Any New Or Residual Neoplasm?: No
Patient To Follow-Up With?: our clinic
Follow Up Units (Optional): 1
Follow Up Time Frame (Optional): months

## 2021-04-13 ENCOUNTER — OFFICE VISIT (OUTPATIENT)
Dept: URGENT CARE | Facility: PHYSICIAN GROUP | Age: 81
End: 2021-04-13
Payer: MEDICARE

## 2021-04-13 ENCOUNTER — HOSPITAL ENCOUNTER (OUTPATIENT)
Facility: MEDICAL CENTER | Age: 81
End: 2021-04-13
Attending: FAMILY MEDICINE
Payer: MEDICARE

## 2021-04-13 VITALS
SYSTOLIC BLOOD PRESSURE: 118 MMHG | BODY MASS INDEX: 30.52 KG/M2 | TEMPERATURE: 97.9 F | WEIGHT: 218 LBS | HEART RATE: 96 BPM | RESPIRATION RATE: 16 BRPM | HEIGHT: 71 IN | OXYGEN SATURATION: 97 % | DIASTOLIC BLOOD PRESSURE: 76 MMHG

## 2021-04-13 DIAGNOSIS — N30.00 ACUTE CYSTITIS WITHOUT HEMATURIA: ICD-10-CM

## 2021-04-13 LAB
APPEARANCE UR: NORMAL
BILIRUB UR STRIP-MCNC: NORMAL MG/DL
COLOR UR AUTO: YELLOW
GLUCOSE UR STRIP.AUTO-MCNC: NORMAL MG/DL
KETONES UR STRIP.AUTO-MCNC: NORMAL MG/DL
LEUKOCYTE ESTERASE UR QL STRIP.AUTO: NORMAL
NITRITE UR QL STRIP.AUTO: NORMAL
PH UR STRIP.AUTO: 6 [PH] (ref 5–8)
PROT UR QL STRIP: NORMAL MG/DL
RBC UR QL AUTO: NORMAL
SP GR UR STRIP.AUTO: 1.01
UROBILINOGEN UR STRIP-MCNC: 0.2 MG/DL

## 2021-04-13 PROCEDURE — 81002 URINALYSIS NONAUTO W/O SCOPE: CPT | Performed by: FAMILY MEDICINE

## 2021-04-13 PROCEDURE — 99214 OFFICE O/P EST MOD 30 MIN: CPT | Performed by: FAMILY MEDICINE

## 2021-04-13 PROCEDURE — 87186 SC STD MICRODIL/AGAR DIL: CPT

## 2021-04-13 PROCEDURE — 87086 URINE CULTURE/COLONY COUNT: CPT

## 2021-04-13 PROCEDURE — 87077 CULTURE AEROBIC IDENTIFY: CPT | Mod: 91

## 2021-04-13 RX ORDER — CIPROFLOXACIN 500 MG/1
500 TABLET, FILM COATED ORAL EVERY 12 HOURS
Qty: 14 TABLET | Refills: 0 | Status: SHIPPED | OUTPATIENT
Start: 2021-04-13 | End: 2021-04-17

## 2021-04-13 ASSESSMENT — FIBROSIS 4 INDEX: FIB4 SCORE: 6.85

## 2021-04-13 NOTE — PROGRESS NOTES
Subjective:      CC:  presents with Dysuria            Dysuria   This is a new problem. The current episode started in the past 5 days. The problem occurs every urination. The problem has been unchanged. The quality of the pain is described as burning.  Denies penile d/c.    There has been subj fever. Pt is not sexually active. There is no history of pyelonephritis. Associated symptoms include frequency and urgency. Pertinent negatives include no chills, discharge, flank pain, nausea or vomiting. Pt has tried nothing for the symptoms. There is no history of recurrent UTIs.     Social History     Socioeconomic History   • Marital status:      Spouse name: Not on file   • Number of children: Not on file   • Years of education: Not on file   • Highest education level: Not on file   Occupational History   • Not on file   Tobacco Use   • Smoking status: Former Smoker     Packs/day: 0.50     Years: 14.00     Pack years: 7.00     Types: Cigarettes     Quit date: 1970     Years since quittin.3   • Smokeless tobacco: Never Used   • Tobacco comment: quit in    Substance and Sexual Activity   • Alcohol use: No     Alcohol/week: 0.5 oz     Types: 1 Glasses of wine per week     Comment: Hx of ETOH abuse; quit    • Drug use: No     Types: Cocaine   • Sexual activity: Not Currently   Other Topics Concern   • Not on file   Social History Narrative   • Not on file     Social Determinants of Health     Financial Resource Strain:    • Difficulty of Paying Living Expenses:    Food Insecurity:    • Worried About Running Out of Food in the Last Year:    • Ran Out of Food in the Last Year:    Transportation Needs:    • Lack of Transportation (Medical):    • Lack of Transportation (Non-Medical):    Physical Activity:    • Days of Exercise per Week:    • Minutes of Exercise per Session:    Stress:    • Feeling of Stress :    Social Connections:    • Frequency of Communication with Friends and Family:    • Frequency  of Social Gatherings with Friends and Family:    • Attends [employer] Services:    • Active Member of Clubs or Organizations:    • Attends Club or Organization Meetings:    • Marital Status:    Intimate Partner Violence:    • Fear of Current or Ex-Partner:    • Emotionally Abused:    • Physically Abused:    • Sexually Abused:          Family History   Problem Relation Age of Onset   • Cancer Father         Prostate CA         Allergies   Allergen Reactions   • Dobutamine      Seizures; taken abruptly off a Dobutamine gtt-no taper   • Latex      Could be adhesive           Current Outpatient Medications on File Prior to Visit   Medication Sig Dispense Refill   • ondansetron (ZOFRAN) 4 MG Tab tablet TK 1 T PO Q 6 H FOR 30 DAYS.     • oxyCODONE immediate-release (ROXICODONE) 5 MG Tab Take 10 mg by mouth every 6 hours as needed for Severe Pain.     • calcitRIOL (ROCALTROL) 0.25 MCG Cap TK 1 C PO QD  4   • SF 5000 PLUS 1.1 % Cream USE FOR BRUSHING UTD  1   • acetaminophen (TYLENOL) 500 MG Tab Take 500-1,000 mg by mouth every 6 hours as needed.     • tramadol (ULTRAM) 50 MG Tab Take 50 mg by mouth every 8 hours as needed.     • NON SPECIFIED L-orinthate 500mg PO daily     • diphenhydrAMINE (BENADRYL) 50 MG Cap Take 50 mg by mouth every bedtime.     • NON SPECIFIED L-aspartate 500mg Po daily     • promethazine (PHENERGAN) 25 MG Tab Take 25 mg by mouth every 6 hours as needed for Nausea/Vomiting.     • Multiple Vitamin (MULTI VITAMIN DAILY PO) Take  by mouth. Combination d3,magnessium,vitamin a     • ropinirole (REQUIP) 0.25 MG Tab TK 2T PO at bedtime  3   • spironolactone (ALDACTONE) 50 MG Tab   0   • lactulose 10 GM/15ML SOLN Take 30 g by mouth as needed.     • meclizine (ANTIVERT) 25 MG TABS Take 25 mg by mouth 3 times a day as needed.     • tamsulosin (FLOMAX) 0.4 MG capsule Take 0.4 mg by mouth ONE-HALF HOUR AFTER BREAKFAST.     • omeprazole (PRILOSEC) 40 MG capsule Take 40 mg by mouth every day.       No current  "facility-administered medications on file prior to visit.       Review of Systems   Constitutional: Negative for chills.   Respiratory: Negative for shortness of breath.    Cardiovascular: Negative for chest pain.   Gastrointestinal: Negative for nausea, vomiting and abdominal pain.   Genitourinary: Positive for dysuria, urgency and frequency. Negative for flank pain.   Skin: Negative for rash.   Neurological: Negative for dizziness and headaches.   All other systems reviewed and are negative.         Objective:      /76   Pulse 96   Temp 36.6 °C (97.9 °F) (Temporal)   Resp 16   Ht 1.803 m (5' 11\")   Wt 98.9 kg (218 lb)   SpO2 97%       Physical Exam   Constitutional: pt is oriented to person, place, and time. Pt appears well-developed and well-nourished. No distress.   HENT:   Head: Normocephalic and atraumatic.   Mouth/Throat: Mucous membranes are normal.   Eyes: Conjunctivae and EOM are normal. Pupils are equal, round, and reactive to light. Right eye exhibits no discharge. Left eye exhibits no discharge. No scleral icterus.   Neck: Normal range of motion. Neck supple.   Cardiovascular: Normal rate, regular rhythm, normal heart sounds and intact distal pulses.    No murmur heard.  Pulmonary/Chest: Effort normal and breath sounds normal. No respiratory distress. Pt has no wheezes,  rales.   Abdominal: Bowel sounds are normal. Pt exhibits no distension and no mass. There is no tenderness. There is no rebound, no guarding and no CVA tenderness.   Neurological: pt is alert and oriented to person, place, and time.   Skin: Skin is warm and dry.   Psychiatric: behavior is normal.   Nursing note and vitals reviewed.           Lab Results   Component Value Date/Time    POCCOLOR YELLOW 06/25/2019 03:36 PM    POCAPPEAR CLEAR 06/25/2019 03:36 PM    POCLEUKEST NEGATIVE 06/25/2019 03:36 PM    POCNITRITE NEGATIVE 06/25/2019 03:36 PM    POCUROBILIGE 0.2 06/25/2019 03:36 PM    POCPROTEIN NEGATIVE 06/25/2019 03:36 PM "    POCURPH 6.0 06/25/2019 03:36 PM    POCBLOOD NEGATIVE 06/25/2019 03:36 PM    POCSPGRV 1.015 06/25/2019 03:36 PM    POCKETONES NEGATIVE 06/25/2019 03:36 PM    POCBILIRUBIN NEGATIVE 06/25/2019 03:36 PM    POCGLUCUA NEGATIVE 06/25/2019 03:36 PM            Assessment/Plan:     1. Acute cystitis without hematuria   UA c/w UTI      - ciprofloxacin (CIPRO) 500 MG Tab; Take 1 tablet by mouth every 12 hours for 7 days.  Dispense: 14 tablet; Refill: 0    Urine sent for cx    Follow up in one week if no improvement, sooner if symptoms worsen.

## 2021-04-14 ENCOUNTER — TELEPHONE (OUTPATIENT)
Dept: CARDIOLOGY | Facility: MEDICAL CENTER | Age: 81
End: 2021-04-14

## 2021-04-16 LAB
BACTERIA UR CULT: ABNORMAL
BACTERIA UR CULT: ABNORMAL
SIGNIFICANT IND 70042: ABNORMAL
SITE SITE: ABNORMAL
SOURCE SOURCE: ABNORMAL

## 2021-04-17 DIAGNOSIS — N30.00 ACUTE CYSTITIS WITHOUT HEMATURIA: ICD-10-CM

## 2021-04-17 RX ORDER — AMPICILLIN 500 MG/1
500 CAPSULE ORAL 4 TIMES DAILY
Qty: 28 CAPSULE | Refills: 0 | Status: SHIPPED | OUTPATIENT
Start: 2021-04-17 | End: 2021-04-24

## 2021-05-25 ENCOUNTER — APPOINTMENT (RX ONLY)
Dept: URBAN - METROPOLITAN AREA CLINIC 22 | Facility: CLINIC | Age: 81
Setting detail: DERMATOLOGY
End: 2021-05-25

## 2021-05-25 DIAGNOSIS — D18.0 HEMANGIOMA: ICD-10-CM

## 2021-05-25 DIAGNOSIS — L81.4 OTHER MELANIN HYPERPIGMENTATION: ICD-10-CM

## 2021-05-25 DIAGNOSIS — L82.1 OTHER SEBORRHEIC KERATOSIS: ICD-10-CM

## 2021-05-25 DIAGNOSIS — L82.0 INFLAMED SEBORRHEIC KERATOSIS: ICD-10-CM

## 2021-05-25 DIAGNOSIS — D22 MELANOCYTIC NEVI: ICD-10-CM

## 2021-05-25 DIAGNOSIS — L57.0 ACTINIC KERATOSIS: ICD-10-CM

## 2021-05-25 PROBLEM — D18.01 HEMANGIOMA OF SKIN AND SUBCUTANEOUS TISSUE: Status: ACTIVE | Noted: 2021-05-25

## 2021-05-25 PROBLEM — D22.9 MELANOCYTIC NEVI, UNSPECIFIED: Status: ACTIVE | Noted: 2021-05-25

## 2021-05-25 PROBLEM — D48.5 NEOPLASM OF UNCERTAIN BEHAVIOR OF SKIN: Status: ACTIVE | Noted: 2021-05-25

## 2021-05-25 PROCEDURE — ? COUNSELING

## 2021-05-25 PROCEDURE — ? LIQUID NITROGEN

## 2021-05-25 PROCEDURE — 11102 TANGNTL BX SKIN SINGLE LES: CPT | Mod: 59

## 2021-05-25 PROCEDURE — 17110 DESTRUCTION B9 LES UP TO 14: CPT | Mod: 59

## 2021-05-25 PROCEDURE — 17004 DESTROY PREMAL LESIONS 15/>: CPT

## 2021-05-25 PROCEDURE — 99213 OFFICE O/P EST LOW 20 MIN: CPT | Mod: 25

## 2021-05-25 PROCEDURE — ? BIOPSY BY SHAVE METHOD

## 2021-05-25 ASSESSMENT — LOCATION SIMPLE DESCRIPTION DERM
LOCATION SIMPLE: RIGHT FOREHEAD
LOCATION SIMPLE: LEFT EYEBROW
LOCATION SIMPLE: RIGHT UPPER BACK
LOCATION SIMPLE: LEFT FOREHEAD
LOCATION SIMPLE: LEFT CHEEK
LOCATION SIMPLE: RIGHT HAND
LOCATION SIMPLE: LEFT HAND
LOCATION SIMPLE: LEFT EAR
LOCATION SIMPLE: RIGHT EAR
LOCATION SIMPLE: RIGHT SCALP
LOCATION SIMPLE: NOSE
LOCATION SIMPLE: LEFT SCALP
LOCATION SIMPLE: LEFT TEMPLE
LOCATION SIMPLE: RIGHT TEMPLE

## 2021-05-25 ASSESSMENT — LOCATION ZONE DERM
LOCATION ZONE: HAND
LOCATION ZONE: EAR
LOCATION ZONE: FACE
LOCATION ZONE: TRUNK
LOCATION ZONE: SCALP
LOCATION ZONE: NOSE

## 2021-05-25 ASSESSMENT — LOCATION DETAILED DESCRIPTION DERM
LOCATION DETAILED: LEFT CENTRAL MALAR CHEEK
LOCATION DETAILED: LEFT LATERAL TEMPLE
LOCATION DETAILED: RIGHT SUPERIOR POSTERIOR HELIX
LOCATION DETAILED: RIGHT RADIAL DORSAL HAND
LOCATION DETAILED: LEFT DORSAL MIDDLE METACARPOPHALANGEAL JOINT
LOCATION DETAILED: LEFT SUPERIOR POSTERIOR HELIX
LOCATION DETAILED: NASAL DORSUM
LOCATION DETAILED: LEFT MEDIAL FRONTAL SCALP
LOCATION DETAILED: RIGHT DORSAL INDEX METACARPOPHALANGEAL JOINT
LOCATION DETAILED: RIGHT INFERIOR LATERAL FOREHEAD
LOCATION DETAILED: LEFT SUPERIOR FOREHEAD
LOCATION DETAILED: LEFT INFERIOR FOREHEAD
LOCATION DETAILED: LEFT SUPERIOR CENTRAL MALAR CHEEK
LOCATION DETAILED: RIGHT INFERIOR TEMPLE
LOCATION DETAILED: LEFT LATERAL EYEBROW
LOCATION DETAILED: RIGHT MEDIAL FRONTAL SCALP
LOCATION DETAILED: RIGHT MID TEMPLE
LOCATION DETAILED: RIGHT SUPERIOR UPPER BACK

## 2021-05-25 NOTE — PROCEDURE: BIOPSY BY SHAVE METHOD
Detail Level: Detailed
Depth Of Biopsy: dermis
Was A Bandage Applied: Yes
Size Of Lesion In Cm: 1.3
X Size Of Lesion In Cm: 1
Biopsy Type: H and E
Biopsy Method: Personna blade
Anesthesia Type: 1% lidocaine with epinephrine and a 1:10 solution of 8.4% sodium bicarbonate
Additional Anesthesia Volume In Cc (Will Not Render If 0): 0
Hemostasis: Electricator
Wound Care: Petrolatum
Dressing: Band-Aid
Destruction After The Procedure: No
Type Of Destruction Used: Curettage
Curettage Text: The wound bed was treated with curettage after the biopsy was performed.
Cryotherapy Text: The wound bed was treated with cryotherapy after the biopsy was performed.
Electrodesiccation Text: The wound bed was treated with electrodesiccation after the biopsy was performed.
Electrodesiccation And Curettage Text: The wound bed was treated with electrodesiccation and curettage after the biopsy was performed.
Silver Nitrate Text: The wound bed was treated with silver nitrate after the biopsy was performed.
Lab: [id number]
Lab Facility: 
Consent: Written consent was obtained and risks were reviewed including but not limited to scarring, infection, bleeding, scabbing, incomplete removal, nerve damage and allergy to anesthesia.
Post-Care Instructions: I reviewed with the patient in detail post-care instructions. Patient is to keep the biopsy site dry overnight, and then apply bacitracin twice daily until healed. Patient may apply hydrogen peroxide soaks to remove any crusting.
Notification Instructions: Patient will be notified of biopsy results. However, patient instructed to call the office if not contacted within 2 weeks.
Billing Type: Third-Party Bill
Information: Selecting Yes will display possible errors in your note based on the variables you have selected. This validation is only offered as a suggestion for you. PLEASE NOTE THAT THE VALIDATION TEXT WILL BE REMOVED WHEN YOU FINALIZE YOUR NOTE. IF YOU WANT TO FAX A PRELIMINARY NOTE YOU WILL NEED TO TOGGLE THIS TO 'NO' IF YOU DO NOT WANT IT IN YOUR FAXED NOTE.

## 2021-05-25 NOTE — PROCEDURE: LIQUID NITROGEN
Detail Level: Detailed
Render Post-Care Instructions In Note?: yes
Consent: The patient's consent was obtained including but not limited to risks of crusting, scabbing, blistering, scarring, darker or lighter pigmentary change, recurrence, incomplete removal and infection.
Render Note In Bullet Format When Appropriate: No
Post-Care Instructions: I reviewed with the patient in detail post-care instructions. Patient is to wear sunprotection, and avoid picking at any of the treated lesions. Pt may apply Vaseline to crusted or scabbing areas.
Duration Of Freeze Thaw-Cycle (Seconds): 5
Number Of Freeze-Thaw Cycles: 1 freeze-thaw cycle
Medical Necessity Clause: This procedure was medically necessary because the lesions that were treated were:
Duration Of Freeze Thaw-Cycle (Seconds): 5-10
Medical Necessity Information: It is in your best interest to select a reason for this procedure from the list below. All of these items fulfill various CMS LCD requirements except the new and changing color options.

## 2021-05-25 NOTE — PROCEDURE: COUNSELING
Detail Level: Generalized
Detail Level: Zone
Sunscreen Recommendations: At least SPF 30 (or 50 for fair skin patients or those with h/o of skin cancer)\\nPhysical UV blockers such as those containing one or both of the following ingredients: Zinc oxide or titanium dioxide

## 2021-07-06 ENCOUNTER — APPOINTMENT (RX ONLY)
Dept: URBAN - METROPOLITAN AREA CLINIC 22 | Facility: CLINIC | Age: 81
Setting detail: DERMATOLOGY
End: 2021-07-06

## 2021-07-06 DIAGNOSIS — D485 NEOPLASM OF UNCERTAIN BEHAVIOR OF SKIN: ICD-10-CM

## 2021-07-06 PROBLEM — D48.5 NEOPLASM OF UNCERTAIN BEHAVIOR OF SKIN: Status: ACTIVE | Noted: 2021-07-06

## 2021-07-06 PROBLEM — D04.4 CARCINOMA IN SITU OF SKIN OF SCALP AND NECK: Status: ACTIVE | Noted: 2021-07-06

## 2021-07-06 PROCEDURE — 17272 DSTR MAL LES S/N/H/F/G 1.1-2: CPT

## 2021-07-06 PROCEDURE — ? BIOPSY BY SHAVE METHOD

## 2021-07-06 PROCEDURE — 11102 TANGNTL BX SKIN SINGLE LES: CPT | Mod: 59

## 2021-07-06 PROCEDURE — ? CURETTAGE AND DESTRUCTION

## 2021-07-06 ASSESSMENT — LOCATION DETAILED DESCRIPTION DERM: LOCATION DETAILED: LEFT INFERIOR CENTRAL MALAR CHEEK

## 2021-07-06 ASSESSMENT — LOCATION SIMPLE DESCRIPTION DERM: LOCATION SIMPLE: LEFT CHEEK

## 2021-07-06 ASSESSMENT — LOCATION ZONE DERM: LOCATION ZONE: FACE

## 2021-07-06 NOTE — PROCEDURE: CURETTAGE AND DESTRUCTION
Detail Level: Detailed
Number Of Curettages: 3
Size Of Lesion In Cm: 1.3
Size Of Lesion After Curettage: 1.6
Add Intralesional Injection: No
Concentration (Mg/Ml Or Millions Of Plaque Forming Units/Cc): 0.01
Total Volume (Ccs): 1
Anesthesia Type: 1% lidocaine with epinephrine and a 1:10 solution of 8.4% sodium bicarbonate
Cautery Type: electrodesiccation
What Was Performed First?: Curettage
Final Size Statement: The size of the lesion after curettage was
Additional Information: (Optional): The wound was cleaned, and a pressure dressing was applied.  The patient received detailed post-op instructions.
Consent was obtained from the patient. The risks, benefits and alternatives to therapy were discussed in detail. Specifically, the risks of infection, scarring, bleeding, prolonged wound healing, nerve injury, incomplete removal, allergy to anesthesia and recurrence were addressed. Alternatives to ED&C, such as: surgical removal and XRT were also discussed.  Prior to the procedure, the treatment site was clearly identified and confirmed by the patient. All components of Universal Protocol/PAUSE Rule completed.
Post-Care Instructions: I reviewed with the patient in detail post-care instructions. Patient is to keep the area dry for 48 hours, and not to engage in any swimming until the area is healed. Should the patient develop any fevers, chills, bleeding, severe pain patient will contact the office immediately.
Bill As A Line Item Or As Units: Line Item

## 2021-07-06 NOTE — PROCEDURE: MIPS QUALITY
Quality 265: Biopsy Follow-Up: Biopsy results reviewed, communicated, tracked, and documented
Quality 226: Preventive Care And Screening: Tobacco Use: Screening And Cessation Intervention: Patient screened for tobacco use and is an ex/non-smoker
Quality 111:Pneumonia Vaccination Status For Older Adults: Pneumococcal Vaccination Previously Received
Detail Level: Detailed
Quality 130: Documentation Of Current Medications In The Medical Record: Current Medications Documented

## 2021-07-06 NOTE — PROCEDURE: BIOPSY BY SHAVE METHOD
Detail Level: Detailed
Depth Of Biopsy: dermis
Was A Bandage Applied: Yes
Size Of Lesion In Cm: 1
X Size Of Lesion In Cm: 0.8
Biopsy Type: H and E
Biopsy Method: Personna blade
Anesthesia Type: 1% lidocaine with epinephrine and a 1:10 solution of 8.4% sodium bicarbonate
Additional Anesthesia Volume In Cc (Will Not Render If 0): 0
Hemostasis: Electricator
Wound Care: Petrolatum
Dressing: Band-Aid
Destruction After The Procedure: No
Type Of Destruction Used: Curettage
Curettage Text: The wound bed was treated with curettage after the biopsy was performed.
Cryotherapy Text: The wound bed was treated with cryotherapy after the biopsy was performed.
Electrodesiccation Text: The wound bed was treated with electrodesiccation after the biopsy was performed.
Electrodesiccation And Curettage Text: The wound bed was treated with electrodesiccation and curettage after the biopsy was performed.
Silver Nitrate Text: The wound bed was treated with silver nitrate after the biopsy was performed.
Lab: [id number]
Lab Facility: 
Consent: Written consent was obtained and risks were reviewed including but not limited to scarring, infection, bleeding, scabbing, incomplete removal, nerve damage and allergy to anesthesia.
Post-Care Instructions: I reviewed with the patient in detail post-care instructions. Patient is to keep the biopsy site dry overnight, and then apply bacitracin twice daily until healed. Patient may apply hydrogen peroxide soaks to remove any crusting.
Notification Instructions: Patient will be notified of biopsy results. However, patient instructed to call the office if not contacted within 2 weeks.
Billing Type: Third-Party Bill
Information: Selecting Yes will display possible errors in your note based on the variables you have selected. This validation is only offered as a suggestion for you. PLEASE NOTE THAT THE VALIDATION TEXT WILL BE REMOVED WHEN YOU FINALIZE YOUR NOTE. IF YOU WANT TO FAX A PRELIMINARY NOTE YOU WILL NEED TO TOGGLE THIS TO 'NO' IF YOU DO NOT WANT IT IN YOUR FAXED NOTE.

## 2021-07-12 ENCOUNTER — HOSPITAL ENCOUNTER (OUTPATIENT)
Dept: LAB | Facility: MEDICAL CENTER | Age: 81
End: 2021-07-12
Attending: INTERNAL MEDICINE
Payer: MEDICARE

## 2021-07-12 LAB
25(OH)D3 SERPL-MCNC: 39 NG/ML (ref 30–100)
ALBUMIN SERPL BCP-MCNC: 4.3 G/DL (ref 3.2–4.9)
ALBUMIN/GLOB SERPL: 1.4 G/DL
ALP SERPL-CCNC: 84 U/L (ref 30–99)
ALT SERPL-CCNC: 12 U/L (ref 2–50)
ANION GAP SERPL CALC-SCNC: 12 MMOL/L (ref 7–16)
APPEARANCE UR: ABNORMAL
AST SERPL-CCNC: 25 U/L (ref 12–45)
BACTERIA #/AREA URNS HPF: NEGATIVE /HPF
BILIRUB SERPL-MCNC: 0.8 MG/DL (ref 0.1–1.5)
BILIRUB UR QL STRIP.AUTO: NEGATIVE
BUN SERPL-MCNC: 19 MG/DL (ref 8–22)
CALCIUM SERPL-MCNC: 10.2 MG/DL (ref 8.5–10.5)
CHLORIDE SERPL-SCNC: 105 MMOL/L (ref 96–112)
CHOLEST SERPL-MCNC: 158 MG/DL (ref 100–199)
CO2 SERPL-SCNC: 23 MMOL/L (ref 20–33)
COLOR UR: YELLOW
CREAT SERPL-MCNC: 1.36 MG/DL (ref 0.5–1.4)
EPI CELLS #/AREA URNS HPF: NEGATIVE /HPF
EST. AVERAGE GLUCOSE BLD GHB EST-MCNC: 100 MG/DL
FASTING STATUS PATIENT QL REPORTED: NORMAL
GLOBULIN SER CALC-MCNC: 3.1 G/DL (ref 1.9–3.5)
GLUCOSE SERPL-MCNC: 101 MG/DL (ref 65–99)
GLUCOSE UR STRIP.AUTO-MCNC: NEGATIVE MG/DL
HBA1C MFR BLD: 5.1 % (ref 4–5.6)
HDLC SERPL-MCNC: 37 MG/DL
HYALINE CASTS #/AREA URNS LPF: ABNORMAL /LPF
KETONES UR STRIP.AUTO-MCNC: NEGATIVE MG/DL
LDLC SERPL CALC-MCNC: 104 MG/DL
LEUKOCYTE ESTERASE UR QL STRIP.AUTO: ABNORMAL
MICRO URNS: ABNORMAL
NITRITE UR QL STRIP.AUTO: NEGATIVE
PH UR STRIP.AUTO: 7 [PH] (ref 5–8)
POTASSIUM SERPL-SCNC: 4.3 MMOL/L (ref 3.6–5.5)
PROT SERPL-MCNC: 7.4 G/DL (ref 6–8.2)
PROT UR QL STRIP: NEGATIVE MG/DL
PSA SERPL-MCNC: 0.15 NG/ML (ref 0–4)
RBC # URNS HPF: ABNORMAL /HPF
RBC UR QL AUTO: ABNORMAL
SODIUM SERPL-SCNC: 140 MMOL/L (ref 135–145)
SP GR UR STRIP.AUTO: 1.01
T4 FREE SERPL-MCNC: 1.39 NG/DL (ref 0.93–1.7)
TRIGL SERPL-MCNC: 86 MG/DL (ref 0–149)
TSH SERPL DL<=0.005 MIU/L-ACNC: 1.79 UIU/ML (ref 0.38–5.33)
UROBILINOGEN UR STRIP.AUTO-MCNC: 0.2 MG/DL
WBC #/AREA URNS HPF: ABNORMAL /HPF

## 2021-07-12 PROCEDURE — 83036 HEMOGLOBIN GLYCOSYLATED A1C: CPT

## 2021-07-12 PROCEDURE — 36415 COLL VENOUS BLD VENIPUNCTURE: CPT

## 2021-07-12 PROCEDURE — 85025 COMPLETE CBC W/AUTO DIFF WBC: CPT

## 2021-07-12 PROCEDURE — 80061 LIPID PANEL: CPT

## 2021-07-12 PROCEDURE — 80053 COMPREHEN METABOLIC PANEL: CPT

## 2021-07-12 PROCEDURE — 87086 URINE CULTURE/COLONY COUNT: CPT

## 2021-07-12 PROCEDURE — 81001 URINALYSIS AUTO W/SCOPE: CPT

## 2021-07-12 PROCEDURE — 84443 ASSAY THYROID STIM HORMONE: CPT

## 2021-07-12 PROCEDURE — 84439 ASSAY OF FREE THYROXINE: CPT

## 2021-07-12 PROCEDURE — 82306 VITAMIN D 25 HYDROXY: CPT

## 2021-07-12 PROCEDURE — 84153 ASSAY OF PSA TOTAL: CPT

## 2021-07-13 LAB
BASOPHILS # BLD AUTO: 0.5 % (ref 0–1.8)
BASOPHILS # BLD: 0.02 K/UL (ref 0–0.12)
EOSINOPHIL # BLD AUTO: 0.08 K/UL (ref 0–0.51)
EOSINOPHIL NFR BLD: 2.1 % (ref 0–6.9)
ERYTHROCYTE [DISTWIDTH] IN BLOOD BY AUTOMATED COUNT: 45.7 FL (ref 35.9–50)
HCT VFR BLD AUTO: 48.6 % (ref 42–52)
HGB BLD-MCNC: 15.9 G/DL (ref 14–18)
IMM GRANULOCYTES # BLD AUTO: 0.01 K/UL (ref 0–0.11)
IMM GRANULOCYTES NFR BLD AUTO: 0.3 % (ref 0–0.9)
LYMPHOCYTES # BLD AUTO: 0.71 K/UL (ref 1–4.8)
LYMPHOCYTES NFR BLD: 19 % (ref 22–41)
MCH RBC QN AUTO: 29.8 PG (ref 27–33)
MCHC RBC AUTO-ENTMCNC: 32.7 G/DL (ref 33.7–35.3)
MCV RBC AUTO: 91 FL (ref 81.4–97.8)
MONOCYTES # BLD AUTO: 0.22 K/UL (ref 0–0.85)
MONOCYTES NFR BLD AUTO: 5.9 % (ref 0–13.4)
NEUTROPHILS # BLD AUTO: 2.69 K/UL (ref 1.82–7.42)
NEUTROPHILS NFR BLD: 72.2 % (ref 44–72)
NRBC # BLD AUTO: 0 K/UL
NRBC BLD-RTO: 0 /100 WBC
PLATELET # BLD AUTO: 110 K/UL (ref 164–446)
PMV BLD AUTO: 12 FL (ref 9–12.9)
RBC # BLD AUTO: 5.34 M/UL (ref 4.7–6.1)
WBC # BLD AUTO: 3.7 K/UL (ref 4.8–10.8)

## 2021-07-14 LAB
BACTERIA UR CULT: NORMAL
SIGNIFICANT IND 70042: NORMAL
SITE SITE: NORMAL
SOURCE SOURCE: NORMAL

## 2021-07-19 ENCOUNTER — TELEPHONE (OUTPATIENT)
Dept: CARDIOLOGY | Facility: MEDICAL CENTER | Age: 81
End: 2021-07-19

## 2021-07-19 NOTE — TELEPHONE ENCOUNTER
DS    Pt called asking if he needs to come to the office for a pacer check or if he can do it remotely. Please call Pt back at [phone].    Thank you

## 2021-07-22 ENCOUNTER — APPOINTMENT (RX ONLY)
Dept: URBAN - METROPOLITAN AREA CLINIC 22 | Facility: CLINIC | Age: 81
Setting detail: DERMATOLOGY
End: 2021-07-22

## 2021-07-22 DIAGNOSIS — Z85.828 PERSONAL HISTORY OF OTHER MALIGNANT NEOPLASM OF SKIN: ICD-10-CM

## 2021-07-22 PROBLEM — C4A.39 MERKEL CELL CARCINOMA OF OTHER PARTS OF FACE: Status: ACTIVE | Noted: 2021-07-22

## 2021-07-22 PROCEDURE — ? COUNSELING

## 2021-07-22 PROCEDURE — 99212 OFFICE O/P EST SF 10 MIN: CPT

## 2021-07-22 PROCEDURE — ? POST-OP WOUND EVALUATION

## 2021-07-22 PROCEDURE — ? REFERRAL

## 2021-07-22 ASSESSMENT — LOCATION DETAILED DESCRIPTION DERM: LOCATION DETAILED: RIGHT INFERIOR POSTAURICULAR SKIN

## 2021-07-22 ASSESSMENT — LOCATION SIMPLE DESCRIPTION DERM: LOCATION SIMPLE: SCALP

## 2021-07-22 ASSESSMENT — LOCATION ZONE DERM: LOCATION ZONE: SCALP

## 2021-07-22 NOTE — PROCEDURE: MIPS QUALITY
Detail Level: Detailed
Quality 111:Pneumonia Vaccination Status For Older Adults: Pneumococcal Vaccination Previously Received
Quality 265: Biopsy Follow-Up: Biopsy results reviewed, communicated, tracked, and documented
Quality 130: Documentation Of Current Medications In The Medical Record: Current Medications Documented
Quality 226: Preventive Care And Screening: Tobacco Use: Screening And Cessation Intervention: Patient screened for tobacco use and is an ex/non-smoker

## 2021-07-22 NOTE — PROCEDURE: POST-OP WOUND EVALUATION
Detail Level: Detailed
Add 35909 Cpt? (Important Note: In 2017 The Use Of 86345 Is Being Tracked By Cms To Determine Future Global Period Reimbursement For Global Periods): no
Wound Evaluated By (Optional): Raghu Gil
Wound Diameter In Cm(Optional): 0
Wound Crusting?: clean
Wound Color?: pink

## 2021-08-09 ENCOUNTER — APPOINTMENT (RX ONLY)
Dept: URBAN - METROPOLITAN AREA CLINIC 22 | Facility: CLINIC | Age: 81
Setting detail: DERMATOLOGY
End: 2021-08-09

## 2021-08-09 DIAGNOSIS — Z71.89 OTHER SPECIFIED COUNSELING: ICD-10-CM

## 2021-08-09 DIAGNOSIS — L85.3 XEROSIS CUTIS: ICD-10-CM

## 2021-08-09 DIAGNOSIS — L81.4 OTHER MELANIN HYPERPIGMENTATION: ICD-10-CM

## 2021-08-09 DIAGNOSIS — L57.0 ACTINIC KERATOSIS: ICD-10-CM

## 2021-08-09 PROBLEM — D48.5 NEOPLASM OF UNCERTAIN BEHAVIOR OF SKIN: Status: ACTIVE | Noted: 2021-08-09

## 2021-08-09 PROCEDURE — 11103 TANGNTL BX SKIN EA SEP/ADDL: CPT

## 2021-08-09 PROCEDURE — ? LIQUID NITROGEN

## 2021-08-09 PROCEDURE — 11102 TANGNTL BX SKIN SINGLE LES: CPT

## 2021-08-09 PROCEDURE — ? PHOTO-DOCUMENTATION

## 2021-08-09 PROCEDURE — 17003 DESTRUCT PREMALG LES 2-14: CPT

## 2021-08-09 PROCEDURE — ? BIOPSY BY SHAVE METHOD

## 2021-08-09 PROCEDURE — 99213 OFFICE O/P EST LOW 20 MIN: CPT | Mod: 25

## 2021-08-09 PROCEDURE — ? COUNSELING

## 2021-08-09 PROCEDURE — 17000 DESTRUCT PREMALG LESION: CPT | Mod: 59

## 2021-08-09 ASSESSMENT — LOCATION DETAILED DESCRIPTION DERM
LOCATION DETAILED: RIGHT VENTRAL PROXIMAL FOREARM
LOCATION DETAILED: RIGHT CENTRAL POSTAURICULAR SKIN
LOCATION DETAILED: RIGHT INFERIOR POSTAURICULAR SKIN
LOCATION DETAILED: LEFT VENTRAL PROXIMAL FOREARM
LOCATION DETAILED: RIGHT FOREHEAD
LOCATION DETAILED: LEFT PROXIMAL DORSAL FOREARM
LOCATION DETAILED: RIGHT DISTAL DORSAL FOREARM

## 2021-08-09 ASSESSMENT — LOCATION SIMPLE DESCRIPTION DERM
LOCATION SIMPLE: RIGHT FOREHEAD
LOCATION SIMPLE: LEFT FOREARM
LOCATION SIMPLE: RIGHT FOREARM
LOCATION SIMPLE: SCALP

## 2021-08-09 ASSESSMENT — LOCATION ZONE DERM
LOCATION ZONE: FACE
LOCATION ZONE: ARM
LOCATION ZONE: SCALP

## 2021-08-09 NOTE — PROCEDURE: LIQUID NITROGEN
Show Aperture Variable?: Yes
Render Post-Care Instructions In Note?: no
Duration Of Freeze Thaw-Cycle (Seconds): 3
Post-Care Instructions: I reviewed with the patient in detail post-care instructions. Patient is to wear sunprotection, and avoid picking at any of the treated lesions. Pt may apply Vaseline to crusted or scabbing areas.
Detail Level: Detailed
Number Of Freeze-Thaw Cycles: 2 freeze-thaw cycles
Consent: The patient's consent was obtained including but not limited to risks of crusting, scabbing, blistering, scarring, darker or lighter pigmentary change, recurrence, incomplete removal and infection.

## 2021-08-16 ENCOUNTER — APPOINTMENT (OUTPATIENT)
Dept: RADIOLOGY | Facility: MEDICAL CENTER | Age: 81
End: 2021-08-16
Attending: INTERNAL MEDICINE
Payer: MEDICARE

## 2021-09-03 ENCOUNTER — APPOINTMENT (RX ONLY)
Dept: URBAN - METROPOLITAN AREA CLINIC 22 | Facility: CLINIC | Age: 81
Setting detail: DERMATOLOGY
End: 2021-09-03

## 2021-09-03 PROBLEM — C44.329 SQUAMOUS CELL CARCINOMA OF SKIN OF OTHER PARTS OF FACE: Status: ACTIVE | Noted: 2021-09-03

## 2021-09-03 PROCEDURE — 17312 MOHS ADDL STAGE: CPT

## 2021-09-03 PROCEDURE — ? MOHS SURGERY

## 2021-09-03 PROCEDURE — 17311 MOHS 1 STAGE H/N/HF/G: CPT

## 2021-09-03 PROCEDURE — ? DIAGNOSIS COMMENT

## 2021-09-03 PROCEDURE — 13132 CMPLX RPR F/C/C/M/N/AX/G/H/F: CPT

## 2021-09-03 NOTE — PROCEDURE: MOHS SURGERY
Mohs Case Number: [id number]
Previous Accession (Optional): [id number]
Biopsy Photograph Reviewed: Yes
Referring Physician (Optional): Robert Edwards PA-C
Consent Type: Consent 1 (Standard)
Eye Shield Used: No
Surgeon Performing Repair (Optional): Usman Gil M.D.
Initial Size Of Lesion: 1.2
X Size Of Lesion In Cm (Optional): 0.8
Number Of Stages: 2
Primary Defect Length In Cm (Final Defect Size - Required For Flaps/Grafts): 1.7
Primary Defect Width In Cm (Final Defect Size - Required For Flaps/Grafts): 1.3
Repair Type: Complex Repair
Oculoplastic Surgeon Procedure Text (A): After obtaining clear surgical margins the patient was sent to oculoplastics for surgical repair.  The patient understands they will receive post-surgical care and follow-up from the referring physician's office.
Otolaryngologist Procedure Text (A): After obtaining clear surgical margins the patient was sent to otolaryngology for surgical repair.  The patient understands they will receive post-surgical care and follow-up from the referring physician's office.
Plastic Surgeon Procedure Text (A): After obtaining clear surgical margins the patient was sent to plastics for surgical repair.  The patient understands they will receive post-surgical care and follow-up from the referring physician's office.
Mid-Level Procedure Text (A): After obtaining clear surgical margins the patient was sent to a mid-level provider for surgical repair.  The patient understands they will receive post-surgical care and follow-up from the mid-level provider.
Provider Procedure Text (A): After obtaining clear surgical margins the defect was repaired by another provider.
Asc Procedure Text (A): After obtaining clear surgical margins the patient was sent to an ASC for surgical repair.  The patient understands they will receive post-surgical care and follow-up from the ASC physician.
Suturegard Retention Suture: 2-0 Nylon
Retention Suture Bite Size: 3 mm
Length To Time In Minutes Device Was In Place: 10
Number Of Hemigard Strips Per Side: 1
Simple / Intermediate / Complex Repair - Final Wound Length In Cm: 5
Width Of Defect Perpendicular To Closure In Cm (Required): 1.4
Distance Of Undermining In Cm (Required): 1.6
Undermining Type: Entire Wound
Debridement Text: The wound edges were debrided prior to proceeding with the closure to facilitate wound healing.
Helical Rim Text: The closure involved the helical rim.
Vermilion Border Text: The closure involved the vermilion border.
Nostril Rim Text: The closure involved the nostril rim.
Retention Suture Text: Retention sutures were placed to support the closure and prevent dehiscence.
Secondary Defect Length In Cm (Required For Flaps): 0
Area H Indication Text: Tumors in this location are included in Area H (eyelids, eyebrows, nose, lips, chin, ear, pre-auricular, post-auricular, temple, genitalia, hands, feet, ankles and areola).  Tissue conservation is critical in these anatomic locations.
Area M Indication Text: Tumors in this location are included in Area M (cheek, forehead, scalp, neck, jawline and pretibial skin).  Mohs surgery is indicated for tumors in these anatomic locations.
Area L Indication Text: Tumors in this location are included in Area L (trunk and extremities).  Mohs surgery is indicated for larger tumors, or tumors with aggressive histologic features, in these anatomic locations.
Tumor Debulked?: curette
Depth Of Tumor Invasion (For Histology): dermis
Special Stains Stage 1 - Results: Base On Clearance Noted Above
Stage 2: Additional Anesthesia Volume In Cc: 1.0
Stage 2: Additional Anesthesia Type: 1% lidocaine with epinephrine and a 1:10 solution of 8.4% sodium bicarbonate
Stage 4: Additional Anesthesia Type: 1% lidocaine with epinephrine
Staging Info: By selecting yes to the question above you will include information on AJCC 8 tumor staging in your Mohs note. Information on tumor staging will be automatically added for SCCs on the head and neck. AJCC 8 includes tumor size, tumor depth, perineural involvement and bone invasion.
Tumor Depth: Less than 6mm from granular layer and no invasion beyond the subcutaneous fat
Include Anticoagulation In Mohs Note?: Please Select the Appropriate Response
Medical Necessity Statement: Based on my medical judgement, Mohs surgery is the most appropriate treatment for this cancer compared to other treatments.
Alternatives Discussed Intro (Do Not Add Period): I discussed alternative treatments to Mohs surgery and specifically discussed the risks and benefits of
Consent 1/Introductory Paragraph: The rationale for Mohs was explained to the patient and consent was obtained. The risks, benefits and alternatives to therapy were discussed in detail. Specifically, the risks of infection, scarring, bleeding, prolonged wound healing, incomplete removal, allergy to anesthesia, nerve injury and recurrence were addressed. Prior to the procedure, the treatment site was clearly identified and confirmed by the patient. All components of Universal Protocol/PAUSE Rule completed.
Consent 2/Introductory Paragraph: Mohs surgery was explained to the patient and consent was obtained. The risks, benefits and alternatives to therapy were discussed in detail. Specifically, the risks of infection, scarring, bleeding, prolonged wound healing, incomplete removal, allergy to anesthesia, nerve injury and recurrence were addressed. Prior to the procedure, the treatment site was clearly identified and confirmed by the patient. All components of Universal Protocol/PAUSE Rule completed.
Consent 3/Introductory Paragraph: I gave the patient a chance to ask questions they had about the procedure.  Following this I explained the Mohs procedure and consent was obtained. The risks, benefits and alternatives to therapy were discussed in detail. Specifically, the risks of infection, scarring, bleeding, prolonged wound healing, incomplete removal, allergy to anesthesia, nerve injury and recurrence were addressed. Prior to the procedure, the treatment site was clearly identified and confirmed by the patient. All components of Universal Protocol/PAUSE Rule completed.
Consent (Temporal Branch)/Introductory Paragraph: The rationale for Mohs was explained to the patient and consent was obtained. The risks, benefits and alternatives to therapy were discussed in detail. Specifically, the risks of damage to the temporal branch of the facial nerve, infection, scarring, bleeding, prolonged wound healing, incomplete removal, allergy to anesthesia, and recurrence were addressed. Prior to the procedure, the treatment site was clearly identified and confirmed by the patient. All components of Universal Protocol/PAUSE Rule completed.
Consent (Marginal Mandibular)/Introductory Paragraph: The rationale for Mohs was explained to the patient and consent was obtained. The risks, benefits and alternatives to therapy were discussed in detail. Specifically, the risks of damage to the marginal mandibular branch of the facial nerve, infection, scarring, bleeding, prolonged wound healing, incomplete removal, allergy to anesthesia, and recurrence were addressed. Prior to the procedure, the treatment site was clearly identified and confirmed by the patient. All components of Universal Protocol/PAUSE Rule completed.
Consent (Spinal Accessory)/Introductory Paragraph: The rationale for Mohs was explained to the patient and consent was obtained. The risks, benefits and alternatives to therapy were discussed in detail. Specifically, the risks of damage to the spinal accessory nerve, infection, scarring, bleeding, prolonged wound healing, incomplete removal, allergy to anesthesia, and recurrence were addressed. Prior to the procedure, the treatment site was clearly identified and confirmed by the patient. All components of Universal Protocol/PAUSE Rule completed.
Consent (Near Eyelid Margin)/Introductory Paragraph: The rationale for Mohs was explained to the patient and consent was obtained. The risks, benefits and alternatives to therapy were discussed in detail. Specifically, the risks of ectropion or eyelid deformity, infection, scarring, bleeding, prolonged wound healing, incomplete removal, allergy to anesthesia, nerve injury and recurrence were addressed. Prior to the procedure, the treatment site was clearly identified and confirmed by the patient. All components of Universal Protocol/PAUSE Rule completed.
Consent (Ear)/Introductory Paragraph: The rationale for Mohs was explained to the patient and consent was obtained. The risks, benefits and alternatives to therapy were discussed in detail. Specifically, the risks of ear deformity, infection, scarring, bleeding, prolonged wound healing, incomplete removal, allergy to anesthesia, nerve injury and recurrence were addressed. Prior to the procedure, the treatment site was clearly identified and confirmed by the patient. All components of Universal Protocol/PAUSE Rule completed.
Consent (Nose)/Introductory Paragraph: The rationale for Mohs was explained to the patient and consent was obtained. The risks, benefits and alternatives to therapy were discussed in detail. Specifically, the risks of nasal deformity, changes in the flow of air through the nose, infection, scarring, bleeding, prolonged wound healing, incomplete removal, allergy to anesthesia, nerve injury and recurrence were addressed. Prior to the procedure, the treatment site was clearly identified and confirmed by the patient. All components of Universal Protocol/PAUSE Rule completed.
Consent (Lip)/Introductory Paragraph: The rationale for Mohs was explained to the patient and consent was obtained. The risks, benefits and alternatives to therapy were discussed in detail. Specifically, the risks of lip deformity, changes in the oral aperture, infection, scarring, bleeding, prolonged wound healing, incomplete removal, allergy to anesthesia, nerve injury and recurrence were addressed. Prior to the procedure, the treatment site was clearly identified and confirmed by the patient. All components of Universal Protocol/PAUSE Rule completed.
Consent (Scalp)/Introductory Paragraph: The rationale for Mohs was explained to the patient and consent was obtained. The risks, benefits and alternatives to therapy were discussed in detail. Specifically, the risks of changes in hair growth pattern secondary to repair, infection, scarring, bleeding, prolonged wound healing, incomplete removal, allergy to anesthesia, nerve injury and recurrence were addressed. Prior to the procedure, the treatment site was clearly identified and confirmed by the patient. All components of Universal Protocol/PAUSE Rule completed.
Detail Level: Detailed
Postop Diagnosis: same
Anesthesia Volume In Cc: 6
Hemostasis: Electricator
Estimated Blood Loss (Cc): minimal
Repair Anesthesia Method: local infiltration
Undermining Location (Optional): below the galea
Brow Lift Text: A midfrontal incision was made medially to the defect to allow access to the tissues just superior to the left eyebrow. Following careful dissection inferiorly in a supraperiosteal plane to the level of the left eyebrow, several 3-0 monocryl sutures were used to resuspend the eyebrow orbicularis oculi muscular unit to the superior frontal bone periosteum. This resulted in an appropriate reapproximation of static eyebrow symmetry and correction of the left brow ptosis.
Deep Sutures: 4-0 Polysorb
Epidermal Sutures: 5-0 Surgipro
Epidermal Closure: running and interrupted
Suturegard Intro: Intraoperative tissue expansion was performed, utilizing the SUTUREGARD device, in order to reduce wound tension.
Suturegard Body: The suture ends were repeatedly re-tightened and re-clamped to achieve the desired tissue expansion.
Hemigard Intro: Due to skin fragility and wound tension, it was decided to use HEMIGARD adhesive retention suture devices to permit a linear closure. The skin was cleaned and dried for a 6cm distance away from the wound. Excessive hair, if present, was removed to allow for adhesion.
Hemigard Postcare Instructions: The HEMIGARD strips are to remain completely dry for at least 5-7 days.
Donor Site Anesthesia Type: same as repair anesthesia
Epidermal Closure Graft Donor Site (Optional): simple interrupted
Graft Donor Site Bandage (Optional-Leave Blank If You Don't Want In Note): Steri-strips and a pressure bandage were applied to the donor site.
Closure 2 Information: This tab is for additional flaps and grafts, including complex repair and grafts and complex repair and flaps. You can also specify a different location for the additional defect, if the location is the same you do not need to select a new one. We will insert the automated text for the repair you select below just as we do for solitary flaps and grafts. Please note that at this time if you select a location with a different insurance zone you will need to override the ICD10 and CPT if appropriate.
Closure 3 Information: This tab is for additional flaps and grafts above and beyond our usual structured repairs.  Please note if you enter information here it will not currently bill and you will need to add the billing information manually.
Wound Care: Petrolatum
Dressing: pressure dressing with telfa
Dressing (No Sutures): dry sterile dressing
Suture Removal: 7 days
Unna Boot Text: An Unna boot was placed to help immobilize the limb and facilitate more rapid healing.
Home Suture Removal Text: Patient was provided instructions on removing sutures and will remove their sutures at home.  If they have any questions or difficulties they will call the office.
Post-Care Instructions: I reviewed with the patient in detail post-care instructions. Patient is not to engage in any heavy lifting, exercise, or swimming for the next 14 days. Should the patient develop any fevers, chills, bleeding, severe pain patient will contact the office immediately.
Pain Refusal Text: I offered to prescribe pain medication but the patient refused to take this medication.
Mauc Instructions: By selecting yes to the question below the MAUC number will be added into the note.  This will be calculated automatically based on the diagnosis chosen, the size entered, the body zone selected (H,M,L) and the specific indications you chose. You will also have the option to override the Mohs AUC if you disagree with the automatically calculated number and this option is found in the Case Summary tab.
Where Do You Want The Question To Include Opioid Counseling Located?: Case Summary Tab
Eye Protection Verbiage: Before proceeding with the stage, a plastic scleral shield was inserted. The globe was anesthetized with a few drops of 1% lidocaine with 1:100,000 epinephrine. Then, an appropriate sized scleral shield was chosen and coated with lacrilube ointment. The shield was gently inserted and left in place for the duration of each stage. After the stage was completed, the shield was gently removed.
Mohs Method Verbiage: An incision at a 45 degree angle following the standard Mohs approach was done and the specimen was harvested as a microscopic controlled layer.
Surgeon/Pathologist Verbiage (Will Incorporate Name Of Surgeon From Intro If Not Blank): operated in two distinct and integrated capacities as the surgeon and pathologist.
Mohs Histo Method Verbiage: Each section was then chromacoded and processed in the Mohs lab using the Mohs protocol and submitted for frozen section.
Subsequent Stages Histo Method Verbiage: Using a similar technique to that described above, a thin layer of tissue was removed from all areas where tumor was visible on the previous stage.  The tissue was again oriented, mapped, dyed, and processed as above.
Mohs Rapid Report Verbiage: The area of clinically evident tumor was marked with skin marking ink and appropriately hatched.  The initial incision was made following the Mohs approach through the skin.  The specimen was taken to the lab, divided into the necessary number of pieces, chromacoded and processed according to the Mohs protocol.  This was repeated in successive stages until a tumor free defect was achieved.
Complex Repair Preamble Text (Leave Blank If You Do Not Want): Extensive wide undermining was performed.
Intermediate Repair Preamble Text (Leave Blank If You Do Not Want): Undermining was performed with blunt dissection.
Non-Graft Cartilage Fenestration Text: The cartilage was fenestrated with a 2mm punch biopsy to help facilitate healing.
Graft Cartilage Fenestration Text: The cartilage was fenestrated with a 2mm punch biopsy to help facilitate graft survival and healing.
Secondary Intention Text (Leave Blank If You Do Not Want): The defect will heal with secondary intention.
No Repair - Repaired With Adjacent Surgical Defect Text (Leave Blank If You Do Not Want): After obtaining clear surgical margins the defect was repaired concurrently with another surgical defect which was in close approximation.
Advancement Flap (Single) Text: The defect edges were debeveled with a #15 scalpel blade.  Given the location of the defect and the proximity to free margins a single advancement flap was deemed most appropriate.  Using a sterile surgical marker, an appropriate advancement flap was drawn incorporating the defect and placing the expected incisions within the relaxed skin tension lines where possible.    The area thus outlined was incised deep to adipose tissue with a #15 scalpel blade.  The skin margins were undermined to an appropriate distance in all directions utilizing iris scissors.
Advancement Flap (Double) Text: The defect edges were debeveled with a #15 scalpel blade.  Given the location of the defect and the proximity to free margins a double advancement flap was deemed most appropriate.  Using a sterile surgical marker, the appropriate advancement flaps were drawn incorporating the defect and placing the expected incisions within the relaxed skin tension lines where possible.    The area thus outlined was incised deep to adipose tissue with a #15 scalpel blade.  The skin margins were undermined to an appropriate distance in all directions utilizing iris scissors.
Burow's Advancement Flap Text: The defect edges were debeveled with a #15 scalpel blade.  Given the location of the defect and the proximity to free margins a Burow's advancement flap was deemed most appropriate.  Using a sterile surgical marker, the appropriate advancement flap was drawn incorporating the defect and placing the expected incisions within the relaxed skin tension lines where possible.    The area thus outlined was incised deep to adipose tissue with a #15 scalpel blade.  The skin margins were undermined to an appropriate distance in all directions utilizing iris scissors.
Chonodrocutaneous Helical Advancement Flap Text: The defect edges were debeveled with a #15 scalpel blade.  Given the location of the defect and the proximity to free margins a chondrocutaneous helical advancement flap was deemed most appropriate.  Using a sterile surgical marker, the appropriate advancement flap was drawn incorporating the defect and placing the expected incisions within the relaxed skin tension lines where possible.    The area thus outlined was incised deep to adipose tissue with a #15 scalpel blade.  The skin margins were undermined to an appropriate distance in all directions utilizing iris scissors.
Crescentic Advancement Flap Text: The defect edges were debeveled with a #15 scalpel blade.  Given the location of the defect and the proximity to free margins a crescentic advancement flap was deemed most appropriate.  Using a sterile surgical marker, the appropriate advancement flap was drawn incorporating the defect and placing the expected incisions within the relaxed skin tension lines where possible.    The area thus outlined was incised deep to adipose tissue with a #15 scalpel blade.  The skin margins were undermined to an appropriate distance in all directions utilizing iris scissors.
A-T Advancement Flap Text: The defect edges were debeveled with a #15 scalpel blade.  Given the location of the defect, shape of the defect and the proximity to free margins an A-T advancement flap was deemed most appropriate.  Using a sterile surgical marker, an appropriate advancement flap was drawn incorporating the defect and placing the expected incisions within the relaxed skin tension lines where possible.    The area thus outlined was incised deep to adipose tissue with a #15 scalpel blade.  The skin margins were undermined to an appropriate distance in all directions utilizing iris scissors.
O-T Advancement Flap Text: The defect edges were debeveled with a #15 scalpel blade.  Given the location of the defect, shape of the defect and the proximity to free margins an O-T advancement flap was deemed most appropriate.  Using a sterile surgical marker, an appropriate advancement flap was drawn incorporating the defect and placing the expected incisions within the relaxed skin tension lines where possible.    The area thus outlined was incised deep to adipose tissue with a #15 scalpel blade.  The skin margins were undermined to an appropriate distance in all directions utilizing iris scissors.
O-L Flap Text: The defect edges were debeveled with a #15 scalpel blade.  Given the location of the defect, shape of the defect and the proximity to free margins an O-L flap was deemed most appropriate.  Using a sterile surgical marker, an appropriate advancement flap was drawn incorporating the defect and placing the expected incisions within the relaxed skin tension lines where possible.    The area thus outlined was incised deep to adipose tissue with a #15 scalpel blade.  The skin margins were undermined to an appropriate distance in all directions utilizing iris scissors.
O-Z Flap Text: The defect edges were debeveled with a #15 scalpel blade.  Given the location of the defect, shape of the defect and the proximity to free margins an O-Z flap was deemed most appropriate.  Using a sterile surgical marker, an appropriate transposition flap was drawn incorporating the defect and placing the expected incisions within the relaxed skin tension lines where possible. The area thus outlined was incised deep to adipose tissue with a #15 scalpel blade.  The skin margins were undermined to an appropriate distance in all directions utilizing iris scissors.
Double O-Z Flap Text: The defect edges were debeveled with a #15 scalpel blade.  Given the location of the defect, shape of the defect and the proximity to free margins a Double O-Z flap was deemed most appropriate.  Using a sterile surgical marker, an appropriate transposition flap was drawn incorporating the defect and placing the expected incisions within the relaxed skin tension lines where possible. The area thus outlined was incised deep to adipose tissue with a #15 scalpel blade.  The skin margins were undermined to an appropriate distance in all directions utilizing iris scissors.
V-Y Flap Text: The defect edges were debeveled with a #15 scalpel blade.  Given the location of the defect, shape of the defect and the proximity to free margins a V-Y flap was deemed most appropriate.  Using a sterile surgical marker, an appropriate advancement flap was drawn incorporating the defect and placing the expected incisions within the relaxed skin tension lines where possible.    The area thus outlined was incised deep to adipose tissue with a #15 scalpel blade.  The skin margins were undermined to an appropriate distance in all directions utilizing iris scissors.
Advancement-Rotation Flap Text: The defect edges were debeveled with a #15 scalpel blade.  Given the location of the defect, shape of the defect and the proximity to free margins an advancement-rotation flap was deemed most appropriate.  Using a sterile surgical marker, an appropriate flap was drawn incorporating the defect and placing the expected incisions within the relaxed skin tension lines where possible. The area thus outlined was incised deep to adipose tissue with a #15 scalpel blade.  The skin margins were undermined to an appropriate distance in all directions utilizing iris scissors.
Mercedes Flap Text: The defect edges were debeveled with a #15 scalpel blade.  Given the location of the defect, shape of the defect and the proximity to free margins a Mercedes flap was deemed most appropriate.  Using a sterile surgical marker, an appropriate advancement flap was drawn incorporating the defect and placing the expected incisions within the relaxed skin tension lines where possible. The area thus outlined was incised deep to adipose tissue with a #15 scalpel blade.  The skin margins were undermined to an appropriate distance in all directions utilizing iris scissors.
Modified Advancement Flap Text: The defect edges were debeveled with a #15 scalpel blade.  Given the location of the defect, shape of the defect and the proximity to free margins a modified advancement flap was deemed most appropriate.  Using a sterile surgical marker, an appropriate advancement flap was drawn incorporating the defect and placing the expected incisions within the relaxed skin tension lines where possible.    The area thus outlined was incised deep to adipose tissue with a #15 scalpel blade.  The skin margins were undermined to an appropriate distance in all directions utilizing iris scissors.
Mucosal Advancement Flap Text: Given the location of the defect, shape of the defect and the proximity to free margins a mucosal advancement flap was deemed most appropriate. Incisions were made with a 15 blade scalpel in the appropriate fashion along the cutaneous vermilion border and the mucosal lip. The remaining actinically damaged mucosal tissue was excised.  The mucosal advancement flap was then elevated to the gingival sulcus with care taken to preserve the neurovascular structures and advanced into the primary defect. Care was taken to ensure that precise realignment of the vermilion border was achieved.
Peng Advancement Flap Text: The defect edges were debeveled with a #15 scalpel blade.  Given the location of the defect, shape of the defect and the proximity to free margins a Peng advancement flap was deemed most appropriate.  Using a sterile surgical marker, an appropriate advancement flap was drawn incorporating the defect and placing the expected incisions within the relaxed skin tension lines where possible. The area thus outlined was incised deep to adipose tissue with a #15 scalpel blade.  The skin margins were undermined to an appropriate distance in all directions utilizing iris scissors.
Hatchet Flap Text: The defect edges were debeveled with a #15 scalpel blade.  Given the location of the defect, shape of the defect and the proximity to free margins a hatchet flap was deemed most appropriate.  Using a sterile surgical marker, an appropriate hatchet flap was drawn incorporating the defect and placing the expected incisions within the relaxed skin tension lines where possible.    The area thus outlined was incised deep to adipose tissue with a #15 scalpel blade.  The skin margins were undermined to an appropriate distance in all directions utilizing iris scissors.
Rotation Flap Text: The defect edges were debeveled with a #15 scalpel blade.  Given the location of the defect, shape of the defect and the proximity to free margins a rotation flap was deemed most appropriate.  Using a sterile surgical marker, an appropriate rotation flap was drawn incorporating the defect and placing the expected incisions within the relaxed skin tension lines where possible.    The area thus outlined was incised deep to adipose tissue with a #15 scalpel blade.  The skin margins were undermined to an appropriate distance in all directions utilizing iris scissors.
Spiral Flap Text: The defect edges were debeveled with a #15 scalpel blade.  Given the location of the defect, shape of the defect and the proximity to free margins a spiral flap was deemed most appropriate.  Using a sterile surgical marker, an appropriate rotation flap was drawn incorporating the defect and placing the expected incisions within the relaxed skin tension lines where possible. The area thus outlined was incised deep to adipose tissue with a #15 scalpel blade.  The skin margins were undermined to an appropriate distance in all directions utilizing iris scissors.
Staged Advancement Flap Text: The defect edges were debeveled with a #15 scalpel blade.  Given the location of the defect, shape of the defect and the proximity to free margins a staged advancement flap was deemed most appropriate.  Using a sterile surgical marker, an appropriate advancement flap was drawn incorporating the defect and placing the expected incisions within the relaxed skin tension lines where possible. The area thus outlined was incised deep to adipose tissue with a #15 scalpel blade.  The skin margins were undermined to an appropriate distance in all directions utilizing iris scissors.
Star Wedge Flap Text: The defect edges were debeveled with a #15 scalpel blade.  Given the location of the defect, shape of the defect and the proximity to free margins a star wedge flap was deemed most appropriate.  Using a sterile surgical marker, an appropriate rotation flap was drawn incorporating the defect and placing the expected incisions within the relaxed skin tension lines where possible. The area thus outlined was incised deep to adipose tissue with a #15 scalpel blade.  The skin margins were undermined to an appropriate distance in all directions utilizing iris scissors.
Transposition Flap Text: The defect edges were debeveled with a #15 scalpel blade.  Given the location of the defect and the proximity to free margins a transposition flap was deemed most appropriate.  Using a sterile surgical marker, an appropriate transposition flap was drawn incorporating the defect.    The area thus outlined was incised deep to adipose tissue with a #15 scalpel blade.  The skin margins were undermined to an appropriate distance in all directions utilizing iris scissors.
Muscle Hinge Flap Text: The defect edges were debeveled with a #15 scalpel blade.  Given the size, depth and location of the defect and the proximity to free margins a muscle hinge flap was deemed most appropriate.  Using a sterile surgical marker, an appropriate hinge flap was drawn incorporating the defect. The area thus outlined was incised with a #15 scalpel blade.  The skin margins were undermined to an appropriate distance in all directions utilizing iris scissors.
Mustarde Flap Text: The defect edges were debeveled with a #15 scalpel blade.  Given the size, depth and location of the defect and the proximity to free margins a Mustarde flap was deemed most appropriate.  Using a sterile surgical marker, an appropriate flap was drawn incorporating the defect. The area thus outlined was incised with a #15 scalpel blade.  The skin margins were undermined to an appropriate distance in all directions utilizing iris scissors.
Nasal Turnover Hinge Flap Text: The defect edges were debeveled with a #15 scalpel blade.  Given the size, depth, location of the defect and the defect being full thickness a nasal turnover hinge flap was deemed most appropriate.  Using a sterile surgical marker, an appropriate hinge flap was drawn incorporating the defect. The area thus outlined was incised with a #15 scalpel blade. The flap was designed to recreate the nasal mucosal lining and the alar rim. The skin margins were undermined to an appropriate distance in all directions utilizing iris scissors.
Nasalis-Muscle-Based Myocutaneous Island Pedicle Flap Text: Using a #15 blade, an incision was made around the donor flap to the level of the nasalis muscle. Wide lateral undermining was then performed in both the subcutaneous plane above the nasalis muscle, and in a submuscular plane just above periosteum. This allowed the formation of a free nasalis muscle axial pedicle (based on the angular artery) which was still attached to the actual cutaneous flap, increasing its mobility and vascular viability. Hemostasis was obtained with pinpoint electrocoagulation. The flap was mobilized into position and the pivotal anchor points positioned and stabilized with buried interrupted sutures. Subcutaneous and dermal tissues were closed in a multilayered fashion with sutures. Tissue redundancies were excised, and the epidermal edges were apposed without significant tension and sutured with sutures.
Orbicularis Oris Muscle Flap Text: The defect edges were debeveled with a #15 scalpel blade.  Given that the defect affected the competency of the oral sphincter an orbicularis oris muscle flap was deemed most appropriate to restore this competency and normal muscle function.  Using a sterile surgical marker, an appropriate flap was drawn incorporating the defect. The area thus outlined was incised with a #15 scalpel blade.
Melolabial Transposition Flap Text: The defect edges were debeveled with a #15 scalpel blade.  Given the location of the defect and the proximity to free margins a melolabial flap was deemed most appropriate.  Using a sterile surgical marker, an appropriate melolabial transposition flap was drawn incorporating the defect.    The area thus outlined was incised deep to adipose tissue with a #15 scalpel blade.  The skin margins were undermined to an appropriate distance in all directions utilizing iris scissors.
Rhombic Flap Text: The defect edges were debeveled with a #15 scalpel blade.  Given the location of the defect and the proximity to free margins a rhombic flap was deemed most appropriate.  Using a sterile surgical marker, an appropriate rhombic flap was drawn incorporating the defect.    The area thus outlined was incised deep to adipose tissue with a #15 scalpel blade.  The skin margins were undermined to an appropriate distance in all directions utilizing iris scissors.
Rhomboid Transposition Flap Text: The defect edges were debeveled with a #15 scalpel blade.  Given the location of the defect and the proximity to free margins a rhomboid transposition flap was deemed most appropriate.  Using a sterile surgical marker, an appropriate rhomboid flap was drawn incorporating the defect.    The area thus outlined was incised deep to adipose tissue with a #15 scalpel blade.  The skin margins were undermined to an appropriate distance in all directions utilizing iris scissors.
Bi-Rhombic Flap Text: The defect edges were debeveled with a #15 scalpel blade.  Given the location of the defect and the proximity to free margins a bi-rhombic flap was deemed most appropriate.  Using a sterile surgical marker, an appropriate rhombic flap was drawn incorporating the defect. The area thus outlined was incised deep to adipose tissue with a #15 scalpel blade.  The skin margins were undermined to an appropriate distance in all directions utilizing iris scissors.
Helical Rim Advancement Flap Text: The defect edges were debeveled with a #15 blade scalpel.  Given the location of the defect and the proximity to free margins (helical rim) a double helical rim advancement flap was deemed most appropriate.  Using a sterile surgical marker, the appropriate advancement flaps were drawn incorporating the defect and placing the expected incisions between the helical rim and antihelix where possible.  The area thus outlined was incised through and through with a #15 scalpel blade.  With a skin hook and iris scissors, the flaps were gently and sharply undermined and freed up.
Bilateral Helical Rim Advancement Flap Text: The defect edges were debeveled with a #15 blade scalpel.  Given the location of the defect and the proximity to free margins (helical rim) a bilateral helical rim advancement flap was deemed most appropriate.  Using a sterile surgical marker, the appropriate advancement flaps were drawn incorporating the defect and placing the expected incisions between the helical rim and antihelix where possible.  The area thus outlined was incised through and through with a #15 scalpel blade.  With a skin hook and iris scissors, the flaps were gently and sharply undermined and freed up.
Ear Star Wedge Flap Text: The defect edges were debeveled with a #15 blade scalpel.  Given the location of the defect and the proximity to free margins (helical rim) an ear star wedge flap was deemed most appropriate.  Using a sterile surgical marker, the appropriate flap was drawn incorporating the defect and placing the expected incisions between the helical rim and antihelix where possible.  The area thus outlined was incised through and through with a #15 scalpel blade.
Banner Transposition Flap Text: The defect edges were debeveled with a #15 scalpel blade.  Given the location of the defect and the proximity to free margins a Banner transposition flap was deemed most appropriate.  Using a sterile surgical marker, an appropriate flap drawn around the defect. The area thus outlined was incised deep to adipose tissue with a #15 scalpel blade.  The skin margins were undermined to an appropriate distance in all directions utilizing iris scissors.
Bilobed Flap Text: The defect edges were debeveled with a #15 scalpel blade.  Given the location of the defect and the proximity to free margins a bilobe flap was deemed most appropriate.  Using a sterile surgical marker, an appropriate bilobe flap drawn around the defect.    The area thus outlined was incised deep to adipose tissue with a #15 scalpel blade.  The skin margins were undermined to an appropriate distance in all directions utilizing iris scissors.
Bilobed Transposition Flap Text: The defect edges were debeveled with a #15 scalpel blade.  Given the location of the defect and the proximity to free margins a bilobed transposition flap was deemed most appropriate.  Using a sterile surgical marker, an appropriate bilobe flap drawn around the defect.    The area thus outlined was incised deep to adipose tissue with a #15 scalpel blade.  The skin margins were undermined to an appropriate distance in all directions utilizing iris scissors.
Trilobed Flap Text: The defect edges were debeveled with a #15 scalpel blade.  Given the location of the defect and the proximity to free margins a trilobed flap was deemed most appropriate.  Using a sterile surgical marker, an appropriate trilobed flap drawn around the defect.    The area thus outlined was incised deep to adipose tissue with a #15 scalpel blade.  The skin margins were undermined to an appropriate distance in all directions utilizing iris scissors.
Dorsal Nasal Flap Text: The defect edges were debeveled with a #15 scalpel blade.  Given the location of the defect and the proximity to free margins a dorsal nasal flap was deemed most appropriate.  Using a sterile surgical marker, an appropriate dorsal nasal flap was drawn around the defect.    The area thus outlined was incised deep to adipose tissue with a #15 scalpel blade.  The skin margins were undermined to an appropriate distance in all directions utilizing iris scissors.
Island Pedicle Flap Text: The defect edges were debeveled with a #15 scalpel blade.  Given the location of the defect, shape of the defect and the proximity to free margins an island pedicle advancement flap was deemed most appropriate.  Using a sterile surgical marker, an appropriate advancement flap was drawn incorporating the defect, outlining the appropriate donor tissue and placing the expected incisions within the relaxed skin tension lines where possible.    The area thus outlined was incised deep to adipose tissue with a #15 scalpel blade.  The skin margins were undermined to an appropriate distance in all directions around the primary defect and laterally outward around the island pedicle utilizing iris scissors.  There was minimal undermining beneath the pedicle flap.
Island Pedicle Flap With Canthal Suspension Text: The defect edges were debeveled with a #15 scalpel blade.  Given the location of the defect, shape of the defect and the proximity to free margins an island pedicle advancement flap was deemed most appropriate.  Using a sterile surgical marker, an appropriate advancement flap was drawn incorporating the defect, outlining the appropriate donor tissue and placing the expected incisions within the relaxed skin tension lines where possible. The area thus outlined was incised deep to adipose tissue with a #15 scalpel blade.  The skin margins were undermined to an appropriate distance in all directions around the primary defect and laterally outward around the island pedicle utilizing iris scissors.  There was minimal undermining beneath the pedicle flap. A suspension suture was placed in the canthal tendon to prevent tension and prevent ectropion.
Alar Island Pedicle Flap Text: The defect edges were debeveled with a #15 scalpel blade.  Given the location of the defect, shape of the defect and the proximity to the alar rim an island pedicle advancement flap was deemed most appropriate.  Using a sterile surgical marker, an appropriate advancement flap was drawn incorporating the defect, outlining the appropriate donor tissue and placing the expected incisions within the nasal ala running parallel to the alar rim. The area thus outlined was incised with a #15 scalpel blade.  The skin margins were undermined minimally to an appropriate distance in all directions around the primary defect and laterally outward around the island pedicle utilizing iris scissors.  There was minimal undermining beneath the pedicle flap.
Double Island Pedicle Flap Text: The defect edges were debeveled with a #15 scalpel blade.  Given the location of the defect, shape of the defect and the proximity to free margins a double island pedicle advancement flap was deemed most appropriate.  Using a sterile surgical marker, an appropriate advancement flap was drawn incorporating the defect, outlining the appropriate donor tissue and placing the expected incisions within the relaxed skin tension lines where possible.    The area thus outlined was incised deep to adipose tissue with a #15 scalpel blade.  The skin margins were undermined to an appropriate distance in all directions around the primary defect and laterally outward around the island pedicle utilizing iris scissors.  There was minimal undermining beneath the pedicle flap.
Island Pedicle Flap-Requiring Vessel Identification Text: The defect edges were debeveled with a #15 scalpel blade.  Given the location of the defect, shape of the defect and the proximity to free margins an island pedicle advancement flap was deemed most appropriate.  Using a sterile surgical marker, an appropriate advancement flap was drawn, based on the axial vessel mentioned above, incorporating the defect, outlining the appropriate donor tissue and placing the expected incisions within the relaxed skin tension lines where possible.    The area thus outlined was incised deep to adipose tissue with a #15 scalpel blade.  The skin margins were undermined to an appropriate distance in all directions around the primary defect and laterally outward around the island pedicle utilizing iris scissors.  There was minimal undermining beneath the pedicle flap.
Keystone Flap Text: The defect edges were debeveled with a #15 scalpel blade.  Given the location of the defect, shape of the defect a keystone flap was deemed most appropriate.  Using a sterile surgical marker, an appropriate keystone flap was drawn incorporating the defect, outlining the appropriate donor tissue and placing the expected incisions within the relaxed skin tension lines where possible. The area thus outlined was incised deep to adipose tissue with a #15 scalpel blade.  The skin margins were undermined to an appropriate distance in all directions around the primary defect and laterally outward around the flap utilizing iris scissors.
O-T Plasty Text: The defect edges were debeveled with a #15 scalpel blade.  Given the location of the defect, shape of the defect and the proximity to free margins an O-T plasty was deemed most appropriate.  Using a sterile surgical marker, an appropriate O-T plasty was drawn incorporating the defect and placing the expected incisions within the relaxed skin tension lines where possible.    The area thus outlined was incised deep to adipose tissue with a #15 scalpel blade.  The skin margins were undermined to an appropriate distance in all directions utilizing iris scissors.
O-Z Plasty Text: The defect edges were debeveled with a #15 scalpel blade.  Given the location of the defect, shape of the defect and the proximity to free margins an O-Z plasty (double transposition flap) was deemed most appropriate.  Using a sterile surgical marker, the appropriate transposition flaps were drawn incorporating the defect and placing the expected incisions within the relaxed skin tension lines where possible.    The area thus outlined was incised deep to adipose tissue with a #15 scalpel blade.  The skin margins were undermined to an appropriate distance in all directions utilizing iris scissors.  Hemostasis was achieved with electrocautery.  The flaps were then transposed into place, one clockwise and the other counterclockwise, and anchored with interrupted buried subcutaneous sutures.
Double O-Z Plasty Text: The defect edges were debeveled with a #15 scalpel blade.  Given the location of the defect, shape of the defect and the proximity to free margins a Double O-Z plasty (double transposition flap) was deemed most appropriate.  Using a sterile surgical marker, the appropriate transposition flaps were drawn incorporating the defect and placing the expected incisions within the relaxed skin tension lines where possible. The area thus outlined was incised deep to adipose tissue with a #15 scalpel blade.  The skin margins were undermined to an appropriate distance in all directions utilizing iris scissors.  Hemostasis was achieved with electrocautery.  The flaps were then transposed into place, one clockwise and the other counterclockwise, and anchored with interrupted buried subcutaneous sutures.
V-Y Plasty Text: The defect edges were debeveled with a #15 scalpel blade.  Given the location of the defect, shape of the defect and the proximity to free margins an V-Y advancement flap was deemed most appropriate.  Using a sterile surgical marker, an appropriate advancement flap was drawn incorporating the defect and placing the expected incisions within the relaxed skin tension lines where possible.    The area thus outlined was incised deep to adipose tissue with a #15 scalpel blade.  The skin margins were undermined to an appropriate distance in all directions utilizing iris scissors.
H Plasty Text: Given the location of the defect, shape of the defect and the proximity to free margins a H-plasty was deemed most appropriate for repair.  Using a sterile surgical marker, the appropriate advancement arms of the H-plasty were drawn incorporating the defect and placing the expected incisions within the relaxed skin tension lines where possible. The area thus outlined was incised deep to adipose tissue with a #15 scalpel blade. The skin margins were undermined to an appropriate distance in all directions utilizing iris scissors.  The opposing advancement arms were then advanced into place in opposite direction and anchored with interrupted buried subcutaneous sutures.
W Plasty Text: The lesion was extirpated to the level of the fat with a #15 scalpel blade.  Given the location of the defect, shape of the defect and the proximity to free margins a W-plasty was deemed most appropriate for repair.  Using a sterile surgical marker, the appropriate transposition arms of the W-plasty were drawn incorporating the defect and placing the expected incisions within the relaxed skin tension lines where possible.    The area thus outlined was incised deep to adipose tissue with a #15 scalpel blade.  The skin margins were undermined to an appropriate distance in all directions utilizing iris scissors.  The opposing transposition arms were then transposed into place in opposite direction and anchored with interrupted buried subcutaneous sutures.
Z Plasty Text: The lesion was extirpated to the level of the fat with a #15 scalpel blade.  Given the location of the defect, shape of the defect and the proximity to free margins a Z-plasty was deemed most appropriate for repair.  Using a sterile surgical marker, the appropriate transposition arms of the Z-plasty were drawn incorporating the defect and placing the expected incisions within the relaxed skin tension lines where possible.    The area thus outlined was incised deep to adipose tissue with a #15 scalpel blade.  The skin margins were undermined to an appropriate distance in all directions utilizing iris scissors.  The opposing transposition arms were then transposed into place in opposite direction and anchored with interrupted buried subcutaneous sutures.
Zygomaticofacial Flap Text: Given the location of the defect, shape of the defect and the proximity to free margins a zygomaticofacial flap was deemed most appropriate for repair.  Using a sterile surgical marker, the appropriate flap was drawn incorporating the defect and placing the expected incisions within the relaxed skin tension lines where possible. The area thus outlined was incised deep to adipose tissue with a #15 scalpel blade with preservation of a vascular pedicle.  The skin margins were undermined to an appropriate distance in all directions utilizing iris scissors.  The flap was then placed into the defect and anchored with interrupted buried subcutaneous sutures.
Cheek Interpolation Flap Text: A decision was made to reconstruct the defect utilizing an interpolation axial flap and a staged reconstruction.  A telfa template was made of the defect.  This telfa template was then used to outline the Cheek Interpolation flap.  The donor area for the pedicle flap was then injected with anesthesia.  The flap was excised through the skin and subcutaneous tissue down to the layer of the underlying musculature.  The interpolation flap was carefully excised within this deep plane to maintain its blood supply.  The edges of the donor site were undermined.   The donor site was closed in a primary fashion.  The pedicle was then rotated into position and sutured.  Once the tube was sutured into place, adequate blood supply was confirmed with blanching and refill.  The pedicle was then wrapped with xeroform gauze and dressed appropriately with a telfa and gauze bandage to ensure continued blood supply and protect the attached pedicle.
Cheek-To-Nose Interpolation Flap Text: A decision was made to reconstruct the defect utilizing an interpolation axial flap and a staged reconstruction.  A telfa template was made of the defect.  This telfa template was then used to outline the Cheek-To-Nose Interpolation flap.  The donor area for the pedicle flap was then injected with anesthesia.  The flap was excised through the skin and subcutaneous tissue down to the layer of the underlying musculature.  The interpolation flap was carefully excised within this deep plane to maintain its blood supply.  The edges of the donor site were undermined.   The donor site was closed in a primary fashion.  The pedicle was then rotated into position and sutured.  Once the tube was sutured into place, adequate blood supply was confirmed with blanching and refill.  The pedicle was then wrapped with xeroform gauze and dressed appropriately with a telfa and gauze bandage to ensure continued blood supply and protect the attached pedicle.
Interpolation Flap Text: A decision was made to reconstruct the defect utilizing an interpolation axial flap and a staged reconstruction.  A telfa template was made of the defect.  This telfa template was then used to outline the interpolation flap.  The donor area for the pedicle flap was then injected with anesthesia.  The flap was excised through the skin and subcutaneous tissue down to the layer of the underlying musculature.  The interpolation flap was carefully excised within this deep plane to maintain its blood supply.  The edges of the donor site were undermined.   The donor site was closed in a primary fashion.  The pedicle was then rotated into position and sutured.  Once the tube was sutured into place, adequate blood supply was confirmed with blanching and refill.  The pedicle was then wrapped with xeroform gauze and dressed appropriately with a telfa and gauze bandage to ensure continued blood supply and protect the attached pedicle.
Melolabial Interpolation Flap Text: A decision was made to reconstruct the defect utilizing an interpolation axial flap and a staged reconstruction.  A telfa template was made of the defect.  This telfa template was then used to outline the melolabial interpolation flap.  The donor area for the pedicle flap was then injected with anesthesia.  The flap was excised through the skin and subcutaneous tissue down to the layer of the underlying musculature.  The pedicle flap was carefully excised within this deep plane to maintain its blood supply.  The edges of the donor site were undermined.   The donor site was closed in a primary fashion.  The pedicle was then rotated into position and sutured.  Once the tube was sutured into place, adequate blood supply was confirmed with blanching and refill.  The pedicle was then wrapped with xeroform gauze and dressed appropriately with a telfa and gauze bandage to ensure continued blood supply and protect the attached pedicle.
Mastoid Interpolation Flap Text: A decision was made to reconstruct the defect utilizing an interpolation axial flap and a staged reconstruction.  A telfa template was made of the defect.  This telfa template was then used to outline the mastoid interpolation flap.  The donor area for the pedicle flap was then injected with anesthesia.  The flap was excised through the skin and subcutaneous tissue down to the layer of the underlying musculature.  The pedicle flap was carefully excised within this deep plane to maintain its blood supply.  The edges of the donor site were undermined.   The donor site was closed in a primary fashion.  The pedicle was then rotated into position and sutured.  Once the tube was sutured into place, adequate blood supply was confirmed with blanching and refill.  The pedicle was then wrapped with xeroform gauze and dressed appropriately with a telfa and gauze bandage to ensure continued blood supply and protect the attached pedicle.
Posterior Auricular Interpolation Flap Text: A decision was made to reconstruct the defect utilizing an interpolation axial flap and a staged reconstruction.  A telfa template was made of the defect.  This telfa template was then used to outline the posterior auricular interpolation flap.  The donor area for the pedicle flap was then injected with anesthesia.  The flap was excised through the skin and subcutaneous tissue down to the layer of the underlying musculature.  The pedicle flap was carefully excised within this deep plane to maintain its blood supply.  The edges of the donor site were undermined.   The donor site was closed in a primary fashion.  The pedicle was then rotated into position and sutured.  Once the tube was sutured into place, adequate blood supply was confirmed with blanching and refill.  The pedicle was then wrapped with xeroform gauze and dressed appropriately with a telfa and gauze bandage to ensure continued blood supply and protect the attached pedicle.
Paramedian Forehead Flap Text: A decision was made to reconstruct the defect utilizing an interpolation axial flap and a staged reconstruction.  A telfa template was made of the defect.  This telfa template was then used to outline the paramedian forehead pedicle flap.  The donor area for the pedicle flap was then injected with anesthesia.  The flap was excised through the skin and subcutaneous tissue down to the layer of the underlying musculature.  The pedicle flap was carefully excised within this deep plane to maintain its blood supply.  The edges of the donor site were undermined.   The donor site was closed in a primary fashion.  The pedicle was then rotated into position and sutured.  Once the tube was sutured into place, adequate blood supply was confirmed with blanching and refill.  The pedicle was then wrapped with xeroform gauze and dressed appropriately with a telfa and gauze bandage to ensure continued blood supply and protect the attached pedicle.
Cheiloplasty (Less Than 50%) Text: A decision was made to reconstruct the defect with a  cheiloplasty.  The defect was undermined extensively.  Additional obicularis oris muscle was excised with a 15 blade scalpel.  The defect was converted into a full thickness wedge, of less than 50% of the vertical height of the lip, to facilite a better cosmetic result.  Small vessels were then tied off with 5-0 monocyrl. The obicularis oris, superficial fascia, adipose and dermis were then reapproximated.  After the deeper layers were approximated the epidermis was reapproximated with particular care given to realign the vermilion border.
Cheiloplasty (Complex) Text: A decision was made to reconstruct the defect with a  cheiloplasty.  The defect was undermined extensively.  Additional obicularis oris muscle was excised with a 15 blade scalpel.  The defect was converted into a full thickness wedge to facilite a better cosmetic result.  Small vessels were then tied off with 5-0 monocyrl. The obicularis oris, superficial fascia, adipose and dermis were then reapproximated.  After the deeper layers were approximated the epidermis was reapproximated with particular care given to realign the vermilion border.
Ear Wedge Repair Text: A wedge excision was completed by carrying down an excision through the full thickness of the ear and cartilage with an inward facing Burow's triangle. The wound was then closed in a layered fashion.
Full Thickness Lip Wedge Repair (Flap) Text: Given the location of the defect and the proximity to free margins a full thickness wedge repair was deemed most appropriate.  Using a sterile surgical marker, the appropriate repair was drawn incorporating the defect and placing the expected incisions perpendicular to the vermilion border.  The vermilion border was also meticulously outlined to ensure appropriate reapproximation during the repair.  The area thus outlined was incised through and through with a #15 scalpel blade.  The muscularis and dermis were reaproximated with deep sutures following hemostasis. Care was taken to realign the vermilion border before proceeding with the superficial closure.  Once the vermilion was realigned the superfical and mucosal closure was finished.
Ftsg Text: The defect edges were debeveled with a #15 scalpel blade.  Given the location of the defect, shape of the defect and the proximity to free margins a full thickness skin graft was deemed most appropriate.  Using a sterile surgical marker, the primary defect shape was transferred to the donor site. The area thus outlined was incised deep to adipose tissue with a #15 scalpel blade.  The harvested graft was then trimmed of adipose tissue until only dermis and epidermis was left.  The skin margins of the secondary defect were undermined to an appropriate distance in all directions utilizing iris scissors.  The secondary defect was closed with interrupted buried subcutaneous sutures.  The skin edges were then re-apposed with running  sutures.  The skin graft was then placed in the primary defect and oriented appropriately.
Split-Thickness Skin Graft Text: The defect edges were debeveled with a #15 scalpel blade.  Given the location of the defect, shape of the defect and the proximity to free margins a split thickness skin graft was deemed most appropriate.  Using a sterile surgical marker, the primary defect shape was transferred to the donor site. The split thickness graft was then harvested.  The skin graft was then placed in the primary defect and oriented appropriately.
Burow's Graft Text: The defect edges were debeveled with a #15 scalpel blade.  Given the location of the defect, shape of the defect, the proximity to free margins and the presence of a standing cone deformity a Burow's skin graft was deemed most appropriate. The standing cone was removed and this tissue was then trimmed to the shape of the primary defect. The adipose tissue was also removed until only dermis and epidermis were left.  The skin margins of the secondary defect were undermined to an appropriate distance in all directions utilizing iris scissors.  The secondary defect was closed with interrupted buried subcutaneous sutures.  The skin edges were then re-apposed with running  sutures.  The skin graft was then placed in the primary defect and oriented appropriately.
Cartilage Graft Text: The defect edges were debeveled with a #15 scalpel blade.  Given the location of the defect, shape of the defect, the fact the defect involved a full thickness cartilage defect a cartilage graft was deemed most appropriate.  An appropriate donor site was identified, cleansed, and anesthetized. The cartilage graft was then harvested and transferred to the recipient site, oriented appropriately and then sutured into place.  The secondary defect was then repaired using a primary closure.
Composite Graft Text: The defect edges were debeveled with a #15 scalpel blade.  Given the location of the defect, shape of the defect, the proximity to free margins and the fact the defect was full thickness a composite graft was deemed most appropriate.  The defect was outline and then transferred to the donor site.  A full thickness graft was then excised from the donor site. The graft was then placed in the primary defect, oriented appropriately and then sutured into place.  The secondary defect was then repaired using a primary closure.
Epidermal Autograft Text: The defect edges were debeveled with a #15 scalpel blade.  Given the location of the defect, shape of the defect and the proximity to free margins an epidermal autograft was deemed most appropriate.  Using a sterile surgical marker, the primary defect shape was transferred to the donor site. The epidermal graft was then harvested.  The skin graft was then placed in the primary defect and oriented appropriately.
Dermal Autograft Text: The defect edges were debeveled with a #15 scalpel blade.  Given the location of the defect, shape of the defect and the proximity to free margins a dermal autograft was deemed most appropriate.  Using a sterile surgical marker, the primary defect shape was transferred to the donor site. The area thus outlined was incised deep to adipose tissue with a #15 scalpel blade.  The harvested graft was then trimmed of adipose and epidermal tissue until only dermis was left.  The skin graft was then placed in the primary defect and oriented appropriately.
Skin Substitute Text: The defect edges were debeveled with a #15 scalpel blade.  Given the location of the defect, shape of the defect and the proximity to free margins a skin substitute graft was deemed most appropriate.  The graft material was trimmed to fit the size of the defect. The graft was then placed in the primary defect and oriented appropriately.
Tissue Cultured Epidermal Autograft Text: The defect edges were debeveled with a #15 scalpel blade.  Given the location of the defect, shape of the defect and the proximity to free margins a tissue cultured epidermal autograft was deemed most appropriate.  The graft was then trimmed to fit the size of the defect.  The graft was then placed in the primary defect and oriented appropriately.
Xenograft Text: The defect edges were debeveled with a #15 scalpel blade.  Given the location of the defect, shape of the defect and the proximity to free margins a xenograft was deemed most appropriate.  The graft was then trimmed to fit the size of the defect.  The graft was then placed in the primary defect and oriented appropriately.
Purse String (Simple) Text: Given the location of the defect and the characteristics of the surrounding skin a purse string closure was deemed most appropriate.  Undermining was performed circumfirentially around the surgical defect.  A purse string suture was then placed and tightened.
Purse String (Intermediate) Text: Given the location of the defect and the characteristics of the surrounding skin a purse string intermediate closure was deemed most appropriate.  Undermining was performed circumfirentially around the surgical defect.  A purse string suture was then placed and tightened.
Partial Purse String (Simple) Text: Given the location of the defect and the characteristics of the surrounding skin a simple purse string closure was deemed most appropriate.  Undermining was performed circumfirentially around the surgical defect.  A purse string suture was then placed and tightened. Wound tension only allowed a partial closure of the circular defect.
Partial Purse String (Intermediate) Text: Given the location of the defect and the characteristics of the surrounding skin an intermediate purse string closure was deemed most appropriate.  Undermining was performed circumfirentially around the surgical defect.  A purse string suture was then placed and tightened. Wound tension only allowed a partial closure of the circular defect.
Localized Dermabrasion With Wire Brush Text: The patient was draped in routine manner.  Localized dermabrasion using 3 x 17 mm wire brush was performed in routine manner to papillary dermis. This spot dermabrasion is being performed to complete skin cancer reconstruction. It also will eliminate the other sun damaged precancerous cells that are known to be part of the regional effect of a lifetime's worth of sun exposure. This localized dermabrasion is therapeutic and should not be considered cosmetic in any regard.
Tarsorrhaphy Text: A tarsorrhaphy was performed using Frost sutures.
Complex Repair And Flap Additional Text (Will Appearing After The Standard Complex Repair Text): The complex repair was not sufficient to completely close the primary defect. The remaining additional defect was repaired with the flap mentioned below.
Complex Repair And Graft Additional Text (Will Appearing After The Standard Complex Repair Text): The complex repair was not sufficient to completely close the primary defect. The remaining additional defect was repaired with the graft mentioned below.
Manual Repair Warning Statement: We plan on removing the manually selected variable below in favor of our much easier automatic structured text blocks found in the previous tab. We decided to do this to help make the flow better and give you the full power of structured data. Manual selection is never going to be ideal in our platform and I would encourage you to avoid using manual selection from this point on, especially since I will be sunsetting this feature. It is important that you do one of two things with the customized text below. First, you can save all of the text in a word file so you can have it for future reference. Second, transfer the text to the appropriate area in the Library tab. Lastly, if there is a flap or graft type which we do not have you need to let us know right away so I can add it in before the variable is hidden. No need to panic, we plan to give you roughly 6 months to make the change.
Same Histology In Subsequent Stages Text: The pattern and morphology of the tumor is as described in the first stage.
No Residual Tumor Seen Histology Text: There were no malignant cells seen in the sections examined.
Inflammation Suggestive Of Cancer Camouflage Histology Text: There was a dense lymphocytic infiltrate which prevented adequate histologic evaluation of adjacent structures.
Bcc Histology Text: There were numerous aggregates of basaloid cells.
Bcc Infiltrative Histology Text: There were numerous aggregates of basaloid cells demonstrating an infiltrative pattern.
Mart-1 - Positive Histology Text: MART-1 staining demonstrates areas of higher density and clustering of melanocytes with Pagetoid spread upwards within the epidermis. The surgical margins are positive for tumor cells.
Mart-1 - Negative Histology Text: MART-1 staining demonstrates a normal density and pattern of melanocytes along the dermal-epidermal junction. The surgical margins are negative for tumor cells.
Information: Selecting Yes will display possible errors in your note based on the variables you have selected. This validation is only offered as a suggestion for you. PLEASE NOTE THAT THE VALIDATION TEXT WILL BE REMOVED WHEN YOU FINALIZE YOUR NOTE. IF YOU WANT TO FAX A PRELIMINARY NOTE YOU WILL NEED TO TOGGLE THIS TO 'NO' IF YOU DO NOT WANT IT IN YOUR FAXED NOTE.
Repair Anesthesia Type: 1% lidocaine with 1:100,000 epinephrine and a 1:10 solution of 8.4% sodium bicarbonate
Width Of Defect Perpendicular To Closure In Cm (Required): 1.5
Epidermal Closure: running

## 2021-09-09 ENCOUNTER — PRE-ADMISSION TESTING (OUTPATIENT)
Dept: ADMISSIONS | Facility: MEDICAL CENTER | Age: 81
End: 2021-09-09
Attending: OTOLARYNGOLOGY
Payer: MEDICARE

## 2021-09-09 DIAGNOSIS — Z01.812 PRE-OPERATIVE LABORATORY EXAMINATION: ICD-10-CM

## 2021-09-09 DIAGNOSIS — Z01.810 PRE-OPERATIVE CARDIOVASCULAR EXAMINATION: ICD-10-CM

## 2021-09-09 LAB
ABO GROUP BLD: NORMAL
ANION GAP SERPL CALC-SCNC: 11 MMOL/L (ref 7–16)
BLD GP AB SCN SERPL QL: NORMAL
BUN SERPL-MCNC: 20 MG/DL (ref 8–22)
CALCIUM SERPL-MCNC: 10 MG/DL (ref 8.5–10.5)
CHLORIDE SERPL-SCNC: 101 MMOL/L (ref 96–112)
CO2 SERPL-SCNC: 27 MMOL/L (ref 20–33)
CREAT SERPL-MCNC: 1.46 MG/DL (ref 0.5–1.4)
EKG IMPRESSION: NORMAL
ERYTHROCYTE [DISTWIDTH] IN BLOOD BY AUTOMATED COUNT: 44.9 FL (ref 35.9–50)
GLUCOSE SERPL-MCNC: 110 MG/DL (ref 65–99)
HCT VFR BLD AUTO: 43.4 % (ref 42–52)
HGB BLD-MCNC: 14.3 G/DL (ref 14–18)
MCH RBC QN AUTO: 29.9 PG (ref 27–33)
MCHC RBC AUTO-ENTMCNC: 32.9 G/DL (ref 33.7–35.3)
MCV RBC AUTO: 90.8 FL (ref 81.4–97.8)
PLATELET # BLD AUTO: 88 K/UL (ref 164–446)
PMV BLD AUTO: 11.3 FL (ref 9–12.9)
POTASSIUM SERPL-SCNC: 4.7 MMOL/L (ref 3.6–5.5)
RBC # BLD AUTO: 4.78 M/UL (ref 4.7–6.1)
RH BLD: NORMAL
SODIUM SERPL-SCNC: 139 MMOL/L (ref 135–145)
WBC # BLD AUTO: 2.3 K/UL (ref 4.8–10.8)

## 2021-09-09 PROCEDURE — 86901 BLOOD TYPING SEROLOGIC RH(D): CPT

## 2021-09-09 PROCEDURE — 80048 BASIC METABOLIC PNL TOTAL CA: CPT

## 2021-09-09 PROCEDURE — C9803 HOPD COVID-19 SPEC COLLECT: HCPCS

## 2021-09-09 PROCEDURE — 86900 BLOOD TYPING SEROLOGIC ABO: CPT

## 2021-09-09 PROCEDURE — U0005 INFEC AGEN DETEC AMPLI PROBE: HCPCS

## 2021-09-09 PROCEDURE — 86850 RBC ANTIBODY SCREEN: CPT

## 2021-09-09 PROCEDURE — 93005 ELECTROCARDIOGRAM TRACING: CPT

## 2021-09-09 PROCEDURE — 85027 COMPLETE CBC AUTOMATED: CPT

## 2021-09-09 PROCEDURE — 36415 COLL VENOUS BLD VENIPUNCTURE: CPT

## 2021-09-09 PROCEDURE — 93010 ELECTROCARDIOGRAM REPORT: CPT | Performed by: INTERNAL MEDICINE

## 2021-09-09 PROCEDURE — U0003 INFECTIOUS AGENT DETECTION BY NUCLEIC ACID (DNA OR RNA); SEVERE ACUTE RESPIRATORY SYNDROME CORONAVIRUS 2 (SARS-COV-2) (CORONAVIRUS DISEASE [COVID-19]), AMPLIFIED PROBE TECHNIQUE, MAKING USE OF HIGH THROUGHPUT TECHNOLOGIES AS DESCRIBED BY CMS-2020-01-R: HCPCS

## 2021-09-09 ASSESSMENT — FIBROSIS 4 INDEX: FIB4 SCORE: 5.25

## 2021-09-10 ENCOUNTER — TELEPHONE (OUTPATIENT)
Dept: SCHEDULING | Facility: IMAGING CENTER | Age: 81
End: 2021-09-10

## 2021-09-10 LAB
SARS-COV-2 RNA RESP QL NAA+PROBE: NOTDETECTED
SPECIMEN SOURCE: NORMAL

## 2021-09-10 NOTE — TELEPHONE ENCOUNTER
SHERINE      Pt called concerned about the ECG  that was performed by pre admit and wanted to discuss them    Thank you,   Wilder BOOTH

## 2021-09-12 ENCOUNTER — ANESTHESIA EVENT (OUTPATIENT)
Dept: SURGERY | Facility: MEDICAL CENTER | Age: 81
End: 2021-09-12
Payer: MEDICARE

## 2021-09-13 ENCOUNTER — HOSPITAL ENCOUNTER (OUTPATIENT)
Facility: MEDICAL CENTER | Age: 81
End: 2021-09-14
Attending: OTOLARYNGOLOGY | Admitting: OTOLARYNGOLOGY
Payer: MEDICARE

## 2021-09-13 ENCOUNTER — ANESTHESIA (OUTPATIENT)
Dept: SURGERY | Facility: MEDICAL CENTER | Age: 81
End: 2021-09-13
Payer: MEDICARE

## 2021-09-13 LAB — PATHOLOGY CONSULT NOTE: NORMAL

## 2021-09-13 PROCEDURE — 700105 HCHG RX REV CODE 258: Performed by: OTOLARYNGOLOGY

## 2021-09-13 PROCEDURE — A9270 NON-COVERED ITEM OR SERVICE: HCPCS | Performed by: OTOLARYNGOLOGY

## 2021-09-13 PROCEDURE — 501838 HCHG SUTURE GENERAL: Performed by: OTOLARYNGOLOGY

## 2021-09-13 PROCEDURE — 700102 HCHG RX REV CODE 250 W/ 637 OVERRIDE(OP): Performed by: ANESTHESIOLOGY

## 2021-09-13 PROCEDURE — 700111 HCHG RX REV CODE 636 W/ 250 OVERRIDE (IP): Performed by: ANESTHESIOLOGY

## 2021-09-13 PROCEDURE — 700101 HCHG RX REV CODE 250: Performed by: OTOLARYNGOLOGY

## 2021-09-13 PROCEDURE — G0378 HOSPITAL OBSERVATION PER HR: HCPCS

## 2021-09-13 PROCEDURE — 700102 HCHG RX REV CODE 250 W/ 637 OVERRIDE(OP): Performed by: OTOLARYNGOLOGY

## 2021-09-13 PROCEDURE — 500754 HCHG JAW BRA: Performed by: OTOLARYNGOLOGY

## 2021-09-13 PROCEDURE — 160048 HCHG OR STATISTICAL LEVEL 1-5: Performed by: OTOLARYNGOLOGY

## 2021-09-13 PROCEDURE — 160031 HCHG SURGERY MINUTES - 1ST 30 MINS LEVEL 5: Performed by: OTOLARYNGOLOGY

## 2021-09-13 PROCEDURE — A9270 NON-COVERED ITEM OR SERVICE: HCPCS | Performed by: ANESTHESIOLOGY

## 2021-09-13 PROCEDURE — 700105 HCHG RX REV CODE 258: Performed by: ANESTHESIOLOGY

## 2021-09-13 PROCEDURE — 160036 HCHG PACU - EA ADDL 30 MINS PHASE I: Performed by: OTOLARYNGOLOGY

## 2021-09-13 PROCEDURE — 500380 HCHG DRAIN, PENROSE 1/4X12: Performed by: OTOLARYNGOLOGY

## 2021-09-13 PROCEDURE — 88307 TISSUE EXAM BY PATHOLOGIST: CPT

## 2021-09-13 PROCEDURE — 160009 HCHG ANES TIME/MIN: Performed by: OTOLARYNGOLOGY

## 2021-09-13 PROCEDURE — 88305 TISSUE EXAM BY PATHOLOGIST: CPT

## 2021-09-13 PROCEDURE — 502573 HCHG PACK, ENT: Performed by: OTOLARYNGOLOGY

## 2021-09-13 PROCEDURE — 502594 HCHG SCISSOR HANDLE, HARMONIC ACE: Performed by: OTOLARYNGOLOGY

## 2021-09-13 PROCEDURE — 500371 HCHG DRAIN, BLAKE 10MM: Performed by: OTOLARYNGOLOGY

## 2021-09-13 PROCEDURE — 160002 HCHG RECOVERY MINUTES (STAT): Performed by: OTOLARYNGOLOGY

## 2021-09-13 PROCEDURE — 700101 HCHG RX REV CODE 250: Performed by: ANESTHESIOLOGY

## 2021-09-13 PROCEDURE — 160035 HCHG PACU - 1ST 60 MINS PHASE I: Performed by: OTOLARYNGOLOGY

## 2021-09-13 PROCEDURE — C1767 GENERATOR, NEURO NON-RECHARG: HCPCS | Performed by: OTOLARYNGOLOGY

## 2021-09-13 PROCEDURE — 160042 HCHG SURGERY MINUTES - EA ADDL 1 MIN LEVEL 5: Performed by: OTOLARYNGOLOGY

## 2021-09-13 RX ORDER — BACITRACIN ZINC 500 [USP'U]/G
OINTMENT TOPICAL
Status: DISCONTINUED | OUTPATIENT
Start: 2021-09-13 | End: 2021-09-13 | Stop reason: HOSPADM

## 2021-09-13 RX ORDER — SODIUM CHLORIDE 9 MG/ML
500 INJECTION, SOLUTION INTRAVENOUS
Status: DISCONTINUED | OUTPATIENT
Start: 2021-09-13 | End: 2021-09-13 | Stop reason: HOSPADM

## 2021-09-13 RX ORDER — DEXMEDETOMIDINE HYDROCHLORIDE 100 UG/ML
INJECTION, SOLUTION INTRAVENOUS PRN
Status: DISCONTINUED | OUTPATIENT
Start: 2021-09-13 | End: 2021-09-13 | Stop reason: SURG

## 2021-09-13 RX ORDER — EPINEPHRINE 1 MG/ML(1)
AMPUL (ML) INJECTION
Status: COMPLETED
Start: 2021-09-13 | End: 2021-09-13

## 2021-09-13 RX ORDER — LIDOCAINE HYDROCHLORIDE 20 MG/ML
INJECTION, SOLUTION EPIDURAL; INFILTRATION; INTRACAUDAL; PERINEURAL PRN
Status: DISCONTINUED | OUTPATIENT
Start: 2021-09-13 | End: 2021-09-13 | Stop reason: SURG

## 2021-09-13 RX ORDER — OMEPRAZOLE 20 MG/1
40 CAPSULE, DELAYED RELEASE ORAL DAILY
Status: DISCONTINUED | OUTPATIENT
Start: 2021-09-14 | End: 2021-09-14 | Stop reason: HOSPADM

## 2021-09-13 RX ORDER — ACETAMINOPHEN 325 MG/1
650 TABLET ORAL EVERY 6 HOURS
Status: DISCONTINUED | OUTPATIENT
Start: 2021-09-13 | End: 2021-09-14 | Stop reason: HOSPADM

## 2021-09-13 RX ORDER — SODIUM CHLORIDE 9 MG/ML
INJECTION, SOLUTION INTRAVENOUS
Status: DISCONTINUED | OUTPATIENT
Start: 2021-09-13 | End: 2021-09-13 | Stop reason: SURG

## 2021-09-13 RX ORDER — HYDROMORPHONE HYDROCHLORIDE 2 MG/ML
INJECTION, SOLUTION INTRAMUSCULAR; INTRAVENOUS; SUBCUTANEOUS PRN
Status: DISCONTINUED | OUTPATIENT
Start: 2021-09-13 | End: 2021-09-13 | Stop reason: SURG

## 2021-09-13 RX ORDER — DEXAMETHASONE SODIUM PHOSPHATE 4 MG/ML
INJECTION, SOLUTION INTRA-ARTICULAR; INTRALESIONAL; INTRAMUSCULAR; INTRAVENOUS; SOFT TISSUE PRN
Status: DISCONTINUED | OUTPATIENT
Start: 2021-09-13 | End: 2021-09-13 | Stop reason: SURG

## 2021-09-13 RX ORDER — DIPHENHYDRAMINE HYDROCHLORIDE 50 MG/ML
12.5 INJECTION INTRAMUSCULAR; INTRAVENOUS
Status: DISCONTINUED | OUTPATIENT
Start: 2021-09-13 | End: 2021-09-13 | Stop reason: HOSPADM

## 2021-09-13 RX ORDER — HYDROMORPHONE HYDROCHLORIDE 1 MG/ML
0.2 INJECTION, SOLUTION INTRAMUSCULAR; INTRAVENOUS; SUBCUTANEOUS
Status: DISCONTINUED | OUTPATIENT
Start: 2021-09-13 | End: 2021-09-13 | Stop reason: HOSPADM

## 2021-09-13 RX ORDER — BACITRACIN ZINC AND POLYMYXIN B SULFATE 500; 1000 [USP'U]/G; [USP'U]/G
OINTMENT TOPICAL
Status: COMPLETED
Start: 2021-09-13 | End: 2021-09-13

## 2021-09-13 RX ORDER — TAMSULOSIN HYDROCHLORIDE 0.4 MG/1
0.4 CAPSULE ORAL EVERY MORNING
Status: DISCONTINUED | OUTPATIENT
Start: 2021-09-14 | End: 2021-09-14 | Stop reason: HOSPADM

## 2021-09-13 RX ORDER — PROMETHAZINE HYDROCHLORIDE 25 MG/1
25 TABLET ORAL EVERY 6 HOURS PRN
Status: DISCONTINUED | OUTPATIENT
Start: 2021-09-13 | End: 2021-09-14 | Stop reason: HOSPADM

## 2021-09-13 RX ORDER — ONDANSETRON 2 MG/ML
4 INJECTION INTRAMUSCULAR; INTRAVENOUS
Status: COMPLETED | OUTPATIENT
Start: 2021-09-13 | End: 2021-09-13

## 2021-09-13 RX ORDER — MECLIZINE HYDROCHLORIDE 25 MG/1
25 TABLET ORAL 3 TIMES DAILY PRN
Status: DISCONTINUED | OUTPATIENT
Start: 2021-09-13 | End: 2021-09-14 | Stop reason: HOSPADM

## 2021-09-13 RX ORDER — REMIFENTANIL HYDROCHLORIDE 1 MG/ML
INJECTION, POWDER, LYOPHILIZED, FOR SOLUTION INTRAVENOUS
Status: COMPLETED
Start: 2021-09-13 | End: 2021-09-13

## 2021-09-13 RX ORDER — PHENYLEPHRINE HCL IN 0.9% NACL 0.5 MG/5ML
SYRINGE (ML) INTRAVENOUS PRN
Status: DISCONTINUED | OUTPATIENT
Start: 2021-09-13 | End: 2021-09-13 | Stop reason: SURG

## 2021-09-13 RX ORDER — ONDANSETRON 2 MG/ML
4 INJECTION INTRAMUSCULAR; INTRAVENOUS EVERY 4 HOURS PRN
Status: DISCONTINUED | OUTPATIENT
Start: 2021-09-13 | End: 2021-09-14 | Stop reason: HOSPADM

## 2021-09-13 RX ORDER — HYDROMORPHONE HYDROCHLORIDE 1 MG/ML
0.1 INJECTION, SOLUTION INTRAMUSCULAR; INTRAVENOUS; SUBCUTANEOUS
Status: DISCONTINUED | OUTPATIENT
Start: 2021-09-13 | End: 2021-09-13 | Stop reason: HOSPADM

## 2021-09-13 RX ORDER — LACTULOSE 10 G/15ML
30 SOLUTION ORAL 3 TIMES DAILY PRN
Status: DISCONTINUED | OUTPATIENT
Start: 2021-09-13 | End: 2021-09-13

## 2021-09-13 RX ORDER — LIDOCAINE HYDROCHLORIDE AND EPINEPHRINE 10; 10 MG/ML; UG/ML
INJECTION, SOLUTION INFILTRATION; PERINEURAL
Status: DISCONTINUED | OUTPATIENT
Start: 2021-09-13 | End: 2021-09-13 | Stop reason: HOSPADM

## 2021-09-13 RX ORDER — ONDANSETRON 2 MG/ML
INJECTION INTRAMUSCULAR; INTRAVENOUS PRN
Status: DISCONTINUED | OUTPATIENT
Start: 2021-09-13 | End: 2021-09-13 | Stop reason: SURG

## 2021-09-13 RX ORDER — SPIRONOLACTONE 50 MG/1
50 TABLET, FILM COATED ORAL DAILY
Status: DISCONTINUED | OUTPATIENT
Start: 2021-09-14 | End: 2021-09-14 | Stop reason: HOSPADM

## 2021-09-13 RX ORDER — OXYCODONE HCL 5 MG/5 ML
10 SOLUTION, ORAL ORAL
Status: COMPLETED | OUTPATIENT
Start: 2021-09-13 | End: 2021-09-13

## 2021-09-13 RX ORDER — METOPROLOL TARTRATE 1 MG/ML
1 INJECTION, SOLUTION INTRAVENOUS
Status: DISCONTINUED | OUTPATIENT
Start: 2021-09-13 | End: 2021-09-13 | Stop reason: HOSPADM

## 2021-09-13 RX ORDER — LIDOCAINE HYDROCHLORIDE 10 MG/ML
INJECTION, SOLUTION EPIDURAL; INFILTRATION; INTRACAUDAL; PERINEURAL
Status: COMPLETED
Start: 2021-09-13 | End: 2021-09-13

## 2021-09-13 RX ORDER — BACITRACIN ZINC 500 [USP'U]/G
OINTMENT TOPICAL
Status: COMPLETED
Start: 2021-09-13 | End: 2021-09-13

## 2021-09-13 RX ORDER — DIPHENHYDRAMINE HCL 25 MG
50 TABLET ORAL NIGHTLY PRN
Status: DISCONTINUED | OUTPATIENT
Start: 2021-09-13 | End: 2021-09-14 | Stop reason: HOSPADM

## 2021-09-13 RX ORDER — CEFAZOLIN SODIUM 1 G/3ML
INJECTION, POWDER, FOR SOLUTION INTRAMUSCULAR; INTRAVENOUS PRN
Status: DISCONTINUED | OUTPATIENT
Start: 2021-09-13 | End: 2021-09-13 | Stop reason: SURG

## 2021-09-13 RX ORDER — LACTULOSE 20 G/30ML
30 SOLUTION ORAL 3 TIMES DAILY PRN
Status: DISCONTINUED | OUTPATIENT
Start: 2021-09-13 | End: 2021-09-14 | Stop reason: HOSPADM

## 2021-09-13 RX ORDER — OXYCODONE HCL 5 MG/5 ML
5 SOLUTION, ORAL ORAL
Status: COMPLETED | OUTPATIENT
Start: 2021-09-13 | End: 2021-09-13

## 2021-09-13 RX ORDER — SODIUM CHLORIDE, SODIUM LACTATE, POTASSIUM CHLORIDE, CALCIUM CHLORIDE 600; 310; 30; 20 MG/100ML; MG/100ML; MG/100ML; MG/100ML
INJECTION, SOLUTION INTRAVENOUS CONTINUOUS
Status: DISCONTINUED | OUTPATIENT
Start: 2021-09-13 | End: 2021-09-13

## 2021-09-13 RX ORDER — HYDROMORPHONE HYDROCHLORIDE 1 MG/ML
0.4 INJECTION, SOLUTION INTRAMUSCULAR; INTRAVENOUS; SUBCUTANEOUS
Status: DISCONTINUED | OUTPATIENT
Start: 2021-09-13 | End: 2021-09-13 | Stop reason: HOSPADM

## 2021-09-13 RX ORDER — SODIUM CHLORIDE 9 MG/ML
INJECTION, SOLUTION INTRAVENOUS CONTINUOUS
Status: ACTIVE | OUTPATIENT
Start: 2021-09-13 | End: 2021-09-14

## 2021-09-13 RX ORDER — SUCCINYLCHOLINE CHLORIDE 20 MG/ML
INJECTION INTRAMUSCULAR; INTRAVENOUS PRN
Status: DISCONTINUED | OUTPATIENT
Start: 2021-09-13 | End: 2021-09-13 | Stop reason: SURG

## 2021-09-13 RX ORDER — ACETAMINOPHEN 500 MG
500-1000 TABLET ORAL EVERY 6 HOURS PRN
Status: DISCONTINUED | OUTPATIENT
Start: 2021-09-13 | End: 2021-09-13

## 2021-09-13 RX ORDER — HYDRALAZINE HYDROCHLORIDE 20 MG/ML
5 INJECTION INTRAMUSCULAR; INTRAVENOUS
Status: DISCONTINUED | OUTPATIENT
Start: 2021-09-13 | End: 2021-09-13 | Stop reason: HOSPADM

## 2021-09-13 RX ORDER — SODIUM CHLORIDE 9 MG/ML
INJECTION, SOLUTION INTRAVENOUS CONTINUOUS
Status: DISCONTINUED | OUTPATIENT
Start: 2021-09-13 | End: 2021-09-13 | Stop reason: HOSPADM

## 2021-09-13 RX ORDER — HALOPERIDOL 5 MG/ML
1 INJECTION INTRAMUSCULAR
Status: DISCONTINUED | OUTPATIENT
Start: 2021-09-13 | End: 2021-09-13 | Stop reason: HOSPADM

## 2021-09-13 RX ORDER — ACETAMINOPHEN 325 MG/1
650 TABLET ORAL EVERY 6 HOURS PRN
Status: DISCONTINUED | OUTPATIENT
Start: 2021-09-18 | End: 2021-09-14 | Stop reason: HOSPADM

## 2021-09-13 RX ORDER — LABETALOL HYDROCHLORIDE 5 MG/ML
5 INJECTION, SOLUTION INTRAVENOUS
Status: DISCONTINUED | OUTPATIENT
Start: 2021-09-13 | End: 2021-09-13 | Stop reason: HOSPADM

## 2021-09-13 RX ORDER — OXYCODONE HYDROCHLORIDE 10 MG/1
5-10 TABLET ORAL EVERY 6 HOURS PRN
Status: DISCONTINUED | OUTPATIENT
Start: 2021-09-13 | End: 2021-09-14 | Stop reason: HOSPADM

## 2021-09-13 RX ORDER — ROPINIROLE 0.5 MG/1
0.5 TABLET, FILM COATED ORAL
Status: DISCONTINUED | OUTPATIENT
Start: 2021-09-13 | End: 2021-09-14 | Stop reason: HOSPADM

## 2021-09-13 RX ADMIN — SODIUM CHLORIDE: 9 INJECTION, SOLUTION INTRAVENOUS at 08:51

## 2021-09-13 RX ADMIN — FENTANYL CITRATE 50 MCG: 50 INJECTION, SOLUTION INTRAMUSCULAR; INTRAVENOUS at 12:32

## 2021-09-13 RX ADMIN — ROPINIROLE HYDROCHLORIDE 0.5 MG: 0.5 TABLET, FILM COATED ORAL at 21:00

## 2021-09-13 RX ADMIN — OXYCODONE HYDROCHLORIDE 10 MG: 5 SOLUTION ORAL at 12:53

## 2021-09-13 RX ADMIN — SODIUM CHLORIDE, POTASSIUM CHLORIDE, SODIUM LACTATE AND CALCIUM CHLORIDE: 600; 310; 30; 20 INJECTION, SOLUTION INTRAVENOUS at 07:55

## 2021-09-13 RX ADMIN — SODIUM CHLORIDE: 9 INJECTION, SOLUTION INTRAVENOUS at 12:34

## 2021-09-13 RX ADMIN — SUCCINYLCHOLINE CHLORIDE 80 MG: 20 INJECTION, SOLUTION INTRAMUSCULAR; INTRAVENOUS; PARENTERAL at 09:33

## 2021-09-13 RX ADMIN — DEXAMETHASONE SODIUM PHOSPHATE 4 MG: 4 INJECTION, SOLUTION INTRA-ARTICULAR; INTRALESIONAL; INTRAMUSCULAR; INTRAVENOUS; SOFT TISSUE at 09:23

## 2021-09-13 RX ADMIN — CEFAZOLIN 2 G: 330 INJECTION, POWDER, FOR SOLUTION INTRAMUSCULAR; INTRAVENOUS at 09:23

## 2021-09-13 RX ADMIN — DEXMEDETOMIDINE 8 MCG: 200 INJECTION, SOLUTION INTRAVENOUS at 12:16

## 2021-09-13 RX ADMIN — ONDANSETRON 4 MG: 2 INJECTION INTRAMUSCULAR; INTRAVENOUS at 11:21

## 2021-09-13 RX ADMIN — ACETAMINOPHEN 650 MG: 325 TABLET, FILM COATED ORAL at 21:00

## 2021-09-13 RX ADMIN — PHENYLEPHRINE HYDROCHLORIDE 0.5 MCG/KG/MIN: 10 INJECTION INTRAVENOUS at 09:12

## 2021-09-13 RX ADMIN — HYDROMORPHONE HYDROCHLORIDE 0.2 MG: 2 INJECTION, SOLUTION INTRAMUSCULAR; INTRAVENOUS; SUBCUTANEOUS at 10:17

## 2021-09-13 RX ADMIN — OXYCODONE HYDROCHLORIDE 10 MG: 10 TABLET ORAL at 21:00

## 2021-09-13 RX ADMIN — FENTANYL CITRATE 50 MCG: 50 INJECTION, SOLUTION INTRAMUSCULAR; INTRAVENOUS at 12:46

## 2021-09-13 RX ADMIN — Medication 100 MCG: at 09:12

## 2021-09-13 RX ADMIN — FENTANYL CITRATE 50 MCG: 50 INJECTION, SOLUTION INTRAMUSCULAR; INTRAVENOUS at 14:56

## 2021-09-13 RX ADMIN — LIDOCAINE HYDROCHLORIDE 50 MG: 20 INJECTION, SOLUTION EPIDURAL; INFILTRATION; INTRACAUDAL at 09:12

## 2021-09-13 RX ADMIN — REMIFENTANIL HYDROCHLORIDE 0.25 MCG/KG/MIN: 1 INJECTION, POWDER, LYOPHILIZED, FOR SOLUTION INTRAVENOUS at 09:15

## 2021-09-13 RX ADMIN — FENTANYL CITRATE 50 MCG: 50 INJECTION, SOLUTION INTRAMUSCULAR; INTRAVENOUS at 14:15

## 2021-09-13 RX ADMIN — HYDROMORPHONE HYDROCHLORIDE 0.4 MG: 2 INJECTION, SOLUTION INTRAMUSCULAR; INTRAVENOUS; SUBCUTANEOUS at 09:12

## 2021-09-13 RX ADMIN — PROPOFOL 100 MG: 10 INJECTION, EMULSION INTRAVENOUS at 09:12

## 2021-09-13 RX ADMIN — ONDANSETRON 4 MG: 2 INJECTION INTRAMUSCULAR; INTRAVENOUS at 12:44

## 2021-09-13 RX ADMIN — PROPOFOL 100 MG: 10 INJECTION, EMULSION INTRAVENOUS at 11:50

## 2021-09-13 ASSESSMENT — COGNITIVE AND FUNCTIONAL STATUS - GENERAL
CLIMB 3 TO 5 STEPS WITH RAILING: A LITTLE
SUGGESTED CMS G CODE MODIFIER DAILY ACTIVITY: CH
STANDING UP FROM CHAIR USING ARMS: A LITTLE
WALKING IN HOSPITAL ROOM: A LITTLE
SUGGESTED CMS G CODE MODIFIER MOBILITY: CJ
MOBILITY SCORE: 20
MOVING FROM LYING ON BACK TO SITTING ON SIDE OF FLAT BED: A LITTLE
DAILY ACTIVITIY SCORE: 24

## 2021-09-13 ASSESSMENT — LIFESTYLE VARIABLES
TOTAL SCORE: 0
AVERAGE NUMBER OF DAYS PER WEEK YOU HAVE A DRINK CONTAINING ALCOHOL: 0
TOTAL SCORE: 0
DOES PATIENT WANT TO STOP DRINKING: NO
TOTAL SCORE: 0
EVER FELT BAD OR GUILTY ABOUT YOUR DRINKING: NO
ALCOHOL_USE: NO
ON A TYPICAL DAY WHEN YOU DRINK ALCOHOL HOW MANY DRINKS DO YOU HAVE: 0
HAVE PEOPLE ANNOYED YOU BY CRITICIZING YOUR DRINKING: NO
CONSUMPTION TOTAL: NEGATIVE
EVER HAD A DRINK FIRST THING IN THE MORNING TO STEADY YOUR NERVES TO GET RID OF A HANGOVER: NO
HOW MANY TIMES IN THE PAST YEAR HAVE YOU HAD 5 OR MORE DRINKS IN A DAY: 0
HAVE YOU EVER FELT YOU SHOULD CUT DOWN ON YOUR DRINKING: NO

## 2021-09-13 ASSESSMENT — PATIENT HEALTH QUESTIONNAIRE - PHQ9
1. LITTLE INTEREST OR PLEASURE IN DOING THINGS: NOT AT ALL
SUM OF ALL RESPONSES TO PHQ9 QUESTIONS 1 AND 2: 0
2. FEELING DOWN, DEPRESSED, IRRITABLE, OR HOPELESS: NOT AT ALL

## 2021-09-13 ASSESSMENT — PAIN DESCRIPTION - PAIN TYPE
TYPE: SURGICAL PAIN
TYPE: SURGICAL PAIN;ACUTE PAIN
TYPE: SURGICAL PAIN

## 2021-09-13 ASSESSMENT — PAIN SCALES - GENERAL: PAIN_LEVEL: 0

## 2021-09-13 ASSESSMENT — FIBROSIS 4 INDEX: FIB4 SCORE: 6.56

## 2021-09-13 NOTE — OR NURSING
1215: Patient from OR awake/confused via gurney to PACU s/p L neck mass excision. 6 L via mask. L facial dressing intact. Jaw bra to L cheek  in place. VANIA drain to L lower neck and penrose drain (sutered) to L cheek in place.  Report from the anesthesiologist and RN received.    1230: Patient c/o pain and will medicate.  Band aid to forehead in place from OR.      1245: Patient tolerating ice chips. Oxy 10 mg PO given. Zofran 4 mg IV given for nausea.     1300: L facial dressing changed. Ice pack to L side of jaw bra. Patient is more awake and reporting sore throat.     1315: Dr Saha at bedside for post op assessment. Patient voided using a urinal.     1320: Patient eating ice cream. Pain tolerable at this time and nausea resolved.     1415: Patient reporting L sided headache and will medicate.     1445: Wife Shanon updated.     1458: Medicated for headache.     0525: VSS. Patient sleeping.     0556: Report to Nick OROZCO. VSS. Patient is awake.

## 2021-09-13 NOTE — TELEPHONE ENCOUNTER
"Called pt [phone]  Spoke to pt wife Shanon. Pt still in hospital. Advised Shanon that Dr Walters states his ECG \"looks ok to me\". Advised Shanon to keep FV with EA on 9/29 and if there are any other questions concerning ECG, concerns can be addressed at that time.  Shanon verbalized understanding.   "

## 2021-09-13 NOTE — ANESTHESIA PREPROCEDURE EVALUATION
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)       Physical Exam    Airway   Mallampati: II  TM distance: >3 FB  Neck ROM: full       Cardiovascular - normal exam  Rhythm: regular  Rate: normal  (-) murmur     Dental - normal exam           Pulmonary - normal exam  Breath sounds clear to auscultation     Abdominal   (+) obese    Comments: ascites   Neurological - normal exam                 Anesthesia Plan    ASA 3   ASA physical status 3 criteria: active hepatitis, alcohol and/or substance dependence or abuse and implanted pacemaker    Plan - general       Airway plan will be ETT    (81 yo male with hepatitis, portal hypertension, ascites, lymphopenia, thrombocytopenia, ESLD, sick sinus syndrome s/p pacemaker, merkel cell carcinoma of face, hx lymphoma - here for neck mass excision and flap under GETA)    Plan Factors:   Patient was not previously instructed to abstain from smoking on day of procedure.      Induction: intravenous    Postoperative Plan: Postoperative administration of opioids is intended.    Pertinent diagnostic labs and testing reviewed    Informed Consent:    Anesthetic plan and risks discussed with patient.    Use of blood products discussed with: patient whom consented to blood products.

## 2021-09-13 NOTE — ANESTHESIA PROCEDURE NOTES
Airway    Date/Time: 9/13/2021 9:15 AM  Performed by: Glory Santos M.D.  Authorized by: Glory Santos M.D.     Location:  OR  Urgency:  Elective  Indications for Airway Management:  Anesthesia      Spontaneous Ventilation: absent    Sedation Level:  Deep  Preoxygenated: Yes    Patient Position:  Sniffing  Mask Difficulty Assessment:  1 - vent by mask  Final Airway Type:  Endotracheal airway  Final Endotracheal Airway:  ETT  Cuffed: Yes    Technique Used for Successful ETT Placement:  Direct laryngoscopy    Insertion Site:  Oral  Blade Type:  Glide  Laryngoscope Blade/Videolaryngoscope Blade Size:  3  ETT Size (mm):  8.0  Measured from:  Teeth  ETT to Teeth (cm):  24  Placement Verified by: auscultation and capnometry    Cormack-Lehane Classification:  Grade IIa - partial view of glottis  Number of Attempts at Approach:  1

## 2021-09-13 NOTE — OR NURSING
1625 - Report received from PAM Brown.   Patient stood and voided at bedside, 350mL output documented.   Linens changed. Awaiting bed placement, patient wife, Wendy updated     1843 - Dinner provided to patient     1948 - Report given to PAM Crews on GSU     2004 - Patient being transported to room, T428-1 w/ all belongings and chart

## 2021-09-13 NOTE — ANESTHESIA POSTPROCEDURE EVALUATION
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 Responsible Provider: Glory Santos M.D.    Anesthesia Type: general ASA Status: 3          Final Anesthesia Type: general  Last vitals  BP   Blood Pressure : 141/71    Temp   37.7 °C (99.8 °F)    Pulse   80   Resp   18    SpO2   93 %      Anesthesia Post Evaluation    Patient location during evaluation: PACU  Patient participation: complete - patient participated  Level of consciousness: awake and alert  Pain score: 0    Airway patency: patent  Anesthetic complications: no  Cardiovascular status: hemodynamically stable  Respiratory status: acceptable  Hydration status: euvolemic    PONV: none          No complications documented.     Nurse Pain Score: 0 (NPRS)

## 2021-09-13 NOTE — ANESTHESIA TIME REPORT
Anesthesia Start and Stop Event Times     Date Time Event    9/13/2021 0900 Ready for Procedure     0906 Anesthesia Start     1219 Anesthesia Stop        Responsible Staff  09/13/21    Name Role Begin End    Glory Santos M.D. 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

## 2021-09-14 VITALS
DIASTOLIC BLOOD PRESSURE: 71 MMHG | BODY MASS INDEX: 29.56 KG/M2 | SYSTOLIC BLOOD PRESSURE: 150 MMHG | HEIGHT: 72 IN | TEMPERATURE: 98.2 F | RESPIRATION RATE: 17 BRPM | WEIGHT: 218.26 LBS | OXYGEN SATURATION: 94 % | HEART RATE: 81 BPM

## 2021-09-14 PROCEDURE — A9270 NON-COVERED ITEM OR SERVICE: HCPCS | Performed by: OTOLARYNGOLOGY

## 2021-09-14 PROCEDURE — G0378 HOSPITAL OBSERVATION PER HR: HCPCS

## 2021-09-14 PROCEDURE — 700102 HCHG RX REV CODE 250 W/ 637 OVERRIDE(OP): Performed by: OTOLARYNGOLOGY

## 2021-09-14 RX ADMIN — OXYCODONE HYDROCHLORIDE 10 MG: 10 TABLET ORAL at 03:20

## 2021-09-14 RX ADMIN — ACETAMINOPHEN 650 MG: 325 TABLET, FILM COATED ORAL at 03:19

## 2021-09-14 RX ADMIN — OXYCODONE HYDROCHLORIDE 10 MG: 10 TABLET ORAL at 09:11

## 2021-09-14 RX ADMIN — ACETAMINOPHEN 650 MG: 325 TABLET, FILM COATED ORAL at 09:11

## 2021-09-14 RX ADMIN — TAMSULOSIN HYDROCHLORIDE 0.4 MG: 0.4 CAPSULE ORAL at 06:00

## 2021-09-14 ASSESSMENT — PAIN DESCRIPTION - PAIN TYPE: TYPE: CHRONIC PAIN;SURGICAL PAIN

## 2021-09-14 NOTE — PROGRESS NOTES
Arrive to dc lounge via wheelchair. A/O. DC instructions reviewed w/ pt. Verbalized understanding. Waiting for wife for ride home.

## 2021-09-14 NOTE — PROGRESS NOTES
Patient doing well this am, surgery pain controlled on PO medications chronic pain at baseline. Tolerating regular diet no nausea no vomiting. VANIA sero-sang output patient will be discharging home with drain. Meets criteria for discharge after 0900 medication, discharge lounge order placed. /71   Pulse 81   Temp 36.8 °C (98.2 °F) (Temporal)   Resp 17   Ht 1.829 m (6')   Wt 99 kg (218 lb 4.1 oz)   SpO2 94%   BMI 29.60 kg/m²

## 2021-09-14 NOTE — DISCHARGE INSTRUCTIONS
Discharge Instructions    Discharged to home by car with relative. Discharged via wheelchair, hospital escort: Yes.  Special equipment needed: Not Applicable    Be sure to schedule a follow-up appointment with your primary care doctor or any specialists as instructed.     Discharge Plan:   Diet Plan: Discussed  Activity Level: Discussed  Confirmed Follow up Appointment: Appointment Scheduled  Confirmed Symptoms Management: Discussed  Medication Reconciliation Updated: Yes    I understand that a diet low in cholesterol, fat, and sodium is recommended for good health. Unless I have been given specific instructions below for another diet, I accept this instruction as my diet prescription.   Other diet:     Special Instructions: None    · Is patient discharged on Warfarin / Coumadin?   No     Depression / Suicide Risk    As you are discharged from this [facility] facility, it is important to learn how to keep safe from harming yourself.    Recognize the warning signs:  · Abrupt changes in personality, positive or negative- including increase in energy   · Giving away possessions  · Change in eating patterns- significant weight changes-  positive or negative  · Change in sleeping patterns- unable to sleep or sleeping all the time   · Unwillingness or inability to communicate  · Depression  · Unusual sadness, discouragement and loneliness  · Talk of wanting to die  · Neglect of personal appearance   · Rebelliousness- reckless behavior  · Withdrawal from people/activities they love  · Confusion- inability to concentrate     If you or a loved one observes any of these behaviors or has concerns about self-harm, here's what you can do:  · Talk about it- your feelings and reasons for harming yourself  · Remove any means that you might use to hurt yourself (examples: pills, rope, extension cords, firearm)  · Get professional help from the community (Mental Health, Substance Abuse, psychological counseling)  · Do not be  alone:Call your Safe Contact- someone whom you trust who will be there for you.  · Call your local CRISIS HOTLINE 916-4493 or 729-259-9341  · Call your local Children's Mobile Crisis Response Team [place] (519) 497-8571 or www.[employer]  · Call the toll free National Suicide Prevention Hotlines   · National Suicide Prevention Lifeline 265-327-AMOY (6947)  · National Hope Line Network 800-SUICIDE (411-7599)      Discharge Instructions: Caring for Your Clarence-Dacosta Drainage Tube  Your healthcare provider discharged you with a Clarence-Dacosta drainage tube. They commonly leave this drain within the abdomen and other cavities after surgery. It helps drain and collect blood and body fluid after surgery. This can prevent swelling and reduces the risk for infection. The tube is held in place by a few stitches. It's covered with a bandage. Your healthcare provider will remove the drain when he or she determines you no longer need it.  Home care  · Don’t sleep on the same side as the tube.  · Secure the tube and bag inside your clothing with a safety pin. This helps keep the tube from being pulled out.  · Empty your drain at least twice a day. Empty it more often if the drain is full. Wash  and dry your hands before emptying the drain.  ? Lift the opening on the drain.  ? Drain the fluid into a measuring cup.  ? Record the amount of fluid each time you empty the drain. Include the date and time it was emptied. Share this information with your healthcare provider on your next visit.  ? Squeeze the bulb with your hands until you hear air coming out of the bulb if your healthcare provider has instructed you to do so (sometimes the bulb is used as a reservoir without suction). Check with your healthcare provider about specific drain instructions.  ? Close the opening.  · If you are to change the dressing around the tube, follow the directions your provider has given you. Some dressings may not need to be changed, but  your provider will let you know. Here are some general steps to follow:  ? Wash your hands.  ? Remove the old bandage.  ? Wash your hands again.  ? Clean the skin around the incision and tube site as instructed.  ? Put a new bandage on the incision and tube site. Make the bandage large enough to cover the whole incision area.  ? Tape the bandage in place.  · Talk with your healthcare provider about showering with the drain. You may need to keep the bandage and tube site dry when you shower. Ask your healthcare team about the best way to do this.  · “Stripping” the tube helps keep blood clots from blocking the tube. Ask your healthcare team how often you should strip the tube. Stripping may not be needed, depending on where and why your healthcare provider placed the tube. It may even be dangerous in some cases.   ? Hold the tubing where it leaves the skin, with one hand. This keeps it from pulling on the skin.  ? Pinch the tubing with the thumb and first finger of your other hand.  ? Slowly and firmly pull your thumb and first finger down the tubing. You may find it helpful to hold an alcohol swab between your fingers and the tube to lubricate the tubing.  ? If the pulling hurts or feels like the tube is coming out of the skin, stop. Begin again more gently.    Follow-up care  Make a follow-up appointment as directed by our staff.  When to call your healthcare provider  Call your healthcare provider right away if you have any of the following:  · New or increased pain around the tube  · Redness, swelling, or warmth around the incision or tube  · Drainage that is foul-smelling  · Vomiting  · Fever of 100.4°F ( 38°C) or higher, or as directed by your provider  · Fluid leaking around the tube  · Incision seems not to be healing  · Stitches become loose  · Tube falls out or breaks  · Drainage that changes from light pink to dark red  · Blood clots in the drainage bulb  · A sudden increase or decrease in the amount of  "drainage (over 30 mL)    © 8959-1505 [employer]. All rights reserved. This information is not intended as a substitute for professional medical care. Always follow your healthcare professional's instructions.            YOB: 1940   Age: 80 y.o.               Admit Date: 9/13/2021     Discharge Date: 9/14/2021  Attending Doctor:  Chaitanya Saha M.D.                  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 Difficile   • Jaundice    • Melena    • Melena    • Merkel cell carcinoma of face (HCC)    • Osteoarthritis    • Pacemaker 2007    SSS   • Pain 09/09/2021    L shoulder, 5/10   • Pneumonia 1972   • Polycythemia    • Presence of permanent cardiac pacemaker 2007/2015    Dr. Chaitanya Walters   • Prostate cancer (HCC)    • Prostate cancer (HCC) 2000        • Renal disorder      Insufficiency....med related?; pt denies 02/19   • Seizure (HCC)     \"with Dobutamine Echo\"   • Thrombocytopenia (HCC)      Past Surgical History:   Procedure Laterality Date   • IL EXC SKIN MALIG >4CM REMAINDR BODY Left 9/13/2021    Procedure: EXCISION, MASS- LEFT FACIAL LESION;  Surgeon: Chaitanya Saha M.D.;  Location: SURGERY SAME DAY [facility];  Service: Ent   • NECK DISSECTION RADICAL Left 9/13/2021    " Procedure: DISSECTION, NECK, RADICAL - MODIFIED;  Surgeon: Chaitanya Saha M.D.;  Location: SURGERY SAME DAY [facility];  Service: Ent   • FLAP FREE Left 9/13/2021    Procedure: ADJACENT TISSUE TRANSFER CLOSURE;  Surgeon: Chaitanya Saha M.D.;  Location: SURGERY SAME DAY [facility];  Service: Ent   • RECOVERY  3/18/2015    Performed by [facility] at SURGERY PRE-POST PROC UNIT RM   • PACEMAKER INSERTION  March 2015    Generator replacement with  Medtronic Adapta ADDRL1 implanted by Dr. Chaitanya Walters. Original device implanted in 2007.   • PENILE PROSTHESIS AMS INFLATABLE  10/13/2014    Performed by Sedrick Pittman M.D. at SURGERY [facility]   • SHOULDER ARTHROPLASTY TOTAL Left 2010   • PACEMAKER INSERTION  2007        • OTHER      Endoscopy every 3-6 months to track esophageal varices       Discharge Instructions  Blood Pressure : 150/71  Weight: 99 kg (218 lb 4.1 oz)    Discharged to home by car with relative. Discharged via wheelchair, hospital escort: Yes.    Special equipment needed: Not Applicable    Belongings with: Personal    Instructions:    Care for VANIA drain as instructed by nursing and educational handout.     Be sure to schedule a follow-up appointment with your primary care doctor or any specialists as instructed.     Discharge Plan:    Diet as tolerated, no strenuous activity, please do not shower.  Follow up in office tomorrow, 9/15, at 11:30 am for drain removal    DIET:  You may eat any foods that you can tolerate.  It is a good idea to eat a high fiber diet and take in plenty of fluids to prevent constipation.  If you do become constipated you may want to take a mild laxative or take ducolax tablets on a daily basis until your bowel habits are regular.  Constipation can be very uncomfortable, along with straining, after recent surgery.    I understand that a diet low in cholesterol, fat, and sodium is recommended for good health. Unless I have been given specific instructions below for  another diet, I accept this instruction as my diet prescription.       Discharge Medication Instructions:    Below are the medications your physician expects you to take upon discharge:    Review all your home medications and newly ordered medications with your doctor and/or pharmacist. Follow medication instructions as directed by your doctor and/or pharmacist.  GENERAL POST-OPERATIVE  PATIENT INSTRUCTIONS      FOLLOW-UP:  Please call your physician/ return to ER if you have any fevers greater than 100.4, drainage from your wound that is not clear or looks infected, persistent bleeding, increasing abdominal pain, problems urinating, or persistent nausea/vomiting.      WOUND CARE INSTRUCTIONS:  Keep a dry clean dressing on the wound if there is drainage. The initial bandage may be removed after 24 hours.  Once the wound has quit draining you may leave it open to air.  If clothing rubs against the wound or causes irritation and the wound is not draining you may cover it with a dry dressing during the daytime.  Try to keep the wound dry and avoid ointments on the wound unless directed to do so.  If the wound becomes bright red and painful or starts to drain infected material that is not clear, please contact your physician immediately.  If the wound is mildly pink and has a thick firm ridge underneath it, this is normal, and is referred to as a healing ridge.  This will resolve over the next 4-6 weeks. No baths, hot tubs, swimming, no submerging incisions, unit til healed.     ACTIVITY:  You are encouraged to cough and deep breath or use your incentive spirometer if you were given one, every 15-30 minutes when awake.  This will help prevent respiratory complications and low grade fevers post-operatively if you had a general anesthetic.  You may want to hug a pillow when coughing and sneezing to add additional support to the surgical area, if you had abdominal or chest surgery, which will decrease pain during these  times.  You are encouraged to walk and engage in light activity for the next two weeks.  You should not lift more than 10 pounds during this time frame as it could put you at increased risk for complications.      MEDICATIONS:  Try to take narcotic medications and anti-inflammatory medications, such as tylenol, ibuprofen, naprosyn, etc., with food.  This will minimize stomach upset from the medication.  Should you develop nausea and vomiting from the pain medication, or develop a rash, please discontinue the medication and contact your physician.  You should not drive, make important decisions, or operate machinery when taking narcotic pain medication.    QUESTIONS:  Please feel free to call your physician or the hospital  if you have any questions, and they will be glad to assist you.       Discharge Instructions      Special Instructions: None    · Is patient discharged on Warfarin / Coumadin?   No     Depression / Suicide Risk    As you are discharged from this [facility] facility, it is important to learn how to keep safe from harming yourself.    Recognize the warning signs:  · Abrupt changes in personality, positive or negative- including increase in energy   · Giving away possessions  · Change in eating patterns- significant weight changes-  positive or negative  · Change in sleeping patterns- unable to sleep or sleeping all the time   · Unwillingness or inability to communicate  · Depression  · Unusual sadness, discouragement and loneliness  · Talk of wanting to die  · Neglect of personal appearance   · Rebelliousness- reckless behavior  · Withdrawal from people/activities they love  · Confusion- inability to concentrate     If you or a loved one observes any of these behaviors or has concerns about self-harm, here's what you can do:  · Talk about it- your feelings and reasons for harming yourself  · Remove any means that you might use to hurt yourself (examples: pills, rope, extension cords,  firearm)  · Get professional help from the community (Mental Health, Substance Abuse, psychological counseling)  · Do not be alone:Call your Safe Contact- someone whom you trust who will be there for you.  · Call your local CRISIS HOTLINE 083-4087 or 770-511-8083  · Call your local Children's Mobile Crisis Response Team [place] (398) 830-1975 or www.[employer]  · Call the toll free National Suicide Prevention Hotlines   · National Suicide Prevention Lifeline 841-267-YVBR (6663)  · National Hope Line Network 800-SUICIDE (822-5436)

## 2021-09-14 NOTE — OP REPORT
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     PROCEDURES:  1.  Left suprahyoid neck dissection, left modified neck dissection.  2.  Wide local excision of left facial Merkel cell carcinoma with primary   closure with mobilization of adjacent tissue.     SURGEON:  Chaitanya Saha MD     FIRST ASSISTANT:  Anuja Gentile MD     ANESTHESIOLOGIST:  Evelina Smallwood MD     INDICATIONS:  The patient is an 80-year-old who has been recently diagnosed   with a left facial Merkel cell carcinoma, a centimeter or so above the angle   of the mandible.  The patient had a PET/CT, which was positive in the area of   the primary lesion and also in what was called zone 2B with 2 potential lymph   nodes in that area.  I initially could not feel a lymph node, but on exam   today in the preoperative holding area, could feel a lymph node in zone 1 or   zone 2A  The patient now presents for left modified neck dissection with wide   local excision of the primary lesion.     DESCRIPTION OF PROCEDURE:  The patient was taken to the operating room and   placed in the supine position.  General anesthesia was induced and the patient   fairly easily intubated with a GlideScope.  The table was rotated to 90   degrees and a shoulder roll placed and the patient's head was placed on a   donut.  The node could be palpated and was no more than 2 cm in greatest   dimension and probably smaller just below the angle of the mandible.  The   lesion was also evidenced a centimeter above the angle of the mandible.  It   was a fleshy smooth, somewhat reddish in appearance.  The patient was then   prepped and draped in the usual sterile fashion for this type of procedure,   leaving the corner of the mouth exposed, so that this could be watched and the   patient  was not paralyzed for the entirety of the case.  The proposed   incision was drawn on the patient's skin extending from the submandibular   triangle region approximately 2-3 fingerbreadths below the inferior border of   the mandible, curving superiorly towards the mastoid tip.  The incision was   made with a 15 blade and taken down to and through the platysma muscle.  In   the upper third of the incision, the greater auricular nerve was encountered.    This structure was followed superiorly as it began to course towards the ear,   but preserved.  Dissection proceeded along the anterior border of the   sternomastoid muscle and then anteriorly above the node leaving a generous   cuff of soft tissue above it, in between it, and the inferior border of the   mandible.  Dissection curved inferiorly anterior to the node down into the   region of the submandibular gland and marginal mandibular nerve.  As   dissection proceeded, the nerve was definitively discovered and followed   anteriorly and then posteriorly up to the parotid gland.  The submandibular   gland was atrophied and difficult to locate and the metastasis was well   posterior to it and I decided at that point to proceed with dissection of   zones 2 and 3 but not 1. Dissection proceeded along the anterior border of the   sternomastoid muscle and the spinal accessory nerve located.  It was followed   to the skull base.  Dissection proceeded in between the marginal mandibular   nerve and the sternomastoid muscle communicating these 2 planes of dissection.    Dissection then proceeded through zone 2B reflecting fatty tissue off the   underlying deep cervical fascia and then underneath the spinal accessory   nerve.  Dissection then proceeded inferior to the nerve inferiorly towards the   omohyoid muscle, but this structure was not exposed during the dissection.    Dissection then proceeded anteriorly off the deep cervical fascia to the   internal jugular vein.  The  fatty tissue in upper zone 2 and 3 and partly in   the ___ was then reflected anteriorly to the internal jugular vein and then   lifted off the vein.  The patient had a fairly large common facial vein.  This   structure had been exposed on the initial dissection and was followed   inferiorly to the internal jugular vein.  Dissection then proceeded superiorly   from the internal jugular vein anteriorly where the posterior belly of the   digastric was quickly discovered.  Soft tissue was reflected off it and just   inferior to the posterior belly, the hypoglossal nerve.  This was discovered   until it began to bend superiorly and then planes communicated again to the   internal jugular vein just lateral to the carotid.  The carotid was discovered   as the fascia was dissected off the anterior aspect of the internal jugular   vein and just inferior to the hypoglossal nerve.  Dissection proceeded to the   previously dissected area over the common facial vein and the specimen   removed.  It was marked zone 2 with a short stitch, zone 3 with a long stitch   and designated at 3 different spots, i.e., zone 2B with a short stitch and   zone 3 with a long stitch and zone 2A with an intermediate stitch and sent to   pathology in formalin for permanent section.  No other nodes could be palpated   throughout the neck.  The wound was vigorously irrigated and the irrigant   blotted.  Hemostasis was excellent.  A stab incision was made inferior to the   main incision and then a 10 mm flat fluted drain placed.  This was secured   with 2-0 silk.  The wound was closed with 3-0 and 4-0 Vicryl and staples.  The   drain was then placed on suction.  Attention was then directed towards the   facial lesion.  An elliptical incision was drawn on the patient's skin around   it with margins of at least a centimeter.  The incision was then made with a   15 blade and then dissection proceeded on the undersurface of the ellipse to   include  subcutaneous fatty tissue.  The ellipse was removed and marked   anteriorly with a short stitch and posteriorly with a long stitch.  The wound   was then undermined circumferentially.  Hemostasis was obtained and then the   wound closed with 4-0 Vicryl and a 5-0 running nylon.  A quarter inch Penrose   that had been divided in half longitudinally was placed as well.  Fluffs were   then placed over the neck and facial incisions and then a JawBra used to hold   the fluffs in place.  The patient was then awakened and taken to the recovery   room in stable condition.  He tolerated the procedure well and there were no   complications.        ______________________________  MD DAVID ZALDIVAR/ASHLEY/TOBY    DD:  09/13/2021 14:06  DT:  09/13/2021 15:32    Job#:  [id number]    CC:Juan A. Cattoni III, MD Denver J. Miller, MD(User)

## 2021-09-14 NOTE — PROGRESS NOTES
S: Had some pain but mostly doing well.      O: /58   Pulse 77   Temp 36.4 °C (97.5 °F) (Temporal)   Resp 16   Ht 1.829 m (6')   Wt 99 kg (218 lb 4.1 oz)   SpO2 92%           I/O last 3 completed shifts:  In: 1840 [P.O.:240; I.V.:1600]  Out: 1390 [Urine:1350; Drains:40]  Neck-flaps down, drain in place    A: POD 1 Doing well.    P: DC home-Follow up 11:30 tomorrow, 9/15/21, for drain removal.

## 2021-09-29 ENCOUNTER — PATIENT MESSAGE (OUTPATIENT)
Dept: HEALTH INFORMATION MANAGEMENT | Facility: OTHER | Age: 81
End: 2021-09-29

## 2021-09-29 ENCOUNTER — NON-PROVIDER VISIT (OUTPATIENT)
Dept: CARDIOLOGY | Facility: MEDICAL CENTER | Age: 81
End: 2021-09-29
Payer: OTHER MISCELLANEOUS

## 2021-09-29 DIAGNOSIS — I48.0 PAROXYSMAL ATRIAL FIBRILLATION (HCC): Chronic | ICD-10-CM

## 2021-09-29 DIAGNOSIS — I49.5 SINOATRIAL NODE DYSFUNCTION (HCC): Chronic | ICD-10-CM

## 2021-09-29 DIAGNOSIS — Z95.0 PRESENCE OF PERMANENT CARDIAC PACEMAKER: Chronic | ICD-10-CM

## 2021-09-29 PROCEDURE — 93288 INTERROG EVL PM/LDLS PM IP: CPT | Performed by: NURSE PRACTITIONER

## 2021-10-05 ENCOUNTER — PATIENT OUTREACH (OUTPATIENT)
Dept: OTHER | Facility: MEDICAL CENTER | Age: 81
End: 2021-10-05

## 2021-10-05 ENCOUNTER — APPOINTMENT (OUTPATIENT)
Dept: RADIOLOGY | Facility: MEDICAL CENTER | Age: 81
End: 2021-10-05
Attending: INTERNAL MEDICINE
Payer: MEDICARE

## 2021-10-05 NOTE — PROGRESS NOTES
Oncology nurse navigator received a call back from the patient and his wife.  He states that he is planning on coming in to [facility] for Cancer radiation appointment this Thursday.  ONN reviewed appointment time and direction.  Patient stated at this time he feels that he will be able to drive himself back and forth to and from [facility] for his appointments.  Oncology nurse navigator introduced myself and my role and services.  Patient stated that at if anytime he did not feel or was not able to drive to let me know and we would get him some extra assistance.  Patient also stated that he is unable to view his most recent records in is my chart from his recent surgery.  Oncology nurse navigator gave him the phone number to the customer [employer] for my chart.  Patient verbaliezed understanding and thanked ONN for the call and the information.

## 2021-10-07 ENCOUNTER — HOSPITAL ENCOUNTER (OUTPATIENT)
Dept: RADIATION ONCOLOGY | Facility: MEDICAL CENTER | Age: 81
End: 2021-10-31
Attending: RADIOLOGY
Payer: MEDICARE

## 2021-10-07 VITALS
WEIGHT: 217.59 LBS | OXYGEN SATURATION: 95 % | HEART RATE: 96 BPM | DIASTOLIC BLOOD PRESSURE: 82 MMHG | BODY MASS INDEX: 29.47 KG/M2 | HEIGHT: 72 IN | SYSTOLIC BLOOD PRESSURE: 128 MMHG

## 2021-10-07 DIAGNOSIS — C4A.39 MERKEL CELL SKIN CANCER OF CHEEK (HCC): ICD-10-CM

## 2021-10-07 PROBLEM — D84.9 IMMUNOCOMPROMISED STATE (HCC): Status: ACTIVE | Noted: 2021-10-07

## 2021-10-07 PROCEDURE — 99214 OFFICE O/P EST MOD 30 MIN: CPT | Performed by: RADIOLOGY

## 2021-10-07 PROCEDURE — 99205 OFFICE O/P NEW HI 60 MIN: CPT | Performed by: RADIOLOGY

## 2021-10-07 RX ORDER — FLUTICASONE PROPIONATE 50 MCG
1 SPRAY, SUSPENSION (ML) NASAL
COMMUNITY
Start: 2021-09-20

## 2021-10-07 ASSESSMENT — PAIN SCALES - GENERAL: PAINLEVEL: 5=MODERATE PAIN

## 2021-10-07 ASSESSMENT — FIBROSIS 4 INDEX: FIB4 SCORE: 6.56

## 2021-10-07 NOTE — CONSULTS
RADIATION ONCOLOGY CONSULT    DATE OF SERVICE: 10/7/2021    IDENTIFICATION: A 80 y.o. male with   Merkel cell skin cancer of cheek (HCC)  Staging form: Merkel Cell Carcinoma, AJCC 8th Edition  - Pathologic stage from 10/7/2021: Stage IIIB (pT1, pN3, cM0) - Signed by Jovany WARD M.D. on 10/7/2021  Histopathologic type: Merkel cell carcinoma  Residual tumor (R): R2 - Macroscopic  Laterality: Left  Tumor size (mm): 10  Lymph-vascular invasion (LVI): LVI present/identified, NOS  Primary tumor: Present  Breslow depth (mm): 3  Clinical status of regional lymph nodes: Metastases confirmed by pathologic examination  Presence of extranodal extension: Present      He is here at the kind request of Dr. Saha and Lily for radiotherapy evaluation.      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  He is abstained from alcohol since 2006.  History of prostate cancer treated with radiotherapy greater than 20 years ago at [facility].    He presented with small facial skin nodule on the left side of his cheek.  Shave biopsy performed by dermatology demonstrated Merkel cell carcinoma.  He was referred to Dr. Saha for ENT evaluation.  He also underwent PET/CT scanning by Dr. Bautista.  The PET/CT demonstrated a small amount of uptake in the left cheek proximately a centimeter or less in size in addition to lymph node uptake in level 2 and level 5 on the left.  There was no evidence of distant metastatic disease.    He was taken to the OR 9/13/2021 underwent a left suprahyoid neck dissection, left modified neck dissection, wide local excision of left facial Merkel cell carcinoma with primary closure with immobilization of adjacent tissue.    Pathology demonstrated 1 cm Merkel cell carcinoma involving the face  with 3 mm depth of invasion.  There was evidence of lymphovascular invasion.  2 lymph nodes out of 14 demonstrated metastatic disease with the largest metastatic deposit 1.2 cm.  There was evidence of extra noris extension of 2 mm.  In addition there was presence of an in-transit metastasis noted in the soft tissue without noris elements measuring 3mm.  Final pathologic stage was T1 N3 M0, stage IIIb.    He is now approximately 2-1/2 weeks postop.  His wounds are completely healed.    He is being seen for consideration of adjuvant therapy.  He does report xerostomia especially at night.  He would requires the use of a moisturizing spray in his mouth.  He states when he awakens in the morning his mouth is quite dry.  He denies any complaints with respect to taste or swallowing.    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)   • Merkel cell skin cancer of cheek (HCC)   • Renal calculus   • History of malignant neoplasm of prostate   • Immunocompromised state (HCC)        PAST SURGICAL HISTORY:  Past Surgical History:   Procedure Laterality Date   • CO EXC SKIN MALIG >4CM REMAINDR BODY Left 9/13/2021    Procedure: EXCISION, MASS- LEFT FACIAL LESION;  Surgeon: Chaitanya Saha M.D.;  Location: SURGERY SAME DAY [facility];  Service: Ent   • NECK DISSECTION RADICAL Left 9/13/2021    Procedure: DISSECTION, NECK, RADICAL - MODIFIED;  Surgeon: Chaitanya Saha M.D.;  Location: SURGERY SAME DAY [facility];  Service: Ent   • FLAP FREE Left 9/13/2021    Procedure: ADJACENT TISSUE TRANSFER CLOSURE;  Surgeon: Chaitanya Saha M.D.;  Location: SURGERY SAME DAY [facility];  Service: Ent   •  RECOVERY  3/18/2015    Performed by [facility] at SURGERY [facility] [place]   • PACEMAKER INSERTION  March 2015    Generator replacement with  Medtronic Adapta [id number] implanted by Dr. Chaitanya Walters. Original device implanted in 2007.   • PENILE PROSTHESIS AMS INFLATABLE  10/13/2014    Performed by Sedrick Pittman M.D. at SURGERY [facility]   • SHOULDER ARTHROPLASTY TOTAL Left 2010   • PACEMAKER INSERTION  2007        • OTHER      Endoscopy every 3-6 months to track esophageal varices       CURRENT MEDICATIONS:  Current Outpatient Medications   Medication Sig Dispense Refill   • oxyCODONE immediate-release (ROXICODONE) 5 MG Tab Take 10 mg by mouth every 6 hours as needed for Severe Pain.     • calcitRIOL (ROCALTROL) 0.25 MCG Cap Take 0.25 mcg by mouth every day.  4   • acetaminophen (TYLENOL) 500 MG Tab Take 500-1,000 mg by mouth as needed. Indications: Pain     • tramadol (ULTRAM) 50 MG Tab Take 50 mg by mouth every 8 hours as needed. Indications: Pain     • NON SPECIFIED L-orinthate 500mg PO daily     • diphenhydrAMINE (BENADRYL) 50 MG Cap Take 50 mg by mouth every bedtime.     • NON SPECIFIED L-aspartate 500mg Po daily     • promethazine (PHENERGAN) 25 MG Tab Take 25 mg by mouth every 6 hours as needed for Nausea/Vomiting.     • Multiple Vitamin (MULTI VITAMIN DAILY PO) Take  by mouth. Combination d3,magnessium,vitamin a     • ropinirole (REQUIP) 0.25 MG Tab Take 0.5 mg by mouth at bedtime. Indications: Restless Leg Syndrome  3   • spironolactone (ALDACTONE) 50 MG Tab Take 50 mg by mouth every day. Indications: Edema  0   • lactulose 10 GM/15ML SOLN Take 30 g by mouth as needed.     • meclizine (ANTIVERT) 25 MG TABS Take 25 mg by mouth 3 times a day as needed for Dizziness or Vertigo.     • tamsulosin (FLOMAX) 0.4 MG capsule Take 0.4 mg by mouth every morning. Indications: Benign Enlargement of Prostate     • omeprazole (PRILOSEC) 40 MG capsule Take 40 mg by mouth every day.     • fluticasone  (FLONASE) 50 MCG/ACT nasal spray      • ondansetron (ZOFRAN) 4 MG Tab tablet TK 1 T PO Q 6 H FOR 30 DAYS. (Patient not taking: Reported on 10/7/2021)     • SF 5000 PLUS 1.1 % Cream USE FOR BRUSHING UTD  1     No current facility-administered medications for this encounter.       ALLERGIES:    Contrast media with iodine [iodine], Dobutamine, and Latex    FAMILY HISTORY:    family history includes Cancer in his brother, father, and sister.    SOCIAL HISTORY:     reports that he quit smoking about 51 years ago. His smoking use included cigarettes. He has a 7.00 pack-year smoking history. He has never used smokeless tobacco. He reports previous alcohol use of about 0.5 oz of alcohol per week. He reports that he does not use drugs.    REVIEW OF SYSTEMS:    A complete review of systems taken. Pertinent items in HPI.     PHYSICAL EXAM:    PERFORMANCE STATUS:  No flowsheet data found.  Karnofsky Score 10/7/2021   Karnofsky Score 90   Some recent data might be hidden     /82   Pulse 96   Ht 1.829 m (6')   Wt 98.7 kg (217 lb 9.5 oz)   SpO2 95%   BMI 29.51 kg/m²   Physical Exam  Vitals and nursing note reviewed.   Constitutional:       General: He is not in acute distress.     Appearance: He is well-developed. He is not diaphoretic.   HENT:      Head: Normocephalic.      Mouth/Throat:      Mouth: Mucous membranes are moist.      Pharynx: Oropharynx is clear. No oropharyngeal exudate or posterior oropharyngeal erythema.      Comments: Mildly enlarged right palatine tonsil.  Eyes:      Extraocular Movements: Extraocular movements intact.   Cardiovascular:      Rate and Rhythm: Normal rate and regular rhythm.   Pulmonary:      Effort: Pulmonary effort is normal.   Musculoskeletal:      Cervical back: Normal range of motion and neck supple.   Lymphadenopathy:      Cervical: Cervical adenopathy (Pea-sized node left supraclavicular region.) present.   Skin:     General: Skin is warm and dry.      Findings: No erythema.    Neurological:      Mental Status: He is alert and oriented to person, place, and time.      Cranial Nerves: No cranial nerve deficit.      Coordination: Coordination normal.   Psychiatric:         Behavior: Behavior normal.         Thought Content: Thought content normal.         Judgment: Judgment normal.          LABORATORY DATA:   Lab Results   Component Value Date/Time    WBC 2.3 (LL) 09/09/2021 02:07 PM    RBC 4.78 09/09/2021 02:07 PM    HEMOGLOBIN 14.3 09/09/2021 02:07 PM    HEMATOCRIT 43.4 09/09/2021 02:07 PM    MCV 90.8 09/09/2021 02:07 PM    MCH 29.9 09/09/2021 02:07 PM    MCHC 32.9 (L) 09/09/2021 02:07 PM    RDW 44.9 09/09/2021 02:07 PM    PLATELETCT 88 (L) 09/09/2021 02:07 PM    MPV 11.3 09/09/2021 02:07 PM    NEUTSPOLYS 72.20 (H) 07/12/2021 10:24 AM    LYMPHOCYTES 19.00 (L) 07/12/2021 10:24 AM    MONOCYTES 5.90 07/12/2021 10:24 AM    EOSINOPHILS 2.10 07/12/2021 10:24 AM    BASOPHILS 0.50 07/12/2021 10:24 AM      Lab Results   Component Value Date/Time    SODIUM 139 09/09/2021 02:07 PM    POTASSIUM 4.7 09/09/2021 02:07 PM    CHLORIDE 101 09/09/2021 02:07 PM    CO2 27 09/09/2021 02:07 PM    GLUCOSE 110 (H) 09/09/2021 02:07 PM    BUN 20 09/09/2021 02:07 PM    CREATININE 1.46 (H) 09/09/2021 02:07 PM    CREATININE 1.0 05/28/2008 06:00 AM           RADIOLOGY DATA:      IMPRESSION:    A 80 y.o. with  Merkel cell skin cancer of cheek (HCC)  Staging form: Merkel Cell Carcinoma, AJCC 8th Edition  - Pathologic stage from 10/7/2021: Stage IIIB (pT1, pN3, cM0) - Signed by Jovany WARD M.D. on 10/7/2021  Histopathologic type: Merkel cell carcinoma  Residual tumor (R): R2 - Macroscopic  Laterality: Left  Tumor size (mm): 10  Lymph-vascular invasion (LVI): LVI present/identified, NOS  Primary tumor: Present  Breslow depth (mm): 3  Clinical status of regional lymph nodes: Metastases confirmed by pathologic examination  Presence of extranodal extension: Present        RECOMMENDATIONS:   This is a pleasant  80-year-old man with stage IIIb Merkel cell cancer.  He has had resection of the primary and the left neck dissection.  He has adverse features including extracapsular extension, lymphovascular invasion and an in-transit metastasis.  Did recommend adjuvant radiotherapy to the primary site as well as the ipsilateral neck.  On my evaluation he does have a small palpable left supraclavicular node which was present on prior PET.  Considering that he is receiving adjuvant radiotherapy, we can dose escalate to the palpable node, which should provide excellent local control rather than subjecting him to additional surgery followed by adjuvant radiotherapy.    We discussed IMRT based radiotherapy. This will involve delivering 70/60/56 Bell in 30 treatments delivered over 6 weeks to 2 areas of gross, high risk, and subclinical disease respectively.   Treatments would be delivered daily Monday through Friday.    We discussed the acute radiation effects associated with therapy including but not limited to: Xerostomia, dysgeusia, radiation pharyngitis, radiation mucositis, dysphagia,  odynophagia, rare risk of osteoradionecrosis, and myelitis. We discussed the importance of nutrition and maintaining fluid balance during therapy. We also discussed the importance of not having any treatment breaks during therapy.    Measures to ameliorate radiation effects include analgesics, placement of a feeding tube either prophylactic or  on an as needed basis to help with nutrition and fluid during therapy. Weekly nutritionist visits. Speech therapy referral for swallow evaluation and ongoing swallow exercises.     We reviewed the importance of dental care prior to start of radiotherapy, during and after completion of radiotherapy. I did recommend a dental visit as well as dental  trays for fluoride therapy.     After a lengthy one hour face-to-face discussion patient is willing to proceed.  He will return for simulation on October 12 with  treatment anticipated to start October 20.  We will obtain cardiac clearance considering his pacemaker will be near the radiation field.    Thank you for the opportunity to participate in his care.  If any questions or comments, please do not hesitate in calling.    Orders Placed This Encounter   • Rad Onc Treatment Planning CT Simulation   • REFERRAL TO ONCOLOGY NURSE NAVIGATOR   • REFERRAL TO ONCOLOGY NURSE NAVIGATOR   • REFERRAL TO ONCOLOGY NUTRITION SERVICES   • REFERRAL TO SPEECH THERAPY   • fluticasone (FLONASE) 50 MCG/ACT nasal spray

## 2021-10-07 NOTE — NON-PROVIDER
Patient was seen today in clinic with Dr. Patiño for consultation.  Vitals signs and weight were obtained and pain assessment was completed.  Allergies and medications were reviewed with the patient.  Toxicities of treatment assessed.     Vitals/Pain:  Vitals:    10/07/21 1045   BP: 128/82   Pulse: 96   SpO2: 95%   Weight: 98.7 kg (217 lb 9.5 oz)   Height: 1.829 m (6')   Pain Score: 5=Moderate Pain        Allergies:   Contrast media with iodine [iodine], Dobutamine, and Latex    Current Medications:  Current Outpatient Medications   Medication Sig Dispense Refill   • oxyCODONE immediate-release (ROXICODONE) 5 MG Tab Take 10 mg by mouth every 6 hours as needed for Severe Pain.     • calcitRIOL (ROCALTROL) 0.25 MCG Cap Take 0.25 mcg by mouth every day.  4   • acetaminophen (TYLENOL) 500 MG Tab Take 500-1,000 mg by mouth as needed. Indications: Pain     • tramadol (ULTRAM) 50 MG Tab Take 50 mg by mouth every 8 hours as needed. Indications: Pain     • NON SPECIFIED L-orinthate 500mg PO daily     • diphenhydrAMINE (BENADRYL) 50 MG Cap Take 50 mg by mouth every bedtime.     • NON SPECIFIED L-aspartate 500mg Po daily     • promethazine (PHENERGAN) 25 MG Tab Take 25 mg by mouth every 6 hours as needed for Nausea/Vomiting.     • Multiple Vitamin (MULTI VITAMIN DAILY PO) Take  by mouth. Combination d3,magnessium,vitamin a     • ropinirole (REQUIP) 0.25 MG Tab Take 0.5 mg by mouth at bedtime. Indications: Restless Leg Syndrome  3   • spironolactone (ALDACTONE) 50 MG Tab Take 50 mg by mouth every day. Indications: Edema  0   • lactulose 10 GM/15ML SOLN Take 30 g by mouth as needed.     • meclizine (ANTIVERT) 25 MG TABS Take 25 mg by mouth 3 times a day as needed for Dizziness or Vertigo.     • tamsulosin (FLOMAX) 0.4 MG capsule Take 0.4 mg by mouth every morning. Indications: Benign Enlargement of Prostate     • omeprazole (PRILOSEC) 40 MG capsule Take 40 mg by mouth every day.     • fluticasone (FLONASE) 50 MCG/ACT nasal  spray      • ondansetron (ZOFRAN) 4 MG Tab tablet TK 1 T PO Q 6 H FOR 30 DAYS. (Patient not taking: Reported on 10/7/2021)     • SF 5000 PLUS 1.1 % Cream USE FOR BRUSHING UTD  1     No current facility-administered medications for this encounter.         PCP:  Dillan Escobedo, Med Ass't

## 2021-10-07 NOTE — CT SIMULATION
PATIENT NAME Luigi Walters   PRIMARY PHYSICIAN [place], [place] J [id number]   REFERRING PHYSICIAN Chaitanya Saha M.D. 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 on 10/7/2021  Histopathologic type: Merkel cell carcinoma  Residual tumor (R): R0 - None  Laterality: Left  Tumor size (mm): 10  Lymph-vascular invasion (LVI): LVI present/identified, NOS  Primary tumor: Present  Breslow depth (mm): 3  Clinical status of regional lymph nodes: Metastases confirmed by pathologic examination  Presence of extranodal extension: Present         Treatment Planning CT Simulation      Order Questions     Question Answer Comment    Is this for a new course of treatment? Yes     Is this an Addendum? No     Implanted Device/Pacemaker Yes Dr Walters - Andrea     Cardiac Clearance     Simulation Status Initial     Planned Start Date 10/20/2021     Treatment Site Face     Laterality Left     Treatment Site Neck     Laterality Left     Treatment Technique IMRT     Treatment Pattern/Frequency Daily     Concurrent Chemotherapy No     CT Technique 3D     Slice Thickness 2mm     Scan Extent H&N     Contrast IV     IV Contrast Volume Other     Volume (cc) 100 @2.5    Treatment Device(s) S-Frame Mask      Shoulder Pulls      Bolus 0.5 cm     Treatment Marker Scar bolus scar    Patient Attire Gown     Patient Position Supine     Patient Orientation Head First     Arm Position Down     Dentures Out     Chin Position Neutral     Treatment Image Guidance CBCT     Frequency (CBCT) Daily     Image Guidance Match Bone     Treatment Planning Image Fusion CT/PET     In Vivo Dosimetry TLD     Other Orders Special Tx Procedure      Weekly Physics Check             Comments     Push to [facility] - LG & SM FOV. Inform physician after pushed. Will review in [facility]

## 2021-10-08 ENCOUNTER — DOCUMENTATION (OUTPATIENT)
Dept: RADIATION ONCOLOGY | Facility: MEDICAL CENTER | Age: 81
End: 2021-10-08

## 2021-10-08 NOTE — PROGRESS NOTES
Nutrition Services  Consult received. 80 year old male with diagnosis of Merkel Cell Cancer to cheek.  Patient scheduled for XRT SIM next week.  RD will attempt to see at that time for interview or during initial week of treatment.     RD following.

## 2021-10-12 ENCOUNTER — TELEPHONE (OUTPATIENT)
Dept: RADIATION ONCOLOGY | Facility: MEDICAL CENTER | Age: 81
End: 2021-10-12

## 2021-10-12 ENCOUNTER — APPOINTMENT (OUTPATIENT)
Dept: RADIATION ONCOLOGY | Facility: MEDICAL CENTER | Age: 81
End: 2021-10-12
Payer: MEDICARE

## 2021-10-12 PROCEDURE — 77470 SPECIAL RADIATION TREATMENT: CPT | Performed by: RADIOLOGY

## 2021-10-12 PROCEDURE — 77263 THER RADIOLOGY TX PLNG CPLX: CPT | Performed by: RADIOLOGY

## 2021-10-12 PROCEDURE — 77334 RADIATION TREATMENT AID(S): CPT | Mod: 26 | Performed by: RADIOLOGY

## 2021-10-12 PROCEDURE — 77334 RADIATION TREATMENT AID(S): CPT | Performed by: RADIOLOGY

## 2021-10-12 PROCEDURE — 77290 THER RAD SIMULAJ FIELD CPLX: CPT | Performed by: RADIOLOGY

## 2021-10-12 NOTE — RADIATION PLANNING NOTES
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 on 10/7/2021  Histopathologic type: Merkel cell carcinoma  Residual tumor (R): R2 - Macroscopic  Laterality: Left  Tumor size (mm): 10  Lymph-vascular invasion (LVI): LVI present/identified, NOS  Primary tumor: Present  Breslow depth (mm): 3  Clinical status of regional lymph nodes: Metastases confirmed by pathologic examination  Presence of extranodal extension: Present       DATE OF SERVICE: 10/12/2021    TYPE OF SIMULATION: Head & Neck    GOAL OF TREATMENT:   [x] Curative  [] Palliative  [] Oligometastatic    CONTRAST:    [] IV Contrast*  [] Oral Contrast               POSITION:    [x]  Supine  [] Prone     COMPLEX:  [] Complex Blocking   [x]Arcs  [] Custom Blocks  [] >3 Sites    PROCEDURE: Patient place in supine position on CT table with head in neutral position. Dentures removed. Shoulder pulls used to stabilize shoulders. Patient head and shoulders were immobilized with a thermoplastic mask.   films evaluated to ensure proper patient position.  CT obtained through entire volume of interest. Exam pushed to treatment planning system for contouring and planning.    I have personally reviewed the relevant data, performed the target localization, and determined all relevant factors for this patient’s simulation.    *Omnipaque 80 -100cc IVP in conjunction with 500cc NS

## 2021-10-12 NOTE — TELEPHONE ENCOUNTER
"Nutrition Services: RD Consultation  Luigi Walters is a 80 y.o. 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 Difficile   • Jaundice    • Melena    • Melena    • Merkel cell carcinoma of face (HCC)    • Non Hodgkin's lymphoma (HCC) 2019   • Osteoarthritis    • Pacemaker 2007    SSS   • Pain 09/09/2021    L shoulder, 5/10   • Pneumonia 1972   • Polycythemia    • Presence of permanent cardiac pacemaker 2007/2015    Dr. Chaitanya Walters   • Prostate cancer (HCC)    • Prostate cancer (HCC) 2000        • Renal disorder      Insufficiency....med related?; pt denies 02/19   • Seizure (HCC)     \"with Dobutamine Echo\"   • Thrombocytopenia (HCC)        Past Surgical History:   Procedure Laterality Date   • TN EXC SKIN MALIG >4CM REMAINDR BODY Left 9/13/2021    Procedure: EXCISION, MASS- LEFT FACIAL LESION;  Surgeon: Chaitanya Saha M.D.;  Location: SURGERY SAME DAY [facility];  Service: Ent   • NECK DISSECTION RADICAL Left 9/13/2021    Procedure: DISSECTION, NECK, RADICAL - MODIFIED;  Surgeon: Chaitanya Saha M.D.;  Location: SURGERY SAME DAY [facility];  Service: Ent   • FLAP FREE Left 9/13/2021    Procedure: ADJACENT TISSUE TRANSFER CLOSURE;  Surgeon: Chaitanya Saha M.D.;  " Location: SURGERY SAME DAY [facility];  Service: Ent   • RECOVERY  3/18/2015    [place] [facility] [place] [facility] [place]EMAKER INSERTION  March 2015    Generator replacement with  Medtronic Adapta [id number] implanted by Dr. Chaitanya Walters. Original device implanted in 2007.   • PENILE PROSTHESIS AMS INFLATABLE  10/13/2014    Performed by Sedrick Pittman M.D. at SURGERY [facility] ORS   • SHOULDER ARTHROPLASTY TOTAL Left 2010   • PACEMAKER INSERTION  2007        • OTHER      Endoscopy every 3-6 months to track esophageal varices       RD able to call and connect with pt on the phone. Pt states is managing well at this time and feels confident going into treatment. Denies any current issues with chewing or swallowing. Denies any changes to weight recently. States will start to incorporate high protein foods and beverages. Pt states will utilize protein powder in order to do so. Pt declines information at this time, as [facility] [place] still has three folders from [facility] to go through. Relays great support at [facility].    Pt did not express any further nutrition-related questions or concerns at this time.     Assessment:  • Treatment Regimen: IMRT x 6 weeks   • Pertinent Labs: Glucose 110, creatinine 1.46, , HDL 37  • Pertinent Meds: zofran, calcitriol, MVI, requip, lactulose, prilosec   Wt Readings from Last 1 Encounters:   10/07/21 98.7 kg (217 lb 9.5 oz)      Ht Readings from Last 1 Encounters:   10/07/21 1.829 m (6')      BMI Readings from Last 1 Encounters:   10/07/21 29.51 kg/m²   • BMI Classification: Overweight   • Nutrition-Focused Physical Exam: Unable to assess given telephone encounter     Weight History:  Wt Readings from Last 6 Encounters:   10/07/21 98.7 kg (217 lb 9.5 oz)   09/13/21 99 kg (218 lb 4.1 oz)   04/13/21 98.9 kg (218 lb)   04/08/21 100 kg (220 lb 7.4 oz)   02/04/21 99.8 kg (220 lb)   01/07/21 101 kg (222 lb 10.6 oz)     Weight Change/Malnutrition Risk: potential  insignificant losses within the year, RD to monitor weight closely during treatment given high risk for malnutrition related to dx.     Interventions:  • Introduced self and discussed role of dietitian throughout treatment process   • RD discussed would visit in person and personalize nutrition recommendations based on side effects and pt preferences  • Provided contact information for questions and concerns prior to start of treatment   • Supported initiation of high protein diet at this time.   • Discussed speech therapy, pt confirms understanding of benefit and purpose.     Pt reports understanding and was receptive to information provided.   RD provided contact information. Encouraged pt to reach out as questions/concerns arise.   RD following and to make further recommendations as indicated.  [phone]

## 2021-10-12 NOTE — RADIATION PLANNING NOTES
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 on 10/7/2021  Histopathologic type: Merkel cell carcinoma  Residual tumor (R): R2 - Macroscopic  Laterality: Left  Tumor size (mm): 10  Lymph-vascular invasion (LVI): LVI present/identified, NOS  Primary tumor: Present  Breslow depth (mm): 3  Clinical status of regional lymph nodes: Metastases confirmed by pathologic examination  Presence of extranodal extension: Present         IMAGING REVIEWED:  [] CT     [] MRI     [x] PET/CT     [] BONE SCAN     [] MAMMO     [] OTHER      TREATMENT INTENT:   [x] CURATIVE     [] MAINTENANCE     []  PALLIATIVE      []  SUPPORTIVE     []  PROPHYLACTIC     [] BENIGN     []  CONSOLIDATIVE      [] DEFINITIVE   []  OLOGIMETASTATIC      LINE OF TREATMENT:  [x] ADJUVANT   [] DEFINITIVE   [] NEOADJUVANT   [] RE-TREATMENT      TECHNIQUE PLANNED:  [x] IMRT   [] 3D   [] SBRT   [] SRS/SRT   [] HDR   [] ELECTRON       IMRT JUSTIFICATION:  []   An immediately adjacent area has been previously irradiated and abutting portals must be established with high precision.    []  Dose escalation is planned to deliver radiation doses in excess of those commonly utilized for similar tumors with conventional treatment.    []  The target volume is concave or convex, and the critical normal tissues are within or around that convexity or concavity.    [x]  The target volume is in close proximity to critical structures that must be protected.    [x]  The volume of interest must be covered with narrow margins to adequately protect  immediately adjacent structures.      FIELDS & BLOCKING:  [] COMPLEX BLOCKS     []  = 3 TX AREAS     [x]  ARCS     []  CUSTOM SHEILD        []  SIMPLE BLOCK      CHEMOTHERAPY:  []  CONCURRENT     []  INDUCTION      [] SEQUENTIAL     []  <30 DAYS FROM XRT      NOTES:  OAR CONSTRAINTS: (GUIDELINES ONLY NOT ABSOLUTE)  Target Prescribed Coverage   PTV1 95% of PTV1 covered by 100% (cGy) of RX Dose     PTV1 99% of PTV1 covered by 93% (cGy) of RX Dose       YOANDY Goal   Any volume (1cc) outside PTV Max Dose < 74Gy   Plan Hot Spot Max Dose < 110%   Cord  Max Dose < 45Gy   Cord + 0.5cm Max Dose < 50Gy   Brainstem Max Dose < 52 Gy   Brainstem + 0.5cm                           (0.3cm w/ Dr. Agnieszka.) Max Dose < 52Gy   Oral Pharynx  Mean Dose < 45Gy    Oral Cavity Mean Dose < 30Gy   R Parotid  Mean Dose < 23Gy    L Parotid  Mean Dose < 23Gy    Cervical Esophagus Mean Dose < 30Gy   Contralateral Submandibular gland (not target)  Max Dose < 39Gy   Optic Chiasm Max Dose < 54Gy   R Optic Nerve Max Dose < 54Gy   L Optic Nerve Max Dose < 54Gy   R Eye Max Dose < 35Gy   L Eye Max Dose < 35Gy   R Lens Max Dose < 10Gy   L Lens Max Dose < 10Gy   Inner Ear Mean Dose < 50Gy    Mandible Goal - 1cc Max Dose < 70Gy   *RTOG 1016, RTOG 0225

## 2021-10-12 NOTE — TELEPHONE ENCOUNTER
Nutrition Services: Telephone Encounter     RD called to establish nutrition services and introduce self. Pt's significant other picked up, states pt is out walking the dog, though agreed to write down RD number and encourage him to call this week. RD verbalized appreciation and discussed is available Monday-Friday for contact.     RD to follow-up  Pending callback  [phone]

## 2021-10-13 ENCOUNTER — PATIENT OUTREACH (OUTPATIENT)
Dept: OTHER | Facility: MEDICAL CENTER | Age: 81
End: 2021-10-13

## 2021-10-18 PROCEDURE — 77333 RADIATION TREATMENT AID(S): CPT | Mod: XU | Performed by: RADIOLOGY

## 2021-10-18 PROCEDURE — 77333 RADIATION TREATMENT AID(S): CPT | Mod: 26,XU | Performed by: RADIOLOGY

## 2021-10-18 PROCEDURE — 77338 DESIGN MLC DEVICE FOR IMRT: CPT | Mod: 26 | Performed by: RADIOLOGY

## 2021-10-18 PROCEDURE — 77301 RADIOTHERAPY DOSE PLAN IMRT: CPT | Mod: 26 | Performed by: RADIOLOGY

## 2021-10-18 PROCEDURE — 77301 RADIOTHERAPY DOSE PLAN IMRT: CPT | Performed by: RADIOLOGY

## 2021-10-18 PROCEDURE — 77338 DESIGN MLC DEVICE FOR IMRT: CPT | Performed by: RADIOLOGY

## 2021-10-19 PROCEDURE — 77300 RADIATION THERAPY DOSE PLAN: CPT | Performed by: RADIOLOGY

## 2021-10-19 PROCEDURE — 77300 RADIATION THERAPY DOSE PLAN: CPT | Mod: 26 | Performed by: RADIOLOGY

## 2021-10-20 ENCOUNTER — APPOINTMENT (OUTPATIENT)
Dept: RADIATION ONCOLOGY | Facility: MEDICAL CENTER | Age: 81
End: 2021-10-20
Payer: MEDICARE

## 2021-10-20 VITALS
HEART RATE: 90 BPM | WEIGHT: 218.03 LBS | SYSTOLIC BLOOD PRESSURE: 155 MMHG | OXYGEN SATURATION: 96 % | DIASTOLIC BLOOD PRESSURE: 91 MMHG | BODY MASS INDEX: 29.57 KG/M2

## 2021-10-20 LAB
CHEMOTHERAPY INFUSION START DATE: NORMAL
CHEMOTHERAPY RECORDS: 2.33
CHEMOTHERAPY RECORDS: 7000
CHEMOTHERAPY RECORDS: NORMAL
CHEMOTHERAPY RX CANCER: NORMAL
DATE 1ST CHEMO CANCER: NORMAL
RAD ONC ARIA COURSE LAST TREATMENT DATE: NORMAL
RAD ONC ARIA COURSE TREATMENT ELAPSED DAYS: NORMAL
RAD ONC ARIA REFERENCE POINT DOSAGE GIVEN TO DATE: 2.33
RAD ONC ARIA REFERENCE POINT DOSAGE GIVEN TO DATE: 2.41
RAD ONC ARIA REFERENCE POINT ID: NORMAL
RAD ONC ARIA REFERENCE POINT ID: NORMAL
RAD ONC ARIA REFERENCE POINT SESSION DOSAGE GIVEN: 2.33
RAD ONC ARIA REFERENCE POINT SESSION DOSAGE GIVEN: 2.41

## 2021-10-20 PROCEDURE — 77280 THER RAD SIMULAJ FIELD SMPL: CPT | Performed by: RADIOLOGY

## 2021-10-20 PROCEDURE — 77386 HCHG IMRT DELIVERY COMPLEX: CPT | Performed by: RADIOLOGY

## 2021-10-20 ASSESSMENT — FIBROSIS 4 INDEX: FIB4 SCORE: 6.56

## 2021-10-20 ASSESSMENT — PAIN SCALES - GENERAL: PAINLEVEL: NO PAIN

## 2021-10-20 NOTE — CT SIMULATION
DATE OF SERVICE: 10/20/2021    Radiation Therapy Episodes     Active Episodes     Radiation Therapy: IMRT (10/20/2021)               Radiation Treatments       Plan Last Treated On Elapsed Days Fractions Treated Prescribed Fraction Dose (cGy) Prescribed Total Dose (cGy)    L Face, Neck 10/20/2021 0 @ [id number] 1 of 30 233 7,000                Reference Point Last Treated On Elapsed Days Most Recent Session Dose (cGy) Total Dose (cGy)    L Face, Neck 10/20/2021 0 @ [id number] 233 233    L Face, Neck CP 10/20/2021 0 @ [id number] 241 241                      First Visit Simple Simulation: Called by [employer] machine to verify treatment parameters including:  treatment site, treatment dose, and treatment setup prior to first treatment. Image derived shifts reviewed in all appropriate plains.  Shifts approved.  Patient treated.    I have personally reviewed the relevant data, performed the target localization, and determined all relevant factors for this patient’s simulation.

## 2021-10-21 ENCOUNTER — APPOINTMENT (OUTPATIENT)
Dept: RADIATION ONCOLOGY | Facility: MEDICAL CENTER | Age: 81
End: 2021-10-21
Payer: MEDICARE

## 2021-10-21 ENCOUNTER — TELEPHONE (OUTPATIENT)
Dept: RADIATION ONCOLOGY | Facility: MEDICAL CENTER | Age: 81
End: 2021-10-21

## 2021-10-21 LAB
CHEMOTHERAPY INFUSION START DATE: NORMAL
CHEMOTHERAPY RECORDS: 2.33
CHEMOTHERAPY RECORDS: 7000
CHEMOTHERAPY RECORDS: NORMAL
CHEMOTHERAPY RX CANCER: NORMAL
DATE 1ST CHEMO CANCER: NORMAL
RAD ONC ARIA COURSE LAST TREATMENT DATE: NORMAL
RAD ONC ARIA COURSE TREATMENT ELAPSED DAYS: NORMAL
RAD ONC ARIA REFERENCE POINT DOSAGE GIVEN TO DATE: 4.67
RAD ONC ARIA REFERENCE POINT DOSAGE GIVEN TO DATE: 4.82
RAD ONC ARIA REFERENCE POINT ID: NORMAL
RAD ONC ARIA REFERENCE POINT ID: NORMAL
RAD ONC ARIA REFERENCE POINT SESSION DOSAGE GIVEN: 2.33
RAD ONC ARIA REFERENCE POINT SESSION DOSAGE GIVEN: 2.41

## 2021-10-21 PROCEDURE — 77331 SPECIAL RADIATION DOSIMETRY: CPT | Performed by: RADIOLOGY

## 2021-10-21 PROCEDURE — 77014 PR CT GUIDANCE PLACEMENT RAD THERAPY FIELDS: CPT | Mod: 26 | Performed by: RADIOLOGY

## 2021-10-21 PROCEDURE — 77386 HCHG IMRT DELIVERY COMPLEX: CPT | Performed by: RADIOLOGY

## 2021-10-21 PROCEDURE — 77331 SPECIAL RADIATION DOSIMETRY: CPT | Mod: 26 | Performed by: RADIOLOGY

## 2021-10-21 NOTE — TELEPHONE ENCOUNTER
Nutrition Services: Brief Update  Wt Readings from Last 7 Encounters:   10/20/21 98.9 kg (218 lb 0.6 oz)   10/07/21 98.7 kg (217 lb 9.5 oz)   09/13/21 99 kg (218 lb 4.1 oz)   04/13/21 98.9 kg (218 lb)   04/08/21 100 kg (220 lb 7.4 oz)   02/04/21 99.8 kg (220 lb)   01/07/21 101 kg (222 lb 10.6 oz)     Weight Change: weight stable    Patient called and wants to know when he should start increasing his protein intake.  He reports he just started XRT and has been really only eating 1 meal (dinner) each day + a protein bar in the morning (9 grams of protein) and snacks on cookies in between.  He reports that he is anticipating a sore throat and wants to know my recommendations.     Plan/Recommend:  • Advised patient to being incorporating high protein snacks at this time 1-2x/day.  Suggested soft and easy to consume items like greek yogurt, cottage cheese, scrambled eggs and protein shakes with 200-300 calories each.  Discussed nut butters, cheeses meats etc as well.   • Advised more frequent meals and snacks daily.     Pt verbalizes understanding and is receptive to information provided. RD to continue to monitor throughout treatment and provide nutrition recommendations as indicated.  Please contact [phone]

## 2021-10-22 ENCOUNTER — APPOINTMENT (OUTPATIENT)
Dept: RADIATION ONCOLOGY | Facility: MEDICAL CENTER | Age: 81
End: 2021-10-22
Payer: MEDICARE

## 2021-10-22 LAB
CHEMOTHERAPY INFUSION START DATE: NORMAL
CHEMOTHERAPY RECORDS: 2.33
CHEMOTHERAPY RECORDS: 7000
CHEMOTHERAPY RECORDS: NORMAL
CHEMOTHERAPY RX CANCER: NORMAL
DATE 1ST CHEMO CANCER: NORMAL
RAD ONC ARIA COURSE LAST TREATMENT DATE: NORMAL
RAD ONC ARIA COURSE TREATMENT ELAPSED DAYS: NORMAL
RAD ONC ARIA REFERENCE POINT DOSAGE GIVEN TO DATE: 7
RAD ONC ARIA REFERENCE POINT DOSAGE GIVEN TO DATE: 7.23
RAD ONC ARIA REFERENCE POINT ID: NORMAL
RAD ONC ARIA REFERENCE POINT ID: NORMAL
RAD ONC ARIA REFERENCE POINT SESSION DOSAGE GIVEN: 2.33
RAD ONC ARIA REFERENCE POINT SESSION DOSAGE GIVEN: 2.41

## 2021-10-22 PROCEDURE — 77014 PR CT GUIDANCE PLACEMENT RAD THERAPY FIELDS: CPT | Mod: 26 | Performed by: RADIOLOGY

## 2021-10-22 PROCEDURE — 77386 HCHG IMRT DELIVERY COMPLEX: CPT | Performed by: RADIOLOGY

## 2021-10-22 PROCEDURE — 77336 RADIATION PHYSICS CONSULT: CPT | Performed by: RADIOLOGY

## 2021-10-25 ENCOUNTER — APPOINTMENT (OUTPATIENT)
Dept: RADIATION ONCOLOGY | Facility: MEDICAL CENTER | Age: 81
End: 2021-10-25
Payer: MEDICARE

## 2021-10-26 ENCOUNTER — APPOINTMENT (OUTPATIENT)
Dept: RADIATION ONCOLOGY | Facility: MEDICAL CENTER | Age: 81
End: 2021-10-26
Payer: MEDICARE

## 2021-10-27 ENCOUNTER — APPOINTMENT (OUTPATIENT)
Dept: RADIATION ONCOLOGY | Facility: MEDICAL CENTER | Age: 81
End: 2021-10-27
Payer: MEDICARE

## 2021-10-27 VITALS
BODY MASS INDEX: 29.85 KG/M2 | HEART RATE: 87 BPM | OXYGEN SATURATION: 93 % | DIASTOLIC BLOOD PRESSURE: 90 MMHG | SYSTOLIC BLOOD PRESSURE: 134 MMHG | WEIGHT: 220.13 LBS

## 2021-10-27 LAB
CHEMOTHERAPY INFUSION START DATE: NORMAL
CHEMOTHERAPY RECORDS: 2.33
CHEMOTHERAPY RECORDS: 7000
CHEMOTHERAPY RECORDS: NORMAL
CHEMOTHERAPY RX CANCER: NORMAL
DATE 1ST CHEMO CANCER: NORMAL
RAD ONC ARIA COURSE LAST TREATMENT DATE: NORMAL
RAD ONC ARIA COURSE TREATMENT ELAPSED DAYS: NORMAL
RAD ONC ARIA REFERENCE POINT DOSAGE GIVEN TO DATE: 9.33
RAD ONC ARIA REFERENCE POINT DOSAGE GIVEN TO DATE: 9.64
RAD ONC ARIA REFERENCE POINT ID: NORMAL
RAD ONC ARIA REFERENCE POINT ID: NORMAL
RAD ONC ARIA REFERENCE POINT SESSION DOSAGE GIVEN: 2.33
RAD ONC ARIA REFERENCE POINT SESSION DOSAGE GIVEN: 2.41

## 2021-10-27 PROCEDURE — 77386 HCHG IMRT DELIVERY COMPLEX: CPT | Performed by: RADIOLOGY

## 2021-10-27 PROCEDURE — 77014 PR CT GUIDANCE PLACEMENT RAD THERAPY FIELDS: CPT | Mod: 26 | Performed by: RADIOLOGY

## 2021-10-27 RX ORDER — ZOLPIDEM TARTRATE 10 MG/1
10 TABLET ORAL NIGHTLY PRN
Status: ON HOLD | COMMUNITY
End: 2022-04-15 | Stop reason: SDUPTHER

## 2021-10-27 RX ORDER — FAMOTIDINE 20 MG/1
20 TABLET, FILM COATED ORAL
COMMUNITY
Start: 2021-10-21

## 2021-10-27 ASSESSMENT — PAIN SCALES - GENERAL: PAINLEVEL: NO PAIN

## 2021-10-27 ASSESSMENT — FIBROSIS 4 INDEX: FIB4 SCORE: 6.56

## 2021-10-27 NOTE — ON TREATMENT VISIT
ON TREATMENT  NOTE  RADIATION ONCOLOGY DEPARTMENT    Patient name:  Luigi Walters    Primary Physician:  [place] J Miller, M.D. MRN: [id number]  CSN: [id number]   Referring physician:  Chaitanya Saha M.D. : 1940, 80 y.o.     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 on 10/7/2021  Histopathologic type: Merkel cell carcinoma  Residual tumor (R): R2 - Macroscopic  Laterality: Left  Tumor size (mm): 10  Lymph-vascular invasion (LVI): LVI present/identified, NOS  Primary tumor: Present  Breslow depth (mm): 3  Clinical status of regional lymph nodes: Metastases confirmed by pathologic examination  Presence of extranodal extension: Present      TREATMENT SUMMARY:  [facility] Treatment Information        Some values may be hidden. Unless noted otherwise, only the newest values recorded on each date are displayed.         [facility] Treatment Summary 10/27/21   Course First Treatment Date 10/20/2021   Course Last Treatment Date 10/27/2021   L Face, Neck Plan from Course C1_Lface&Neck   Fraction 4 of 30   Elapsed Course Days  @    Prescribed Fraction Dose 233 cGy   Prescribed Total Dose 7,000 cGy   L Face, Neck Reference Point from Course C1_Lface&Neck   Elapsed Course Days  @    Session Dose 233 cGy   Total Dose 933 cGy   L Face, Neck CP Reference Point from Course C1_Lface&Neck   Elapsed Course Days  @    Session Dose 241 cGy   Total Dose 964 cGy                SUBJECTIVE:  No complaints.      VITAL SIGNS:  Vitals 10/27/2021 10/20/2021 10/7/2021 2021 2021 2021 2021   SYSTOLIC 134 155 128 150 - 118 118   DIASTOLIC 90 91 82 71 - 76 76   PULSE 87 90 96 81 - 96 89   TEMPERATURE - - - 98.2 - 97.9 97.5   RESPIRATIONS - - - 17 - 16 16   WEIGHT 220.13 218.04 217.59 - 218.26 218  "220.46   HEIGHT - - 6' 0\" - 6' 0\" 5' 11\" 6' .008\"   BMI 29.85 kg/m2 29.57 kg/m2 29.51 kg/m2 - 29.6 kg/m2 30.4 kg/m2 29.89 kg/m2   SPO2 93 96 95 94 - 97 95     KPS: 70, Cares for self; unable to carry on normal activity or to do active work (ECOG equivalent 1)  Encounter Vitals  Blood Pressure : 134/90  Pulse: 87  Pulse Oximetry: 93 %  Weight: 99.9 kg (220 lb 2.1 oz)  Pain Score: No pain  Pain Assessment 10/27/2021 10/20/2021 10/7/2021   Pain Score 0 0 5   Pain Loc - - Shoulder   Some recent data might be hidden          PHYSICAL EXAM:  Physical Exam  Vitals and nursing note reviewed.   Constitutional:       General: He is not in acute distress.     Appearance: He is well-developed.   HENT:      Head: Normocephalic.   Lymphadenopathy:      Cervical: Cervical adenopathy (left supraclav) present.   Skin:     General: Skin is warm and dry.      Findings: No erythema.   Neurological:      Mental Status: He is alert and oriented to person, place, and time.   Psychiatric:         Behavior: Behavior normal.         Thought Content: Thought content normal.         Judgment: Judgment normal.          Toxicity Assessment 10/27/2021 10/20/2021   Toxicity Assessment Head/Neck Head/Neck   Fatigue (lethargy, malaise, asthenia) None None   Radiation Dermatitis None None   Rash/desquamation None None   Constipation Requiring stool softener or dietary modification None   Dehydration Dry mucous membranes and/or diminished skin turgor None   Mouth Dryness Normal Normal   RT Dysphagia-Pharyngeal None None   Mucositis None None   Salivary Gland Changes None None   Taste Disturbance (dysgeusia) Normal Normal   RT - Pain due to RT None None   Cough Absent Absent   Voice changes/stridor/larynx (hoarseness, loss of voice, laryngitis) Persistent hoarseness, but able to vocalize and/or may have mild to moderate edema Normal       CURRENT MEDICATIONS:    Current Outpatient Medications:   •  famotidine (PEPCID) 20 MG Tab, Take 20 mg by mouth., " Disp: , Rfl:   •  zolpidem (AMBIEN) 10 MG Tab, Take 10 mg by mouth at bedtime as needed for Sleep., Disp: , Rfl:   •  fluticasone (FLONASE) 50 MCG/ACT nasal spray, , Disp: , Rfl:   •  ondansetron (ZOFRAN) 4 MG Tab tablet, TK 1 T PO Q 6 H FOR 30 DAYS. (Patient not taking: Reported on 10/7/2021), Disp: , Rfl:   •  oxyCODONE immediate-release (ROXICODONE) 5 MG Tab, Take 10 mg by mouth every 6 hours as needed for Severe Pain., Disp: , Rfl:   •  calcitRIOL (ROCALTROL) 0.25 MCG Cap, Take 0.25 mcg by mouth every day., Disp: , Rfl: 4  •  SF 5000 PLUS 1.1 % Cream, USE FOR BRUSHING UTD, Disp: , Rfl: 1  •  acetaminophen (TYLENOL) 500 MG Tab, Take 500-1,000 mg by mouth as needed. Indications: Pain, Disp: , Rfl:   •  tramadol (ULTRAM) 50 MG Tab, Take 50 mg by mouth every 8 hours as needed. Indications: Pain, Disp: , Rfl:   •  NON SPECIFIED, L-orinthate 500mg PO daily, Disp: , Rfl:   •  diphenhydrAMINE (BENADRYL) 50 MG Cap, Take 50 mg by mouth every bedtime. (Patient not taking: Reported on 10/27/2021), Disp: , Rfl:   •  NON SPECIFIED, L-aspartate 500mg Po daily, Disp: , Rfl:   •  promethazine (PHENERGAN) 25 MG Tab, Take 25 mg by mouth every 6 hours as needed for Nausea/Vomiting., Disp: , Rfl:   •  Multiple Vitamin (MULTI VITAMIN DAILY PO), Take  by mouth. Combination d3,magnessium,vitamin a, Disp: , Rfl:   •  ropinirole (REQUIP) 0.25 MG Tab, Take 0.5 mg by mouth at bedtime. Indications: Restless Leg Syndrome, Disp: , Rfl: 3  •  spironolactone (ALDACTONE) 50 MG Tab, Take 50 mg by mouth every day. Indications: Edema, Disp: , Rfl: 0  •  lactulose 10 GM/15ML SOLN, Take 30 g by mouth as needed., Disp: , Rfl:   •  meclizine (ANTIVERT) 25 MG TABS, Take 25 mg by mouth 3 times a day as needed for Dizziness or Vertigo., Disp: , Rfl:   •  tamsulosin (FLOMAX) 0.4 MG capsule, Take 0.4 mg by mouth every morning. Indications: Benign Enlargement of Prostate, Disp: , Rfl:   •  omeprazole (PRILOSEC) 40 MG capsule, Take 40 mg by mouth every day.,  Disp: , Rfl:     LABORATORY DATA:   Lab Results   Component Value Date/Time    SODIUM 139 09/09/2021 02:07 PM    POTASSIUM 4.7 09/09/2021 02:07 PM    CHLORIDE 101 09/09/2021 02:07 PM    CO2 27 09/09/2021 02:07 PM    GLUCOSE 110 (H) 09/09/2021 02:07 PM    BUN 20 09/09/2021 02:07 PM    CREATININE 1.46 (H) 09/09/2021 02:07 PM    CREATININE 1.0 05/28/2008 06:00 AM       Lab Results   Component Value Date/Time    WBC 2.3 (LL) 09/09/2021 02:07 PM    RBC 4.78 09/09/2021 02:07 PM    HEMOGLOBIN 14.3 09/09/2021 02:07 PM    HEMATOCRIT 43.4 09/09/2021 02:07 PM    MCV 90.8 09/09/2021 02:07 PM    MCH 29.9 09/09/2021 02:07 PM    MCHC 32.9 (L) 09/09/2021 02:07 PM    PLATELETCT 88 (L) 09/09/2021 02:07 PM         RADIOLOGY DATA:  No results found.    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 on 10/7/2021      PLAN:  Change in treatment plan   Level 3 node partially out of high dose region. replan with additional margin.    Disposition:  Treatment plan and imaging reviewed. Questions answered. Continue therapy outlined.     Jovany WARD M.D.    Orders Placed This Encounter   • Rad Onc [facility] Session Summary   • famotidine (PEPCID) 20 MG Tab   • zolpidem (AMBIEN) 10 MG Tab

## 2021-10-28 ENCOUNTER — APPOINTMENT (OUTPATIENT)
Dept: RADIATION ONCOLOGY | Facility: MEDICAL CENTER | Age: 81
End: 2021-10-28
Payer: MEDICARE

## 2021-10-28 ENCOUNTER — DOCUMENTATION (OUTPATIENT)
Dept: RADIATION ONCOLOGY | Facility: MEDICAL CENTER | Age: 81
End: 2021-10-28

## 2021-10-28 LAB
CHEMOTHERAPY INFUSION START DATE: NORMAL
CHEMOTHERAPY INFUSION START DATE: NORMAL
CHEMOTHERAPY RECORDS: 2.33
CHEMOTHERAPY RECORDS: 2.33
CHEMOTHERAPY RECORDS: 7000
CHEMOTHERAPY RECORDS: 7000
CHEMOTHERAPY RECORDS: NORMAL
CHEMOTHERAPY RX CANCER: NORMAL
CHEMOTHERAPY RX CANCER: NORMAL
DATE 1ST CHEMO CANCER: NORMAL
DATE 1ST CHEMO CANCER: NORMAL
RAD ONC ARIA COURSE LAST TREATMENT DATE: NORMAL
RAD ONC ARIA COURSE LAST TREATMENT DATE: NORMAL
RAD ONC ARIA COURSE TREATMENT ELAPSED DAYS: NORMAL
RAD ONC ARIA COURSE TREATMENT ELAPSED DAYS: NORMAL
RAD ONC ARIA REFERENCE POINT DOSAGE GIVEN TO DATE: 11.67
RAD ONC ARIA REFERENCE POINT DOSAGE GIVEN TO DATE: 12.05
RAD ONC ARIA REFERENCE POINT DOSAGE GIVEN TO DATE: 14
RAD ONC ARIA REFERENCE POINT DOSAGE GIVEN TO DATE: 14.47
RAD ONC ARIA REFERENCE POINT ID: NORMAL
RAD ONC ARIA REFERENCE POINT SESSION DOSAGE GIVEN: 2.33
RAD ONC ARIA REFERENCE POINT SESSION DOSAGE GIVEN: 2.33
RAD ONC ARIA REFERENCE POINT SESSION DOSAGE GIVEN: 2.41
RAD ONC ARIA REFERENCE POINT SESSION DOSAGE GIVEN: 2.41

## 2021-10-28 PROCEDURE — 77014 PR CT GUIDANCE PLACEMENT RAD THERAPY FIELDS: CPT | Mod: 26,XE | Performed by: RADIOLOGY

## 2021-10-28 PROCEDURE — 77386 HCHG IMRT DELIVERY COMPLEX: CPT | Performed by: RADIOLOGY

## 2021-10-28 PROCEDURE — 77427 RADIATION TX MANAGEMENT X5: CPT | Performed by: RADIOLOGY

## 2021-10-28 NOTE — PROGRESS NOTES
Nutrition Services: Brief Update  Wt Readings from Last 7 Encounters:   10/27/21 99.9 kg (220 lb 2.1 oz)   10/20/21 98.9 kg (218 lb 0.6 oz)   10/07/21 98.7 kg (217 lb 9.5 oz)   09/13/21 99 kg (218 lb 4.1 oz)   04/13/21 98.9 kg (218 lb)   04/08/21 100 kg (220 lb 7.4 oz)   02/04/21 99.8 kg (220 lb)     Weight Change: weight up slightly.    I met with patient this morning following XRT.  Patient reports he has found a protein powder that he likes with 30 grams of protein/serving.  He is taking this 2-3x/day and also eating well.  He reports a good appetite and no difficulties with eating.  Discussed he could likely just do the protein shake 2x/day since he is eating well at this time however patient prefers to do it 3x/day.  Discussed protein goal of at least 100 grams per day based on weight.  Ideally 100-120 grams of protein/day will meet patients needs. Encouraged patient to reach out with questions or concerns as they arise.       Pt verbalizes understanding and is receptive to information provided. RD to continue to monitor throughout treatment and provide nutrition recommendations as indicated.  Please contact [phone]

## 2021-10-29 ENCOUNTER — APPOINTMENT (OUTPATIENT)
Dept: RADIATION ONCOLOGY | Facility: MEDICAL CENTER | Age: 81
End: 2021-10-29
Payer: MEDICARE

## 2021-10-29 LAB
CHEMOTHERAPY INFUSION START DATE: NORMAL
CHEMOTHERAPY RECORDS: 2.33
CHEMOTHERAPY RECORDS: 7000
CHEMOTHERAPY RECORDS: NORMAL
CHEMOTHERAPY RX CANCER: NORMAL
DATE 1ST CHEMO CANCER: NORMAL
RAD ONC ARIA COURSE LAST TREATMENT DATE: NORMAL
RAD ONC ARIA COURSE TREATMENT ELAPSED DAYS: NORMAL
RAD ONC ARIA REFERENCE POINT DOSAGE GIVEN TO DATE: 16.33
RAD ONC ARIA REFERENCE POINT DOSAGE GIVEN TO DATE: 16.88
RAD ONC ARIA REFERENCE POINT ID: NORMAL
RAD ONC ARIA REFERENCE POINT ID: NORMAL
RAD ONC ARIA REFERENCE POINT SESSION DOSAGE GIVEN: 2.33
RAD ONC ARIA REFERENCE POINT SESSION DOSAGE GIVEN: 2.41

## 2021-10-29 PROCEDURE — 77386 HCHG IMRT DELIVERY COMPLEX: CPT | Performed by: RADIOLOGY

## 2021-10-29 PROCEDURE — 77014 PR CT GUIDANCE PLACEMENT RAD THERAPY FIELDS: CPT | Mod: 26 | Performed by: RADIOLOGY

## 2021-11-01 ENCOUNTER — HOSPITAL ENCOUNTER (OUTPATIENT)
Dept: RADIATION ONCOLOGY | Facility: MEDICAL CENTER | Age: 81
End: 2021-11-30
Attending: RADIOLOGY
Payer: MEDICARE

## 2021-11-01 LAB
CHEMOTHERAPY INFUSION START DATE: NORMAL
CHEMOTHERAPY RECORDS: 2.33
CHEMOTHERAPY RECORDS: 7000
CHEMOTHERAPY RECORDS: NORMAL
CHEMOTHERAPY RX CANCER: NORMAL
DATE 1ST CHEMO CANCER: NORMAL
RAD ONC ARIA COURSE LAST TREATMENT DATE: NORMAL
RAD ONC ARIA COURSE TREATMENT ELAPSED DAYS: NORMAL
RAD ONC ARIA REFERENCE POINT DOSAGE GIVEN TO DATE: 18.67
RAD ONC ARIA REFERENCE POINT DOSAGE GIVEN TO DATE: 19.29
RAD ONC ARIA REFERENCE POINT ID: NORMAL
RAD ONC ARIA REFERENCE POINT ID: NORMAL
RAD ONC ARIA REFERENCE POINT SESSION DOSAGE GIVEN: 2.33
RAD ONC ARIA REFERENCE POINT SESSION DOSAGE GIVEN: 2.41

## 2021-11-01 PROCEDURE — 77014 PR CT GUIDANCE PLACEMENT RAD THERAPY FIELDS: CPT | Mod: 26 | Performed by: RADIOLOGY

## 2021-11-01 PROCEDURE — 77331 SPECIAL RADIATION DOSIMETRY: CPT | Performed by: RADIOLOGY

## 2021-11-01 PROCEDURE — 77336 RADIATION PHYSICS CONSULT: CPT | Performed by: RADIOLOGY

## 2021-11-01 PROCEDURE — 77386 HCHG IMRT DELIVERY COMPLEX: CPT | Performed by: RADIOLOGY

## 2021-11-01 PROCEDURE — 77331 SPECIAL RADIATION DOSIMETRY: CPT | Mod: 26 | Performed by: RADIOLOGY

## 2021-11-02 ENCOUNTER — APPOINTMENT (OUTPATIENT)
Dept: RADIATION ONCOLOGY | Facility: MEDICAL CENTER | Age: 81
End: 2021-11-02
Payer: MEDICARE

## 2021-11-02 LAB
CHEMOTHERAPY INFUSION START DATE: NORMAL
CHEMOTHERAPY RECORDS: 2.33
CHEMOTHERAPY RECORDS: 7000
CHEMOTHERAPY RECORDS: NORMAL
CHEMOTHERAPY RX CANCER: NORMAL
DATE 1ST CHEMO CANCER: NORMAL
RAD ONC ARIA COURSE LAST TREATMENT DATE: NORMAL
RAD ONC ARIA COURSE TREATMENT ELAPSED DAYS: NORMAL
RAD ONC ARIA REFERENCE POINT DOSAGE GIVEN TO DATE: 21
RAD ONC ARIA REFERENCE POINT DOSAGE GIVEN TO DATE: 21.7
RAD ONC ARIA REFERENCE POINT ID: NORMAL
RAD ONC ARIA REFERENCE POINT ID: NORMAL
RAD ONC ARIA REFERENCE POINT SESSION DOSAGE GIVEN: 2.33
RAD ONC ARIA REFERENCE POINT SESSION DOSAGE GIVEN: 2.41

## 2021-11-02 PROCEDURE — 77386 HCHG IMRT DELIVERY COMPLEX: CPT | Performed by: RADIOLOGY

## 2021-11-02 PROCEDURE — 77300 RADIATION THERAPY DOSE PLAN: CPT | Performed by: RADIOLOGY

## 2021-11-02 PROCEDURE — 77300 RADIATION THERAPY DOSE PLAN: CPT | Mod: 26 | Performed by: RADIOLOGY

## 2021-11-02 PROCEDURE — 77338 DESIGN MLC DEVICE FOR IMRT: CPT | Mod: 26 | Performed by: RADIOLOGY

## 2021-11-02 PROCEDURE — 77014 PR CT GUIDANCE PLACEMENT RAD THERAPY FIELDS: CPT | Mod: 26 | Performed by: RADIOLOGY

## 2021-11-02 PROCEDURE — 77338 DESIGN MLC DEVICE FOR IMRT: CPT | Performed by: RADIOLOGY

## 2021-11-03 ENCOUNTER — APPOINTMENT (OUTPATIENT)
Dept: RADIATION ONCOLOGY | Facility: MEDICAL CENTER | Age: 81
End: 2021-11-03
Payer: MEDICARE

## 2021-11-03 VITALS
WEIGHT: 218.37 LBS | OXYGEN SATURATION: 95 % | BODY MASS INDEX: 29.62 KG/M2 | DIASTOLIC BLOOD PRESSURE: 91 MMHG | SYSTOLIC BLOOD PRESSURE: 158 MMHG | HEART RATE: 87 BPM

## 2021-11-03 LAB
CHEMOTHERAPY INFUSION START DATE: NORMAL
CHEMOTHERAPY RECORDS: 2.33
CHEMOTHERAPY RECORDS: 4900
CHEMOTHERAPY RECORDS: NORMAL
CHEMOTHERAPY RX CANCER: NORMAL
DATE 1ST CHEMO CANCER: NORMAL
RAD ONC ARIA COURSE LAST TREATMENT DATE: NORMAL
RAD ONC ARIA COURSE TREATMENT ELAPSED DAYS: NORMAL
RAD ONC ARIA REFERENCE POINT DOSAGE GIVEN TO DATE: 23.33
RAD ONC ARIA REFERENCE POINT DOSAGE GIVEN TO DATE: 24.11
RAD ONC ARIA REFERENCE POINT ID: NORMAL
RAD ONC ARIA REFERENCE POINT ID: NORMAL
RAD ONC ARIA REFERENCE POINT SESSION DOSAGE GIVEN: 2.33
RAD ONC ARIA REFERENCE POINT SESSION DOSAGE GIVEN: 2.42

## 2021-11-03 PROCEDURE — 77386 HCHG IMRT DELIVERY COMPLEX: CPT | Performed by: RADIOLOGY

## 2021-11-03 PROCEDURE — 77014 PR CT GUIDANCE PLACEMENT RAD THERAPY FIELDS: CPT | Mod: 26 | Performed by: RADIOLOGY

## 2021-11-03 PROCEDURE — 77427 RADIATION TX MANAGEMENT X5: CPT | Performed by: RADIOLOGY

## 2021-11-03 ASSESSMENT — FIBROSIS 4 INDEX: FIB4 SCORE: 6.56

## 2021-11-03 ASSESSMENT — PAIN SCALES - GENERAL: PAINLEVEL: NO PAIN

## 2021-11-03 NOTE — ON TREATMENT VISIT
ON TREATMENT  NOTE  RADIATION ONCOLOGY DEPARTMENT    Patient name:  Luigi Walters    Primary Physician:  Denver J Miller, M.D. MRN: [id number]  [facility]: [id number]   Referring physician:  Chaitanya Saha M.D. : 1940, 80 y.o.     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 on 10/7/2021  Histopathologic type: Merkel cell carcinoma  Residual tumor (R): R2 - Macroscopic  Laterality: Left  Tumor size (mm): 10  Lymph-vascular invasion (LVI): LVI present/identified, NOS  Primary tumor: Present  Breslow depth (mm): 3  Clinical status of regional lymph nodes: Metastases confirmed by pathologic examination  Presence of extranodal extension: Present      TREATMENT SUMMARY:  [facility] Treatment Information        Some values may be hidden. Unless noted otherwise, only the newest values recorded on each date are displayed.         [facility] Treatment Summary 11/3/21   Course First Treatment Date 10/20/2021   Course Last Treatment Date 2021   L Face, Neck Plan from Course C1_Lface&Neck   L Face, Neck1 Plan from Course C1_Lface&Neck   Fraction 1 of 21   Elapsed Course Days  @ [id number]   Prescribed Fraction Dose 233 cGy   Prescribed Total Dose 4,900 cGy   L Face, Neck Reference Point from Course C1_Lface&Neck   Elapsed Course Days  @ [id number]   Session Dose 233 cGy   Total Dose 2,333 cGy   L Face, Neck CP Reference Point from Course C1_Lface&Neck   Elapsed Course Days  @    Session Dose 242 cGy   Total Dose 2,411 cGy                SUBJECTIVE:  Nausea, dizziness, constipation.      VITAL SIGNS:  Vitals 11/3/2021 10/27/2021 10/20/2021 10/7/2021 2021 2021 2021   SYSTOLIC 158 134 155 128 150 - 118   DIASTOLIC 91 90 91 82 71 - 76   PULSE 87 87 90 96 81 - 96   TEMPERATURE - - - - 98.2 - 97.9  "  RESPIRATIONS - - - - 17 - 16   WEIGHT 218.37 220.13 218.04 217.59 - 218.26 218   HEIGHT - - - 6' 0\" - 6' 0\" 5' 11\"   BMI 29.62 kg/m2 29.85 kg/m2 29.57 kg/m2 29.51 kg/m2 - 29.6 kg/m2 30.4 kg/m2   SPO2 95 93 96 95 94 - 97     KPS: 70, Cares for self; unable to carry on normal activity or to do active work (ECOG equivalent 1)  Encounter Vitals  Blood Pressure : 158/91  Pulse: 87  Pulse Oximetry: 95 %  Weight: 99 kg (218 lb 5.9 oz)  Pain Score: No pain  Pain Assessment 11/3/2021 10/27/2021 10/20/2021 10/7/2021   Pain Score 0 0 0 5   Pain Loc - - - Shoulder   Some recent data might be hidden          PHYSICAL EXAM:  Physical Exam     Toxicity Assessment 11/3/2021 10/27/2021 10/20/2021   Toxicity Assessment Head/Neck Head/Neck Head/Neck   Fatigue (lethargy, malaise, asthenia) Increased fatigue over baseline, but not altering normal activities None None   Radiation Dermatitis None None None   Rash/desquamation None None None   Constipation Requiring laxatives Requiring stool softener or dietary modification None   Dehydration Dry mucous membranes and/or diminished skin turgor Dry mucous membranes and/or diminished skin turgor None   Mouth Dryness Normal Normal Normal   RT Dysphagia-Pharyngeal None None None   Mucositis None None None   Salivary Gland Changes None None None   Taste Disturbance (dysgeusia) Normal Normal Normal   RT - Pain due to RT None None None   Cough Absent Absent Absent   Voice changes/stridor/larynx (hoarseness, loss of voice, laryngitis) Normal Persistent hoarseness, but able to vocalize and/or may have mild to moderate edema Normal       CURRENT MEDICATIONS:    Current Outpatient Medications:   •  famotidine (PEPCID) 20 MG Tab, Take 20 mg by mouth., Disp: , Rfl:   •  zolpidem (AMBIEN) 10 MG Tab, Take 10 mg by mouth at bedtime as needed for Sleep., Disp: , Rfl:   •  fluticasone (FLONASE) 50 MCG/ACT nasal spray, , Disp: , Rfl:   •  ondansetron (ZOFRAN) 4 MG Tab tablet, TK 1 T PO Q 6 H FOR 30 DAYS. " (Patient not taking: Reported on 10/7/2021), Disp: , Rfl:   •  oxyCODONE immediate-release (ROXICODONE) 5 MG Tab, Take 10 mg by mouth every 6 hours as needed for Severe Pain., Disp: , Rfl:   •  calcitRIOL (ROCALTROL) 0.25 MCG Cap, Take 0.25 mcg by mouth every day., Disp: , Rfl: 4  •  SF 5000 PLUS 1.1 % Cream, USE FOR BRUSHING UTD, Disp: , Rfl: 1  •  acetaminophen (TYLENOL) 500 MG Tab, Take 500-1,000 mg by mouth as needed. Indications: Pain, Disp: , Rfl:   •  tramadol (ULTRAM) 50 MG Tab, Take 50 mg by mouth every 8 hours as needed. Indications: Pain, Disp: , Rfl:   •  NON SPECIFIED, L-orinthate 500mg PO daily, Disp: , Rfl:   •  diphenhydrAMINE (BENADRYL) 50 MG Cap, Take 50 mg by mouth every bedtime. (Patient not taking: Reported on 10/27/2021), Disp: , Rfl:   •  NON SPECIFIED, L-aspartate 500mg Po daily, Disp: , Rfl:   •  promethazine (PHENERGAN) 25 MG Tab, Take 25 mg by mouth every 6 hours as needed for Nausea/Vomiting., Disp: , Rfl:   •  Multiple Vitamin (MULTI VITAMIN DAILY PO), Take  by mouth. Combination d3,magnessium,vitamin a, Disp: , Rfl:   •  ropinirole (REQUIP) 0.25 MG Tab, Take 0.5 mg by mouth at bedtime. Indications: Restless Leg Syndrome, Disp: , Rfl: 3  •  spironolactone (ALDACTONE) 50 MG Tab, Take 50 mg by mouth every day. Indications: Edema, Disp: , Rfl: 0  •  lactulose 10 GM/15ML SOLN, Take 30 g by mouth as needed., Disp: , Rfl:   •  meclizine (ANTIVERT) 25 MG TABS, Take 25 mg by mouth 3 times a day as needed for Dizziness or Vertigo., Disp: , Rfl:   •  tamsulosin (FLOMAX) 0.4 MG capsule, Take 0.4 mg by mouth every morning. Indications: Benign Enlargement of Prostate, Disp: , Rfl:   •  omeprazole (PRILOSEC) 40 MG capsule, Take 40 mg by mouth every day., Disp: , Rfl:     LABORATORY DATA:   Lab Results   Component Value Date/Time    SODIUM 139 09/09/2021 02:07 PM    POTASSIUM 4.7 09/09/2021 02:07 PM    CHLORIDE 101 09/09/2021 02:07 PM    CO2 27 09/09/2021 02:07 PM    GLUCOSE 110 (H) 09/09/2021 02:07 PM     BUN 20 09/09/2021 02:07 PM    CREATININE 1.46 (H) 09/09/2021 02:07 PM    CREATININE 1.0 05/28/2008 06:00 AM       Lab Results   Component Value Date/Time    WBC 2.3 (LL) 09/09/2021 02:07 PM    RBC 4.78 09/09/2021 02:07 PM    HEMOGLOBIN 14.3 09/09/2021 02:07 PM    HEMATOCRIT 43.4 09/09/2021 02:07 PM    MCV 90.8 09/09/2021 02:07 PM    MCH 29.9 09/09/2021 02:07 PM    MCHC 32.9 (L) 09/09/2021 02:07 PM    PLATELETCT 88 (L) 09/09/2021 02:07 PM         RADIOLOGY DATA:  No results found.    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 on 10/7/2021      PLAN:  No change in treatment plan   Increase fluids.    Disposition:  Treatment plan and imaging reviewed. Questions answered. Continue therapy outlined.     Jovany WARD M.D.    Orders Placed This Encounter   • Rad Onc [facility] Session Summary

## 2021-11-04 ENCOUNTER — APPOINTMENT (OUTPATIENT)
Dept: RADIATION ONCOLOGY | Facility: MEDICAL CENTER | Age: 81
End: 2021-11-04
Payer: MEDICARE

## 2021-11-04 LAB
CHEMOTHERAPY INFUSION START DATE: NORMAL
CHEMOTHERAPY RECORDS: 2.33
CHEMOTHERAPY RECORDS: 4900
CHEMOTHERAPY RECORDS: NORMAL
CHEMOTHERAPY RX CANCER: NORMAL
DATE 1ST CHEMO CANCER: NORMAL
RAD ONC ARIA COURSE LAST TREATMENT DATE: NORMAL
RAD ONC ARIA COURSE TREATMENT ELAPSED DAYS: NORMAL
RAD ONC ARIA REFERENCE POINT DOSAGE GIVEN TO DATE: 25.67
RAD ONC ARIA REFERENCE POINT DOSAGE GIVEN TO DATE: 26.53
RAD ONC ARIA REFERENCE POINT ID: NORMAL
RAD ONC ARIA REFERENCE POINT ID: NORMAL
RAD ONC ARIA REFERENCE POINT SESSION DOSAGE GIVEN: 2.33
RAD ONC ARIA REFERENCE POINT SESSION DOSAGE GIVEN: 2.42

## 2021-11-04 PROCEDURE — 77014 PR CT GUIDANCE PLACEMENT RAD THERAPY FIELDS: CPT | Mod: 26 | Performed by: RADIOLOGY

## 2021-11-04 PROCEDURE — 77386 HCHG IMRT DELIVERY COMPLEX: CPT | Performed by: RADIOLOGY

## 2021-11-05 ENCOUNTER — APPOINTMENT (OUTPATIENT)
Dept: RADIATION ONCOLOGY | Facility: MEDICAL CENTER | Age: 81
End: 2021-11-05
Payer: MEDICARE

## 2021-11-05 LAB
CHEMOTHERAPY INFUSION START DATE: NORMAL
CHEMOTHERAPY RECORDS: 2.33
CHEMOTHERAPY RECORDS: 4900
CHEMOTHERAPY RECORDS: NORMAL
CHEMOTHERAPY RX CANCER: NORMAL
DATE 1ST CHEMO CANCER: NORMAL
RAD ONC ARIA COURSE LAST TREATMENT DATE: NORMAL
RAD ONC ARIA COURSE TREATMENT ELAPSED DAYS: NORMAL
RAD ONC ARIA REFERENCE POINT DOSAGE GIVEN TO DATE: 28
RAD ONC ARIA REFERENCE POINT DOSAGE GIVEN TO DATE: 28.94
RAD ONC ARIA REFERENCE POINT ID: NORMAL
RAD ONC ARIA REFERENCE POINT ID: NORMAL
RAD ONC ARIA REFERENCE POINT SESSION DOSAGE GIVEN: 2.33
RAD ONC ARIA REFERENCE POINT SESSION DOSAGE GIVEN: 2.42

## 2021-11-05 PROCEDURE — 77386 HCHG IMRT DELIVERY COMPLEX: CPT | Performed by: RADIOLOGY

## 2021-11-05 PROCEDURE — 77014 PR CT GUIDANCE PLACEMENT RAD THERAPY FIELDS: CPT | Mod: 26 | Performed by: RADIOLOGY

## 2021-11-08 ENCOUNTER — APPOINTMENT (OUTPATIENT)
Dept: RADIATION ONCOLOGY | Facility: MEDICAL CENTER | Age: 81
End: 2021-11-08
Payer: MEDICARE

## 2021-11-08 LAB
CHEMOTHERAPY INFUSION START DATE: NORMAL
CHEMOTHERAPY RECORDS: 2.33
CHEMOTHERAPY RECORDS: 4900
CHEMOTHERAPY RECORDS: NORMAL
CHEMOTHERAPY RX CANCER: NORMAL
DATE 1ST CHEMO CANCER: NORMAL
RAD ONC ARIA COURSE LAST TREATMENT DATE: NORMAL
RAD ONC ARIA COURSE TREATMENT ELAPSED DAYS: NORMAL
RAD ONC ARIA REFERENCE POINT DOSAGE GIVEN TO DATE: 30.33
RAD ONC ARIA REFERENCE POINT DOSAGE GIVEN TO DATE: 31.36
RAD ONC ARIA REFERENCE POINT ID: NORMAL
RAD ONC ARIA REFERENCE POINT ID: NORMAL
RAD ONC ARIA REFERENCE POINT SESSION DOSAGE GIVEN: 2.33
RAD ONC ARIA REFERENCE POINT SESSION DOSAGE GIVEN: 2.42

## 2021-11-08 PROCEDURE — 77336 RADIATION PHYSICS CONSULT: CPT | Performed by: RADIOLOGY

## 2021-11-08 PROCEDURE — 77014 PR CT GUIDANCE PLACEMENT RAD THERAPY FIELDS: CPT | Mod: 26 | Performed by: RADIOLOGY

## 2021-11-08 PROCEDURE — 77386 HCHG IMRT DELIVERY COMPLEX: CPT | Performed by: RADIOLOGY

## 2021-11-09 ENCOUNTER — APPOINTMENT (OUTPATIENT)
Dept: RADIATION ONCOLOGY | Facility: MEDICAL CENTER | Age: 81
End: 2021-11-09
Payer: MEDICARE

## 2021-11-09 LAB
CHEMOTHERAPY INFUSION START DATE: NORMAL
CHEMOTHERAPY RECORDS: 2.33
CHEMOTHERAPY RECORDS: 4900
CHEMOTHERAPY RECORDS: NORMAL
CHEMOTHERAPY RX CANCER: NORMAL
DATE 1ST CHEMO CANCER: NORMAL
RAD ONC ARIA COURSE LAST TREATMENT DATE: NORMAL
RAD ONC ARIA COURSE TREATMENT ELAPSED DAYS: NORMAL
RAD ONC ARIA REFERENCE POINT DOSAGE GIVEN TO DATE: 32.67
RAD ONC ARIA REFERENCE POINT DOSAGE GIVEN TO DATE: 33.78
RAD ONC ARIA REFERENCE POINT ID: NORMAL
RAD ONC ARIA REFERENCE POINT ID: NORMAL
RAD ONC ARIA REFERENCE POINT SESSION DOSAGE GIVEN: 2.33
RAD ONC ARIA REFERENCE POINT SESSION DOSAGE GIVEN: 2.42

## 2021-11-09 PROCEDURE — 77386 HCHG IMRT DELIVERY COMPLEX: CPT | Performed by: RADIOLOGY

## 2021-11-09 PROCEDURE — 77014 PR CT GUIDANCE PLACEMENT RAD THERAPY FIELDS: CPT | Mod: 26 | Performed by: RADIOLOGY

## 2021-11-10 ENCOUNTER — APPOINTMENT (OUTPATIENT)
Dept: RADIATION ONCOLOGY | Facility: MEDICAL CENTER | Age: 81
End: 2021-11-10
Payer: MEDICARE

## 2021-11-10 VITALS
DIASTOLIC BLOOD PRESSURE: 90 MMHG | HEART RATE: 78 BPM | SYSTOLIC BLOOD PRESSURE: 143 MMHG | WEIGHT: 219.91 LBS | OXYGEN SATURATION: 97 % | BODY MASS INDEX: 29.82 KG/M2

## 2021-11-10 LAB
CHEMOTHERAPY INFUSION START DATE: NORMAL
CHEMOTHERAPY RECORDS: 2.33
CHEMOTHERAPY RECORDS: 4900
CHEMOTHERAPY RECORDS: NORMAL
CHEMOTHERAPY RX CANCER: NORMAL
DATE 1ST CHEMO CANCER: NORMAL
RAD ONC ARIA COURSE LAST TREATMENT DATE: NORMAL
RAD ONC ARIA COURSE TREATMENT ELAPSED DAYS: NORMAL
RAD ONC ARIA REFERENCE POINT DOSAGE GIVEN TO DATE: 35
RAD ONC ARIA REFERENCE POINT DOSAGE GIVEN TO DATE: 36.19
RAD ONC ARIA REFERENCE POINT ID: NORMAL
RAD ONC ARIA REFERENCE POINT ID: NORMAL
RAD ONC ARIA REFERENCE POINT SESSION DOSAGE GIVEN: 2.33
RAD ONC ARIA REFERENCE POINT SESSION DOSAGE GIVEN: 2.42

## 2021-11-10 PROCEDURE — 77386 HCHG IMRT DELIVERY COMPLEX: CPT | Performed by: RADIOLOGY

## 2021-11-10 PROCEDURE — 77427 RADIATION TX MANAGEMENT X5: CPT | Performed by: RADIOLOGY

## 2021-11-10 PROCEDURE — 77014 PR CT GUIDANCE PLACEMENT RAD THERAPY FIELDS: CPT | Mod: 26 | Performed by: RADIOLOGY

## 2021-11-10 ASSESSMENT — PAIN SCALES - GENERAL: PAINLEVEL: NO PAIN

## 2021-11-10 ASSESSMENT — FIBROSIS 4 INDEX: FIB4 SCORE: 6.56

## 2021-11-11 ENCOUNTER — APPOINTMENT (OUTPATIENT)
Dept: RADIATION ONCOLOGY | Facility: MEDICAL CENTER | Age: 81
End: 2021-11-11
Payer: MEDICARE

## 2021-11-11 ENCOUNTER — DOCUMENTATION (OUTPATIENT)
Dept: RADIATION ONCOLOGY | Facility: MEDICAL CENTER | Age: 81
End: 2021-11-11

## 2021-11-11 LAB
CHEMOTHERAPY INFUSION START DATE: NORMAL
CHEMOTHERAPY RECORDS: 2.33
CHEMOTHERAPY RECORDS: 4900
CHEMOTHERAPY RECORDS: NORMAL
CHEMOTHERAPY RX CANCER: NORMAL
DATE 1ST CHEMO CANCER: NORMAL
RAD ONC ARIA COURSE LAST TREATMENT DATE: NORMAL
RAD ONC ARIA COURSE TREATMENT ELAPSED DAYS: NORMAL
RAD ONC ARIA REFERENCE POINT DOSAGE GIVEN TO DATE: 37.33
RAD ONC ARIA REFERENCE POINT DOSAGE GIVEN TO DATE: 38.61
RAD ONC ARIA REFERENCE POINT ID: NORMAL
RAD ONC ARIA REFERENCE POINT ID: NORMAL
RAD ONC ARIA REFERENCE POINT SESSION DOSAGE GIVEN: 2.33
RAD ONC ARIA REFERENCE POINT SESSION DOSAGE GIVEN: 2.42

## 2021-11-11 PROCEDURE — 77386 HCHG IMRT DELIVERY COMPLEX: CPT | Performed by: RADIOLOGY

## 2021-11-11 PROCEDURE — 77014 PR CT GUIDANCE PLACEMENT RAD THERAPY FIELDS: CPT | Mod: 26 | Performed by: RADIOLOGY

## 2021-11-11 NOTE — PROGRESS NOTES
"Nutrition Services: Brief Update  Wt Readings from Last 7 Encounters:   11/10/21 99.7 kg (219 lb 14.5 oz)   11/03/21 99 kg (218 lb 5.9 oz)   10/27/21 99.9 kg (220 lb 2.1 oz)   10/20/21 98.9 kg (218 lb 0.6 oz)   10/07/21 98.7 kg (217 lb 9.5 oz)   09/13/21 99 kg (218 lb 4.1 oz)   04/13/21 98.9 kg (218 lb)     Weight Change: Weight has been stable since April 2021 consistently.     Pt seen after XRT with wife present who is the \"cook\" per pt. Pt reports using Flavio protein bar in the mornings and is drinking 3 protein shakes per day with Muscle Milk protein (30 gm). Discussed stable weight with pt and wife. Wife concerned about pt's water intake, pt reports using 2 cups of water per protein shake with an additional ~2 cups of water to meet daily goal of 64 oz. Pt does report flavoring water, discussed that an additional cup of water or 2 per day without flavoring could be beneficial.    Plan/Recommend:  • Continue with current intake and 3 protein shakes per day.  • Encouraged water intake for hydration.    Pt verbalizes understanding and is very receptive to information provided. RD to continue to monitor throughout treatment and provide nutrition recommendations as indicated.  Please contact [phone]    "

## 2021-11-12 ENCOUNTER — APPOINTMENT (OUTPATIENT)
Dept: RADIATION ONCOLOGY | Facility: MEDICAL CENTER | Age: 81
End: 2021-11-12
Payer: MEDICARE

## 2021-11-12 LAB
CHEMOTHERAPY INFUSION START DATE: NORMAL
CHEMOTHERAPY RECORDS: 2.33
CHEMOTHERAPY RECORDS: 4900
CHEMOTHERAPY RECORDS: NORMAL
CHEMOTHERAPY RX CANCER: NORMAL
DATE 1ST CHEMO CANCER: NORMAL
RAD ONC ARIA COURSE LAST TREATMENT DATE: NORMAL
RAD ONC ARIA COURSE TREATMENT ELAPSED DAYS: NORMAL
RAD ONC ARIA REFERENCE POINT DOSAGE GIVEN TO DATE: 39.67
RAD ONC ARIA REFERENCE POINT DOSAGE GIVEN TO DATE: 41.02
RAD ONC ARIA REFERENCE POINT ID: NORMAL
RAD ONC ARIA REFERENCE POINT ID: NORMAL
RAD ONC ARIA REFERENCE POINT SESSION DOSAGE GIVEN: 2.33
RAD ONC ARIA REFERENCE POINT SESSION DOSAGE GIVEN: 2.42

## 2021-11-12 PROCEDURE — 77386 HCHG IMRT DELIVERY COMPLEX: CPT | Performed by: RADIOLOGY

## 2021-11-12 PROCEDURE — 77014 PR CT GUIDANCE PLACEMENT RAD THERAPY FIELDS: CPT | Mod: 26 | Performed by: RADIOLOGY

## 2021-11-15 ENCOUNTER — APPOINTMENT (OUTPATIENT)
Dept: RADIATION ONCOLOGY | Facility: MEDICAL CENTER | Age: 81
End: 2021-11-15
Payer: MEDICARE

## 2021-11-15 LAB
CHEMOTHERAPY INFUSION START DATE: NORMAL
CHEMOTHERAPY RECORDS: 2.33
CHEMOTHERAPY RECORDS: 4900
CHEMOTHERAPY RECORDS: NORMAL
CHEMOTHERAPY RX CANCER: NORMAL
DATE 1ST CHEMO CANCER: NORMAL
RAD ONC ARIA COURSE LAST TREATMENT DATE: NORMAL
RAD ONC ARIA COURSE TREATMENT ELAPSED DAYS: NORMAL
RAD ONC ARIA REFERENCE POINT DOSAGE GIVEN TO DATE: 42
RAD ONC ARIA REFERENCE POINT DOSAGE GIVEN TO DATE: 43.44
RAD ONC ARIA REFERENCE POINT ID: NORMAL
RAD ONC ARIA REFERENCE POINT ID: NORMAL
RAD ONC ARIA REFERENCE POINT SESSION DOSAGE GIVEN: 2.33
RAD ONC ARIA REFERENCE POINT SESSION DOSAGE GIVEN: 2.42

## 2021-11-15 PROCEDURE — 77386 HCHG IMRT DELIVERY COMPLEX: CPT | Performed by: RADIOLOGY

## 2021-11-15 PROCEDURE — 77336 RADIATION PHYSICS CONSULT: CPT | Performed by: RADIOLOGY

## 2021-11-15 PROCEDURE — 77014 PR CT GUIDANCE PLACEMENT RAD THERAPY FIELDS: CPT | Mod: 26 | Performed by: RADIOLOGY

## 2021-11-16 ENCOUNTER — APPOINTMENT (OUTPATIENT)
Dept: RADIATION ONCOLOGY | Facility: MEDICAL CENTER | Age: 81
End: 2021-11-16
Payer: MEDICARE

## 2021-11-16 LAB
CHEMOTHERAPY INFUSION START DATE: NORMAL
CHEMOTHERAPY RECORDS: 2.33
CHEMOTHERAPY RECORDS: 4900
CHEMOTHERAPY RECORDS: NORMAL
CHEMOTHERAPY RX CANCER: NORMAL
DATE 1ST CHEMO CANCER: NORMAL
RAD ONC ARIA COURSE LAST TREATMENT DATE: NORMAL
RAD ONC ARIA COURSE TREATMENT ELAPSED DAYS: NORMAL
RAD ONC ARIA REFERENCE POINT DOSAGE GIVEN TO DATE: 44.33
RAD ONC ARIA REFERENCE POINT DOSAGE GIVEN TO DATE: 45.85
RAD ONC ARIA REFERENCE POINT ID: NORMAL
RAD ONC ARIA REFERENCE POINT ID: NORMAL
RAD ONC ARIA REFERENCE POINT SESSION DOSAGE GIVEN: 2.33
RAD ONC ARIA REFERENCE POINT SESSION DOSAGE GIVEN: 2.42

## 2021-11-16 PROCEDURE — 77386 HCHG IMRT DELIVERY COMPLEX: CPT | Performed by: RADIOLOGY

## 2021-11-16 PROCEDURE — 77014 PR CT GUIDANCE PLACEMENT RAD THERAPY FIELDS: CPT | Mod: 26 | Performed by: RADIOLOGY

## 2021-11-17 ENCOUNTER — APPOINTMENT (OUTPATIENT)
Dept: RADIATION ONCOLOGY | Facility: MEDICAL CENTER | Age: 81
End: 2021-11-17
Payer: MEDICARE

## 2021-11-17 VITALS
BODY MASS INDEX: 29.62 KG/M2 | OXYGEN SATURATION: 96 % | WEIGHT: 218.37 LBS | DIASTOLIC BLOOD PRESSURE: 89 MMHG | SYSTOLIC BLOOD PRESSURE: 133 MMHG | HEART RATE: 94 BPM

## 2021-11-17 DIAGNOSIS — L58.9 RADIATION DERMATITIS: ICD-10-CM

## 2021-11-17 LAB
CHEMOTHERAPY INFUSION START DATE: NORMAL
CHEMOTHERAPY RECORDS: 2.33
CHEMOTHERAPY RECORDS: 4900
CHEMOTHERAPY RECORDS: NORMAL
CHEMOTHERAPY RX CANCER: NORMAL
DATE 1ST CHEMO CANCER: NORMAL
RAD ONC ARIA COURSE LAST TREATMENT DATE: NORMAL
RAD ONC ARIA COURSE TREATMENT ELAPSED DAYS: NORMAL
RAD ONC ARIA REFERENCE POINT DOSAGE GIVEN TO DATE: 46.67
RAD ONC ARIA REFERENCE POINT DOSAGE GIVEN TO DATE: 48.27
RAD ONC ARIA REFERENCE POINT ID: NORMAL
RAD ONC ARIA REFERENCE POINT ID: NORMAL
RAD ONC ARIA REFERENCE POINT SESSION DOSAGE GIVEN: 2.33
RAD ONC ARIA REFERENCE POINT SESSION DOSAGE GIVEN: 2.42

## 2021-11-17 PROCEDURE — 77386 HCHG IMRT DELIVERY COMPLEX: CPT | Performed by: RADIOLOGY

## 2021-11-17 PROCEDURE — 77014 PR CT GUIDANCE PLACEMENT RAD THERAPY FIELDS: CPT | Mod: 26 | Performed by: RADIOLOGY

## 2021-11-17 PROCEDURE — 77427 RADIATION TX MANAGEMENT X5: CPT | Performed by: RADIOLOGY

## 2021-11-17 ASSESSMENT — FIBROSIS 4 INDEX: FIB4 SCORE: 6.56

## 2021-11-17 ASSESSMENT — PAIN SCALES - GENERAL: PAINLEVEL: 5=MODERATE PAIN

## 2021-11-17 NOTE — ON TREATMENT VISIT
ON TREATMENT  NOTE  RADIATION ONCOLOGY DEPARTMENT    Patient name:  Luigi Walters    Primary Physician:  Denver J Miller, M.D. MRN: [id number]  CSN: [id number]   Referring physician:  Chaitanya Saha M.D. : 1940, 80 y.o.     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 on 10/7/2021  Histopathologic type: Merkel cell carcinoma  Residual tumor (R): R2 - Macroscopic  Laterality: Left  Tumor size (mm): 10  Lymph-vascular invasion (LVI): LVI present/identified, NOS  Primary tumor: Present  Breslow depth (mm): 3  Clinical status of regional lymph nodes: Metastases confirmed by pathologic examination  Presence of extranodal extension: Present      TREATMENT SUMMARY:  [facility] Treatment Information        Some values may be hidden. Unless noted otherwise, only the newest values recorded on each date are displayed.         [facility]   Course First Treatment Date 10/20/2021   Course Last Treatment Date 2021   L Face, Neck Plan from Course C1_Lface&Neck   L Face, Neck1 Plan from Course C1_Lface&Neck   Fraction    Elapsed Course Days  @ [id number]   Prescribed Fraction Dose 233 cGy   Prescribed Total Dose 4,900 cGy   L Face, Neck Reference Point from Course C1_Lface&Neck   Elapsed Course Days  @ [id number]   Session Dose 233 cGy   Total Dose 4,667 cGy   L Face, Neck CP Reference Point from Course C1_Lface&Neck   Elapsed Course Days  @ [id number]   Session Dose 242 cGy   Total Dose 4,827 cGy                SUBJECTIVE:  Itching left face.      VITAL SIGNS:  Vitals 2021 11/10/2021 11/3/2021 10/27/2021 10/20/2021 10/7/2021 2021   SYSTOLIC 133 143 158 134 155 128 150   DIASTOLIC 89 90 91 90 91 82 71   PULSE 94 78 87 87 90 96 81   TEMPERATURE - - - - - - 98.2   RESPIRATIONS  "- - - - - - 17   WEIGHT 218.37 219.91 218.37 220.13 218.04 217.59 -   HEIGHT - - - - - 6' 0\" -   BMI 29.62 kg/m2 29.82 kg/m2 29.62 kg/m2 29.85 kg/m2 29.57 kg/m2 29.51 kg/m2 -   SPO2 96 97 95 93 96 95 94     KPS: 70, Cares for self; unable to carry on normal activity or to do active work (ECOG equivalent 1)  Encounter Vitals  Blood Pressure : 133/89  Pulse: 94  Pulse Oximetry: 96 %  Weight: 99 kg (218 lb 5.9 oz)  Pain Score: 5=Moderate Pain  Pain Assessment 11/17/2021 11/10/2021 11/3/2021 10/27/2021 10/20/2021   Pain Score 5 0 0 0 0   Pain Loc Abdomen - - - -   Some recent data might be hidden          PHYSICAL EXAM:  Physical Exam     Toxicity Assessment 11/17/2021 11/10/2021 11/3/2021 10/27/2021 10/20/2021   Toxicity Assessment Head/Neck Head/Neck Head/Neck Head/Neck Head/Neck   Fatigue (lethargy, malaise, asthenia) Increased fatigue over baseline, but not altering normal activities Increased fatigue over baseline, but not altering normal activities Increased fatigue over baseline, but not altering normal activities None None   Radiation Dermatitis Faint erythema or dry desquamation Faint erythema or dry desquamation None None None   Rash/desquamation None None None None None   Constipation None None Requiring laxatives Requiring stool softener or dietary modification None   Dehydration None None Dry mucous membranes and/or diminished skin turgor Dry mucous membranes and/or diminished skin turgor None   Mouth Dryness Mild Mild Normal Normal Normal   RT Dysphagia-Pharyngeal None Mild dysphagia, but can eat regular diet None None None   Mucositis None None None None None   Salivary Gland Changes None None None None None   Taste Disturbance (dysgeusia) Normal Normal Normal Normal Normal   RT - Pain due to RT None None None None None   Cough Absent Absent Absent Absent Absent   Voice changes/stridor/larynx (hoarseness, loss of voice, laryngitis) Normal Normal Normal Persistent hoarseness, but able to vocalize and/or " may have mild to moderate edema Normal       CURRENT MEDICATIONS:    Current Outpatient Medications:   •  silver sulfADIAZINE (SILVADENE) 1 % Cream, Apply 1/8 inch layer to affected area BID, Disp: 400 g, Rfl: 2  •  famotidine (PEPCID) 20 MG Tab, Take 20 mg by mouth., Disp: , Rfl:   •  zolpidem (AMBIEN) 10 MG Tab, Take 10 mg by mouth at bedtime as needed for Sleep., Disp: , Rfl:   •  fluticasone (FLONASE) 50 MCG/ACT nasal spray, , Disp: , Rfl:   •  oxyCODONE immediate-release (ROXICODONE) 5 MG Tab, Take 10 mg by mouth every 6 hours as needed for Severe Pain., Disp: , Rfl:   •  calcitRIOL (ROCALTROL) 0.25 MCG Cap, Take 0.25 mcg by mouth every day., Disp: , Rfl: 4  •  SF 5000 PLUS 1.1 % Cream, USE FOR BRUSHING UTD, Disp: , Rfl: 1  •  acetaminophen (TYLENOL) 500 MG Tab, Take 500-1,000 mg by mouth as needed. Indications: Pain, Disp: , Rfl:   •  tramadol (ULTRAM) 50 MG Tab, Take 50 mg by mouth every 8 hours as needed. Indications: Pain, Disp: , Rfl:   •  NON SPECIFIED, L-orinthate 500mg PO daily, Disp: , Rfl:   •  NON SPECIFIED, L-aspartate 500mg Po daily, Disp: , Rfl:   •  promethazine (PHENERGAN) 25 MG Tab, Take 25 mg by mouth every 6 hours as needed for Nausea/Vomiting., Disp: , Rfl:   •  Multiple Vitamin (MULTI VITAMIN DAILY PO), Take  by mouth. Combination d3,magnessium,vitamin a, Disp: , Rfl:   •  ropinirole (REQUIP) 0.25 MG Tab, Take 0.5 mg by mouth at bedtime. Indications: Restless Leg Syndrome, Disp: , Rfl: 3  •  spironolactone (ALDACTONE) 50 MG Tab, Take 50 mg by mouth every day. Indications: Edema, Disp: , Rfl: 0  •  lactulose 10 GM/15ML SOLN, Take 30 g by mouth as needed., Disp: , Rfl:   •  meclizine (ANTIVERT) 25 MG TABS, Take 25 mg by mouth 3 times a day as needed for Dizziness or Vertigo., Disp: , Rfl:   •  tamsulosin (FLOMAX) 0.4 MG capsule, Take 0.4 mg by mouth every morning. Indications: Benign Enlargement of Prostate, Disp: , Rfl:   •  omeprazole (PRILOSEC) 40 MG capsule, Take 40 mg by mouth every  day., Disp: , Rfl:     LABORATORY DATA:   Lab Results   Component Value Date/Time    SODIUM 139 09/09/2021 02:07 PM    POTASSIUM 4.7 09/09/2021 02:07 PM    CHLORIDE 101 09/09/2021 02:07 PM    CO2 27 09/09/2021 02:07 PM    GLUCOSE 110 (H) 09/09/2021 02:07 PM    BUN 20 09/09/2021 02:07 PM    CREATININE 1.46 (H) 09/09/2021 02:07 PM    CREATININE 1.0 05/28/2008 06:00 AM       Lab Results   Component Value Date/Time    WBC 2.3 (LL) 09/09/2021 02:07 PM    RBC 4.78 09/09/2021 02:07 PM    HEMOGLOBIN 14.3 09/09/2021 02:07 PM    HEMATOCRIT 43.4 09/09/2021 02:07 PM    MCV 90.8 09/09/2021 02:07 PM    MCH 29.9 09/09/2021 02:07 PM    MCHC 32.9 (L) 09/09/2021 02:07 PM    PLATELETCT 88 (L) 09/09/2021 02:07 PM         RADIOLOGY DATA:  No results found.    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 on 10/7/2021      PLAN:  No change in treatment plan and Symptomatic - prescription given and/or additional testing required    Disposition:  Treatment plan and imaging reviewed. Questions answered. Continue therapy outlined.     Jovany WARD M.D.    Orders Placed This Encounter   • Rad Onc [facility] Session Summary   • silver sulfADIAZINE (SILVADENE) 1 % Cream

## 2021-11-18 ENCOUNTER — APPOINTMENT (OUTPATIENT)
Dept: RADIATION ONCOLOGY | Facility: MEDICAL CENTER | Age: 81
End: 2021-11-18
Payer: MEDICARE

## 2021-11-18 LAB
CHEMOTHERAPY INFUSION START DATE: NORMAL
CHEMOTHERAPY RECORDS: 2.33
CHEMOTHERAPY RECORDS: 4900
CHEMOTHERAPY RECORDS: NORMAL
CHEMOTHERAPY RX CANCER: NORMAL
DATE 1ST CHEMO CANCER: NORMAL
RAD ONC ARIA COURSE LAST TREATMENT DATE: NORMAL
RAD ONC ARIA COURSE TREATMENT ELAPSED DAYS: NORMAL
RAD ONC ARIA REFERENCE POINT DOSAGE GIVEN TO DATE: 49
RAD ONC ARIA REFERENCE POINT DOSAGE GIVEN TO DATE: 50.68
RAD ONC ARIA REFERENCE POINT ID: NORMAL
RAD ONC ARIA REFERENCE POINT ID: NORMAL
RAD ONC ARIA REFERENCE POINT SESSION DOSAGE GIVEN: 2.33
RAD ONC ARIA REFERENCE POINT SESSION DOSAGE GIVEN: 2.42

## 2021-11-18 PROCEDURE — 77014 PR CT GUIDANCE PLACEMENT RAD THERAPY FIELDS: CPT | Mod: 26 | Performed by: RADIOLOGY

## 2021-11-18 PROCEDURE — 77386 HCHG IMRT DELIVERY COMPLEX: CPT | Performed by: RADIOLOGY

## 2021-11-19 ENCOUNTER — APPOINTMENT (OUTPATIENT)
Dept: RADIATION ONCOLOGY | Facility: MEDICAL CENTER | Age: 81
End: 2021-11-19
Payer: MEDICARE

## 2021-11-19 LAB
CHEMOTHERAPY INFUSION START DATE: NORMAL
CHEMOTHERAPY RECORDS: 2.33
CHEMOTHERAPY RECORDS: 4900
CHEMOTHERAPY RECORDS: NORMAL
CHEMOTHERAPY RX CANCER: NORMAL
DATE 1ST CHEMO CANCER: NORMAL
RAD ONC ARIA COURSE LAST TREATMENT DATE: NORMAL
RAD ONC ARIA COURSE TREATMENT ELAPSED DAYS: NORMAL
RAD ONC ARIA REFERENCE POINT DOSAGE GIVEN TO DATE: 51.33
RAD ONC ARIA REFERENCE POINT DOSAGE GIVEN TO DATE: 53.1
RAD ONC ARIA REFERENCE POINT ID: NORMAL
RAD ONC ARIA REFERENCE POINT ID: NORMAL
RAD ONC ARIA REFERENCE POINT SESSION DOSAGE GIVEN: 2.33
RAD ONC ARIA REFERENCE POINT SESSION DOSAGE GIVEN: 2.42

## 2021-11-19 PROCEDURE — 77014 PR CT GUIDANCE PLACEMENT RAD THERAPY FIELDS: CPT | Mod: 26 | Performed by: RADIOLOGY

## 2021-11-19 PROCEDURE — 77386 HCHG IMRT DELIVERY COMPLEX: CPT | Performed by: RADIOLOGY

## 2021-11-19 NOTE — ON TREATMENT VISIT
"  ON TREATMENT  NOTE  RADIATION ONCOLOGY DEPARTMENT    Patient name:  Luigi Walters    Primary Physician:  Denver J Miller, M.D. MRN: [id number]  CSN: [id number]   Referring physician:  Chaitanya Saha M.D. : 1940, 80 y.o.     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 on 10/7/2021  Histopathologic type: Merkel cell carcinoma  Residual tumor (R): R2 - Macroscopic  Laterality: Left  Tumor size (mm): 10  Lymph-vascular invasion (LVI): LVI present/identified, NOS  Primary tumor: Present  Breslow depth (mm): 3  Clinical status of regional lymph nodes: Metastases confirmed by pathologic examination  Presence of extranodal extension: Present      TREATMENT SUMMARY:  [facility] Treatment Information        Some values may be hidden. Unless noted otherwise, only the newest values recorded on each date are displayed.         [facility] Treatment Summary 21   Course First Treatment Date 10/20/2021   Course Last Treatment Date 11/10/2021   L Face, Neck Plan from Course C1_Lface&Neck   L Face, Neck1 Plan from Course C1_Lface&Neck   Fraction 6 of 21   Elapsed Course Days 29 @ [id number]   Prescribed Fraction Dose 233 cGy   Prescribed Total Dose 4,900 cGy       SUBJECTIVE:  No complaints.      VITAL SIGNS:  Vitals 2021 11/10/2021 11/3/2021 10/27/2021 10/20/2021 10/7/2021 2021   SYSTOLIC 133 143 158 134 155 128 150   DIASTOLIC 89 90 91 90 91 82 71   PULSE 94 78 87 87 90 96 81   TEMPERATURE - - - - - - 98.2   RESPIRATIONS - - - - - - 17   WEIGHT 218.37 219.91 218.37 220.13 218.04 217.59 -   HEIGHT - - - - - 6' 0\" -   BMI 29.62 kg/m2 29.82 kg/m2 29.62 kg/m2 29.85 kg/m2 29.57 kg/m2 29.51 kg/m2 -   SPO2 96 97 95 93 96 95 94     KPS: 70, Cares for self; unable to carry on normal activity or to do active work (ECOG " equivalent 1)  Encounter Vitals  Blood Pressure : 143/90  Pulse: 78  Pulse Oximetry: 97 %  Weight: 99.7 kg (219 lb 14.5 oz)  Pain Score: No pain  Pain Assessment 11/17/2021 11/10/2021 11/3/2021 10/27/2021 10/20/2021   Pain Score 5 0 0 0 0   Pain Loc Abdomen - - - -   Some recent data might be hidden          PHYSICAL EXAM:  Physical Exam  Vitals and nursing note reviewed.   Constitutional:       General: He is not in acute distress.     Appearance: He is well-developed.   HENT:      Head: Normocephalic.   Skin:     General: Skin is warm and dry.      Findings: Erythema present.   Neurological:      Mental Status: He is alert and oriented to person, place, and time.   Psychiatric:         Behavior: Behavior normal.         Thought Content: Thought content normal.         Judgment: Judgment normal.          Toxicity Assessment 11/17/2021 11/10/2021 11/3/2021 10/27/2021 10/20/2021   Toxicity Assessment Head/Neck Head/Neck Head/Neck Head/Neck Head/Neck   Fatigue (lethargy, malaise, asthenia) Increased fatigue over baseline, but not altering normal activities Increased fatigue over baseline, but not altering normal activities Increased fatigue over baseline, but not altering normal activities None None   Radiation Dermatitis Faint erythema or dry desquamation Faint erythema or dry desquamation None None None   Rash/desquamation None None None None None   Constipation None None Requiring laxatives Requiring stool softener or dietary modification None   Dehydration None None Dry mucous membranes and/or diminished skin turgor Dry mucous membranes and/or diminished skin turgor None   Mouth Dryness Mild Mild Normal Normal Normal   RT Dysphagia-Pharyngeal None Mild dysphagia, but can eat regular diet None None None   Mucositis None None None None None   Salivary Gland Changes None None None None None   Taste Disturbance (dysgeusia) Normal Normal Normal Normal Normal   RT - Pain due to RT None None None None None   Cough Absent  Absent Absent Absent Absent   Voice changes/stridor/larynx (hoarseness, loss of voice, laryngitis) Normal Normal Normal Persistent hoarseness, but able to vocalize and/or may have mild to moderate edema Normal       CURRENT MEDICATIONS:    Current Outpatient Medications:   •  silver sulfADIAZINE (SILVADENE) 1 % Cream, Apply 1/8 inch layer to affected area BID, Disp: 400 g, Rfl: 2  •  famotidine (PEPCID) 20 MG Tab, Take 20 mg by mouth., Disp: , Rfl:   •  zolpidem (AMBIEN) 10 MG Tab, Take 10 mg by mouth at bedtime as needed for Sleep., Disp: , Rfl:   •  fluticasone (FLONASE) 50 MCG/ACT nasal spray, , Disp: , Rfl:   •  oxyCODONE immediate-release (ROXICODONE) 5 MG Tab, Take 10 mg by mouth every 6 hours as needed for Severe Pain., Disp: , Rfl:   •  calcitRIOL (ROCALTROL) 0.25 MCG Cap, Take 0.25 mcg by mouth every day., Disp: , Rfl: 4  •  SF 5000 PLUS 1.1 % Cream, USE FOR BRUSHING UTD, Disp: , Rfl: 1  •  acetaminophen (TYLENOL) 500 MG Tab, Take 500-1,000 mg by mouth as needed. Indications: Pain, Disp: , Rfl:   •  tramadol (ULTRAM) 50 MG Tab, Take 50 mg by mouth every 8 hours as needed. Indications: Pain, Disp: , Rfl:   •  NON SPECIFIED, L-orinthate 500mg PO daily, Disp: , Rfl:   •  NON SPECIFIED, L-aspartate 500mg Po daily, Disp: , Rfl:   •  promethazine (PHENERGAN) 25 MG Tab, Take 25 mg by mouth every 6 hours as needed for Nausea/Vomiting., Disp: , Rfl:   •  Multiple Vitamin (MULTI VITAMIN DAILY PO), Take  by mouth. Combination d3,magnessium,vitamin a, Disp: , Rfl:   •  ropinirole (REQUIP) 0.25 MG Tab, Take 0.5 mg by mouth at bedtime. Indications: Restless Leg Syndrome, Disp: , Rfl: 3  •  spironolactone (ALDACTONE) 50 MG Tab, Take 50 mg by mouth every day. Indications: Edema, Disp: , Rfl: 0  •  lactulose 10 GM/15ML SOLN, Take 30 g by mouth as needed., Disp: , Rfl:   •  meclizine (ANTIVERT) 25 MG TABS, Take 25 mg by mouth 3 times a day as needed for Dizziness or Vertigo., Disp: , Rfl:   •  tamsulosin (FLOMAX) 0.4 MG  capsule, Take 0.4 mg by mouth every morning. Indications: Benign Enlargement of Prostate, Disp: , Rfl:   •  omeprazole (PRILOSEC) 40 MG capsule, Take 40 mg by mouth every day., Disp: , Rfl:     LABORATORY DATA:   Lab Results   Component Value Date/Time    SODIUM 139 09/09/2021 02:07 PM    POTASSIUM 4.7 09/09/2021 02:07 PM    CHLORIDE 101 09/09/2021 02:07 PM    CO2 27 09/09/2021 02:07 PM    GLUCOSE 110 (H) 09/09/2021 02:07 PM    BUN 20 09/09/2021 02:07 PM    CREATININE 1.46 (H) 09/09/2021 02:07 PM    CREATININE 1.0 05/28/2008 06:00 AM       Lab Results   Component Value Date/Time    WBC 2.3 (LL) 09/09/2021 02:07 PM    RBC 4.78 09/09/2021 02:07 PM    HEMOGLOBIN 14.3 09/09/2021 02:07 PM    HEMATOCRIT 43.4 09/09/2021 02:07 PM    MCV 90.8 09/09/2021 02:07 PM    MCH 29.9 09/09/2021 02:07 PM    MCHC 32.9 (L) 09/09/2021 02:07 PM    PLATELETCT 88 (L) 09/09/2021 02:07 PM         RADIOLOGY DATA:  No results found.    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 on 10/7/2021      PLAN:  No change in treatment plan    Disposition:  Treatment plan and imaging reviewed. Questions answered. Continue therapy outlined.     Jovany WARD M.D.    Orders Placed This Encounter   • Rad Onc [facility] Session Summary

## 2021-11-21 ENCOUNTER — APPOINTMENT (OUTPATIENT)
Dept: RADIATION ONCOLOGY | Facility: MEDICAL CENTER | Age: 81
End: 2021-11-21
Payer: MEDICARE

## 2021-11-21 LAB
CHEMOTHERAPY INFUSION START DATE: NORMAL
CHEMOTHERAPY RECORDS: 2.33
CHEMOTHERAPY RECORDS: 4900
CHEMOTHERAPY RECORDS: NORMAL
CHEMOTHERAPY RX CANCER: NORMAL
DATE 1ST CHEMO CANCER: NORMAL
RAD ONC ARIA COURSE LAST TREATMENT DATE: NORMAL
RAD ONC ARIA COURSE TREATMENT ELAPSED DAYS: NORMAL
RAD ONC ARIA REFERENCE POINT DOSAGE GIVEN TO DATE: 53.67
RAD ONC ARIA REFERENCE POINT DOSAGE GIVEN TO DATE: 55.51
RAD ONC ARIA REFERENCE POINT ID: NORMAL
RAD ONC ARIA REFERENCE POINT ID: NORMAL
RAD ONC ARIA REFERENCE POINT SESSION DOSAGE GIVEN: 2.33
RAD ONC ARIA REFERENCE POINT SESSION DOSAGE GIVEN: 2.42

## 2021-11-21 PROCEDURE — 77386 HCHG IMRT DELIVERY COMPLEX: CPT | Performed by: RADIOLOGY

## 2021-11-21 PROCEDURE — 77336 RADIATION PHYSICS CONSULT: CPT | Performed by: RADIOLOGY

## 2021-11-21 PROCEDURE — 77014 PR CT GUIDANCE PLACEMENT RAD THERAPY FIELDS: CPT | Mod: 26 | Performed by: RADIOLOGY

## 2021-11-22 ENCOUNTER — APPOINTMENT (OUTPATIENT)
Dept: RADIATION ONCOLOGY | Facility: MEDICAL CENTER | Age: 81
End: 2021-11-22
Payer: MEDICARE

## 2021-11-22 LAB
CHEMOTHERAPY INFUSION START DATE: NORMAL
CHEMOTHERAPY RECORDS: 2.33
CHEMOTHERAPY RECORDS: 4900
CHEMOTHERAPY RECORDS: NORMAL
CHEMOTHERAPY RX CANCER: NORMAL
DATE 1ST CHEMO CANCER: NORMAL
RAD ONC ARIA COURSE LAST TREATMENT DATE: NORMAL
RAD ONC ARIA COURSE TREATMENT ELAPSED DAYS: NORMAL
RAD ONC ARIA REFERENCE POINT DOSAGE GIVEN TO DATE: 56
RAD ONC ARIA REFERENCE POINT DOSAGE GIVEN TO DATE: 57.93
RAD ONC ARIA REFERENCE POINT ID: NORMAL
RAD ONC ARIA REFERENCE POINT ID: NORMAL
RAD ONC ARIA REFERENCE POINT SESSION DOSAGE GIVEN: 2.33
RAD ONC ARIA REFERENCE POINT SESSION DOSAGE GIVEN: 2.42

## 2021-11-22 PROCEDURE — 77386 HCHG IMRT DELIVERY COMPLEX: CPT | Performed by: RADIOLOGY

## 2021-11-22 PROCEDURE — 77014 PR CT GUIDANCE PLACEMENT RAD THERAPY FIELDS: CPT | Mod: 26 | Performed by: RADIOLOGY

## 2021-11-23 ENCOUNTER — APPOINTMENT (OUTPATIENT)
Dept: RADIATION ONCOLOGY | Facility: MEDICAL CENTER | Age: 81
End: 2021-11-23
Payer: MEDICARE

## 2021-11-23 VITALS
TEMPERATURE: 97 F | OXYGEN SATURATION: 97 % | SYSTOLIC BLOOD PRESSURE: 135 MMHG | WEIGHT: 217 LBS | BODY MASS INDEX: 29.43 KG/M2 | DIASTOLIC BLOOD PRESSURE: 89 MMHG | HEART RATE: 84 BPM

## 2021-11-23 LAB
CHEMOTHERAPY INFUSION START DATE: NORMAL
CHEMOTHERAPY RECORDS: 2.33
CHEMOTHERAPY RECORDS: 4900
CHEMOTHERAPY RECORDS: NORMAL
CHEMOTHERAPY RX CANCER: NORMAL
DATE 1ST CHEMO CANCER: NORMAL
RAD ONC ARIA COURSE LAST TREATMENT DATE: NORMAL
RAD ONC ARIA COURSE TREATMENT ELAPSED DAYS: NORMAL
RAD ONC ARIA REFERENCE POINT DOSAGE GIVEN TO DATE: 58.33
RAD ONC ARIA REFERENCE POINT DOSAGE GIVEN TO DATE: 60.34
RAD ONC ARIA REFERENCE POINT ID: NORMAL
RAD ONC ARIA REFERENCE POINT ID: NORMAL
RAD ONC ARIA REFERENCE POINT SESSION DOSAGE GIVEN: 2.33
RAD ONC ARIA REFERENCE POINT SESSION DOSAGE GIVEN: 2.42

## 2021-11-23 PROCEDURE — 77014 PR CT GUIDANCE PLACEMENT RAD THERAPY FIELDS: CPT | Mod: 26 | Performed by: RADIOLOGY

## 2021-11-23 PROCEDURE — 77427 RADIATION TX MANAGEMENT X5: CPT | Performed by: RADIOLOGY

## 2021-11-23 PROCEDURE — 77386 HCHG IMRT DELIVERY COMPLEX: CPT | Performed by: RADIOLOGY

## 2021-11-23 ASSESSMENT — PAIN SCALES - GENERAL: PAINLEVEL: NO PAIN

## 2021-11-23 ASSESSMENT — FIBROSIS 4 INDEX: FIB4 SCORE: 6.56

## 2021-11-23 NOTE — ON TREATMENT VISIT
ON TREATMENT  NOTE  RADIATION ONCOLOGY DEPARTMENT    Patient name:  Luigi Walters    Primary Physician:  Denver J Miller, M.D. MRN: [id number]  CSN: [id number]   Referring physician:  Chaitanya Saha M.D. : 1940, 80 y.o.     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 on 10/7/2021  Histopathologic type: Merkel cell carcinoma  Residual tumor (R): R2 - Macroscopic  Laterality: Left  Tumor size (mm): 10  Lymph-vascular invasion (LVI): LVI present/identified, NOS  Primary tumor: Present  Breslow depth (mm): 3  Clinical status of regional lymph nodes: Metastases confirmed by pathologic examination  Presence of extranodal extension: Present      TREATMENT SUMMARY:  [facility] Treatment Information        Some values may be hidden. Unless noted otherwise, only the newest values recorded on each date are displayed.         [facility]   Course First Treatment Date 10/20/2021   Course Last Treatment Date 2021   L Face, Neck Plan from Course C1_Lface&Neck   L Face, Neck1 Plan from Course C1_Lface&Neck   Fraction 16 of 21   Elapsed Course Days 34 @ [id number]   Prescribed Fraction Dose 233 cGy   Prescribed Total Dose 4,900 cGy   L Face, Neck Reference Point from Course C1_Lface&Neck   Elapsed Course Days 34 @ [id number]   Session Dose 233 cGy   Total Dose 5,833 cGy   L Face, Neck CP Reference Point from Course C1_Lface&Neck   Elapsed Course Days 34 @    Session Dose 242 cGy   Total Dose 6,034 cGy                SUBJECTIVE:  Well. No complaints.      VITAL SIGNS:  Vitals 2021 2021 11/10/2021 11/3/2021 10/27/2021 10/20/2021 10/7/2021   SYSTOLIC 135 133 143 158 134 155 128   DIASTOLIC 89 89 90 91 90 91 82   PULSE 84 94 78 87 87 90 96   TEMPERATURE 97 - - - - - -  "  RESPIRATIONS - - - - - - -   WEIGHT 217 218.37 219.91 218.37 220.13 218.04 217.59   HEIGHT - - - - - - 6' 0\"   BMI 29.43 kg/m2 29.62 kg/m2 29.82 kg/m2 29.62 kg/m2 29.85 kg/m2 29.57 kg/m2 29.51 kg/m2   SPO2 97 96 97 95 93 96 95     KPS: 70, Cares for self; unable to carry on normal activity or to do active work (ECOG equivalent 1)  Encounter Vitals  Temperature: 36.1 °C (97 °F)  Temp src: Temporal  Blood Pressure : 135/89  Pulse: 84  Pulse Oximetry: 97 %  Weight: 98.4 kg (217 lb)  Pain Score: No pain  Pain Assessment 11/23/2021 11/17/2021 11/10/2021 11/3/2021 10/27/2021   Pain Score 0 5 0 0 0   Pain Loc - Abdomen - - -   Some recent data might be hidden          PHYSICAL EXAM:  Physical Exam  Vitals and nursing note reviewed.   Constitutional:       General: He is not in acute distress.     Appearance: He is well-developed.   HENT:      Head: Normocephalic.   Skin:     General: Skin is warm and dry.      Findings: Erythema present.   Neurological:      Mental Status: He is alert and oriented to person, place, and time.   Psychiatric:         Behavior: Behavior normal.         Thought Content: Thought content normal.         Judgment: Judgment normal.          Toxicity Assessment 11/23/2021 11/17/2021 11/10/2021 11/3/2021 10/27/2021 10/20/2021   Toxicity Assessment Head/Neck Head/Neck Head/Neck Head/Neck Head/Neck Head/Neck   Fatigue (lethargy, malaise, asthenia) None Increased fatigue over baseline, but not altering normal activities Increased fatigue over baseline, but not altering normal activities Increased fatigue over baseline, but not altering normal activities None None   Radiation Dermatitis Faint erythema or dry desquamation Faint erythema or dry desquamation Faint erythema or dry desquamation None None None   Rash/desquamation None None None None None None   Constipation None None None Requiring laxatives Requiring stool softener or dietary modification None   Dehydration None None None Dry mucous " membranes and/or diminished skin turgor Dry mucous membranes and/or diminished skin turgor None   Mouth Dryness Normal Mild Mild Normal Normal Normal   RT Dysphagia-Pharyngeal None None Mild dysphagia, but can eat regular diet None None None   Mucositis None None None None None None   Salivary Gland Changes None None None None None None   Stomatitis/Pharyngitis None - - - - -   Taste Disturbance (dysgeusia) Normal Normal Normal Normal Normal Normal   RT - Pain due to RT None None None None None None   Cough Absent Absent Absent Absent Absent Absent   Voice changes/stridor/larynx (hoarseness, loss of voice, laryngitis) Normal Normal Normal Normal Persistent hoarseness, but able to vocalize and/or may have mild to moderate edema Normal       CURRENT MEDICATIONS:    Current Outpatient Medications:   •  silver sulfADIAZINE (SILVADENE) 1 % Cream, Apply 1/8 inch layer to affected area BID, Disp: 400 g, Rfl: 2  •  famotidine (PEPCID) 20 MG Tab, Take 20 mg by mouth., Disp: , Rfl:   •  zolpidem (AMBIEN) 10 MG Tab, Take 10 mg by mouth at bedtime as needed for Sleep., Disp: , Rfl:   •  fluticasone (FLONASE) 50 MCG/ACT nasal spray, , Disp: , Rfl:   •  oxyCODONE immediate-release (ROXICODONE) 5 MG Tab, Take 10 mg by mouth every 6 hours as needed for Severe Pain., Disp: , Rfl:   •  calcitRIOL (ROCALTROL) 0.25 MCG Cap, Take 0.25 mcg by mouth every day., Disp: , Rfl: 4  •  SF 5000 PLUS 1.1 % Cream, USE FOR BRUSHING UTD, Disp: , Rfl: 1  •  acetaminophen (TYLENOL) 500 MG Tab, Take 500-1,000 mg by mouth as needed. Indications: Pain, Disp: , Rfl:   •  tramadol (ULTRAM) 50 MG Tab, Take 50 mg by mouth every 8 hours as needed. Indications: Pain, Disp: , Rfl:   •  NON SPECIFIED, L-orinthate 500mg PO daily, Disp: , Rfl:   •  NON SPECIFIED, L-aspartate 500mg Po daily, Disp: , Rfl:   •  promethazine (PHENERGAN) 25 MG Tab, Take 25 mg by mouth every 6 hours as needed for Nausea/Vomiting., Disp: , Rfl:   •  Multiple Vitamin (MULTI VITAMIN DAILY  PO), Take  by mouth. Combination d3,magnessium,vitamin a, Disp: , Rfl:   •  ropinirole (REQUIP) 0.25 MG Tab, Take 0.5 mg by mouth at bedtime. Indications: Restless Leg Syndrome, Disp: , Rfl: 3  •  spironolactone (ALDACTONE) 50 MG Tab, Take 50 mg by mouth every day. Indications: Edema, Disp: , Rfl: 0  •  lactulose 10 GM/15ML SOLN, Take 30 g by mouth as needed., Disp: , Rfl:   •  meclizine (ANTIVERT) 25 MG TABS, Take 25 mg by mouth 3 times a day as needed for Dizziness or Vertigo., Disp: , Rfl:   •  tamsulosin (FLOMAX) 0.4 MG capsule, Take 0.4 mg by mouth every morning. Indications: Benign Enlargement of Prostate, Disp: , Rfl:   •  omeprazole (PRILOSEC) 40 MG capsule, Take 40 mg by mouth every day., Disp: , Rfl:     LABORATORY DATA:   Lab Results   Component Value Date/Time    SODIUM 139 09/09/2021 02:07 PM    POTASSIUM 4.7 09/09/2021 02:07 PM    CHLORIDE 101 09/09/2021 02:07 PM    CO2 27 09/09/2021 02:07 PM    GLUCOSE 110 (H) 09/09/2021 02:07 PM    BUN 20 09/09/2021 02:07 PM    CREATININE 1.46 (H) 09/09/2021 02:07 PM    CREATININE 1.0 05/28/2008 06:00 AM       Lab Results   Component Value Date/Time    WBC 2.3 (LL) 09/09/2021 02:07 PM    RBC 4.78 09/09/2021 02:07 PM    HEMOGLOBIN 14.3 09/09/2021 02:07 PM    HEMATOCRIT 43.4 09/09/2021 02:07 PM    MCV 90.8 09/09/2021 02:07 PM    MCH 29.9 09/09/2021 02:07 PM    MCHC 32.9 (L) 09/09/2021 02:07 PM    PLATELETCT 88 (L) 09/09/2021 02:07 PM         RADIOLOGY DATA:  No results found.    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 on 10/7/2021      PLAN:  No change in treatment plan    Disposition:  Treatment plan and imaging reviewed. Questions answered. Continue therapy outlined.     Jovany WARD M.D.    Orders Placed This Encounter   • Rad Onc [facility] Session Summary

## 2021-11-24 ENCOUNTER — APPOINTMENT (OUTPATIENT)
Dept: RADIATION ONCOLOGY | Facility: MEDICAL CENTER | Age: 81
End: 2021-11-24
Payer: MEDICARE

## 2021-11-24 LAB
CHEMOTHERAPY INFUSION START DATE: NORMAL
CHEMOTHERAPY RECORDS: 2.33
CHEMOTHERAPY RECORDS: 4900
CHEMOTHERAPY RECORDS: NORMAL
CHEMOTHERAPY RX CANCER: NORMAL
DATE 1ST CHEMO CANCER: NORMAL
RAD ONC ARIA COURSE LAST TREATMENT DATE: NORMAL
RAD ONC ARIA COURSE TREATMENT ELAPSED DAYS: NORMAL
RAD ONC ARIA REFERENCE POINT DOSAGE GIVEN TO DATE: 60.67
RAD ONC ARIA REFERENCE POINT DOSAGE GIVEN TO DATE: 62.76
RAD ONC ARIA REFERENCE POINT ID: NORMAL
RAD ONC ARIA REFERENCE POINT ID: NORMAL
RAD ONC ARIA REFERENCE POINT SESSION DOSAGE GIVEN: 2.33
RAD ONC ARIA REFERENCE POINT SESSION DOSAGE GIVEN: 2.42

## 2021-11-24 PROCEDURE — 77014 PR CT GUIDANCE PLACEMENT RAD THERAPY FIELDS: CPT | Mod: 26 | Performed by: RADIOLOGY

## 2021-11-24 PROCEDURE — 77386 HCHG IMRT DELIVERY COMPLEX: CPT | Performed by: RADIOLOGY

## 2021-11-29 ENCOUNTER — APPOINTMENT (OUTPATIENT)
Dept: RADIATION ONCOLOGY | Facility: MEDICAL CENTER | Age: 81
End: 2021-11-29
Payer: MEDICARE

## 2021-11-29 LAB
CHEMOTHERAPY INFUSION START DATE: NORMAL
CHEMOTHERAPY RECORDS: 2.33
CHEMOTHERAPY RECORDS: 4900
CHEMOTHERAPY RECORDS: NORMAL
CHEMOTHERAPY RX CANCER: NORMAL
DATE 1ST CHEMO CANCER: NORMAL
RAD ONC ARIA COURSE LAST TREATMENT DATE: NORMAL
RAD ONC ARIA COURSE TREATMENT ELAPSED DAYS: NORMAL
RAD ONC ARIA REFERENCE POINT DOSAGE GIVEN TO DATE: 63
RAD ONC ARIA REFERENCE POINT DOSAGE GIVEN TO DATE: 65.18
RAD ONC ARIA REFERENCE POINT ID: NORMAL
RAD ONC ARIA REFERENCE POINT ID: NORMAL
RAD ONC ARIA REFERENCE POINT SESSION DOSAGE GIVEN: 2.33
RAD ONC ARIA REFERENCE POINT SESSION DOSAGE GIVEN: 2.42

## 2021-11-29 PROCEDURE — 77014 PR CT GUIDANCE PLACEMENT RAD THERAPY FIELDS: CPT | Mod: 26 | Performed by: RADIOLOGY

## 2021-11-29 PROCEDURE — 77386 HCHG IMRT DELIVERY COMPLEX: CPT | Performed by: RADIOLOGY

## 2021-11-30 ENCOUNTER — DOCUMENTATION (OUTPATIENT)
Dept: RADIATION ONCOLOGY | Facility: MEDICAL CENTER | Age: 81
End: 2021-11-30

## 2021-11-30 ENCOUNTER — APPOINTMENT (OUTPATIENT)
Dept: RADIATION ONCOLOGY | Facility: MEDICAL CENTER | Age: 81
End: 2021-11-30
Payer: MEDICARE

## 2021-11-30 LAB
CHEMOTHERAPY INFUSION START DATE: NORMAL
CHEMOTHERAPY RECORDS: 2.33
CHEMOTHERAPY RECORDS: 4900
CHEMOTHERAPY RECORDS: NORMAL
CHEMOTHERAPY RX CANCER: NORMAL
DATE 1ST CHEMO CANCER: NORMAL
RAD ONC ARIA COURSE LAST TREATMENT DATE: NORMAL
RAD ONC ARIA COURSE TREATMENT ELAPSED DAYS: NORMAL
RAD ONC ARIA REFERENCE POINT DOSAGE GIVEN TO DATE: 65.33
RAD ONC ARIA REFERENCE POINT DOSAGE GIVEN TO DATE: 67.59
RAD ONC ARIA REFERENCE POINT ID: NORMAL
RAD ONC ARIA REFERENCE POINT ID: NORMAL
RAD ONC ARIA REFERENCE POINT SESSION DOSAGE GIVEN: 2.33
RAD ONC ARIA REFERENCE POINT SESSION DOSAGE GIVEN: 2.42

## 2021-11-30 PROCEDURE — 77014 PR CT GUIDANCE PLACEMENT RAD THERAPY FIELDS: CPT | Mod: 26 | Performed by: RADIOLOGY

## 2021-11-30 PROCEDURE — 77336 RADIATION PHYSICS CONSULT: CPT | Performed by: RADIOLOGY

## 2021-11-30 PROCEDURE — 77386 HCHG IMRT DELIVERY COMPLEX: CPT | Performed by: RADIOLOGY

## 2021-11-30 NOTE — PROGRESS NOTES
Nutrition Services: Brief Update  Wt Readings from Last 7 Encounters:   11/23/21 98.4 kg (217 lb)   11/17/21 99 kg (218 lb 5.9 oz)   11/10/21 99.7 kg (219 lb 14.5 oz)   11/03/21 99 kg (218 lb 5.9 oz)   10/27/21 99.9 kg (220 lb 2.1 oz)   10/20/21 98.9 kg (218 lb 0.6 oz)   10/07/21 98.7 kg (217 lb 9.5 oz)     Weight Change: Wt is stable throughout treatment and resembles starting weight.     RD able to briefly visit pt following XRT. Pt states is managing well and declines need for consultation. States is getting a bit fatigued from the high protein diet, though declines modifications at this time.     Plan/Recommend:  · Encouraged continuation with high protein diet at this time, discussed can make alternatives through high protein soups and can review this as needed.   · Ensured pt has contact info and to contact RD as needed.     RD to sign off, please consult as needed  Please contact -[phone]

## 2021-12-01 ENCOUNTER — HOSPITAL ENCOUNTER (OUTPATIENT)
Dept: RADIATION ONCOLOGY | Facility: MEDICAL CENTER | Age: 81
End: 2021-12-31
Attending: RADIOLOGY
Payer: MEDICARE

## 2021-12-01 VITALS
OXYGEN SATURATION: 95 % | BODY MASS INDEX: 29.85 KG/M2 | HEART RATE: 94 BPM | DIASTOLIC BLOOD PRESSURE: 75 MMHG | WEIGHT: 220.13 LBS | SYSTOLIC BLOOD PRESSURE: 132 MMHG

## 2021-12-01 LAB
CHEMOTHERAPY INFUSION START DATE: NORMAL
CHEMOTHERAPY RECORDS: 2.33
CHEMOTHERAPY RECORDS: 4900
CHEMOTHERAPY RECORDS: NORMAL
CHEMOTHERAPY RX CANCER: NORMAL
DATE 1ST CHEMO CANCER: NORMAL
RAD ONC ARIA COURSE LAST TREATMENT DATE: NORMAL
RAD ONC ARIA COURSE TREATMENT ELAPSED DAYS: NORMAL
RAD ONC ARIA REFERENCE POINT DOSAGE GIVEN TO DATE: 67.67
RAD ONC ARIA REFERENCE POINT DOSAGE GIVEN TO DATE: 70.01
RAD ONC ARIA REFERENCE POINT ID: NORMAL
RAD ONC ARIA REFERENCE POINT ID: NORMAL
RAD ONC ARIA REFERENCE POINT SESSION DOSAGE GIVEN: 2.33
RAD ONC ARIA REFERENCE POINT SESSION DOSAGE GIVEN: 2.42

## 2021-12-01 PROCEDURE — 77014 PR CT GUIDANCE PLACEMENT RAD THERAPY FIELDS: CPT | Mod: 26 | Performed by: RADIOLOGY

## 2021-12-01 PROCEDURE — 77386 HCHG IMRT DELIVERY COMPLEX: CPT | Performed by: RADIOLOGY

## 2021-12-01 ASSESSMENT — PAIN SCALES - GENERAL: PAINLEVEL: 5=MODERATE PAIN

## 2021-12-01 ASSESSMENT — FIBROSIS 4 INDEX: FIB4 SCORE: 6.56

## 2021-12-01 NOTE — ON TREATMENT VISIT
ON TREATMENT  NOTE  RADIATION ONCOLOGY DEPARTMENT    Patient name:  Luigi Walters    Primary Physician:  Denver J Miller, M.D. MRN: [id number]  CSN: [id number]   Referring physician:  Chaitanya Saha M.D. : 1940, 80 y.o.     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 on 10/7/2021  Histopathologic type: Merkel cell carcinoma  Residual tumor (R): R2 - Macroscopic  Laterality: Left  Tumor size (mm): 10  Lymph-vascular invasion (LVI): LVI present/identified, NOS  Primary tumor: Present  Breslow depth (mm): 3  Clinical status of regional lymph nodes: Metastases confirmed by pathologic examination  Presence of extranodal extension: Present      TREATMENT SUMMARY:  [facility] Treatment Information        Some values may be hidden. Unless noted otherwise, only the newest values recorded on each date are displayed.         [facility]   Course First Treatment Date 10/20/2021   Course Last Treatment Date 2021   L Face, Neck Plan from Course C1_Lface&Neck   L Face, Neck1 Plan from Course C1_Lface&Neck   Fraction    Elapsed Course Days 42 @    Prescribed Fraction Dose 233 cGy   Prescribed Total Dose 4,900 cGy   L Face, Neck Reference Point from Course C1_Lface&Neck   Elapsed Course Days 42 @    Session Dose 233 cGy   Total Dose 6,767 cGy   L Face, Neck CP Reference Point from Course C1_Lface&Neck   Elapsed Course Days 42 @    Session Dose 242 cGy   Total Dose 7,001 cGy                SUBJECTIVE:  Skin irritation.       VITAL SIGNS:  Vitals 2021 2021 2021 11/10/2021 11/3/2021 10/27/2021 10/20/2021   SYSTOLIC 132 135 133 143 158 134 155   DIASTOLIC 75 89 89 90 91 90 91   PULSE 94 84 94 78 87 87 90   TEMPERATURE - 97 - - - - -   RESPIRATIONS - -  - - - - -   WEIGHT 220.13 217 218.37 219.91 218.37 220.13 218.04   HEIGHT - - - - - - -   BMI 29.85 kg/m2 29.43 kg/m2 29.62 kg/m2 29.82 kg/m2 29.62 kg/m2 29.85 kg/m2 29.57 kg/m2   SPO2 95 97 96 97 95 93 96     KPS: 70, Cares for self; unable to carry on normal activity or to do active work (ECOG equivalent 1)  Encounter Vitals  Blood Pressure : 132/75  Pulse: 94  Pulse Oximetry: 95 %  Weight: 99.9 kg (220 lb 2.1 oz)  Pain Score: 5=Moderate Pain  Pain Assessment 12/1/2021 11/23/2021 11/17/2021 11/10/2021   Pain Score 5 0 5 0   Pain Loc Neck - Abdomen -   Some recent data might be hidden          PHYSICAL EXAM:  Physical Exam  Vitals and nursing note reviewed.   Constitutional:       General: He is not in acute distress.     Appearance: He is well-developed.   HENT:      Head: Normocephalic.   Skin:     General: Skin is warm and dry.      Findings: Erythema present.   Neurological:      Mental Status: He is alert and oriented to person, place, and time.   Psychiatric:         Behavior: Behavior normal.         Thought Content: Thought content normal.         Judgment: Judgment normal.          Toxicity Assessment 12/1/2021 11/23/2021 11/17/2021 11/10/2021 11/3/2021 10/27/2021 10/20/2021   Toxicity Assessment Head/Neck Head/Neck Head/Neck Head/Neck Head/Neck Head/Neck Head/Neck   Fatigue (lethargy, malaise, asthenia) Increased fatigue over baseline, but not altering normal activities None Increased fatigue over baseline, but not altering normal activities Increased fatigue over baseline, but not altering normal activities Increased fatigue over baseline, but not altering normal activities None None   Radiation Dermatitis Confluent moist desquamation 1.5 cm diameter or more and not confined to skin folds, with/without pitting edema Faint erythema or dry desquamation Faint erythema or dry desquamation Faint erythema or dry desquamation None None None   Rash/desquamation None None None None None None None   Constipation  None None None None Requiring laxatives Requiring stool softener or dietary modification None   Dehydration None None None None Dry mucous membranes and/or diminished skin turgor Dry mucous membranes and/or diminished skin turgor None   Mouth Dryness Mild Normal Mild Mild Normal Normal Normal   RT Dysphagia-Pharyngeal None None None Mild dysphagia, but can eat regular diet None None None   Mucositis None None None None None None None   Salivary Gland Changes None None None None None None None   Stomatitis/Pharyngitis - None - - - - -   Taste Disturbance (dysgeusia) Slightly altered Normal Normal Normal Normal Normal Normal   RT - Pain due to RT Mild pain not interfering with function None None None None None None   Cough Absent Absent Absent Absent Absent Absent Absent   Voice changes/stridor/larynx (hoarseness, loss of voice, laryngitis) Normal Normal Normal Normal Normal Persistent hoarseness, but able to vocalize and/or may have mild to moderate edema Normal       CURRENT MEDICATIONS:    Current Outpatient Medications:   •  silver sulfADIAZINE (SILVADENE) 1 % Cream, Apply 1/8 inch layer to affected area BID, Disp: 400 g, Rfl: 2  •  famotidine (PEPCID) 20 MG Tab, Take 20 mg by mouth. (Patient not taking: Reported on 12/1/2021), Disp: , Rfl:   •  zolpidem (AMBIEN) 10 MG Tab, Take 10 mg by mouth at bedtime as needed for Sleep., Disp: , Rfl:   •  fluticasone (FLONASE) 50 MCG/ACT nasal spray, , Disp: , Rfl:   •  oxyCODONE immediate-release (ROXICODONE) 5 MG Tab, Take 10 mg by mouth every 6 hours as needed for Severe Pain., Disp: , Rfl:   •  calcitRIOL (ROCALTROL) 0.25 MCG Cap, Take 0.25 mcg by mouth every day., Disp: , Rfl: 4  •  SF 5000 PLUS 1.1 % Cream, USE FOR BRUSHING UTD, Disp: , Rfl: 1  •  acetaminophen (TYLENOL) 500 MG Tab, Take 500-1,000 mg by mouth as needed. Indications: Pain, Disp: , Rfl:   •  tramadol (ULTRAM) 50 MG Tab, Take 50 mg by mouth every 8 hours as needed. Indications: Pain, Disp: , Rfl:   •  NON  SPECIFIED, L-orinthate 500mg PO daily, Disp: , Rfl:   •  NON SPECIFIED, L-aspartate 500mg Po daily, Disp: , Rfl:   •  promethazine (PHENERGAN) 25 MG Tab, Take 25 mg by mouth every 6 hours as needed for Nausea/Vomiting., Disp: , Rfl:   •  Multiple Vitamin (MULTI VITAMIN DAILY PO), Take  by mouth. Combination d3,magnessium,vitamin a, Disp: , Rfl:   •  ropinirole (REQUIP) 0.25 MG Tab, Take 0.5 mg by mouth at bedtime. Indications: Restless Leg Syndrome, Disp: , Rfl: 3  •  spironolactone (ALDACTONE) 50 MG Tab, Take 50 mg by mouth every day. Indications: Edema, Disp: , Rfl: 0  •  lactulose 10 GM/15ML SOLN, Take 30 g by mouth as needed., Disp: , Rfl:   •  meclizine (ANTIVERT) 25 MG TABS, Take 25 mg by mouth 3 times a day as needed for Dizziness or Vertigo., Disp: , Rfl:   •  tamsulosin (FLOMAX) 0.4 MG capsule, Take 0.4 mg by mouth every morning. Indications: Benign Enlargement of Prostate, Disp: , Rfl:   •  omeprazole (PRILOSEC) 40 MG capsule, Take 40 mg by mouth every day., Disp: , Rfl:     LABORATORY DATA:   Lab Results   Component Value Date/Time    SODIUM 139 09/09/2021 02:07 PM    POTASSIUM 4.7 09/09/2021 02:07 PM    CHLORIDE 101 09/09/2021 02:07 PM    CO2 27 09/09/2021 02:07 PM    GLUCOSE 110 (H) 09/09/2021 02:07 PM    BUN 20 09/09/2021 02:07 PM    CREATININE 1.46 (H) 09/09/2021 02:07 PM    CREATININE 1.0 05/28/2008 06:00 AM       Lab Results   Component Value Date/Time    WBC 2.3 (LL) 09/09/2021 02:07 PM    RBC 4.78 09/09/2021 02:07 PM    HEMOGLOBIN 14.3 09/09/2021 02:07 PM    HEMATOCRIT 43.4 09/09/2021 02:07 PM    MCV 90.8 09/09/2021 02:07 PM    MCH 29.9 09/09/2021 02:07 PM    MCHC 32.9 (L) 09/09/2021 02:07 PM    PLATELETCT 88 (L) 09/09/2021 02:07 PM         RADIOLOGY DATA:  No results found.    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  on 10/7/2021      PLAN:  No change in treatment plan. Weekly virtual skin checks    Disposition:  Treatment plan and imaging reviewed. Questions answered. Continue therapy outlined.     Jovany WARD M.D.    No orders of the defined types were placed in this encounter.

## 2021-12-02 ENCOUNTER — APPOINTMENT (OUTPATIENT)
Dept: RADIATION ONCOLOGY | Facility: MEDICAL CENTER | Age: 81
End: 2021-12-02
Payer: MEDICARE

## 2021-12-02 LAB
CHEMOTHERAPY INFUSION START DATE: NORMAL
CHEMOTHERAPY INFUSION START DATE: NORMAL
CHEMOTHERAPY INFUSION STOP DATE: NORMAL
CHEMOTHERAPY RECORDS: 2.33
CHEMOTHERAPY RECORDS: 4900
CHEMOTHERAPY RECORDS: 4900
CHEMOTHERAPY RECORDS: 7000
CHEMOTHERAPY RECORDS: NORMAL
CHEMOTHERAPY RX CANCER: NORMAL
CHEMOTHERAPY RX CANCER: NORMAL
DATE 1ST CHEMO CANCER: NORMAL
DATE 1ST CHEMO CANCER: NORMAL
RAD ONC ARIA COURSE LAST TREATMENT DATE: NORMAL
RAD ONC ARIA COURSE LAST TREATMENT DATE: NORMAL
RAD ONC ARIA COURSE TREATMENT ELAPSED DAYS: NORMAL
RAD ONC ARIA COURSE TREATMENT ELAPSED DAYS: NORMAL
RAD ONC ARIA REFERENCE POINT DOSAGE GIVEN TO DATE: 70
RAD ONC ARIA REFERENCE POINT DOSAGE GIVEN TO DATE: 70
RAD ONC ARIA REFERENCE POINT DOSAGE GIVEN TO DATE: 72.42
RAD ONC ARIA REFERENCE POINT DOSAGE GIVEN TO DATE: 72.42
RAD ONC ARIA REFERENCE POINT ID: NORMAL
RAD ONC ARIA REFERENCE POINT SESSION DOSAGE GIVEN: 2.33
RAD ONC ARIA REFERENCE POINT SESSION DOSAGE GIVEN: 2.42

## 2021-12-02 PROCEDURE — 77014 PR CT GUIDANCE PLACEMENT RAD THERAPY FIELDS: CPT | Mod: 26 | Performed by: RADIOLOGY

## 2021-12-02 PROCEDURE — 77427 RADIATION TX MANAGEMENT X5: CPT | Performed by: RADIOLOGY

## 2021-12-02 PROCEDURE — 77386 HCHG IMRT DELIVERY COMPLEX: CPT | Performed by: RADIOLOGY

## 2021-12-09 ENCOUNTER — APPOINTMENT (RX ONLY)
Dept: URBAN - METROPOLITAN AREA CLINIC 22 | Facility: CLINIC | Age: 81
Setting detail: DERMATOLOGY
End: 2021-12-09

## 2021-12-09 DIAGNOSIS — L57.0 ACTINIC KERATOSIS: ICD-10-CM

## 2021-12-09 PROBLEM — D04.61 CARCINOMA IN SITU OF SKIN OF RIGHT UPPER LIMB, INCLUDING SHOULDER: Status: ACTIVE | Noted: 2021-12-09

## 2021-12-09 PROCEDURE — ? CURETTAGE AND DESTRUCTION

## 2021-12-09 PROCEDURE — 17272 DSTR MAL LES S/N/H/F/G 1.1-2: CPT

## 2021-12-09 PROCEDURE — 17003 DESTRUCT PREMALG LES 2-14: CPT

## 2021-12-09 PROCEDURE — ? PHOTO-DOCUMENTATION

## 2021-12-09 PROCEDURE — ? COUNSELING

## 2021-12-09 PROCEDURE — ? LIQUID NITROGEN

## 2021-12-09 PROCEDURE — 17000 DESTRUCT PREMALG LESION: CPT | Mod: 59

## 2021-12-09 ASSESSMENT — LOCATION DETAILED DESCRIPTION DERM
LOCATION DETAILED: LEFT ULNAR DORSAL HAND
LOCATION DETAILED: 1ST WEB SPACE RIGHT HAND
LOCATION DETAILED: LEFT DORSAL MIDDLE METACARPOPHALANGEAL JOINT
LOCATION DETAILED: LEFT RADIAL DORSAL HAND
LOCATION DETAILED: RIGHT ULNAR DORSAL HAND

## 2021-12-09 ASSESSMENT — LOCATION ZONE DERM
LOCATION ZONE: HAND
LOCATION ZONE: FINGER

## 2021-12-09 ASSESSMENT — LOCATION SIMPLE DESCRIPTION DERM
LOCATION SIMPLE: LEFT HAND
LOCATION SIMPLE: RIGHT HAND
LOCATION SIMPLE: RIGHT THUMB

## 2021-12-09 NOTE — PROCEDURE: CURETTAGE AND DESTRUCTION
Detail Level: Detailed
Biopsy Photograph Reviewed: Yes
Accession # (Optional): [id number]
Number Of Curettages: 2
Size Of Lesion In Cm: 0.9
Size Of Lesion After Curettage: 1.4
Add Intralesional Injection: No
Total Volume (Ccs): 1
Anesthesia Type: 1% lidocaine with epinephrine
Anesthesia Volume In Cc: 6
Cautery Type: electrocautery (heat cautery)
What Was Performed First?: Curettage
Final Size Statement: The size of the lesion after curettage was
Additional Information: (Optional): The wound was cleaned, and a pressure dressing was applied.  The patient received detailed post-op instructions.
Consent was obtained from the patient. The risks, benefits and alternatives to therapy were discussed in detail. Specifically, the risks of infection, scarring, bleeding, prolonged wound healing, nerve injury, incomplete removal, allergy to anesthesia and recurrence were addressed. Alternatives to ED&C, such as: surgical removal and XRT were also discussed.  Prior to the procedure, the treatment site was clearly identified and confirmed by the patient. All components of Universal Protocol/PAUSE Rule completed.
Post-Care Instructions: I reviewed with the patient in detail post-care instructions. Patient is to keep the area dry for 48 hours, and not to engage in any swimming until the area is healed. Should the patient develop any fevers, chills, bleeding, severe pain patient will contact the office immediately.
Bill As A Line Item Or As Units: Line Item

## 2021-12-09 NOTE — RADIATION COMPLETION NOTES
END OF TREATMENT SUMMARY    Patient name:  Luigi Walters    Primary Physician:  Denver J Miller, M.D. MRN: [id number]  CSN: [id number]   Referring physician:  Chaitanya Saha M.D. : 1940, 80 y.o.                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 on 10/7/2021  Histopathologic type: Merkel cell carcinoma  Residual tumor (R): R2 - Macroscopic  Laterality: Left  Tumor size (mm): 10  Lymph-vascular invasion (LVI): LVI present/identified, NOS  Primary tumor: Present  Breslow depth (mm): 3  Clinical status of regional lymph nodes: Metastases confirmed by pathologic examination  Presence of extranodal extension: Present       TREATMENT INDICATION:   Adjuvant radiotherapy for high risk Merkel's cell cancer with residual microscopic disease, noris involvement with presence of extranodal extension.     COURSE START DATE:   Course First Treatment Date   Date Value Ref Range Status   2021 10/20/2021  Final        COURSE END DATE:   Course Last Treatment Date   Date Value Ref Range Status   2021  Final          ELAPSED DAYS:   Course Elapsed Days   Date Value Ref Range Status   2021 43 @ [id number]  Final        INTENT:   Course Intent   Date Value Ref Range Status   2021 Curative  Final        CONCURRENT SYSTEMIC TREATMENT:   None     RT COURSE DISCONTINUED EARLY:   No     PATIENT EXPERIENCE:   Toxicity Assessment 2021 2021 2021 11/10/2021 11/3/2021 10/27/2021 10/20/2021   Toxicity Assessment Head/Neck Head/Neck Head/Neck Head/Neck Head/Neck Head/Neck Head/Neck   Fatigue (lethargy, malaise, asthenia) Increased fatigue over baseline, but not altering normal activities None Increased fatigue over baseline, but not altering normal activities Increased fatigue over baseline, but not altering normal  activities Increased fatigue over baseline, but not altering normal activities None None   Radiation Dermatitis Confluent moist desquamation 1.5 cm diameter or more and not confined to skin folds, with/without pitting edema Faint erythema or dry desquamation Faint erythema or dry desquamation Faint erythema or dry desquamation None None None   Rash/desquamation None None None None None None None   Constipation None None None None Requiring laxatives Requiring stool softener or dietary modification None   Dehydration None None None None Dry mucous membranes and/or diminished skin turgor Dry mucous membranes and/or diminished skin turgor None   Mouth Dryness Mild Normal Mild Mild Normal Normal Normal   RT Dysphagia-Pharyngeal None None None Mild dysphagia, but can eat regular diet None None None   Mucositis None None None None None None None   Salivary Gland Changes None None None None None None None   Stomatitis/Pharyngitis - None - - - - -   Taste Disturbance (dysgeusia) Slightly altered Normal Normal Normal Normal Normal Normal   RT - Pain due to RT Mild pain not interfering with function None None None None None None   Cough Absent Absent Absent Absent Absent Absent Absent   Voice changes/stridor/larynx (hoarseness, loss of voice, laryngitis) Normal Normal Normal Normal Normal Persistent hoarseness, but able to vocalize and/or may have mild to moderate edema Normal          FOLLOW-UP PLAN:   6 Weeks     COMMENT:          ANATOMIC TARGET SUMMARY    ANATOMIC TARGET MODALITY TECHNIQUE   Left face and l neck   External beam, photons IMRT          COMMENT: Dose painting 60/70 Bell       TREATMENT SUMMARY:  [facility] Treatment Information        Some values may be hidden. Unless noted otherwise, only the newest values recorded on each date are displayed.         [facility] Treatment Summary 12/1/21 12/2/21   Course First Treatment Date 10/20/2021 10/20/2021    Course Last Treatment Date 12/01/2021 12/02/2021    L Face, Neck Plan  from Course C1_Lface&Neck   Fraction  9 of 9   Elapsed Course Days  43 @ [id number]   Prescribed Fraction Dose  233 cGy   Prescribed Total Dose  7,000 cGy   L Face, Neck1 Plan from Course C1_Lface&Neck   Fraction 20 of 21 21 of 21    Elapsed Course Days 42 @ [id number] 43 @ [id number]    Prescribed Fraction Dose 233 cGy 233 cGy    Prescribed Total Dose 4,900 cGy 4,900 cGy    L Face, Neck Reference Point from Course C1_Lface&Neck   Elapsed Course Days 42 @ [id number] 43 @ [id number]    Session Dose 233 cGy --    Total Dose 6,767 cGy 7,000 cGy    L Face, Neck CP Reference Point from Course C1_Lface&Neck   Elapsed Course Days 42 @ [id number] 43 @ [id number]    Session Dose 242 cGy --    Total Dose 7,001 cGy 7,242 cGy     Some values recorded on this date have been omitted.                 DIAGRAMS:      DOSE VOLUME HISTOGRAMS:        Jovany WARD M.D.  Electronically signed by: Jovany WARD M.D., 12/29/2021 4:31 PM  [phone]

## 2021-12-09 NOTE — ADDENDUM NOTE
Encounter addended by: Olga Escobedo, Med Ass't on: 12/9/2021 10:50 AM   Actions taken: Pend clinical note

## 2021-12-31 ENCOUNTER — OFFICE VISIT (OUTPATIENT)
Dept: URGENT CARE | Facility: PHYSICIAN GROUP | Age: 81
End: 2021-12-31
Payer: MEDICARE

## 2021-12-31 VITALS
WEIGHT: 216.05 LBS | HEIGHT: 71 IN | OXYGEN SATURATION: 96 % | DIASTOLIC BLOOD PRESSURE: 64 MMHG | SYSTOLIC BLOOD PRESSURE: 132 MMHG | TEMPERATURE: 97.3 F | HEART RATE: 95 BPM | BODY MASS INDEX: 30.25 KG/M2 | RESPIRATION RATE: 20 BRPM

## 2021-12-31 DIAGNOSIS — M54.50 CHRONIC LOW BACK PAIN, UNSPECIFIED BACK PAIN LATERALITY, UNSPECIFIED WHETHER SCIATICA PRESENT: ICD-10-CM

## 2021-12-31 DIAGNOSIS — G89.29 CHRONIC LOW BACK PAIN, UNSPECIFIED BACK PAIN LATERALITY, UNSPECIFIED WHETHER SCIATICA PRESENT: ICD-10-CM

## 2021-12-31 DIAGNOSIS — R09.81 NASAL CONGESTION: ICD-10-CM

## 2021-12-31 DIAGNOSIS — Z87.81 HISTORY OF SPINAL FRACTURE: ICD-10-CM

## 2021-12-31 PROCEDURE — 99214 OFFICE O/P EST MOD 30 MIN: CPT | Performed by: PHYSICIAN ASSISTANT

## 2021-12-31 RX ORDER — OXYCODONE HYDROCHLORIDE 5 MG/1
5 TABLET ORAL 2 TIMES DAILY PRN
Qty: 12 TABLET | Refills: 0 | Status: SHIPPED | OUTPATIENT
Start: 2021-12-31 | End: 2022-01-06

## 2021-12-31 ASSESSMENT — ENCOUNTER SYMPTOMS
BACK PAIN: 1
NECK PAIN: 0
COUGH: 1

## 2021-12-31 ASSESSMENT — FIBROSIS 4 INDEX: FIB4 SCORE: 6.64

## 2021-12-31 NOTE — PROGRESS NOTES
"Subjective     Justin Walters is a 81 y.o. male who presents with Leg Pain (bilateral; 4x day) and Back Pain (4x day; Pt states that it is more spine)            Patient is an 81-year-old male who presents to urgent care concern regarding running out of his Oxycodone for his chronic back pain.  Patient reports that he has several compression fractures throughout his low back.  Patient is followed by Dr. Grimaldo of which these have been present for some time.  Patient has been on chronic pain medication for several years.  He does report unfortunately he ran out of such 4 days ago and was unable to get a refill as his current provider is on vacation.  He does have upcoming appointment on Tuesday, January 4 and is requesting medication until such.  Patient denies new injury or trauma to the low back.  He does update me and reports that during Christmas his daughter visited of which he reports she brought a \"cold \".  He since has developed a slight dry cough along with congestion.  He did take a home rapid COVID-19 test yesterday of which was negative.  Patient denies any fevers, shortness of breath or difficulty breathing.    Back Pain  This is a chronic (New to this provider) problem. The current episode started more than 1 year ago. The problem occurs daily. The pain is present in the lumbar spine and gluteal. The quality of the pain is described as aching. The pain is severe. The pain is worse during the night. Risk factors include history of cancer. Treatments tried: As above, tramadol. The treatment provided no relief.       Review of Systems   HENT: Positive for congestion.    Respiratory: Positive for cough.    Musculoskeletal: Positive for back pain. Negative for joint pain and neck pain.   All other systems reviewed and are negative.             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), Encephalopathy, End-stage liver disease (HCC), Esophageal varices in alcoholic cirrhosis, Hepatic encephalopathy (HCC), Hepatitis, Hip fracture (HCC), History of falling, History of non-Hodgkin's lymphoma, HTN (hypertension), benign, Indigestion, Infectious disease (2008,2014), Jaundice, Melena, Melena, Merkel cell carcinoma of face (HCC), Non Hodgkin's lymphoma (HCC) (2019), Osteoarthritis, Pacemaker (2007), Pain (09/09/2021), Pneumonia (1972), Polycythemia, Presence of permanent cardiac pacemaker (2007/2015), Prostate cancer (HCC), Prostate cancer (HCC) (2000), Renal disorder ( ), Seizure (HCC), and Thrombocytopenia (HCC).  MEDS: Reviewed .   ALLERGIES:   Allergies   Allergen Reactions   • Contrast Media With Iodine [Iodine]      \"siezure\"   • Dobutamine      Seizures; taken abruptly off a Dobutamine gtt-no taper   • Latex      Could be adhesive \"red welts\"     SURGHX:   Past Surgical History:   Procedure Laterality Date   • AR EXC SKIN MALIG >4CM REMAINDR BODY Left 9/13/2021    Procedure: EXCISION, MASS- LEFT FACIAL LESION;  Surgeon: Chaitanya Saha M.D.;  Location: SURGERY SAME DAY [facility];  Service: Ent   • NECK DISSECTION RADICAL Left 9/13/2021    Procedure: DISSECTION, NECK, RADICAL - MODIFIED;  Surgeon: Chaitanya Saha M.D.;  Location: SURGERY SAME DAY [facility];  Service: Ent   • FLAP FREE Left 9/13/2021    Procedure: ADJACENT TISSUE TRANSFER CLOSURE;  Surgeon: Chaitanya Saha M.D.;  Location: SURGERY SAME DAY [facility];  Service: Ent   • RECOVERY  3/18/2015    Performed by [facility] at SURGERY PRE-POST PROC UNIT [facility]   • PACEMAKER INSERTION  March 2015    Generator replacement " with  Medtronic Adapta [id number] implanted by Dr. Chaitanya Walters. Original device implanted in 2007.   • PENILE PROSTHESIS AMS INFLATABLE  10/13/2014    Performed by Sedrick Pittman M.D. at SURGERY [facility]   • SHOULDER ARTHROPLASTY TOTAL Left 2010   • PACEMAKER INSERTION  2007        • OTHER      Endoscopy every 3-6 months to track esophageal varices     SOCHX:  reports that he quit smoking about 52 years ago. His smoking use included cigarettes. He has a 7.00 pack-year smoking history. He has never used smokeless tobacco. He reports previous alcohol use of about 0.5 oz of alcohol per week. He reports that he does not use drugs.  FH: Family history was reviewed, no pertinent findings to report    Physical Exam  Vitals reviewed.   Constitutional:       General: He is not in acute distress.     Appearance: He is well-developed.   HENT:      Head: Normocephalic and atraumatic.   Eyes:      Conjunctiva/sclera: Conjunctivae normal.      Pupils: Pupils are equal, round, and reactive to light.   Neck:      Trachea: No tracheal deviation.   Cardiovascular:      Rate and Rhythm: Normal rate and regular rhythm.   Pulmonary:      Effort: Pulmonary effort is normal.      Breath sounds: Normal breath sounds.   Musculoskeletal:      Cervical back: Normal range of motion and neck supple.      Comments: Spine- was not extensively examined.    Skin:     General: Skin is warm.      Findings: No rash.      Comments: No rash to area exposed during the visit today.    Neurological:      Mental Status: He is alert and oriented to person, place, and time.      Coordination: Coordination normal.   Psychiatric:         Behavior: Behavior normal.         Thought Content: Thought content normal.         Judgment: Judgment normal.                             Assessment & Plan        1. Chronic low back pain, unspecified back pain laterality, unspecified whether sciatica present  - oxyCODONE immediate-release (ROXICODONE) 5 MG Tab; Take 1  Tablet by mouth 2 times a day as needed for Severe Pain (avoid while driving) for up to 6 days.  Dispense: 12 Tablet; Refill: 0    2. History of spinal fracture  - oxyCODONE immediate-release (ROXICODONE) 5 MG Tab; Take 1 Tablet by mouth 2 times a day as needed for Severe Pain (avoid while driving) for up to 6 days.  Dispense: 12 Tablet; Refill: 0    3. Nasal congestion            Of note patient is very adamant today that he does not want to pursue COVID-19 testing.  Discussed worrisome signs and symptoms that would require reevaluation in urgent care.    NARXCHECK was reviewed by myself-  Document does not reveal any concerning patterns. Pt. was advised to avoid the operation of heavy machine along with driving while on such medications. Finally pt. was advised to use medication only as prescribed.   Further review of patient's chart and does appear that patient is receiving active cancer treatment along with previous history of spinal fractures.  Patient does have reason to be on chronic pain medication.  Discussed at length today that I will write for 6 days worth- however he MUST keep appointment with his primary care provider for further medication refills.    No new work-up for back pain as this has been present for years without new trauma or injury.    Appropriate PPE worn at all times by provider.   Pt. Had face mask on throughout entirety of the visit other than oropharyngeal examination today.     Side effects of OTC or prescribed medications discussed.     DDX, Supportive care, and indications for immediate follow-up discussed with patient.    Instructed to return to clinic or nearest emergency department if we are not available for any change in condition, further concerns, or worsening of symptoms.    The patient and/or guardian demonstrated a good understanding and agreed with the treatment plan.    Please note that this dictation was created using voice recognition software. I have made every  reasonable attempt to correct obvious errors, but I expect that there are errors of grammar and possibly content that I did not discover before finalizing the note.

## 2022-01-18 ENCOUNTER — HOSPITAL ENCOUNTER (OUTPATIENT)
Dept: RADIATION ONCOLOGY | Facility: MEDICAL CENTER | Age: 82
End: 2022-01-31
Attending: RADIOLOGY
Payer: MEDICARE

## 2022-01-18 VITALS
SYSTOLIC BLOOD PRESSURE: 131 MMHG | DIASTOLIC BLOOD PRESSURE: 82 MMHG | BODY MASS INDEX: 30.69 KG/M2 | OXYGEN SATURATION: 98 % | WEIGHT: 220.02 LBS | HEART RATE: 91 BPM

## 2022-01-18 DIAGNOSIS — C4A.39 MERKEL CELL SKIN CANCER OF CHEEK (HCC): ICD-10-CM

## 2022-01-18 PROCEDURE — 99212 OFFICE O/P EST SF 10 MIN: CPT | Performed by: RADIOLOGY

## 2022-01-18 ASSESSMENT — FIBROSIS 4 INDEX: FIB4 SCORE: 6.64

## 2022-01-18 ASSESSMENT — PAIN SCALES - GENERAL: PAINLEVEL: NO PAIN

## 2022-01-18 NOTE — NON-PROVIDER
Patient was seen today in clinic with Dr. Patiño for follow up.  Vitals signs and weight were obtained and pain assessment was completed.  Allergies and medications were reviewed with the patient.       Vitals/Pain:  There were no vitals filed for this visit.         Allergies:   Contrast media with iodine [iodine], Dobutamine, and Latex    Current Medications:  Current Outpatient Medications   Medication Sig Dispense Refill   • silver sulfADIAZINE (SILVADENE) 1 % Cream Apply 1/8 inch layer to affected area  g 2   • famotidine (PEPCID) 20 MG Tab Take 20 mg by mouth.     • zolpidem (AMBIEN) 10 MG Tab Take 10 mg by mouth at bedtime as needed for Sleep.     • fluticasone (FLONASE) 50 MCG/ACT nasal spray      • oxyCODONE immediate-release (ROXICODONE) 5 MG Tab Take 10 mg by mouth every 6 hours as needed for Severe Pain.     • calcitRIOL (ROCALTROL) 0.25 MCG Cap Take 0.25 mcg by mouth every day.  4   • SF 5000 PLUS 1.1 % Cream USE FOR BRUSHING UTD  1   • acetaminophen (TYLENOL) 500 MG Tab Take 500-1,000 mg by mouth as needed. Indications: Pain     • tramadol (ULTRAM) 50 MG Tab Take 50 mg by mouth every 8 hours as needed. Indications: Pain     • NON SPECIFIED L-orinthate 500mg PO daily     • NON SPECIFIED L-aspartate 500mg Po daily     • promethazine (PHENERGAN) 25 MG Tab Take 25 mg by mouth every 6 hours as needed for Nausea/Vomiting.     • Multiple Vitamin (MULTI VITAMIN DAILY PO) Take  by mouth. Combination d3,magnessium,vitamin a     • ropinirole (REQUIP) 0.25 MG Tab Take 0.5 mg by mouth at bedtime. Indications: Restless Leg Syndrome  3   • spironolactone (ALDACTONE) 50 MG Tab Take 50 mg by mouth every day. Indications: Edema  0   • lactulose 10 GM/15ML SOLN Take 30 g by mouth as needed.     • meclizine (ANTIVERT) 25 MG TABS Take 25 mg by mouth 3 times a day as needed for Dizziness or Vertigo.     • tamsulosin (FLOMAX) 0.4 MG capsule Take 0.4 mg by mouth every morning. Indications: Benign Enlargement of  Prostate     • omeprazole (PRILOSEC) 40 MG capsule Take 40 mg by mouth every day.       No current facility-administered medications for this encounter.         PCP:  Dillan Coleman R.N.

## 2022-01-18 NOTE — PROGRESS NOTES
RADIATION ONCOLOGY FOLLOW-UP    DATE OF SERVICE: 1/18/2022    IDENTIFICATION:   A 81 y.o. male with    Visit Diagnoses     ICD-10-CM   1. 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 on 10/7/2021  Histopathologic type: Merkel cell carcinoma  Residual tumor (R): R2 - Macroscopic  Laterality: Left  Tumor size (mm): 10  Lymph-vascular invasion (LVI): LVI present/identified, NOS  Primary tumor: Present  Breslow depth (mm): 3  Clinical status of regional lymph nodes: Metastases confirmed by pathologic examination  Presence of extranodal extension: Present      RADIATION SUMMARY:  [facility] Information        Some values may be hidden. Unless noted otherwise, only the newest values recorded on each date are displayed.         [facility] Treatment Summary 12/2/21   Course First Treatment Date 10/20/2021    Course Last Treatment Date 12/02/2021    L Face, Neck Plan from Course C1_Lface&Neck   Fraction 9 of 9   Elapsed Course Days 43 @ [id number]   Prescribed Fraction Dose 233 cGy   Prescribed Total Dose 7,000 cGy   L Face, Neck1 Plan from Course C1_Lface&Neck   Fraction 21 of 21    Elapsed Course Days 43 @ [id number]    Prescribed Fraction Dose 233 cGy    Prescribed Total Dose 4,900 cGy    L Face, Neck Reference Point from Course C1_Lface&Neck   Elapsed Course Days 43 @ [id number]    Session Dose --    Total Dose 7,000 cGy    L Face, Neck CP Reference Point from Course C1_Lface&Neck   Elapsed Course Days 43 @ [id number]    Session Dose --    Total Dose 7,242 cGy     Some values recorded on this date have been omitted.              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  He is abstained from alcohol since 2006.  History of prostate cancer treated with radiotherapy greater than 20 years ago at [facility].     He presented with small facial skin nodule on the left side of his cheek.  Shave biopsy performed by dermatology demonstrated Merkel cell carcinoma.  He was referred to Dr. Saha for ENT evaluation.  He also underwent PET/CT scanning by Dr. Bautista.  The PET/CT demonstrated a small amount of uptake in the left cheek proximately a centimeter or less in size in addition to lymph node uptake in level 2 and level 5 on the left.  There was no evidence of distant metastatic disease.     He was taken to the OR 9/13/2021 underwent a left suprahyoid neck dissection, left modified neck dissection, wide local excision of left facial Merkel cell carcinoma with primary closure with immobilization of adjacent tissue.     Pathology demonstrated 1 cm Merkel cell carcinoma involving the face with 3 mm depth of invasion.  There was evidence of lymphovascular invasion.  2 lymph nodes out of 14 demonstrated metastatic disease with the largest metastatic deposit 1.2 cm.  There was evidence of extra noris extension of 2 mm.  In addition there was presence of an in-transit metastasis noted in the soft tissue without noris elements measuring 3mm.  Final pathologic stage was T1 N3 M0, stage IIIb.     He is now approximately 2-1/2 weeks postop.  His wounds are completely healed.    He is being seen for consideration of adjuvant therapy.  He does report xerostomia especially at night.  He would requires the use of a moisturizing spray in his mouth.  He states when he awakens in the morning his mouth is quite dry.  He denies any complaints with respect to taste or swallowing.     INTERVAL HISTORY:   Initial follow-up visit post completion of therapy.  Did experience fairly significant radiation dermatitis neck area left  greater than right.  This is now completely resolved with use of Silvadene.  He is doing well and offers no complaints.      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     • zolpidem (AMBIEN) 10 MG Tab Take 10 mg by mouth at bedtime as needed for Sleep.     • fluticasone (FLONASE) 50 MCG/ACT nasal spray      • oxyCODONE immediate-release (ROXICODONE) 5 MG Tab Take 10 mg by mouth every 6 hours as needed for Severe Pain.     • calcitRIOL (ROCALTROL) 0.25 MCG Cap Take 0.25 mcg by mouth every day.  4   • SF 5000 PLUS 1.1 % Cream USE FOR BRUSHING UTD  1   • acetaminophen (TYLENOL) 500 MG Tab Take 500-1,000 mg by mouth as needed. Indications: Pain     • tramadol (ULTRAM) 50 MG Tab Take 50 mg by mouth every 8 hours as needed. Indications: Pain     • NON SPECIFIED L-orinthate 500mg PO daily     • NON SPECIFIED L-aspartate 500mg Po daily     • promethazine (PHENERGAN) 25 MG Tab Take 25 mg by mouth every 6 hours as needed for Nausea/Vomiting.     • Multiple Vitamin (MULTI VITAMIN DAILY PO) Take  by mouth. Combination d3,magnessium,vitamin a     • ropinirole (REQUIP) 0.25 MG Tab Take 0.5 mg by mouth at bedtime. Indications: Restless Leg Syndrome  3   • spironolactone  (ALDACTONE) 50 MG Tab Take 50 mg by mouth every day. Indications: Edema  0   • lactulose 10 GM/15ML SOLN Take 30 g by mouth as needed.     • meclizine (ANTIVERT) 25 MG TABS Take 25 mg by mouth 3 times a day as needed for Dizziness or Vertigo.     • tamsulosin (FLOMAX) 0.4 MG capsule Take 0.4 mg by mouth every morning. Indications: Benign Enlargement of Prostate     • omeprazole (PRILOSEC) 40 MG capsule Take 40 mg by mouth every day.       No current facility-administered medications for this encounter.       ALLERGIES:  Contrast media with iodine [iodine], Dobutamine, and Latex    REVIEW OF SYSTEMS:  A review of systems for today's date of service was reviewed and uploaded into the electronic medical record.    PHYSICAL EXAM:  PERFORMANCE STATUS:  No flowsheet data found.  Karnofsky Score 10/7/2021   Karnofsky Score 90   Some recent data might be hidden     /82   Pulse 91   Wt 99.8 kg (220 lb 0.3 oz)   SpO2 98%   BMI 30.69 kg/m²   Physical Exam  Vitals and nursing note reviewed.   Constitutional:       General: He is not in acute distress.     Appearance: He is well-developed. He is not diaphoretic.   HENT:      Head: Normocephalic.      Mouth/Throat:      Mouth: Mucous membranes are moist.      Pharynx: Oropharynx is clear. No oropharyngeal exudate or posterior oropharyngeal erythema.   Eyes:      Extraocular Movements: Extraocular movements intact.   Cardiovascular:      Rate and Rhythm: Normal rate and regular rhythm.   Pulmonary:      Effort: Pulmonary effort is normal.   Musculoskeletal:      Cervical back: Normal range of motion and neck supple.   Lymphadenopathy:      Cervical: No cervical adenopathy.   Skin:     General: Skin is warm and dry.      Findings: No erythema.   Neurological:      Mental Status: He is alert and oriented to person, place, and time.      Cranial Nerves: No cranial nerve deficit.      Coordination: Coordination normal.   Psychiatric:         Behavior: Behavior normal.          Thought Content: Thought content normal.         Judgment: Judgment normal.         LABORATORY DATA:   Lab Results   Component Value Date/Time    WBC 2.3 (LL) 09/09/2021 1407    WBC 3.7 (L) 07/12/2021 1024    WBC 3.5 (L) 09/02/2020 1646    HEMOGLOBIN 14.3 09/09/2021 1407    HEMOGLOBIN 15.9 07/12/2021 1024    HEMOGLOBIN 15.6 09/02/2020 1646    HEMATOCRIT 43.4 09/09/2021 1407    HEMATOCRIT 48.6 07/12/2021 1024    HEMATOCRIT 47.7 09/02/2020 1646    MCV 90.8 09/09/2021 1407    MCV 91.0 07/12/2021 1024    MCV 92.1 09/02/2020 1646    PLATELETCT 88 (L) 09/09/2021 1407    PLATELETCT 110 (L) 07/12/2021 1024    PLATELETCT 85 (L) 09/02/2020 1646    NEUTS 2.69 07/12/2021 1024    NEUTS 2.75 09/02/2020 1646    NEUTS 0.97 (L) 08/02/2019 1043      Lab Results   Component Value Date/Time    SODIUM 139 09/09/2021 1407    SODIUM 140 07/12/2021 1024    SODIUM 138 01/12/2021 1438    POTASSIUM 4.7 09/09/2021 1407    POTASSIUM 4.3 07/12/2021 1024    POTASSIUM 4.8 01/12/2021 1438    BUN 20 09/09/2021 1407    BUN 19 07/12/2021 1024    BUN 20 01/12/2021 1438    CREATININE 1.46 (H) 09/09/2021 1407    CREATININE 1.36 07/12/2021 1024    CREATININE 1.23 01/12/2021 1438    CALCIUM 10.0 09/09/2021 1407    CALCIUM 10.2 07/12/2021 1024    CALCIUM 10.5 01/12/2021 1438    ALBUMIN 4.3 07/12/2021 1024    ALBUMIN 4.5 01/12/2021 1438    ALBUMIN 4.2 09/02/2020 1646    ASTSGOT 25 07/12/2021 1024    ASTSGOT 30 01/12/2021 1438    ASTSGOT 45 09/02/2020 1646    ALKPHOSPHAT 84 07/12/2021 1024    ALKPHOSPHAT 92 01/12/2021 1438    ALKPHOSPHAT 69 09/02/2020 1646    IFNOTAFR 46 (A) 09/09/2021 1407    IFNOTAFR 50 (A) 07/12/2021 1024    IFNOTAFR 57 (A) 01/12/2021 1438       RADIOLOGY DATA:  No results found.    IMPRESSION:    A 81 y.o. with   Visit Diagnoses     ICD-10-CM   1. 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 on 10/7/2021  Histopathologic type: Merkel cell carcinoma  Residual tumor (R): R2 - Macroscopic  Laterality: Left  Tumor size (mm): 10  Lymph-vascular invasion (LVI): LVI present/identified, NOS  Primary tumor: Present  Breslow depth (mm): 3  Clinical status of regional lymph nodes: Metastases confirmed by pathologic examination  Presence of extranodal extension: Present      CANCER STATUS:  No Evidence of Disease    RECOMMENDATIONS:   Reassured him.  Wounds appear well-healed.  There is no evidence of disease at this point.  Did recommend restaging PET CT scan which we will obtain an early March with a follow-up post imaging.    Thank you for the opportunity to participate in his care.  If any questions or comments, please do not hesitate in calling.    Orders Placed This Encounter   • [id number] B[facility] TO [facility]-TH[facility]

## 2022-03-07 ENCOUNTER — HOSPITAL ENCOUNTER (OUTPATIENT)
Dept: RADIOLOGY | Facility: MEDICAL CENTER | Age: 82
End: 2022-03-07
Attending: RADIOLOGY
Payer: MEDICARE

## 2022-03-07 DIAGNOSIS — C4A.39 MERKEL CELL SKIN CANCER OF CHEEK (HCC): ICD-10-CM

## 2022-03-07 PROCEDURE — A9552 F18 FDG: HCPCS

## 2022-03-08 ENCOUNTER — HOSPITAL ENCOUNTER (OUTPATIENT)
Dept: RADIATION ONCOLOGY | Facility: MEDICAL CENTER | Age: 82
End: 2022-03-31
Attending: RADIOLOGY
Payer: MEDICARE

## 2022-03-08 VITALS
DIASTOLIC BLOOD PRESSURE: 97 MMHG | HEART RATE: 89 BPM | RESPIRATION RATE: 18 BRPM | TEMPERATURE: 97.3 F | SYSTOLIC BLOOD PRESSURE: 143 MMHG | OXYGEN SATURATION: 98 % | BODY MASS INDEX: 29.99 KG/M2 | WEIGHT: 215 LBS

## 2022-03-08 DIAGNOSIS — C4A.39 MERKEL CELL SKIN CANCER OF CHEEK (HCC): ICD-10-CM

## 2022-03-08 ASSESSMENT — FIBROSIS 4 INDEX: FIB4 SCORE: 6.64

## 2022-03-08 ASSESSMENT — PAIN SCALES - GENERAL: PAINLEVEL: NO PAIN

## 2022-03-08 NOTE — PROGRESS NOTES
RADIATION ONCOLOGY FOLLOW-UP    Patient name:  Luigi Walters    Primary Physician:  Denver J Miller, M.D. MRN: [id number]  [facility]: [id number]   Referring physician:  Chaitanya Saha M.D.  : 1940, 81 y.o.     DATE OF SERVICE: 3/8/2022    IDENTIFICATION:   A 81 y.o. 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 on 10/7/2021  Histopathologic type: Merkel cell carcinoma  Residual tumor (R): R2 - Macroscopic  Laterality: Left  Tumor size (mm): 10  Lymph-vascular invasion (LVI): LVI present/identified, NOS  Primary tumor: Present  Breslow depth (mm): 3  Clinical status of regional lymph nodes: Metastases confirmed by pathologic examination  Presence of extranodal extension: Present      RADIATION SUMMARY:  [facility]ria Treatment Information        Some values may be hidden. Unless noted otherwise, only the newest values recorded on each date are displayed.         [facility]ria Treatment Summary 21   Course First Treatment Date 10/20/2021 10/20/2021    Course Last Treatment Date 2021    L Face, Neck Plan from Course C1_Lface&Neck   Fraction     Elapsed Course Days  43 @    Prescribed Fraction Dose  233 cGy   Prescribed Total Dose  7,000 cGy   L Face, Neck1 Plan from Course C1_Lface&Neck   Fraction     Elapsed Course Days 42 @  43 @     Prescribed Fraction Dose 233 cGy 233 cGy    Prescribed Total Dose 4,900 cGy 4,900 cGy    L Face, Neck Reference Point from Course C1_Lface&Neck   Elapsed Course Days 42 @  43 @     Session Dose 233 cGy --    Total Dose 6,767 cGy 7,000 cGy    L Face, Neck CP Reference Point from Course C1_Lface&Neck   Elapsed Course Days 42 @  43 @     Session Dose 242 cGy --    Total Dose 7,001 cGy 7,242 cGy     Some  values recorded on this date have been omitted.               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  He is abstained from alcohol since 2006.  History of prostate cancer treated with radiotherapy greater than 20 years ago at [facility].     He presented with small facial skin nodule on the left side of his cheek.  Shave biopsy performed by dermatology demonstrated Merkel cell carcinoma.  He was referred to Dr. Saha for ENT evaluation.  He also underwent PET/CT scanning by Dr. Bautista.  The PET/CT demonstrated a small amount of uptake in the left cheek proximately a centimeter or less in size in addition to lymph node uptake in level 2 and level 5 on the left.  There was no evidence of distant metastatic disease.     He was taken to the OR 9/13/2021 underwent a left suprahyoid neck dissection, left modified neck dissection, wide local excision of left facial Merkel cell carcinoma with primary closure with immobilization of adjacent tissue.     Pathology demonstrated 1 cm Merkel cell carcinoma involving the face with 3 mm depth of invasion.  There was evidence of lymphovascular invasion.  2 lymph nodes out of 14 demonstrated metastatic disease with the largest metastatic deposit 1.2 cm.  There was evidence of extra noris extension of 2 mm.  In addition there was presence of an in-transit metastasis noted in the soft tissue without noris elements measuring 3mm.  Final pathologic stage was T1 N3 M0, stage IIIb.     He is now approximately 2-1/2 weeks postop.  His wounds are completely healed.    He is being seen for consideration of adjuvant therapy.  He does report xerostomia especially at night.  He would requires the use of a moisturizing spray in his mouth.  He states when he  awakens in the morning his mouth is quite dry.  He denies any complaints with respect to taste or swallowing.     01/18/22  Initial follow-up visit post completion of therapy.  Did experience fairly significant radiation dermatitis neck area left greater than right.  This is now completely resolved with use of Silvadene.  He is doing well and offers no complaints.          INTERVAL HISTORY:  3/8/2022.  Scheduled follow-up visit post restaging PET CT scan.  Currently he is doing well offers no significant complaints except for back pain med back.  He is taking oxycodone.    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     • zolpidem (AMBIEN) 10 MG Tab Take 10 mg by mouth at bedtime as needed for Sleep.     • fluticasone (FLONASE) 50 MCG/ACT nasal spray      • oxyCODONE immediate-release (ROXICODONE) 5 MG Tab Take 10 mg by mouth every 6 hours as needed for Severe Pain.     • calcitRIOL (ROCALTROL) 0.25 MCG Cap Take 0.25 mcg by mouth every day.  4   • SF 5000 PLUS 1.1 % Cream USE FOR BRUSHING UTD  1   • acetaminophen (TYLENOL) 500 MG Tab Take 500-1,000 mg by mouth as needed. Indications: Pain     • tramadol (ULTRAM) 50 MG Tab Take 50 mg by mouth every 8 hours as needed. Indications: Pain     • NON SPECIFIED L-orinthate 500mg PO daily     • NON SPECIFIED  L-aspartate 500mg Po daily     • promethazine (PHENERGAN) 25 MG Tab Take 25 mg by mouth every 6 hours as needed for Nausea/Vomiting.     • Multiple Vitamin (MULTI VITAMIN DAILY PO) Take  by mouth. Combination d3,magnessium,vitamin a     • ropinirole (REQUIP) 0.25 MG Tab Take 0.5 mg by mouth at bedtime. Indications: Restless Leg Syndrome  3   • spironolactone (ALDACTONE) 50 MG Tab Take 50 mg by mouth every day. Indications: Edema  0   • lactulose 10 GM/15ML SOLN Take 30 g by mouth as needed.     • meclizine (ANTIVERT) 25 MG TABS Take 25 mg by mouth 3 times a day as needed for Dizziness or Vertigo.     • tamsulosin (FLOMAX) 0.4 MG capsule Take 0.4 mg by mouth every morning. Indications: Benign Enlargement of Prostate     • silver sulfADIAZINE (SILVADENE) 1 % Cream Apply 1/8 inch layer to affected area BID (Patient not taking: Reported on 3/8/2022) 400 g 2   • omeprazole (PRILOSEC) 40 MG capsule Take 40 mg by mouth every day. (Patient not taking: Reported on 3/8/2022)       No current facility-administered medications for this encounter.       ALLERGIES:  Contrast media with iodine [iodine], Dobutamine, and Latex    REVIEW OF SYSTEMS:    A complete review of system taken. Pertinent items in HPI.  All others negative.    PHYSICAL EXAM:  PERFORMANCE STATUS:  No flowsheet data found.  Karnofsky Score 10/7/2021   Karnofsky Score 90   Some recent data might be hidden     /97   Pulse 89   Temp 36.3 °C (97.3 °F)   Resp 18   Wt 97.5 kg (215 lb)   SpO2 98%   BMI 29.99 kg/m²   Physical Exam  Vitals and nursing note reviewed.   Constitutional:       General: He is not in acute distress.     Appearance: He is well-developed.   HENT:      Head: Normocephalic.   Musculoskeletal:         General: Tenderness (Mid back approximately T11-T12) present.   Skin:     General: Skin is warm and dry.      Findings: No erythema.   Neurological:      Mental Status: He is alert and oriented to person, place, and time.   Psychiatric:          Behavior: Behavior normal.         Thought Content: Thought content normal.         Judgment: Judgment normal.         LABORATORY DATA:   Lab Results   Component Value Date/Time    WBC 2.3 (LL) 09/09/2021 02:07 PM    RBC 4.78 09/09/2021 02:07 PM    HEMOGLOBIN 14.3 09/09/2021 02:07 PM    HEMATOCRIT 43.4 09/09/2021 02:07 PM    MCV 90.8 09/09/2021 02:07 PM    MCH 29.9 09/09/2021 02:07 PM    MCHC 32.9 (L) 09/09/2021 02:07 PM    RDW 44.9 09/09/2021 02:07 PM    PLATELETCT 88 (L) 09/09/2021 02:07 PM    MPV 11.3 09/09/2021 02:07 PM    NEUTSPOLYS 72.20 (H) 07/12/2021 10:24 AM    LYMPHOCYTES 19.00 (L) 07/12/2021 10:24 AM    MONOCYTES 5.90 07/12/2021 10:24 AM    EOSINOPHILS 2.10 07/12/2021 10:24 AM    BASOPHILS 0.50 07/12/2021 10:24 AM      Lab Results   Component Value Date/Time    SODIUM 139 09/09/2021 02:07 PM    POTASSIUM 4.7 09/09/2021 02:07 PM    CHLORIDE 101 09/09/2021 02:07 PM    CO2 27 09/09/2021 02:07 PM    GLUCOSE 110 (H) 09/09/2021 02:07 PM    BUN 20 09/09/2021 02:07 PM    CREATININE 1.46 (H) 09/09/2021 02:07 PM    CREATININE 1.0 05/28/2008 06:00 AM         RADIOLOGY DATA:  [id number] BASE TO MID-THIGH    Result Date: 3/7/2022  1. Hypermetabolic osseous metastases in the clivus and T11 vertebral body. 2. Hypermetabolic left hilar lymph node, possibly metastatic. 3. No other FDG avid lesions. 4. Postsurgical changes in the left neck.      IMPRESSION:    A 81 y.o. 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 on 10/7/2021  Histopathologic type: Merkel cell carcinoma  Residual tumor (R): R2 - Macroscopic  Laterality: Left  Tumor size (mm): 10  Lymph-vascular invasion (LVI): LVI present/identified, NOS  Primary tumor: Present  Breslow depth (mm): 3  Clinical status of regional lymph nodes: Metastases confirmed by pathologic examination  Presence of  extranodal extension: Present      CANCER STATUS:  Distant Recurrence Suspected    RECOMMENDATIONS:   Reviewed PET/CT imaging with family.  Compared to his pretreatment PET there is interval development of uptake in the clivus and T11 vertebral body.  Uptake is fairly intense suggestive of metastatic disease.  T11 uptake probably corresponds to his back pain.    He has a history of 2 additional cancers prostate and Hodgkin's lymphoma.  Considering the multiple malignancies I did recommend biopsy of the T11 vertebral body. Will see back after biopsy.    Thank you for the opportunity to participate in his care.  If any questions or comments, please do not hesitate in calling.    Orders Placed This Encounter   • CT-NEEDLE BX-DEEP BONE

## 2022-03-08 NOTE — NON-PROVIDER
Patient was seen today in clinic with Dr. Patiño for follow up.  Vitals signs and weight were obtained and pain assessment was completed.  Allergies and medications were reviewed with the patient.       Vitals/Pain:  Vitals:    03/08/22 1435   BP: 143/97   Pulse: 89   Resp: 18   Temp: 36.3 °C (97.3 °F)   SpO2: 98%   Weight: 97.5 kg (215 lb)   Pain Score: No pain        Allergies:   Contrast media with iodine [iodine], Dobutamine, and Latex    Current Medications:  Current Outpatient Medications   Medication Sig Dispense Refill   • famotidine (PEPCID) 20 MG Tab Take 20 mg by mouth.     • zolpidem (AMBIEN) 10 MG Tab Take 10 mg by mouth at bedtime as needed for Sleep.     • fluticasone (FLONASE) 50 MCG/ACT nasal spray      • oxyCODONE immediate-release (ROXICODONE) 5 MG Tab Take 10 mg by mouth every 6 hours as needed for Severe Pain.     • calcitRIOL (ROCALTROL) 0.25 MCG Cap Take 0.25 mcg by mouth every day.  4   • SF 5000 PLUS 1.1 % Cream USE FOR BRUSHING UTD  1   • acetaminophen (TYLENOL) 500 MG Tab Take 500-1,000 mg by mouth as needed. Indications: Pain     • tramadol (ULTRAM) 50 MG Tab Take 50 mg by mouth every 8 hours as needed. Indications: Pain     • NON SPECIFIED L-orinthate 500mg PO daily     • NON SPECIFIED L-aspartate 500mg Po daily     • promethazine (PHENERGAN) 25 MG Tab Take 25 mg by mouth every 6 hours as needed for Nausea/Vomiting.     • Multiple Vitamin (MULTI VITAMIN DAILY PO) Take  by mouth. Combination d3,magnessium,vitamin a     • ropinirole (REQUIP) 0.25 MG Tab Take 0.5 mg by mouth at bedtime. Indications: Restless Leg Syndrome  3   • spironolactone (ALDACTONE) 50 MG Tab Take 50 mg by mouth every day. Indications: Edema  0   • lactulose 10 GM/15ML SOLN Take 30 g by mouth as needed.     • meclizine (ANTIVERT) 25 MG TABS Take 25 mg by mouth 3 times a day as needed for Dizziness or Vertigo.     • tamsulosin (FLOMAX) 0.4 MG capsule Take 0.4 mg by mouth every morning. Indications: Benign Enlargement  of Prostate     • silver sulfADIAZINE (SILVADENE) 1 % Cream Apply 1/8 inch layer to affected area BID (Patient not taking: Reported on 3/8/2022) 400 g 2   • omeprazole (PRILOSEC) 40 MG capsule Take 40 mg by mouth every day. (Patient not taking: Reported on 3/8/2022)       No current facility-administered medications for this encounter.         PCP:  Dillan Coleman R.N.

## 2022-03-09 ENCOUNTER — HOSPITAL ENCOUNTER (OUTPATIENT)
Facility: MEDICAL CENTER | Age: 82
End: 2022-03-09
Attending: RADIOLOGY | Admitting: RADIOLOGY
Payer: MEDICARE

## 2022-03-09 DIAGNOSIS — C4A.39 MERKEL CELL SKIN CANCER OF CHEEK (HCC): ICD-10-CM

## 2022-03-11 ENCOUNTER — PRE-ADMISSION TESTING (OUTPATIENT)
Dept: ADMISSIONS | Facility: MEDICAL CENTER | Age: 82
End: 2022-03-11
Attending: RADIOLOGY
Payer: MEDICARE

## 2022-03-11 DIAGNOSIS — Z01.810 PRE-OPERATIVE CARDIOVASCULAR EXAMINATION: ICD-10-CM

## 2022-03-11 DIAGNOSIS — Z01.812 PRE-OPERATIVE LABORATORY EXAMINATION: ICD-10-CM

## 2022-03-11 NOTE — OR NURSING
"Preadmit appointment: \" Preparing for your Procedure information\" sheet given to patient with verbal and written instructions. Patient instructed to continue prescribed medications through the day before surgery, instructed to take the following medications the day of surgery per anesthesia protocol:  TYLENOL, ROCALTROL, PEPCID, FLONASE, LACTULOSE, ANTIVERT, OXYCODONE, PHENERGAN, SF 5000 FLUORIDE CREAM, FLOMAX, ULTRAM.            FALL RISK ordered and protocol initiated.    Pacemaker Form FAX to Dr. Chaitanya Walters [facility].    Emailed pre-admit surgery instructions and video to email confirmed bu pt.  "

## 2022-03-13 ENCOUNTER — HOSPITAL ENCOUNTER (OUTPATIENT)
Dept: RADIOLOGY | Facility: MEDICAL CENTER | Age: 82
End: 2022-03-13
Attending: INTERNAL MEDICINE
Payer: MEDICARE

## 2022-03-13 DIAGNOSIS — K74.60 HEPATIC CIRRHOSIS, UNSPECIFIED HEPATIC CIRRHOSIS TYPE, UNSPECIFIED WHETHER ASCITES PRESENT (HCC): ICD-10-CM

## 2022-03-13 DIAGNOSIS — K76.6 PORTAL HYPERTENSION (HCC): ICD-10-CM

## 2022-03-13 PROCEDURE — 76700 US EXAM ABDOM COMPLETE: CPT

## 2022-03-14 ENCOUNTER — PRE-ADMISSION TESTING (OUTPATIENT)
Dept: ADMISSIONS | Facility: MEDICAL CENTER | Age: 82
End: 2022-03-14
Attending: RADIOLOGY
Payer: MEDICARE

## 2022-03-14 ENCOUNTER — HOSPITAL ENCOUNTER (OUTPATIENT)
Dept: LAB | Facility: MEDICAL CENTER | Age: 82
End: 2022-03-14
Attending: INTERNAL MEDICINE
Payer: MEDICARE

## 2022-03-14 DIAGNOSIS — Z01.810 PRE-OPERATIVE CARDIOVASCULAR EXAMINATION: ICD-10-CM

## 2022-03-14 DIAGNOSIS — Z01.812 PRE-OPERATIVE LABORATORY EXAMINATION: ICD-10-CM

## 2022-03-14 LAB
ANION GAP SERPL CALC-SCNC: 10 MMOL/L (ref 7–16)
BUN SERPL-MCNC: 18 MG/DL (ref 8–22)
CALCIUM SERPL-MCNC: 9.5 MG/DL (ref 8.4–10.2)
CHLORIDE SERPL-SCNC: 107 MMOL/L (ref 96–112)
CO2 SERPL-SCNC: 22 MMOL/L (ref 20–33)
CREAT SERPL-MCNC: 1.16 MG/DL (ref 0.5–1.4)
EKG IMPRESSION: NORMAL
ERYTHROCYTE [DISTWIDTH] IN BLOOD BY AUTOMATED COUNT: 45.1 FL (ref 35.9–50)
GLUCOSE SERPL-MCNC: 98 MG/DL (ref 65–99)
HCT VFR BLD AUTO: 43.2 % (ref 42–52)
HGB BLD-MCNC: 14.4 G/DL (ref 14–18)
MCH RBC QN AUTO: 30.6 PG (ref 27–33)
MCHC RBC AUTO-ENTMCNC: 33.3 G/DL (ref 33.7–35.3)
MCV RBC AUTO: 91.7 FL (ref 81.4–97.8)
PLATELET # BLD AUTO: 99 K/UL (ref 164–446)
PMV BLD AUTO: 11.9 FL (ref 9–12.9)
POTASSIUM SERPL-SCNC: 4.1 MMOL/L (ref 3.6–5.5)
RBC # BLD AUTO: 4.71 M/UL (ref 4.7–6.1)
SARS-COV+SARS-COV-2 AG RESP QL IA.RAPID: NOTDETECTED
SODIUM SERPL-SCNC: 139 MMOL/L (ref 135–145)
SPECIMEN SOURCE: NORMAL
WBC # BLD AUTO: 3.3 K/UL (ref 4.8–10.8)

## 2022-03-14 PROCEDURE — 85027 COMPLETE CBC AUTOMATED: CPT

## 2022-03-14 PROCEDURE — 93005 ELECTROCARDIOGRAM TRACING: CPT

## 2022-03-14 PROCEDURE — 36415 COLL VENOUS BLD VENIPUNCTURE: CPT

## 2022-03-14 PROCEDURE — 87426 SARSCOV CORONAVIRUS AG IA: CPT

## 2022-03-14 PROCEDURE — 80048 BASIC METABOLIC PNL TOTAL CA: CPT

## 2022-03-14 PROCEDURE — 93010 ELECTROCARDIOGRAM REPORT: CPT | Performed by: INTERNAL MEDICINE

## 2022-03-14 PROCEDURE — 82105 ALPHA-FETOPROTEIN SERUM: CPT

## 2022-03-15 ENCOUNTER — TELEPHONE (OUTPATIENT)
Dept: CARDIOLOGY | Facility: MEDICAL CENTER | Age: 82
End: 2022-03-15
Payer: MEDICARE

## 2022-03-15 NOTE — TELEPHONE ENCOUNTER
SHERINE Serna from pre admit would like the pacer form faxed to her at [phone].    Thank you,  Bessy DIXON

## 2022-03-16 LAB — AFP-TM SERPL-MCNC: 2 NG/ML (ref 0–9)

## 2022-03-17 ENCOUNTER — PRE-ADMISSION TESTING (OUTPATIENT)
Dept: ADMISSIONS | Facility: MEDICAL CENTER | Age: 82
End: 2022-03-17
Attending: RADIOLOGY
Payer: MEDICARE

## 2022-03-17 NOTE — OR NURSING
"Patient had a COVID test at [facility] pre admit for his procedure on 3/16/22.  Denies having any symptoms and stated the COVID test seemed \"routine\".  Patients procedure was rescheduled for 3/18/22 in [facility].  COVID testing is not indicated for this procedure on 3/18/22.  "

## 2022-03-18 ENCOUNTER — HOSPITAL ENCOUNTER (OUTPATIENT)
Facility: MEDICAL CENTER | Age: 82
End: 2022-03-18
Attending: RADIOLOGY | Admitting: RADIOLOGY
Payer: MEDICARE

## 2022-03-18 ENCOUNTER — APPOINTMENT (OUTPATIENT)
Dept: RADIOLOGY | Facility: MEDICAL CENTER | Age: 82
End: 2022-03-18
Attending: RADIOLOGY
Payer: MEDICARE

## 2022-03-18 VITALS
HEART RATE: 76 BPM | WEIGHT: 218.92 LBS | RESPIRATION RATE: 16 BRPM | BODY MASS INDEX: 30.65 KG/M2 | HEIGHT: 71 IN | DIASTOLIC BLOOD PRESSURE: 76 MMHG | TEMPERATURE: 97 F | OXYGEN SATURATION: 97 % | SYSTOLIC BLOOD PRESSURE: 146 MMHG

## 2022-03-18 VITALS
RESPIRATION RATE: 16 BRPM | DIASTOLIC BLOOD PRESSURE: 78 MMHG | HEART RATE: 75 BPM | SYSTOLIC BLOOD PRESSURE: 145 MMHG | OXYGEN SATURATION: 99 %

## 2022-03-18 LAB
INR PPP: 1.14 (ref 0.87–1.13)
PATHOLOGY CONSULT NOTE: NORMAL
PROTHROMBIN TIME: 14.3 SEC (ref 12–14.6)

## 2022-03-18 PROCEDURE — 20225 BONE BIOPSY TROCAR/NDL DEEP: CPT

## 2022-03-18 PROCEDURE — 700111 HCHG RX REV CODE 636 W/ 250 OVERRIDE (IP): Performed by: STUDENT IN AN ORGANIZED HEALTH CARE EDUCATION/TRAINING PROGRAM

## 2022-03-18 PROCEDURE — 160035 HCHG PACU - 1ST 60 MINS PHASE I

## 2022-03-18 PROCEDURE — 160002 HCHG RECOVERY MINUTES (STAT)

## 2022-03-18 PROCEDURE — 85610 PROTHROMBIN TIME: CPT

## 2022-03-18 PROCEDURE — 88307 TISSUE EXAM BY PATHOLOGIST: CPT

## 2022-03-18 PROCEDURE — 700102 HCHG RX REV CODE 250 W/ 637 OVERRIDE(OP): Performed by: STUDENT IN AN ORGANIZED HEALTH CARE EDUCATION/TRAINING PROGRAM

## 2022-03-18 PROCEDURE — 160036 HCHG PACU - EA ADDL 30 MINS PHASE I

## 2022-03-18 PROCEDURE — 160046 HCHG PACU - 1ST 60 MINS PHASE II

## 2022-03-18 PROCEDURE — 88342 IMHCHEM/IMCYTCHM 1ST ANTB: CPT

## 2022-03-18 PROCEDURE — 36415 COLL VENOUS BLD VENIPUNCTURE: CPT

## 2022-03-18 PROCEDURE — 88311 DECALCIFY TISSUE: CPT

## 2022-03-18 PROCEDURE — A9270 NON-COVERED ITEM OR SERVICE: HCPCS | Performed by: STUDENT IN AN ORGANIZED HEALTH CARE EDUCATION/TRAINING PROGRAM

## 2022-03-18 RX ORDER — OXYCODONE HYDROCHLORIDE 5 MG/1
5 TABLET ORAL EVERY 4 HOURS PRN
Status: DISCONTINUED | OUTPATIENT
Start: 2022-03-18 | End: 2022-03-18 | Stop reason: HOSPADM

## 2022-03-18 RX ORDER — SODIUM CHLORIDE 9 MG/ML
500 INJECTION, SOLUTION INTRAVENOUS
Status: CANCELLED | OUTPATIENT
Start: 2022-03-18 | End: 2022-03-18

## 2022-03-18 RX ORDER — MIDAZOLAM HYDROCHLORIDE 1 MG/ML
.5-2 INJECTION INTRAMUSCULAR; INTRAVENOUS PRN
Status: CANCELLED | OUTPATIENT
Start: 2022-03-18 | End: 2022-03-18

## 2022-03-18 RX ORDER — MIDAZOLAM HYDROCHLORIDE 1 MG/ML
INJECTION INTRAMUSCULAR; INTRAVENOUS
Status: DISCONTINUED
Start: 2022-03-18 | End: 2022-03-18 | Stop reason: HOSPADM

## 2022-03-18 RX ORDER — ONDANSETRON 2 MG/ML
4 INJECTION INTRAMUSCULAR; INTRAVENOUS PRN
Status: ACTIVE | OUTPATIENT
Start: 2022-03-18 | End: 2022-03-18

## 2022-03-18 RX ORDER — ONDANSETRON 2 MG/ML
4 INJECTION INTRAMUSCULAR; INTRAVENOUS PRN
Status: CANCELLED | OUTPATIENT
Start: 2022-03-18 | End: 2022-03-18

## 2022-03-18 RX ORDER — SODIUM CHLORIDE 9 MG/ML
500 INJECTION, SOLUTION INTRAVENOUS
Status: ACTIVE | OUTPATIENT
Start: 2022-03-18 | End: 2022-03-18

## 2022-03-18 RX ORDER — SODIUM CHLORIDE 9 MG/ML
1000 INJECTION, SOLUTION INTRAVENOUS
Status: DISCONTINUED | OUTPATIENT
Start: 2022-03-18 | End: 2022-03-18 | Stop reason: HOSPADM

## 2022-03-18 RX ORDER — MIDAZOLAM HYDROCHLORIDE 1 MG/ML
.5-2 INJECTION INTRAMUSCULAR; INTRAVENOUS PRN
Status: ACTIVE | OUTPATIENT
Start: 2022-03-18 | End: 2022-03-18

## 2022-03-18 RX ADMIN — FENTANYL CITRATE 50 MCG: 50 INJECTION, SOLUTION INTRAMUSCULAR; INTRAVENOUS at 13:27

## 2022-03-18 RX ADMIN — OXYCODONE 5 MG: 5 TABLET ORAL at 14:21

## 2022-03-18 RX ADMIN — MIDAZOLAM HYDROCHLORIDE 1 MG: 1 INJECTION, SOLUTION INTRAMUSCULAR; INTRAVENOUS at 13:12

## 2022-03-18 RX ADMIN — FENTANYL CITRATE 50 MCG: 50 INJECTION, SOLUTION INTRAMUSCULAR; INTRAVENOUS at 13:12

## 2022-03-18 RX ADMIN — MIDAZOLAM HYDROCHLORIDE 1 MG: 1 INJECTION, SOLUTION INTRAMUSCULAR; INTRAVENOUS at 13:27

## 2022-03-18 ASSESSMENT — FIBROSIS 4 INDEX: FIB4 SCORE: 5.9

## 2022-03-18 NOTE — OR NURSING
@1625 Pt arrived to Phase 2. Pt has no complaints of pain or nausea. Pt mobilized from gurney to chair. Dressing site is clean dry and intact. Vital signs stable. Discharge instructions discussed with patient at bedside. Pt verbalizes understanding. PIV removed. Pt able to dress self without assistance.    @1637 Pts wife brought back to discharge area.    @1640 Pt discharged to home with all belongings.

## 2022-03-18 NOTE — PROGRESS NOTES
Pt presents to CT. Pt was consented by MD at bedside, confirmed by this RN and consent at bedside. Pt transferred to CT table in prone position. Pt placed on monitor, prepped and draped in a sterile fashion. Deep bone biopsy of T11 performed by Dr Melton. Unable to chart timeout d/t software issue, time out complete @ 1320. Specimen carried to lab. Pt tolerated procedure well, VSS, RA, transported to PACU.

## 2022-03-18 NOTE — PROGRESS NOTES
1347-pt arrived to pacu, report received at bedside. Vss. Gauze dressing to mid back with scant drainage, otherwise intact. Pt lying flat on back, continue bedrest for 2hrs per MD order.  1421-Pt medicated for 6/10 pain with oxycodone IR. VSS on 1L NC.  Site checked, dressing remains intact.  1455-pt assisted to use urinal, states pain is intermittent, but tolerable now. VSS on 1L NC.  1510-pt resting with eyes closed. Puncture site checked, dressing intact with scant dried drainage present.  1545-bed rest complete, site checked. Dressing intact with dried scant amount of drainage. Hob elevated to 30 degrees. Vss.  1605-pt meets criteria to move to phase 2.   1620-pt transferred to phase 2, report to rn.

## 2022-03-18 NOTE — OR SURGEON
Immediate Post- Operative Note        Findings: Spinal lesion      Procedure(s): biopsy      Estimated Blood Loss: Less than 5 ml        Complications: None            3/18/2022     1:33 PM     Gerson Melton M.D.

## 2022-03-18 NOTE — DISCHARGE INSTRUCTIONS
ACTIVITY: Rest and take it easy for the first 24 hours.  A responsible adult is recommended to remain with you during that time.  It is normal to feel sleepy.  We encourage you to not do anything that requires balance, judgment or coordination.    MILD FLU-LIKE SYMPTOMS ARE NORMAL. YOU MAY EXPERIENCE GENERALIZED MUSCLE ACHES, THROAT IRRITATION, HEADACHE AND/OR SOME NAUSEA.    FOR 24 HOURS DO NOT:  Drive, operate machinery or run household appliances.  Drink beer or alcoholic beverages.   Make important decisions or sign legal documents.    SPECIAL INSTRUCTIONS:   Needle Biopsy, Care After  These instructions tell you how to care for yourself after your procedure. Your doctor may also give you more specific instructions. Call your doctor if you have any problems or questions.  What can I expect after the procedure?  After the procedure, it is common to have:  · Soreness.  · Bruising.  · Mild pain.  Follow these instructions at home:  · Return to your normal activities as told by your doctor. Ask your doctor what activities are safe for you.  · Take over-the-counter and prescription medicines only as told by your doctor.  · Wash your hands with soap and water before you change your bandage (dressing). If you cannot use soap and water, use hand .  · Follow instructions from your doctor about:  ? How to take care of your puncture site.  ? When and how to change your bandage.  ? When to remove your bandage.  · Check your puncture site every day for signs of infection. Watch for:  ? Redness, swelling, or pain.  ? Fluid or blood.   ? Pus or a bad smell.  ? Warmth.  · Do not take baths, swim, or use a hot tub until your doctor approves. Ask your doctor if you may take showers. You may only be allowed to take sponge baths.  · Keep all follow-up visits as told by your doctor. This is important.  Contact a doctor if you have:  · A fever.  · Redness, swelling, or pain at the puncture site, and it lasts longer than a  few days.  · Fluid, blood, or pus coming from the puncture site.  · Warmth coming from the puncture site.  Get help right away if:  · You have a lot of bleeding from the puncture site.  Summary  · After the procedure, it is common to have soreness, bruising, or mild pain at the puncture site.  · Check your puncture site every day for signs of infection, such as redness, swelling, or pain.  · Get help right away if you have severe bleeding from your puncture site.  This information is not intended to replace advice given to you by your health care provider. Make sure you discuss any questions you have with your health care provider.  Document Released: 11/30/2009 Document Revised: 12/31/2018 Document Reviewed: 12/31/2018  [employer] Patient Education © 2020 [employer]    DIET: To avoid nausea, slowly advance diet as tolerated, avoiding spicy or greasy foods for the first day.  Add more substantial food to your diet according to your physician's instructions.  Babies can be fed formula or breast milk as soon as they are hungry.  INCREASE FLUIDS AND FIBER TO AVOID CONSTIPATION.    SURGICAL DRESSING/BATHING: You may remove your dressing in 24 hours and shower.    FOLLOW-UP APPOINTMENT:  A follow-up appointment should be arranged with your doctor in 1-2 Weeks; call to schedule.    You should CALL YOUR PHYSICIAN if you develop:  Fever greater than 101 degrees F.  Pain not relieved by medication, or persistent nausea or vomiting.  Excessive bleeding (blood soaking through dressing) or unexpected drainage from the wound.  Extreme redness or swelling around the incision site, drainage of pus or foul smelling drainage.  Inability to urinate or empty your bladder within 8 hours.  Problems with breathing or chest pain.    You should call 911 if you develop problems with breathing or chest pain.  If you are unable to contact your doctor or surgical center, you should go to the nearest emergency room or urgent care center.       Physician's telephone #: [phone] Dr. Melton    If any questions arise, call your doctor.  If your doctor is not available, please feel free to call the Surgical Center at [phone].     A registered nurse may call you a few days after your surgery to see how you are doing after your procedure.    MEDICATIONS: Resume taking daily medication.  Take prescribed pain medication with food.  If no medication is prescribed, you may take non-aspirin pain medication if needed.  PAIN MEDICATION CAN BE VERY CONSTIPATING.  Take a stool softener or laxative such as senokot, pericolace, or milk of magnesia if needed.    Last pain medication given at 2:21 PM.    If your physician has prescribed pain medication that includes Acetaminophen (Tylenol), do not take additional Acetaminophen (Tylenol) while taking the prescribed medication.    Depression / Suicide Risk    As you are discharged from this [facility] facility, it is important to learn how to keep safe from harming yourself.    Recognize the warning signs:  · Abrupt changes in personality, positive or negative- including increase in energy   · Giving away possessions  · Change in eating patterns- significant weight changes-  positive or negative  · Change in sleeping patterns- unable to sleep or sleeping all the time   · Unwillingness or inability to communicate  · Depression  · Unusual sadness, discouragement and loneliness  · Talk of wanting to die  · Neglect of personal appearance   · Rebelliousness- reckless behavior  · Withdrawal from people/activities they love  · Confusion- inability to concentrate     If you or a loved one observes any of these behaviors or has concerns about self-harm, here's what you can do:  · Talk about it- your feelings and reasons for harming yourself  · Remove any means that you might use to hurt yourself (examples: pills, rope, extension cords, firearm)  · Get professional help from the community (Mental Health, Substance Abuse,  psychological counseling)  · Do not be alone:Call your Safe Contact- someone whom you trust who will be there for you.  · Call your local CRISIS HOTLINE 356-4776 or 139-376-9767  · Call your local Children's Mobile Crisis Response Team [place] (860) 818-4323 or www.[employer]  · Call the toll free National Suicide Prevention Hotlines   · National Suicide Prevention Lifeline 080-833-VMEW (5413)  · National Hope Line Network 800-SUICIDE (805-6988)

## 2022-03-21 ENCOUNTER — HOSPITAL ENCOUNTER (OUTPATIENT)
Facility: MEDICAL CENTER | Age: 82
End: 2022-03-21
Attending: INTERNAL MEDICINE
Payer: MEDICARE

## 2022-03-21 LAB
ALBUMIN SERPL BCP-MCNC: 4.3 G/DL (ref 3.2–4.9)
ALBUMIN/GLOB SERPL: 1.5 G/DL
ALP SERPL-CCNC: 70 U/L (ref 30–99)
ALT SERPL-CCNC: 14 U/L (ref 2–50)
ANION GAP SERPL CALC-SCNC: 10 MMOL/L (ref 7–16)
APPEARANCE UR: ABNORMAL
AST SERPL-CCNC: 25 U/L (ref 12–45)
BACTERIA #/AREA URNS HPF: ABNORMAL /HPF
BILIRUB SERPL-MCNC: 1 MG/DL (ref 0.1–1.5)
BILIRUB UR QL STRIP.AUTO: NEGATIVE
BUN SERPL-MCNC: 18 MG/DL (ref 8–22)
CALCIUM SERPL-MCNC: 9.7 MG/DL (ref 8.5–10.5)
CHLORIDE SERPL-SCNC: 104 MMOL/L (ref 96–112)
CO2 SERPL-SCNC: 25 MMOL/L (ref 20–33)
COLOR UR: YELLOW
CREAT SERPL-MCNC: 1.18 MG/DL (ref 0.5–1.4)
EPI CELLS #/AREA URNS HPF: ABNORMAL /HPF
GFR SERPLBLD CREATININE-BSD FMLA CKD-EPI: 62 ML/MIN/1.73 M 2
GLOBULIN SER CALC-MCNC: 2.8 G/DL (ref 1.9–3.5)
GLUCOSE SERPL-MCNC: 133 MG/DL (ref 65–99)
GLUCOSE UR STRIP.AUTO-MCNC: NEGATIVE MG/DL
KETONES UR STRIP.AUTO-MCNC: NEGATIVE MG/DL
LEUKOCYTE ESTERASE UR QL STRIP.AUTO: ABNORMAL
MICRO URNS: ABNORMAL
NITRITE UR QL STRIP.AUTO: NEGATIVE
PH UR STRIP.AUTO: 5 [PH] (ref 5–8)
POTASSIUM SERPL-SCNC: 4.1 MMOL/L (ref 3.6–5.5)
PROT SERPL-MCNC: 7.1 G/DL (ref 6–8.2)
PROT UR QL STRIP: 100 MG/DL
RBC # URNS HPF: ABNORMAL /HPF
RBC UR QL AUTO: ABNORMAL
SODIUM SERPL-SCNC: 139 MMOL/L (ref 135–145)
SP GR UR STRIP.AUTO: 1.02
UROBILINOGEN UR STRIP.AUTO-MCNC: 0.2 MG/DL
WBC #/AREA URNS HPF: ABNORMAL /HPF

## 2022-03-21 PROCEDURE — 87186 SC STD MICRODIL/AGAR DIL: CPT

## 2022-03-21 PROCEDURE — 80074 ACUTE HEPATITIS PANEL: CPT

## 2022-03-21 PROCEDURE — 87086 URINE CULTURE/COLONY COUNT: CPT

## 2022-03-21 PROCEDURE — 87077 CULTURE AEROBIC IDENTIFY: CPT

## 2022-03-21 PROCEDURE — 81001 URINALYSIS AUTO W/SCOPE: CPT

## 2022-03-21 PROCEDURE — 84443 ASSAY THYROID STIM HORMONE: CPT

## 2022-03-21 PROCEDURE — 80053 COMPREHEN METABOLIC PANEL: CPT

## 2022-03-22 ENCOUNTER — APPOINTMENT (OUTPATIENT)
Dept: RADIATION ONCOLOGY | Facility: MEDICAL CENTER | Age: 82
End: 2022-03-22
Attending: RADIOLOGY
Payer: MEDICARE

## 2022-03-22 LAB
HAV IGM SERPL QL IA: NORMAL
HBV CORE IGM SER QL: NORMAL
HBV SURFACE AG SER QL: NORMAL
HCV AB SER QL: NORMAL
TSH SERPL DL<=0.005 MIU/L-ACNC: 2.11 UIU/ML (ref 0.38–5.33)

## 2022-03-25 VITALS
SYSTOLIC BLOOD PRESSURE: 132 MMHG | TEMPERATURE: 97.7 F | WEIGHT: 217 LBS | DIASTOLIC BLOOD PRESSURE: 74 MMHG | HEART RATE: 83 BPM | OXYGEN SATURATION: 94 % | BODY MASS INDEX: 30.27 KG/M2

## 2022-03-25 PROCEDURE — 99213 OFFICE O/P EST LOW 20 MIN: CPT | Performed by: RADIOLOGY

## 2022-03-25 ASSESSMENT — PAIN SCALES - GENERAL: PAINLEVEL: NO PAIN

## 2022-03-25 ASSESSMENT — FIBROSIS 4 INDEX: FIB4 SCORE: 5.47

## 2022-03-25 NOTE — PROGRESS NOTES
RADIATION ONCOLOGY FOLLOW-UP    Patient name:  Luigi Walters    Primary Physician:  Denver J Miller, M.D. MRN: [id number]  [facility]: [id number]   Referring physician:  Chaitanya Saha M.D.  : 1940, 81 y.o.     DATE OF SERVICE:   3/25/2022    IDENTIFICATION:   A 81 y.o. 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 on 10/7/2021  Histopathologic type: Merkel cell carcinoma  Residual tumor (R): R2 - Macroscopic  Laterality: Left  Tumor size (mm): 10  Lymph-vascular invasion (LVI): LVI present/identified, NOS  Primary tumor: Present  Breslow depth (mm): 3  Clinical status of regional lymph nodes: Metastases confirmed by pathologic examination  Presence of extranodal extension: Present      RADIATION SUMMARY:  [facility] Treatment Information        Some values may be hidden. Unless noted otherwise, only the newest values recorded on each date are displayed.         [facility] Treatment Summary 21   Course First Treatment Date 10/20/2021 10/20/2021    Course Last Treatment Date 2021    L Face, Neck Plan from Course C1_Lface&Neck   Fraction     Elapsed Course Days  43 @    Prescribed Fraction Dose  233 cGy   Prescribed Total Dose  7,000 cGy   L Face, Neck1 Plan from Course C1_Lface&Neck   Fraction     Elapsed Course Days 42 @  43 @     Prescribed Fraction Dose 233 cGy 233 cGy    Prescribed Total Dose 4,900 cGy 4,900 cGy    L Face, Neck Reference Point from Course C1_Lface&Neck   Elapsed Course Days 42 @  43 @     Session Dose 233 cGy --    Total Dose 6,767 cGy 7,000 cGy    L Face, Neck CP Reference Point from Course C1_Lface&Neck   Elapsed Course Days 42 @  43 @     Session Dose 242 cGy --    Total Dose 7,001 cGy 7,242 cGy     Some  values recorded on this date have been omitted.               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  He is abstained from alcohol since 2006.  History of prostate cancer treated with radiotherapy greater than 20 years ago at [facility].     He presented with small facial skin nodule on the left side of his cheek.  Shave biopsy performed by dermatology demonstrated Merkel cell carcinoma.  He was referred to Dr. Saha for ENT evaluation.  He also underwent PET/CT scanning by Dr. Bautista.  The PET/CT demonstrated a small amount of uptake in the left cheek proximately a centimeter or less in size in addition to lymph node uptake in level 2 and level 5 on the left.  There was no evidence of distant metastatic disease.     He was taken to the OR 9/13/2021 underwent a left suprahyoid neck dissection, left modified neck dissection, wide local excision of left facial Merkel cell carcinoma with primary closure with immobilization of adjacent tissue.     Pathology demonstrated 1 cm Merkel cell carcinoma involving the face with 3 mm depth of invasion.  There was evidence of lymphovascular invasion.  2 lymph nodes out of 14 demonstrated metastatic disease with the largest metastatic deposit 1.2 cm.  There was evidence of extra noris extension of 2 mm.  In addition there was presence of an in-transit metastasis noted in the soft tissue without noris elements measuring 3mm.  Final pathologic stage was T1 N3 M0, stage IIIb.     He is now approximately 2-1/2 weeks postop.  His wounds are completely healed.    He is being seen for consideration of adjuvant therapy.  He does report xerostomia especially at night.  He would requires the use of a moisturizing spray in his mouth.  He states when he  awakens in the morning his mouth is quite dry.  He denies any complaints with respect to taste or swallowing.     01/18/22  Initial follow-up visit post completion of therapy.  Did experience fairly significant radiation dermatitis neck area left greater than right.  This is now completely resolved with use of Silvadene.  He is doing well and offers no complaints.  3/8/2022.  Scheduled follow-up visit post restaging PET CT scan.  Currently he is doing well offers no significant complaints except for back pain med back.  He is taking oxycodone.        INTERVAL HISTORY:  3/25/2022.  Scheduled follow-up visit.  He returns today for follow-up after his bone biopsy.  T11 biopsy showed benign bone bone marrow with no metastatic tumor identified.  Post biopsy he is having some pain and discomfort in his back.  He also reports having UTI.  He was started on Cipro by Dr. Bautista.      CURRENT MEDICATIONS:  Current Outpatient Medications   Medication Sig Dispense Refill   • famotidine (PEPCID) 20 MG Tab Take 20 mg by mouth every day.     • zolpidem (AMBIEN) 10 MG Tab Take 10 mg by mouth at bedtime as needed for Sleep.     • fluticasone (FLONASE) 50 MCG/ACT nasal spray Administer 1 Spray into affected nostril(S) every day.     • oxyCODONE immediate-release (ROXICODONE) 5 MG Tab Take 10 mg by mouth every 6 hours as needed for Severe Pain.     • calcitRIOL (ROCALTROL) 0.25 MCG Cap Take 0.25 mcg by mouth every day.  4   • SF 5000 PLUS 1.1 % Cream Take 1 Each by mouth every evening.  1   • acetaminophen (TYLENOL) 500 MG Tab Take 500-1,000 mg by mouth as needed. Indications: Pain     • tramadol (ULTRAM) 50 MG Tab Take 50 mg by mouth every 8 hours as needed. Indications: Pain     • NON SPECIFIED Take 1 Tablet by mouth every day. L-orinthate 500mg PO daily     • NON SPECIFIED Take 500 mg by mouth every day. L-aspartate 500mg Po daily     • promethazine (PHENERGAN) 25 MG Tab Take 25 mg by mouth every 6 hours as needed for  "Nausea/Vomiting.     • Multiple Vitamin (MULTI VITAMIN DAILY PO) Take 1 Tablet by mouth every day. Combination d3,magnessium,vitamin a     • ropinirole (REQUIP) 0.25 MG Tab Take 0.5 mg by mouth at bedtime. Indications: Restless Leg Syndrome  3   • spironolactone (ALDACTONE) 50 MG Tab Take 50 mg by mouth every day. Indications: Edema  0   • lactulose 10 GM/15ML SOLN Take 30 g by mouth as needed.     • meclizine (ANTIVERT) 25 MG TABS Take 25 mg by mouth 3 times a day as needed for Dizziness or Vertigo.     • tamsulosin (FLOMAX) 0.4 MG capsule Take 0.4 mg by mouth every morning. Indications: Benign Enlargement of Prostate       No current facility-administered medications for this encounter.       ALLERGIES:  Contrast media with iodine [iodine], Dobutamine, Latex, and Tape    REVIEW OF SYSTEMS:    A complete review of system taken. Pertinent items in HPI.  All other negative.    PHYSICAL EXAM:   PERFORMANCE STATUS:  Karnofsky Score 10/7/2021   Karnofsky Score 90   Some recent data might be hidden     Vitals 3/25/2022 3/18/2022 3/18/2022 3/8/2022 1/18/2022 12/31/2021 12/1/2021   SYSTOLIC 132 146 145 143 131 132 132   DIASTOLIC 74 76 78 97 82 64 75   PULSE 83 76 75 89 91 95 94   TEMPERATURE 97.7 97 - 97.3 - 97.3 -   RESPIRATIONS - 16 16 18 - 20 -   WEIGHT 217 218.92 - 215 220.02 216.05 220.13   HEIGHT - 5' 11\" - - - 5' 11\" -   BMI 30.27 kg/m2 30.53 kg/m2 - 29.99 kg/m2 30.69 kg/m2 30.13 kg/m2 29.85 kg/m2   SPO2 94 97 99 98 98 96 95       Physical Exam  Vitals and nursing note reviewed.   Constitutional:       General: He is not in acute distress.     Appearance: He is well-developed.   HENT:      Head: Normocephalic.   Musculoskeletal:         General: Tenderness (Back) present.   Skin:     General: Skin is warm and dry.      Findings: No erythema.   Neurological:      Mental Status: He is alert and oriented to person, place, and time.   Psychiatric:         Behavior: Behavior normal.         Thought Content: Thought " content normal.         Judgment: Judgment normal.             LABORATORY DATA:   Lab Results   Component Value Date/Time    WBC 3.3 (L) 03/14/2022 12:17 PM    RBC 4.71 03/14/2022 12:17 PM    HEMOGLOBIN 14.4 03/14/2022 12:17 PM    HEMATOCRIT 43.2 03/14/2022 12:17 PM    MCV 91.7 03/14/2022 12:17 PM    MCH 30.6 03/14/2022 12:17 PM    MCHC 33.3 (L) 03/14/2022 12:17 PM    RDW 45.1 03/14/2022 12:17 PM    PLATELETCT 99 (L) 03/14/2022 12:17 PM    MPV 11.9 03/14/2022 12:17 PM    NEUTSPOLYS 72.20 (H) 07/12/2021 10:24 AM    LYMPHOCYTES 19.00 (L) 07/12/2021 10:24 AM    MONOCYTES 5.90 07/12/2021 10:24 AM    EOSINOPHILS 2.10 07/12/2021 10:24 AM    BASOPHILS 0.50 07/12/2021 10:24 AM      Lab Results   Component Value Date/Time    SODIUM 139 03/21/2022 11:32 AM    POTASSIUM 4.1 03/21/2022 11:32 AM    CHLORIDE 104 03/21/2022 11:32 AM    CO2 25 03/21/2022 11:32 AM    GLUCOSE 133 (H) 03/21/2022 11:32 AM    BUN 18 03/21/2022 11:32 AM    CREATININE 1.18 03/21/2022 11:32 AM    CREATININE 1.0 05/28/2008 06:00 AM       FINAL DIAGNOSIS:     A. T-11 bone biopsy:          Benign bone and bone marrow with no metastatic tumor identified           on H&E and keratin immunohistochemical stains.                                         Diagnosis performed by:                                       CHUY SORIANO MD     RADIOLOGY DATA:  CT-NEEDLE BX-DEEP BONE    Result Date: 3/18/2022  CT-guided T11 vertebral body lesion biopsy.    US-ABDOMEN COMPLETE SURVEY    Result Date: 3/13/2022  1.  There are morphologic changes of the liver consistent with cirrhosis. 2.  There is splenomegaly. 3.  There is cholelithiasis without biliary dilatation.     AY-QUABD-MTSWC BASE TO MID-THIGH    Result Date: 3/7/2022  1. Hypermetabolic osseous metastases in the clivus and T11 vertebral body. 2. Hypermetabolic left hilar lymph node, possibly metastatic. 3. No other FDG avid lesions. 4. Postsurgical changes in the left neck.      IMPRESSION:    A 81 y.o. with  Angella  cell skin cancer of cheek (HCC)  Staging form: Merkel Cell Carcinoma, AJCC 8th Edition  - Pathologic stage from 10/7/2021: Stage IIIB (pT1, pN3, cM0) - Signed by Jovany WARD M.D. on 10/7/2021  Histopathologic type: Merkel cell carcinoma  Residual tumor (R): R2 - Macroscopic  Laterality: Left  Tumor size (mm): 10  Lymph-vascular invasion (LVI): LVI present/identified, NOS  Primary tumor: Present  Breslow depth (mm): 3  Clinical status of regional lymph nodes: Metastases confirmed by pathologic examination  Presence of extranodal extension: Present      CANCER STATUS:  Distant Recurrence Suspected    RECOMMENDATIONS:   Reviewed pathology findings with patient.  Findings are discordant with pet imaging.  I do not think he is ready for another biopsy.    1 option is wait 3 months repeat scan to see if there is progression. .He is seeing Dr. Bautista next week will defer work-up to Dr. Bautista.    Thank you for the opportunity to participate in his care.  If any questions or comments, please do not hesitate in calling.      No orders of the defined types were placed in this encounter.

## 2022-03-25 NOTE — NON-PROVIDER
Patient was seen today in clinic with Dr. Patiño for follow up.  Vitals signs and weight were obtained and pain assessment was completed.  Allergies and medications were reviewed with the patient.  Toxicities of treatment assessed.     Vitals/Pain:  Vitals:    03/25/22 0918   BP: 132/74   Pulse: 83   Temp: 36.5 °C (97.7 °F)   TempSrc: Temporal   SpO2: 94%   Weight: 98.4 kg (217 lb)   Pain Score: No pain        Allergies:   Contrast media with iodine [iodine], Dobutamine, Latex, and Tape    Current Medications:  Current Outpatient Medications   Medication Sig Dispense Refill   • famotidine (PEPCID) 20 MG Tab Take 20 mg by mouth every day.     • zolpidem (AMBIEN) 10 MG Tab Take 10 mg by mouth at bedtime as needed for Sleep.     • fluticasone (FLONASE) 50 MCG/ACT nasal spray Administer 1 Spray into affected nostril(S) every day.     • oxyCODONE immediate-release (ROXICODONE) 5 MG Tab Take 10 mg by mouth every 6 hours as needed for Severe Pain.     • calcitRIOL (ROCALTROL) 0.25 MCG Cap Take 0.25 mcg by mouth every day.  4   • SF 5000 PLUS 1.1 % Cream Take 1 Each by mouth every evening.  1   • acetaminophen (TYLENOL) 500 MG Tab Take 500-1,000 mg by mouth as needed. Indications: Pain     • tramadol (ULTRAM) 50 MG Tab Take 50 mg by mouth every 8 hours as needed. Indications: Pain     • NON SPECIFIED Take 1 Tablet by mouth every day. L-orinthate 500mg PO daily     • NON SPECIFIED Take 500 mg by mouth every day. L-aspartate 500mg Po daily     • promethazine (PHENERGAN) 25 MG Tab Take 25 mg by mouth every 6 hours as needed for Nausea/Vomiting.     • Multiple Vitamin (MULTI VITAMIN DAILY PO) Take 1 Tablet by mouth every day. Combination d3,magnessium,vitamin a     • ropinirole (REQUIP) 0.25 MG Tab Take 0.5 mg by mouth at bedtime. Indications: Restless Leg Syndrome  3   • spironolactone (ALDACTONE) 50 MG Tab Take 50 mg by mouth every day. Indications: Edema  0   • lactulose 10 GM/15ML SOLN Take 30 g by mouth as needed.     •  meclizine (ANTIVERT) 25 MG TABS Take 25 mg by mouth 3 times a day as needed for Dizziness or Vertigo.     • tamsulosin (FLOMAX) 0.4 MG capsule Take 0.4 mg by mouth every morning. Indications: Benign Enlargement of Prostate       No current facility-administered medications for this encounter.           Rhonda Hinson, Med Ass't

## 2022-04-02 ENCOUNTER — HOSPITAL ENCOUNTER (OUTPATIENT)
Dept: RADIOLOGY | Facility: MEDICAL CENTER | Age: 82
End: 2022-04-02
Attending: INTERNAL MEDICINE
Payer: MEDICARE

## 2022-04-06 ENCOUNTER — APPOINTMENT (OUTPATIENT)
Dept: RADIOLOGY | Facility: MEDICAL CENTER | Age: 82
DRG: 092 | End: 2022-04-06
Attending: EMERGENCY MEDICINE
Payer: MEDICARE

## 2022-04-06 ENCOUNTER — HOSPITAL ENCOUNTER (INPATIENT)
Facility: MEDICAL CENTER | Age: 82
LOS: 9 days | DRG: 092 | End: 2022-04-15
Attending: EMERGENCY MEDICINE | Admitting: FAMILY MEDICINE
Payer: MEDICARE

## 2022-04-06 DIAGNOSIS — G89.29 CHRONIC MIDLINE LOW BACK PAIN WITHOUT SCIATICA: ICD-10-CM

## 2022-04-06 DIAGNOSIS — M54.50 CHRONIC MIDLINE LOW BACK PAIN WITHOUT SCIATICA: ICD-10-CM

## 2022-04-06 DIAGNOSIS — F51.01 PRIMARY INSOMNIA: ICD-10-CM

## 2022-04-06 DIAGNOSIS — R40.4 TRANSIENT ALTERATION OF AWARENESS: ICD-10-CM

## 2022-04-06 DIAGNOSIS — S22.080A COMPRESSION FRACTURE OF T12 VERTEBRA, INITIAL ENCOUNTER (HCC): ICD-10-CM

## 2022-04-06 DIAGNOSIS — G47.00 INSOMNIA, UNSPECIFIED TYPE: ICD-10-CM

## 2022-04-06 DIAGNOSIS — G25.3 MYOCLONUS: ICD-10-CM

## 2022-04-06 DIAGNOSIS — G47.30 SLEEP APNEA, UNSPECIFIED TYPE: ICD-10-CM

## 2022-04-06 DIAGNOSIS — R41.82 ALTERED MENTAL STATUS, UNSPECIFIED ALTERED MENTAL STATUS TYPE: ICD-10-CM

## 2022-04-06 LAB
ABO GROUP BLD: NORMAL
ALBUMIN SERPL BCP-MCNC: 4.1 G/DL (ref 3.2–4.9)
ALBUMIN/GLOB SERPL: 1.4 G/DL
ALP SERPL-CCNC: 101 U/L (ref 30–99)
ALT SERPL-CCNC: 18 U/L (ref 2–50)
AMMONIA PLAS-SCNC: 12 UMOL/L (ref 11–45)
AMPHET UR QL SCN: NEGATIVE
ANION GAP SERPL CALC-SCNC: 11 MMOL/L (ref 7–16)
APPEARANCE UR: CLEAR
APTT PPP: 26.6 SEC (ref 24.7–36)
AST SERPL-CCNC: 21 U/L (ref 12–45)
BARBITURATES UR QL SCN: NEGATIVE
BASOPHILS # BLD AUTO: 0.2 % (ref 0–1.8)
BASOPHILS # BLD: 0.01 K/UL (ref 0–0.12)
BENZODIAZ UR QL SCN: NEGATIVE
BILIRUB SERPL-MCNC: 1 MG/DL (ref 0.1–1.5)
BILIRUB UR QL STRIP.AUTO: NEGATIVE
BLD GP AB SCN SERPL QL: NORMAL
BUN SERPL-MCNC: 21 MG/DL (ref 8–22)
BZE UR QL SCN: NEGATIVE
CALCIUM SERPL-MCNC: 9.7 MG/DL (ref 8.5–10.5)
CANNABINOIDS UR QL SCN: NEGATIVE
CHLORIDE SERPL-SCNC: 107 MMOL/L (ref 96–112)
CO2 SERPL-SCNC: 23 MMOL/L (ref 20–33)
COLOR UR: YELLOW
CREAT SERPL-MCNC: 1.24 MG/DL (ref 0.5–1.4)
EKG IMPRESSION: NORMAL
EKG IMPRESSION: NORMAL
EOSINOPHIL # BLD AUTO: 0.1 K/UL (ref 0–0.51)
EOSINOPHIL NFR BLD: 2.3 % (ref 0–6.9)
ERYTHROCYTE [DISTWIDTH] IN BLOOD BY AUTOMATED COUNT: 43.3 FL (ref 35.9–50)
GFR SERPLBLD CREATININE-BSD FMLA CKD-EPI: 58 ML/MIN/1.73 M 2
GLOBULIN SER CALC-MCNC: 3 G/DL (ref 1.9–3.5)
GLUCOSE SERPL-MCNC: 103 MG/DL (ref 65–99)
GLUCOSE UR STRIP.AUTO-MCNC: NEGATIVE MG/DL
HCT VFR BLD AUTO: 44.8 % (ref 42–52)
HGB BLD-MCNC: 14.9 G/DL (ref 14–18)
IMM GRANULOCYTES # BLD AUTO: 0.02 K/UL (ref 0–0.11)
IMM GRANULOCYTES NFR BLD AUTO: 0.5 % (ref 0–0.9)
INR PPP: 1.12 (ref 0.87–1.13)
KETONES UR STRIP.AUTO-MCNC: NEGATIVE MG/DL
LACTATE BLD-SCNC: 1.7 MMOL/L (ref 0.5–2)
LEUKOCYTE ESTERASE UR QL STRIP.AUTO: NEGATIVE
LYMPHOCYTES # BLD AUTO: 0.53 K/UL (ref 1–4.8)
LYMPHOCYTES NFR BLD: 12.3 % (ref 22–41)
MCH RBC QN AUTO: 30.5 PG (ref 27–33)
MCHC RBC AUTO-ENTMCNC: 33.3 G/DL (ref 33.7–35.3)
MCV RBC AUTO: 91.6 FL (ref 81.4–97.8)
METHADONE UR QL SCN: NEGATIVE
MICRO URNS: NORMAL
MONOCYTES # BLD AUTO: 0.31 K/UL (ref 0–0.85)
MONOCYTES NFR BLD AUTO: 7.2 % (ref 0–13.4)
NEUTROPHILS # BLD AUTO: 3.33 K/UL (ref 1.82–7.42)
NEUTROPHILS NFR BLD: 77.5 % (ref 44–72)
NITRITE UR QL STRIP.AUTO: NEGATIVE
NRBC # BLD AUTO: 0 K/UL
NRBC BLD-RTO: 0 /100 WBC
OPIATES UR QL SCN: NEGATIVE
OXYCODONE UR QL SCN: POSITIVE
PCP UR QL SCN: NEGATIVE
PH UR STRIP.AUTO: 6.5 [PH] (ref 5–8)
PLATELET # BLD AUTO: 116 K/UL (ref 164–446)
PMV BLD AUTO: 10.7 FL (ref 9–12.9)
POTASSIUM SERPL-SCNC: 4.3 MMOL/L (ref 3.6–5.5)
PROPOXYPH UR QL SCN: NEGATIVE
PROT SERPL-MCNC: 7.1 G/DL (ref 6–8.2)
PROT UR QL STRIP: NEGATIVE MG/DL
PROTHROMBIN TIME: 14 SEC (ref 12–14.6)
RBC # BLD AUTO: 4.89 M/UL (ref 4.7–6.1)
RBC UR QL AUTO: NEGATIVE
RH BLD: NORMAL
SODIUM SERPL-SCNC: 141 MMOL/L (ref 135–145)
SP GR UR STRIP.AUTO: 1.01
TROPONIN T SERPL-MCNC: 12 NG/L (ref 6–19)
TROPONIN T SERPL-MCNC: 13 NG/L (ref 6–19)
TROPONIN T SERPL-MCNC: 13 NG/L (ref 6–19)
UROBILINOGEN UR STRIP.AUTO-MCNC: 0.2 MG/DL
WBC # BLD AUTO: 4.3 K/UL (ref 4.8–10.8)

## 2022-04-06 PROCEDURE — 700111 HCHG RX REV CODE 636 W/ 250 OVERRIDE (IP)

## 2022-04-06 PROCEDURE — 70450 CT HEAD/BRAIN W/O DYE: CPT | Mod: MG

## 2022-04-06 PROCEDURE — 85730 THROMBOPLASTIN TIME PARTIAL: CPT

## 2022-04-06 PROCEDURE — 700105 HCHG RX REV CODE 258: Performed by: STUDENT IN AN ORGANIZED HEALTH CARE EDUCATION/TRAINING PROGRAM

## 2022-04-06 PROCEDURE — 81003 URINALYSIS AUTO W/O SCOPE: CPT

## 2022-04-06 PROCEDURE — 83605 ASSAY OF LACTIC ACID: CPT

## 2022-04-06 PROCEDURE — 71045 X-RAY EXAM CHEST 1 VIEW: CPT

## 2022-04-06 PROCEDURE — 86901 BLOOD TYPING SEROLOGIC RH(D): CPT

## 2022-04-06 PROCEDURE — 80307 DRUG TEST PRSMV CHEM ANLYZR: CPT

## 2022-04-06 PROCEDURE — 86850 RBC ANTIBODY SCREEN: CPT

## 2022-04-06 PROCEDURE — 86900 BLOOD TYPING SEROLOGIC ABO: CPT

## 2022-04-06 PROCEDURE — 700111 HCHG RX REV CODE 636 W/ 250 OVERRIDE (IP): Performed by: EMERGENCY MEDICINE

## 2022-04-06 PROCEDURE — 87086 URINE CULTURE/COLONY COUNT: CPT

## 2022-04-06 PROCEDURE — 99223 1ST HOSP IP/OBS HIGH 75: CPT | Mod: 25,FS | Performed by: NURSE PRACTITIONER

## 2022-04-06 PROCEDURE — 700111 HCHG RX REV CODE 636 W/ 250 OVERRIDE (IP): Performed by: STUDENT IN AN ORGANIZED HEALTH CARE EDUCATION/TRAINING PROGRAM

## 2022-04-06 PROCEDURE — 770020 HCHG ROOM/CARE - TELE (206)

## 2022-04-06 PROCEDURE — 93005 ELECTROCARDIOGRAM TRACING: CPT

## 2022-04-06 PROCEDURE — 72128 CT CHEST SPINE W/O DYE: CPT | Mod: MG

## 2022-04-06 PROCEDURE — 0042T CT-CEREBRAL PERFUSION ANALYSIS: CPT

## 2022-04-06 PROCEDURE — 84484 ASSAY OF TROPONIN QUANT: CPT

## 2022-04-06 PROCEDURE — 85025 COMPLETE CBC W/AUTO DIFF WBC: CPT

## 2022-04-06 PROCEDURE — 87040 BLOOD CULTURE FOR BACTERIA: CPT

## 2022-04-06 PROCEDURE — 700117 HCHG RX CONTRAST REV CODE 255

## 2022-04-06 PROCEDURE — 99285 EMERGENCY DEPT VISIT HI MDM: CPT

## 2022-04-06 PROCEDURE — 72131 CT LUMBAR SPINE W/O DYE: CPT | Mod: ME

## 2022-04-06 PROCEDURE — 93005 ELECTROCARDIOGRAM TRACING: CPT | Performed by: STUDENT IN AN ORGANIZED HEALTH CARE EDUCATION/TRAINING PROGRAM

## 2022-04-06 PROCEDURE — 80053 COMPREHEN METABOLIC PANEL: CPT

## 2022-04-06 PROCEDURE — 82140 ASSAY OF AMMONIA: CPT

## 2022-04-06 PROCEDURE — 4A00X4Z MEASUREMENT OF CENTRAL NERVOUS ELECTRICAL ACTIVITY, EXTERNAL APPROACH: ICD-10-PCS | Performed by: STUDENT IN AN ORGANIZED HEALTH CARE EDUCATION/TRAINING PROGRAM

## 2022-04-06 PROCEDURE — 36415 COLL VENOUS BLD VENIPUNCTURE: CPT

## 2022-04-06 PROCEDURE — 85610 PROTHROMBIN TIME: CPT

## 2022-04-06 PROCEDURE — 96375 TX/PRO/DX INJ NEW DRUG ADDON: CPT

## 2022-04-06 PROCEDURE — 95816 EEG AWAKE AND DROWSY: CPT | Mod: 26 | Performed by: STUDENT IN AN ORGANIZED HEALTH CARE EDUCATION/TRAINING PROGRAM

## 2022-04-06 PROCEDURE — 70496 CT ANGIOGRAPHY HEAD: CPT | Mod: MG

## 2022-04-06 PROCEDURE — 93010 ELECTROCARDIOGRAM REPORT: CPT | Performed by: INTERNAL MEDICINE

## 2022-04-06 PROCEDURE — 95816 EEG AWAKE AND DROWSY: CPT | Performed by: STUDENT IN AN ORGANIZED HEALTH CARE EDUCATION/TRAINING PROGRAM

## 2022-04-06 PROCEDURE — 96374 THER/PROPH/DIAG INJ IV PUSH: CPT

## 2022-04-06 PROCEDURE — 70498 CT ANGIOGRAPHY NECK: CPT | Mod: MG

## 2022-04-06 RX ORDER — ONDANSETRON 4 MG/1
4 TABLET, ORALLY DISINTEGRATING ORAL EVERY 4 HOURS PRN
Status: DISCONTINUED | OUTPATIENT
Start: 2022-04-06 | End: 2022-04-15 | Stop reason: HOSPADM

## 2022-04-06 RX ORDER — POLYETHYLENE GLYCOL 3350 17 G/17G
1 POWDER, FOR SOLUTION ORAL
Status: DISCONTINUED | OUTPATIENT
Start: 2022-04-06 | End: 2022-04-15 | Stop reason: HOSPADM

## 2022-04-06 RX ORDER — KETOROLAC TROMETHAMINE 30 MG/ML
30 INJECTION, SOLUTION INTRAMUSCULAR; INTRAVENOUS EVERY 6 HOURS PRN
Status: DISPENSED | OUTPATIENT
Start: 2022-04-06 | End: 2022-04-07

## 2022-04-06 RX ORDER — METHYLPREDNISOLONE SODIUM SUCCINATE 125 MG/2ML
INJECTION, POWDER, LYOPHILIZED, FOR SOLUTION INTRAMUSCULAR; INTRAVENOUS
Status: COMPLETED
Start: 2022-04-06 | End: 2022-04-06

## 2022-04-06 RX ORDER — HYDRALAZINE HYDROCHLORIDE 20 MG/ML
10 INJECTION INTRAMUSCULAR; INTRAVENOUS EVERY 4 HOURS PRN
Status: DISCONTINUED | OUTPATIENT
Start: 2022-04-06 | End: 2022-04-15 | Stop reason: HOSPADM

## 2022-04-06 RX ORDER — BISACODYL 10 MG
10 SUPPOSITORY, RECTAL RECTAL
Status: DISCONTINUED | OUTPATIENT
Start: 2022-04-06 | End: 2022-04-15 | Stop reason: HOSPADM

## 2022-04-06 RX ORDER — CALCITRIOL 0.25 UG/1
0.25 CAPSULE, LIQUID FILLED ORAL DAILY
Status: DISCONTINUED | OUTPATIENT
Start: 2022-04-07 | End: 2022-04-15 | Stop reason: HOSPADM

## 2022-04-06 RX ORDER — ONDANSETRON 2 MG/ML
4 INJECTION INTRAMUSCULAR; INTRAVENOUS EVERY 4 HOURS PRN
Status: DISCONTINUED | OUTPATIENT
Start: 2022-04-06 | End: 2022-04-15 | Stop reason: HOSPADM

## 2022-04-06 RX ORDER — MORPHINE SULFATE 4 MG/ML
1 INJECTION INTRAVENOUS ONCE
Status: COMPLETED | OUTPATIENT
Start: 2022-04-06 | End: 2022-04-06

## 2022-04-06 RX ORDER — CEFTRIAXONE 2 G/1
2 INJECTION, POWDER, FOR SOLUTION INTRAMUSCULAR; INTRAVENOUS ONCE
Status: COMPLETED | OUTPATIENT
Start: 2022-04-06 | End: 2022-04-06

## 2022-04-06 RX ORDER — KETOROLAC TROMETHAMINE 30 MG/ML
30 INJECTION, SOLUTION INTRAMUSCULAR; INTRAVENOUS ONCE
Status: COMPLETED | OUTPATIENT
Start: 2022-04-06 | End: 2022-04-06

## 2022-04-06 RX ORDER — AMOXICILLIN 250 MG
2 CAPSULE ORAL 2 TIMES DAILY
Status: DISCONTINUED | OUTPATIENT
Start: 2022-04-06 | End: 2022-04-15 | Stop reason: HOSPADM

## 2022-04-06 RX ORDER — DEXTROSE AND SODIUM CHLORIDE 5; .9 G/100ML; G/100ML
INJECTION, SOLUTION INTRAVENOUS CONTINUOUS
Status: DISCONTINUED | OUTPATIENT
Start: 2022-04-06 | End: 2022-04-09

## 2022-04-06 RX ORDER — TAMSULOSIN HYDROCHLORIDE 0.4 MG/1
0.4 CAPSULE ORAL EVERY MORNING
Status: DISCONTINUED | OUTPATIENT
Start: 2022-04-06 | End: 2022-04-15 | Stop reason: HOSPADM

## 2022-04-06 RX ORDER — PROMETHAZINE HYDROCHLORIDE 25 MG/1
25 TABLET ORAL EVERY 6 HOURS PRN
Status: DISCONTINUED | OUTPATIENT
Start: 2022-04-06 | End: 2022-04-15 | Stop reason: HOSPADM

## 2022-04-06 RX ORDER — FAMOTIDINE 20 MG/1
20 TABLET, FILM COATED ORAL DAILY
Status: DISCONTINUED | OUTPATIENT
Start: 2022-04-06 | End: 2022-04-14

## 2022-04-06 RX ORDER — ACETAMINOPHEN 500 MG
1000 TABLET ORAL EVERY 6 HOURS PRN
Status: DISCONTINUED | OUTPATIENT
Start: 2022-04-06 | End: 2022-04-10

## 2022-04-06 RX ORDER — SODIUM FLUORIDE 5 MG/ML
1 PASTE, DENTIFRICE DENTAL EVERY EVENING
Status: DISCONTINUED | OUTPATIENT
Start: 2022-04-06 | End: 2022-04-06

## 2022-04-06 RX ADMIN — IOHEXOL 40 ML: 350 INJECTION, SOLUTION INTRAVENOUS at 11:36

## 2022-04-06 RX ADMIN — IOHEXOL 100 ML: 350 INJECTION, SOLUTION INTRAVENOUS at 11:34

## 2022-04-06 RX ADMIN — METHYLPREDNISOLONE SODIUM SUCCINATE 125 MG: 125 INJECTION, POWDER, FOR SOLUTION INTRAMUSCULAR; INTRAVENOUS at 11:08

## 2022-04-06 RX ADMIN — DEXTROSE AND SODIUM CHLORIDE: 5; 900 INJECTION, SOLUTION INTRAVENOUS at 22:51

## 2022-04-06 RX ADMIN — MORPHINE SULFATE 1 MG: 4 INJECTION INTRAVENOUS at 22:47

## 2022-04-06 RX ADMIN — KETOROLAC TROMETHAMINE 30 MG: 30 INJECTION, SOLUTION INTRAMUSCULAR at 12:44

## 2022-04-06 RX ADMIN — CEFTRIAXONE SODIUM 2 G: 2 INJECTION, POWDER, FOR SOLUTION INTRAMUSCULAR; INTRAVENOUS at 13:04

## 2022-04-06 RX ADMIN — KETOROLAC TROMETHAMINE 30 MG: 30 INJECTION, SOLUTION INTRAMUSCULAR; INTRAVENOUS at 18:38

## 2022-04-06 ASSESSMENT — FIBROSIS 4 INDEX: FIB4 SCORE: 5.47

## 2022-04-06 ASSESSMENT — PAIN DESCRIPTION - PAIN TYPE: TYPE: ACUTE PAIN

## 2022-04-06 NOTE — ED TRIAGE NOTES
"Chief Complaint   Patient presents with   • ALOC     Pt went to bed at baseline last night at approximately 2200, wife found pt extremely weak and disoriented this morning. Upon arrival, pt is weak in all extremities with slurred speech. Pt oriented to self. BS PTA was 126 mg/dL per EMS. Pt recently underwent a bone marrow biopsy on 03/18 and per pt's wife, pt has been declining since.      BP (!) 196/79   Pulse 82   Temp 37.1 °C (98.7 °F) (Temporal)   Resp 18   Ht 1.803 m (5' 11\")   Wt 98.4 kg (217 lb)   SpO2 96%   BMI 30.27 kg/m²     Pt activated as a Stroke IR and taken to CT upon arrival.   "

## 2022-04-06 NOTE — ED NOTES
Report from PAM Teresa. Pt placed on monitor. Wife at bedside. Urine sent to lab. Pt alert and oriented x1-2.  Pt moaning.

## 2022-04-06 NOTE — ED PROVIDER NOTES
ED Provider Note    CHIEF COMPLAINT  Chief Complaint   Patient presents with   • ALOC     Pt went to bed at baseline last night at approximately 2200, wife found pt extremely weak and disoriented this morning. Upon arrival, pt is weak in all extremities with slurred speech. Pt oriented to self. BS PTA was 126 mg/dL per EMS. Pt recently underwent a bone marrow biopsy on 03/18 and per pt's wife, pt has been declining since.        HPI  Luigi Walters is a 81 y.o. male with Merkel cell carcinoma of the cheek who presents as a stroke evaluation for altered mental status.  The patient was at his baseline last night around 10:00pm.  This morning when his wife woke up she found him to be extremely weak and confused.  Upon presentation he has slurred speech and is not really moving his extremities left side seems more weak than the right.  Patient recently had a bone marrow biopsy on 3/18.  His wife states that he has been doing worse every day since.  Patient is not verbal and history and physical are difficult to obtain from the patient.  He is complaining of some mid back pain.  He was recently treated for a urinary tract infection on 21 March.  According to his wife he has not had any diarrhea or vomiting or fevers.    REVIEW OF SYSTEMS  EYES: no discharge redness or vision changes  CARDIAC: no chest pain, palpitations    PULMONARY: no dyspnea, cough or congestion   GI: no vomiting diarrhea or abdominal pain   : no dysuria, positive mid and lower back pain no hematuria   Neuro: no weakness, numbness aphasia or headache  Musculoskeletal: no swelling deformity or pain no joint swelling  Endocrine: no fevers, sweating, weight loss   SKIN: no rash, erythema or contusions     See history of present illness all other systems are negative    History and review of systems are difficult to obtain because of the patient's mental status    PAST MEDICAL HISTORY  Past Medical History:   Diagnosis Date   • Arrhythmia      "A-Fib   • Arthritis     Generalized   • Ascites    • Basal cell carcinoma of face     forehead   • Bronchitis    • Cancer (HCC)    • Cancer (HCC)     left hand basal cell ca   • Chronic diarrhea    • Chronic nausea    • Chronic vomiting    • 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 Difficile   • Jaundice    • Melena    • Melena    • Merkel cell carcinoma of face (HCC)    • Non Hodgkin's lymphoma (HCC)    • Osteoarthritis    • Pacemaker     SSS   • Pain 2021    L shoulder, 5/10   • Pneumonia    • Polycythemia    • Presence of permanent cardiac pacemaker     Dr. Chaitanya Walters   • Prostate cancer (HCC)    • Prostate cancer (HCC)         • Renal disorder      Insufficiency....med related?; pt denies    • Seizure (HCC)     \"with Dobutamine Echo\"   • Stroke (HCC)     Undiagnosed but went through speech and physical therapy; no residual effects.   • Thrombocytopenia (HCC)        FAMILY HISTORY  Family History   Problem Relation Age of Onset   • Cancer Father         Prostate CA   • Cancer Sister         unknown   • Cancer Brother         colon       SOCIAL HISTORY  Social History     Socioeconomic History   • Marital status:    Tobacco Use   • Smoking status: Former Smoker     Packs/day: 0.50     Years: 14.00     Pack years: 7.00     Types: Cigarettes     Quit date: 1970     Years since quittin.2   • Smokeless tobacco: Never Used   • Tobacco comment: quit in    Vaping Use   • Vaping Use: Never used   Substance and Sexual Activity   • Alcohol use: Not Currently     " Alcohol/week: 0.5 oz     Types: 1 Glasses of wine per week   • Drug use: Never   • Sexual activity: Not Currently   Social History Narrative    Retired , lives in [place] with wife. Has 2 children that both live out of state.       SURGICAL HISTORY  Past Surgical History:   Procedure Laterality Date   • [place] EXC SKIN MALIG >4CM REMAINDR BODY Left 9/13/2021    Procedure: EXCISION, MASS- LEFT FACIAL LESION;  Surgeon: Chaitanya Saha M.D.;  Location: SURGERY SAME DAY [facility];  Service: Ent   • NECK DISSECTION RADICAL Left 9/13/2021    Procedure: DISSECTION, NECK, RADICAL - MODIFIED;  Surgeon: Chaitanya Saha M.D.;  Location: SURGERY SAME DAY [facility];  Service: Ent   • FLAP FREE Left 9/13/2021    Procedure: ADJACENT TISSUE TRANSFER CLOSURE;  Surgeon: Chaitanya Saha M.D.;  Location: SURGERY SAME DAY [facility];  Service: Ent   • RECOVERY  3/18/2015    Performed by [facility] at SURGERY PRE-POST PROC UNIT [facility] [place]   • PACEMAKER INSERTION  March 2015    Generator replacement with  [employer] Adapta [id number] implanted by Dr. Chaitanya Waltres. Original device implanted in 2007.   • PENILE PROSTHESIS AMS INFLATABLE  10/13/2014    Performed by Sedrick Pittman M.D. at SURGERY [facility]   • SHOULDER ARTHROPLASTY TOTAL Left 2010   • PACEMAKER INSERTION  2007        • OTHER      Endoscopy every 3-6 months to track esophageal varices       CURRENT MEDICATIONS  Home Medications     Reviewed by Shelia Dominguez (Pharmacy Tech) on 04/06/22 at 1333  Med List Status: Complete   Medication Last Dose Status   acetaminophen (TYLENOL) 500 MG Tab unknown Active   calcitRIOL (ROCALTROL) 0.25 MCG Cap 4/5/2022 Active   famotidine (PEPCID) 20 MG Tab 4/5/2022 Active   fluticasone (FLONASE) 50 MCG/ACT nasal spray unknown Active   lactulose 10 GM/15ML SOLN unknown Active   meclizine (ANTIVERT) 25 MG TABS unknown Active   Multiple Vitamin (MULTI VITAMIN DAILY PO) 4/5/2022 Active   NON SPECIFIED 4/5/2022 Active   NON SPECIFIED  "4/5/2022 Active   oxyCODONE immediate-release (ROXICODONE) 5 MG Tab 4/6/2022 Active   promethazine (PHENERGAN) 25 MG Tab unknown Active   ropinirole (REQUIP) 0.25 MG Tab 4/5/2022 Active   SF 5000 PLUS 1.1 % Cream unknown Active   tamsulosin (FLOMAX) 0.4 MG capsule 4/5/2022 Active   tramadol (ULTRAM) 50 MG Tab unknown Active   zolpidem (AMBIEN) 10 MG Tab unknown Active                ALLERGIES  Allergies   Allergen Reactions   • Contrast Media With Iodine [Iodine]      \"siezure\" 03/11/2022 States had many iodine contract scans w/out issue, then w/the last one in 2020 at [facility] had \"spasms\".   • Dobutamine      Seizures; taken abruptly off a Dobutamine gtt-no taper   • Latex      Could be adhesive \"red welts\"   • Tape Rash     Paper tape ok       PHYSICAL EXAM  VITAL SIGNS: BP (!) 165/78   Pulse 75   Temp 37.1 °C (98.7 °F) (Temporal)   Resp 16   Ht 1.803 m (5' 11\")   Wt 98.4 kg (217 lb)   SpO2 96%   BMI 30.27 kg/m²   Constitutional: Slurred speech moaning no acute respiratory distress.   HEENT: Normocephalic, Atraumatic,  external ears normal, pharynx pink,  Mucous  Membranes moist, No rhinorrhea or mucosal edema  Eyes: PERRL, EOMI, Conjunctiva normal, No discharge.   Neck: Normal range of motion, No tenderness, Supple, No stridor.   Lymphatic: No lymphadenopathy    Cardiovascular: Regular Rate and Rhythm, No murmurs,  rubs, or gallops.   Thorax & Lungs: Lungs clear to auscultation bilaterally, No respiratory distress, No wheezes, rhales or rhonchi, No chest wall tenderness.   Abdomen: Bowel sounds normal, Soft, non tender, non distended,  No pulsatile masses., no rebound guarding or peritoneal signs.   Skin: Warm, Dry, No erythema, No rash,   Back:  No CVA tenderness, positive lower T-spine and upper LS-spine tenderness with paraspinous muscle tenderness as well, no bony crepitance step offs or instability.   Extremities: Equal, intact distal pulses, No cyanosis, clubbing or edema,  No tenderness. "   Musculoskeletal: Good range of motion in all major joints. No tenderness to palpation or major deformities noted.   Neurologic: Somnolent and confused weakness of all extremities left upper and left lower extremity seem worse with no movement.  He is aphasic without coherent speech.  He does follow 1 commands but not others.        RADIOLOGY/PROCEDURES  CT-TSPINE W/O PLUS RECONS   Final Result      1.  Acute/subacute mild T12 superior endplate compression fracture.   2.  Age-indeterminate without likely chronic mild T1, T3, T4 and L1 compression fractures.      CT-LSPINE W/O PLUS RECONS   Final Result      1.  Acute-subacute wedge compression fracture of T12 with trace retropulsion.   2.  Age-indeterminate L1 compression deformity. Correlate with point tenderness.   3.  Degenerative changes of the lower lumbar spine.   4.  Moderate bilateral hydroureteronephrosis.      DX-CHEST-PORTABLE (1 VIEW)   Final Result      No acute cardiopulmonary abnormality.      CT-CEREBRAL PERFUSION ANALYSIS   Final Result      1.  Cerebral blood flow less than 30% likely representing completed infarct = 0 mL.      2.  T Max more than 6 seconds likely representing combination of completed infarct and ischemia = 4 mL.      3.  Mismatched volume likely representing ischemic brain/penumbra = 4 mL      4.  Please note that the cerebral perfusion was performed on the limited brain tissue around the basal ganglia region. Infarct/ischemia outside the CT perfusion sections can be missed in this study.      CT-CTA HEAD WITH & W/O-POST PROCESS   Final Result      No large vessel occlusion, hemodynamically significant stenosis or aneurysm is seen.      CT-CTA NECK WITH & W/O-POST PROCESSING   Final Result      1.  Moderate atherosclerotic narrowing of the proximal right internal carotid artery.   2.  Significant streak artifact adjacent to the distal left vertebral artery provides severely suboptimal evaluation. There is contrast proximally and  distally, however high-grade stenosis cannot be entirely excluded.   3.  Suboptimal evaluation of the proximal left and bilateral vertebral arteries due to streak artifact.   4.  No high-grade stenosis or dissection in the visualized portions of the arteries of the neck.   5.  6 mm left upper lobe pulmonary nodule that appears similar to prior PET/CT.   6.  Remote appearing superior endplate deformity of T3.      CT-HEAD W/O   Final Result      1.  Cerebral atrophy.   2.  No evidence of acute infarction or acute hemorrhage or mass lesion.               COURSE & MEDICAL DECISION MAKING  Pertinent Labs & Imaging studies reviewed. (See chart for details)  Differential diagnosis: CVA, TIA, intracranial hemorrhage, seizure, dehydration, anemia, electrolyte disturbance, sepsis    HYDRATION: Based on the patient's presentation of Abnormal Fluid Loss the patient was given IV fluids. IV Hydration was used because oral hydration was not adequate alone. Upon recheck following hydration, the patient was improved.    The patient was sent straight to CT scan but had no evidence of acute intracranial hemorrhage or infarction and no large vessel occlusions on his CTA.  It was determined by neurology that his mental status changes were not due to acute stroke or intracranial hemorrhage.      I gave the patient IV Rocephin and fluids per the sepsis protocol because of his recent urinary tract infection and is wife being unsure whether he has been taking his antibiotic.    The nurse and I rolled him and we looked at his bone marrow biopsy site which shows some contusion but no erythema or swelling or warmth.     I spoke with the hospitalist who has accepted the patient for admission.  His CT does show an age-indeterminate L1 compression fracture which could be the source of his back pain.    Patient has had high blood pressure while in the emergency department, felt likely secondary to medical condition. Counseled patient to monitor  blood pressure at home and follow up with primary care physician.    Critical Care  Due to the real possibility of a deterioration of this patient's condition required the highest level of my preparedness for sudden emergent intervention. I provided critical care services which included medication orders, frequent reevaluations of the patient's condition and response to treatment, ordering and reviewing test results and discussing the case with various consultants. The critical care time associated with the care of the patient was 40 minutes. Review chart for interventions. This time is exclusive of any other billable procedures.     FINAL IMPRESSION  1. Altered mental status, unspecified altered mental status type    2. Chronic midline low back pain without sciatica    3. L1 compression fracture       PLAN/DISPOSITION  Admitted in guarded condition.           Electronically signed by: Magaly Bowen M.D., 4/6/2022 11:44 AM

## 2022-04-06 NOTE — CONSULTS
Neurology STROKE H&P  [facility]    Referring Physician: Magaly Bowen M.D.    STROKE CODE:   Chief Complaint   Patient presents with   • ALOC     Pt went to bed at baseline last night at approximately 2200, wife found pt extremely weak and disoriented this morning. Upon arrival, pt is weak in all extremities with slurred speech. Pt oriented to self. BS PTA was 126 mg/dL per EMS. Pt recently underwent a bone marrow biopsy on 03/18 and per pt's wife, pt has been declining since.        To obtain the most accurate data regarding the time called, and time patient seen, refer to the stroke run-sheet and chart.  For time of CT, refer to the radiology report. See A&P below for TPA Decision and door to needle time if and when applicable.    HPI: Luigi Walters is a 81 y.o. male with history of arrhythmia with pacemaker, ascites, cirrhosis of the liver, basal cell carcinoma, hepatic encephalopathy, non-Hodgkin's lymphoma, HTN, prostate cancer, polycythemia, seizure, and stroke presenting to the hospital for ALOC, generalized weakness, and slurred speech and consulted for possible stroke. The patient is unable to provide history, thus is obtained from EMS report from his wife. Per the wife, the patient recently has a bone marrow biopsy completed on 3/18/22, and since has been declining. He was last seen in his usual state of health yesterday evening at 22:00, and upon awakening this morning found him to be difficult to wake, altered, with generalized weakness and slurred speech. EMS was notified and the patient was transferred to [facility]. En route, FSBG 126 mg/dL and /79. No reported anticoagulation. Upon arrival, NIHSS 13 significant for drowsiness, disorientation, dysarthria, and generalized weakness. CT imaging was completed with results and interpretation below. Reports back pain and need to urinate, but otherwise ROS limited given ALOC.     Review of systems: In addition to what is detailed  "in the HPI above, all other systems reviewed and are negative.    Past Medical History:    has a past medical history of Arrhythmia, Arthritis, Ascites, Basal cell carcinoma of face, Bronchitis (1970), Cancer (HCC) (1999), Cancer (HCC) (2021), Chronic diarrhea, Chronic nausea, Chronic vomiting, Cirrhosis of liver not due to alcohol (HCC), Encephalopathy, End-stage liver disease (HCC), Esophageal varices in alcoholic cirrhosis, Hepatic encephalopathy (HCC), Hepatitis, Hip fracture (HCC), History of falling, History of non-Hodgkin's lymphoma, HTN (hypertension), benign, Indigestion, Infectious disease (2008,2014), Jaundice, Melena, Melena, Merkel cell carcinoma of face (HCC), Non Hodgkin's lymphoma (HCC) (2019), Osteoarthritis, Pacemaker (2007), Pain (09/09/2021), Pneumonia (1972), Polycythemia, Presence of permanent cardiac pacemaker (2007/2015), Prostate cancer (HCC), Prostate cancer (HCC) (2000), Renal disorder ( ), Seizure (HCC), Stroke (HCC) (2020), and Thrombocytopenia (HCC).    FHx:  family history includes Cancer in his brother, father, and sister.    SHx:   reports that he quit smoking about 52 years ago. His smoking use included cigarettes. He has a 7.00 pack-year smoking history. He has never used smokeless tobacco. He reports previous alcohol use of about 0.5 oz of alcohol per week. He reports that he does not use drugs.    Allergies:  Allergies   Allergen Reactions   • Contrast Media With Iodine [Iodine]      \"siezure\" 03/11/2022 [facility] had many iodine contract scans w/out issue, then w/the last one in 2020 at [facility] had \"spasms\".   • Dobutamine      Seizures; taken abruptly off a Dobutamine gtt-no taper   • Latex      Could be adhesive \"red welts\"   • Tape Rash     Paper tape ok       Medications:  No current facility-administered medications for this encounter.    Current Outpatient Medications:   •  famotidine (PEPCID) 20 MG Tab, Take 20 mg by mouth every day., Disp: , Rfl:   •  zolpidem " (AMBIEN) 10 MG Tab, Take 10 mg by mouth at bedtime as needed for Sleep., Disp: , Rfl:   •  fluticasone (FLONASE) 50 MCG/ACT nasal spray, Administer 1 Spray into affected nostril(S) every day., Disp: , Rfl:   •  oxyCODONE immediate-release (ROXICODONE) 5 MG Tab, Take 10 mg by mouth every 6 hours as needed for Severe Pain., Disp: , Rfl:   •  calcitRIOL (ROCALTROL) 0.25 MCG Cap, Take 0.25 mcg by mouth every day., Disp: , Rfl: 4  •  SF 5000 PLUS 1.1 % Cream, Take 1 Each by mouth every evening., Disp: , Rfl: 1  •  acetaminophen (TYLENOL) 500 MG Tab, Take 500-1,000 mg by mouth as needed. Indications: Pain, Disp: , Rfl:   •  tramadol (ULTRAM) 50 MG Tab, Take 50 mg by mouth every 8 hours as needed. Indications: Pain, Disp: , Rfl:   •  NON SPECIFIED, Take 1 Tablet by mouth every day. L-orinthate 500mg PO daily, Disp: , Rfl:   •  NON SPECIFIED, Take 500 mg by mouth every day. L-aspartate 500mg Po daily, Disp: , Rfl:   •  promethazine (PHENERGAN) 25 MG Tab, Take 25 mg by mouth every 6 hours as needed for Nausea/Vomiting., Disp: , Rfl:   •  Multiple Vitamin (MULTI VITAMIN DAILY PO), Take 1 Tablet by mouth every day. Combination d3,magnessium,vitamin a, Disp: , Rfl:   •  ropinirole (REQUIP) 0.25 MG Tab, Take 0.5 mg by mouth at bedtime. Indications: Restless Leg Syndrome, Disp: , Rfl: 3  •  spironolactone (ALDACTONE) 50 MG Tab, Take 50 mg by mouth every day. Indications: Edema, Disp: , Rfl: 0  •  lactulose 10 GM/15ML SOLN, Take 30 g by mouth as needed., Disp: , Rfl:   •  meclizine (ANTIVERT) 25 MG TABS, Take 25 mg by mouth 3 times a day as needed for Dizziness or Vertigo., Disp: , Rfl:   •  tamsulosin (FLOMAX) 0.4 MG capsule, Take 0.4 mg by mouth every morning. Indications: Benign Enlargement of Prostate, Disp: , Rfl:     Physical Examination:    Vitals:    04/06/22 1116 04/06/22 1119   BP:  (!) 196/79   Pulse:  82   Resp:  18   Temp:  37.1 °C (98.7 °F)   TempSrc:  Temporal   SpO2:  96%   Weight: 98.4 kg (217 lb)    Height: 1.803  "m (5' 11\")        General: Patient is drowsy, ill-appearing, and in no acute distress  Eyes: Examination of optic disks not indicated at this time given acuity of consult.  CV: RRR    NEUROLOGICAL EXAM:     Mental status: Drowsy, awakens to verbal stimuli, oriented to self only, and follows commands.  Speech and language: Speech is moderately dysarthric and fluent. The patient is able to name, repeat, and comprehend.  Cranial nerve exam: Pupils are equal, round and reactive to light bilaterally. Visual fields are full. Extraocular muscles are intact. Sensation in the face is intact to light touch. Face is symmetric. Hearing to finger rub equal. Palate elevates symmetrically. Shoulder shrug is full. Tongue is midline.  Motor exam: Generalized weakness with drift to bed in all extremities. Tone is normal. No abnormal movements were seen on exam.  Sensory exam: No sensory deficits identified   Deep tendon reflexes:  Deferred.  Coordination: No dali ataxia noted with observed movements.    Gait: Deferred as patient is actively transported to CT scanner.     NIH Stroke Scale:    1a. Level of Consciousness (Alert, drowsy, etc): 1= Drowsy    1b. LOC Questions (Month, age): 2= Incorrect    1c. LOC Commands (Open/close eyes make fist/let go): 0= Obeys both correctly    2.   Best Gaze (Eyes open - patient follows examiner's finger on face): 0= Normal    3.   Visual Fields (introduce visual stimulus/threat to patient's field quadrants): 0= No visual loss  4.   Facial Paresis (Show teeth, raise eyebrows and squeeze eyes shut): 0= Normal     5a. Motor Arm - Left (Elevate arm to 90 degrees if patient is sitting, 45 degrees if  supine): 3= No effort against gravity    5b. Motor Arm - Right (Elevate arm to 90 degrees if patient is sitting, 45 degrees if supine): 2= Can't resist gravity    6a. Motor Leg - Left (Elevate leg 30 degrees with patient supine): 2= Can't resist gravity    6b. Motor Leg - Right  (Elevate leg 30 degrees " with patient supine): 2= Can't resist gravity    7.   Limb Ataxia (Finger-nose, heel down shin): 0= No ataxia    8.   Sensory (Pin prick to face, arm, trunk and leg - compare side to side): 0= Normal    9.  Best Language (Name item, describe a picture and read sentences): 0= No aphasia    10. Dysarthria (Evaluate speech clarity by patient repeating listed words): 1= Mild to moderate slurring    11. Extinction and Inattention (Use information from prior testing to identify neglect or  double simultaneous stimuli testing): 0= No neglect    Total NIH Score:13      Modified Angélica Scale (MRS): 3 = Moderate disability; requires some help, but able to walk without assistance      Objective Data:    Labs:  Lab Results   Component Value Date/Time    PROTHROMBTM 14.0 04/06/2022 11:02 AM    INR 1.12 04/06/2022 11:02 AM      Lab Results   Component Value Date/Time    WBC 4.3 (L) 04/06/2022 11:02 AM    RBC 4.89 04/06/2022 11:02 AM    HEMOGLOBIN 14.9 04/06/2022 11:02 AM    HEMATOCRIT 44.8 04/06/2022 11:02 AM    MCV 91.6 04/06/2022 11:02 AM    MCH 30.5 04/06/2022 11:02 AM    MCHC 33.3 (L) 04/06/2022 11:02 AM    MPV 10.7 04/06/2022 11:02 AM    NEUTSPOLYS 77.50 (H) 04/06/2022 11:02 AM    LYMPHOCYTES 12.30 (L) 04/06/2022 11:02 AM    MONOCYTES 7.20 04/06/2022 11:02 AM    EOSINOPHILS 2.30 04/06/2022 11:02 AM    BASOPHILS 0.20 04/06/2022 11:02 AM      Lab Results   Component Value Date/Time    SODIUM 141 04/06/2022 11:02 AM    POTASSIUM 4.3 04/06/2022 11:02 AM    CHLORIDE 107 04/06/2022 11:02 AM    CO2 23 04/06/2022 11:02 AM    GLUCOSE 103 (H) 04/06/2022 11:02 AM    BUN 21 04/06/2022 11:02 AM    CREATININE 1.24 04/06/2022 11:02 AM    CREATININE 1.0 05/28/2008 06:00 AM      Lab Results   Component Value Date/Time    CHOLSTRLTOT 158 07/12/2021 10:24 AM     (H) 07/12/2021 10:24 AM    HDL 37 (A) 07/12/2021 10:24 AM    TRIGLYCERIDE 86 07/12/2021 10:24 AM       Lab Results   Component Value Date/Time    ALKPHOSPHAT 101 (H) 04/06/2022  11:02 AM    ASTSGOT 21 04/06/2022 11:02 AM    ALTSGPT 18 04/06/2022 11:02 AM    TBILIRUBIN 1.0 04/06/2022 11:02 AM        Imaging/Testing:    I interpreted and/or reviewed the patient's neuroimaging    CT-CEREBRAL PERFUSION ANALYSIS   Final Result      1.  Cerebral blood flow less than 30% likely representing completed infarct = 0 mL.      2.  T Max more than 6 seconds likely representing combination of completed infarct and ischemia = 4 mL.      3.  Mismatched volume likely representing ischemic brain/penumbra = 4 mL      4.  Please note that the cerebral perfusion was performed on the limited brain tissue around the basal ganglia region. Infarct/ischemia outside the CT perfusion sections can be missed in this study.      CT-CTA HEAD WITH & W/O-POST PROCESS   Final Result      No large vessel occlusion, hemodynamically significant stenosis or aneurysm is seen.      CT-CTA NECK WITH & W/O-POST PROCESSING   Final Result      1.  Moderate atherosclerotic narrowing of the proximal right internal carotid artery.   2.  Significant streak artifact adjacent to the distal left vertebral artery provides severely suboptimal evaluation. There is contrast proximally and distally, however high-grade stenosis cannot be entirely excluded.   3.  Suboptimal evaluation of the proximal left and bilateral vertebral arteries due to streak artifact.   4.  No high-grade stenosis or dissection in the visualized portions of the arteries of the neck.   5.  6 mm left upper lobe pulmonary nodule that appears similar to prior PET/CT.   6.  Remote appearing superior endplate deformity of T3.      CT-HEAD W/O   Final Result      1.  Cerebral atrophy.   2.  No evidence of acute infarction or acute hemorrhage or mass lesion.         DX-CHEST-PORTABLE (1 VIEW)    (Results Pending)         Assessment and Plan:    Luigi Walters is a 81 y.o. male with relevant history of arrhythmia with pacemaker, ascites, cirrhosis of the liver, basal cell  carcinoma, hepatic encephalopathy, non-Hodgkin's lymphoma, HTN, prostate cancer, polycythemia, seizure, and stroke (details limited) presenting to the hospital for ALOC, generalized weakness, and slurred speech and neurology was consulted for possible stroke. NIHSS 13 significant for drowsiness, disorientation, dysarthria, and generalized weakness. CT head wo contrast showed no acute abnormalities. CTA head and neck w/wo contrast showed no LVO or high grade stenosis. He was not a candidate for IV thrombolytics given wake up symptoms with LKW yesterday evening. Also not a thrombectomy candidate given no LVO on CTAs. Given his non-focal weakness and disorientation, symptoms and presentation appear more consistent with toxic/metabolic encephalopathy.     Plan:    -q4h and PRN neuro assessment. VS per nursing/unit protocol.   -Permissive HTN not indicated given no LVO or high grade stenosis. Gradually lower to normotensive BP goal 110-130/60-80.   -Antihypertensives per primary team.   -Toxic/metabolic workup per ERP/primary team.     -Recommend UA, UDS, CBC, CMP, ammonia  -Consider MRI Brain wo contrast if toxic/metabolic workup is unrevealing.   -Telemetry; currently SR. Screen for Afib/arrhythmia.   -DVT PPX: SCDs and lovenox SQ daily       The evaluation of the patient, and recommended management, was discussed with Dr. Connolly, Dr. Bowen, and bedside RN.    Gerson Jimenez, MSN [facility]  Nurse Practitioner  [facility]  (t) [phone]

## 2022-04-06 NOTE — H&P
"[facility] MEDICINE HISTORY AND PHYSICAL     PATIENT ID:  NAME:  Luigi Walters  MRN:               [id number]  YOB: 1940    Date of Admission: 4/6/2022     Attending: Dr. Jan Cotto    Resident: Nick Abdul M.D., PGY-3    Primary Care Physician:  Denver Miller, MD    CC:  Altered mental status, pain, weakness    HPI: Luigi Walters is a 81 y.o. male with a past medical history of Meckel's cell carcinoma, sick sinus syndrome with pacemaker, ascites, cirrhosis, hepatic encephalopathy, non-Hodgekin's lymphoma, HTN, prostate cancer, polycythemia, seizure, and stroke who presented with altered mental status.  His wife reports he was last known normal last night. When she went to wake him up today, she reports he was cold and difficult to rouse. He was breathing on his own, but was breathing very slowly. She reports he was complaining of a lot of pain last night. He takes tramadol, oxycodone, and ambien PRN, but his wife gives him the oxycodone because she does not want him to overdue. She reports he only had 1 dose last night. He reports he did not take ambien last night and is unsure if he took tramadol. He reports taking some benadryl last night. He is in charge of all of his own medications other than the oxycodone. When he presented to the ER he was diffusely weak and had slurred speech. He was able to talk in short complete sentences on my exam and reported \"Tugging\" on his heart that started yesterday. He also reports severe lower back pain. CT on 4/2 showed compression fracture of his T12 vertebrae and osteopenia. His wife denies falls. He reports that he passes out often and passed out yesterday, but his wife does not think this happened. He reports he has been feeling poor since bone marrow biopsy on 3/18 and states he had an infection afterwards that caused him to \"urinate and poop blood.\" He states these have both resolved, but continues to endorse pain with urination. He reports " "lower abdominal pain. Last BM was yesterday and was hard with lots of straining required.     REVIEW OF SYSTEMS:   Ten systems reviewed and were negative except as noted in the HPI.                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 Difficile   • Jaundice    • Melena    • Melena    • Merkel cell carcinoma of face (HCC)    • Non Hodgkin's lymphoma (HCC) 2019   • Osteoarthritis    • Pacemaker 2007    SSS   • Pain 09/09/2021    L shoulder, 5/10   • Pneumonia 1972   • Polycythemia    • Presence of permanent cardiac pacemaker 2007/2015    Dr. Chaitanya Walters   • Prostate cancer (HCC)    • Prostate cancer (HCC) 2000        • Renal disorder      Insufficiency....med related?; pt denies 02/19   • Seizure (HCC)     \"with Dobutamine Echo\"   • Stroke (HCC) 2020    Undiagnosed but went through speech and physical therapy; no residual effects.   • Thrombocytopenia (HCC)        PAST SURGICAL HISTORY:  Past Surgical History:   Procedure Laterality Date   • NJ EXC SKIN MALIG >4CM REMAINDR BODY Left 9/13/2021    Procedure: EXCISION, MASS- LEFT FACIAL LESION;  Surgeon: Chaitanya Saha M.D.;  Location: SURGERY SAME DAY [facility];  Service: Ent   • NECK DISSECTION RADICAL Left 9/13/2021    Procedure: DISSECTION, NECK, " "RADICAL - MODIFIED;  Surgeon: Chaitanya Saha M.D.;  Location: SURGERY SAME DAY [facility];  Service: Ent   • FLAP FREE Left 9/13/2021    Procedure: ADJACENT TISSUE TRANSFER CLOSURE;  Surgeon: Chaitanya Saha M.D.;  Location: SURGERY SAME DAY [facility];  Service: Ent   • RECOVERY  3/18/2015    Performed by [facility] at SURGERY PRE-POST PROC UNIT [facility]   • PACEMAKER INSERTION  March 2015    Generator replacement with  Medtronic Adapta [id number] implanted by Dr. Chaitanya Walters. Original device implanted in 2007.   • PENILE PROSTHESIS AMS INFLATABLE  10/13/2014    Performed by Sedrick Pittman M.D. at SURGERY [facility]   • SHOULDER ARTHROPLASTY TOTAL Left 2010   • PACEMAKER INSERTION  2007        • OTHER      Endoscopy every 3-6 months to track esophageal varices       FAMILY HISTORY:  Family History   Problem Relation Age of Onset   • Cancer Father         Prostate CA   • Cancer Sister         unknown   • Cancer Brother         colon       SOCIAL HISTORY:   Living situation: Lives in a condo with his wife who is a primary caregiver. Uses a cane. Has not had home healthcare in a while. Wife states she needs help to take care of him. 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   Tobacco: Quit >50 years ago  Alcohol: Previously an alcoholic, has been sober about 20 years  Illicit drugs: Denies    DIET:   Orders Placed This Encounter   Procedures   • Diet Order Diet: Regular     Standing Status:   Standing     Number of Occurrences:   1     Order Specific Question:   Diet:     Answer:   Regular [1]       ALLERGIES:  Allergies   Allergen Reactions   • Contrast Media With Iodine [Iodine]      \"siezure\" 03/11/2022 States had many iodine contract scans w/out issue, then w/the last one in 2020 at [facility] had \"spasms\".   • Dobutamine      Seizures; taken abruptly off a Dobutamine gtt-no taper   • Latex      Could be adhesive \"red welts\"   • Tape Rash     Paper tape ok       OUTPATIENT MEDICATIONS:    Current " Facility-Administered Medications:   •  acetaminophen (TYLENOL) tablet 1,000 mg, 1,000 mg, Oral, Q6HRS PRN, Nick Abdul M.D.  •  [START ON 4/7/2022] calcitRIOL (ROCALTROL) capsule 0.25 mcg, 0.25 mcg, Oral, DAILY, Nick Abdul M.D.  •  famotidine (PEPCID) tablet 20 mg, 20 mg, Oral, DAILY, Nick Abdul M.D.  •  [START ON 4/7/2022] multivitamin (THERAGRAN) tablet 1 Tablet, 1 Tablet, Oral, DAILY, Nick Abdul M.D.  •  promethazine (PHENERGAN) tablet 25 mg, 25 mg, Oral, Q6HRS PRN, Nick Abdul M.D.  •  SODIUM FLUORIDE (DENTAL GEL) 1.1 % CREA 1 Each, 1 Each, Oral, Q EVENING, Nick Abdul M.D.  •  tamsulosin (FLOMAX) capsule 0.4 mg, 0.4 mg, Oral, QAM, Nick Abdul M.D.  •  senna-docusate (PERICOLACE or SENOKOT S) 8.6-50 MG per tablet 2 Tablet, 2 Tablet, Oral, BID **AND** polyethylene glycol/lytes (MIRALAX) PACKET 1 Packet, 1 Packet, Oral, QDAY PRN **AND** magnesium hydroxide (MILK OF MAGNESIA) suspension 30 mL, 30 mL, Oral, QDAY PRN **AND** bisacodyl (DULCOLAX) suppository 10 mg, 10 mg, Rectal, QDAY PRN, Nick Abdul M.D.  •  [START ON 4/7/2022] enoxaparin (LOVENOX) inj 40 mg, 40 mg, Subcutaneous, DAILY, Nick Abdul M.D.  •  hydrALAZINE (APRESOLINE) injection 10 mg, 10 mg, Intravenous, Q4HRS PRN, Nick Abdul M.D.  •  ondansetron (ZOFRAN) syringe/vial injection 4 mg, 4 mg, Intravenous, Q4HRS PRN, Nick J Abdul, M.D.  •  ondansetron (ZOFRAN ODT) dispertab 4 mg, 4 mg, Oral, Q4HRS PRN, Nick Abdul M.D.    Current Outpatient Medications:   •  famotidine (PEPCID) 20 MG Tab, Take 20 mg by mouth every day. Indications: Heartburn, Disp: , Rfl:   •  zolpidem (AMBIEN) 10 MG Tab, Take 10 mg by mouth at bedtime as needed for Sleep., Disp: , Rfl:   •  fluticasone (FLONASE) 50 MCG/ACT nasal spray, Administer 1 Spray into affected nostril(S) 1 time a day as needed. Indications: Allergic Rhinitis, Disp: , Rfl:   •  oxyCODONE immediate-release (ROXICODONE) 5 MG Tab, Take 10 mg by  mouth every 6 hours as needed for Severe Pain., Disp: , Rfl:   •  calcitRIOL (ROCALTROL) 0.25 MCG Cap, Take 0.25 mcg by mouth every day., Disp: , Rfl: 4  •  SF 5000 PLUS 1.1 % Cream, Take 1 Each by mouth every evening., Disp: , Rfl: 1  •  acetaminophen (TYLENOL) 500 MG Tab, Take 500-1,000 mg by mouth every 6 hours as needed. Indications: Pain, Disp: , Rfl:   •  tramadol (ULTRAM) 50 MG Tab, Take 50 mg by mouth every 8 hours as needed. Indications: Pain, Disp: , Rfl:   •  NON SPECIFIED, Take 1 Tablet by mouth every day. L-orinthate 500mg PO daily, Disp: , Rfl:   •  NON SPECIFIED, Take 500 mg by mouth every day. L-aspartate 500mg Po daily, Disp: , Rfl:   •  promethazine (PHENERGAN) 25 MG Tab, Take 25 mg by mouth every 6 hours as needed for Nausea/Vomiting., Disp: , Rfl:   •  Multiple Vitamin (MULTI VITAMIN DAILY PO), Take 1 Tablet by mouth every day. Combination d3,magnessium,vitamin a, Disp: , Rfl:   •  ropinirole (REQUIP) 0.25 MG Tab, Take 0.5 mg by mouth at bedtime. Indications: Restless Leg Syndrome, Disp: , Rfl: 3  •  lactulose 10 GM/15ML SOLN, Take 30 g by mouth 2 times a day as needed. Indications: Constipation, Impaired Brain Function due to Liver Disease, Disp: , Rfl:   •  meclizine (ANTIVERT) 25 MG TABS, Take 25 mg by mouth 3 times a day as needed for Dizziness or Vertigo., Disp: , Rfl:   •  tamsulosin (FLOMAX) 0.4 MG capsule, Take 0.4 mg by mouth every morning. Indications: Benign Enlargement of Prostate, Disp: , Rfl:     PHYSICAL EXAM:  Vitals:    22 1216 22 1231 22 1252 22 1301   BP: (!) 164/74 (!) 179/84  (!) 165/78   Pulse: 77 75 75 75   Resp: (!) 21 (!) 21 16    Temp:       TempSrc:       SpO2: (!) 86% 98% 97% 96%   Weight:       Height:       , Temp (24hrs), Av.1 °C (98.7 °F), Min:37.1 °C (98.7 °F), Max:37.1 °C (98.7 °F)  , Pulse Oximetry: 96 %    General: Elderly chronically ill patient resting, moaning in pain occasionally  Skin:  Pink, warm and dry.  No rashes  HEENT:  "NC/AT. PERRL. EOMI. MMM. No nasal discharge. Oropharynx nonerythematous without exudate/plaques  Neck:  Supple without lymphadenopathy or rigidity. No JVD   Lungs:  Symmetrical.  CTAB with no W/R/R. Unable to sit forward for the exam  Cardiovascular:  S1/S2 RRR without M/R/G.  Abdomen:  Abdomen is soft, lower quadrants mildly tender to palpation. +BS. No masses noted.  Genitourinary:  Deferred    Extremities:  No pedal edema  Spine:  Straight, very tender and painful  CNS:  Muscle tone is normal. Cranial nerves II-XII grossly intact. Moving all arms and legs 4/5 strength throughout. Oriented to person and place, thinks it is February 2002.     ERCourse:  Patient seen in stroke and noted to have ALOC, generalized weakness, and slurred speech. Neurology was consulted for possible stroke. CT head and CTA head/neck were unremarkable. Ammonia level and LA normal. UA without signs of UTI. [facility] paged for admission.     LAB TESTS:   Recent Labs     04/06/22  1102   WBC 4.3*   RBC 4.89   HEMOGLOBIN 14.9   HEMATOCRIT 44.8   MCV 91.6   MCH 30.5   RDW 43.3   PLATELETCT 116*   MPV 10.7   NEUTSPOLYS 77.50*   LYMPHOCYTES 12.30*   MONOCYTES 7.20   EOSINOPHILS 2.30   BASOPHILS 0.20         Recent Labs     04/06/22  1102   SODIUM 141   POTASSIUM 4.3   CHLORIDE 107   CO2 23   BUN 21   CREATININE 1.24   CALCIUM 9.7   ALBUMIN 4.1       CULTURES:   Results     Procedure Component Value Units Date/Time    URINE CULTURE(NEW) [[id number]]     Order Status: Sent Specimen: Urine, Clean Catch     Blood Culture [[id number]] Collected: 04/06/22 1257    Order Status: Sent Specimen: Blood from Peripheral Updated: 04/06/22 1419    Narrative:      1 of 2 for Blood Culture x 2 sites order. Per Hospital  Policy: Only change Specimen Src: to \"Line\" if specified by  physician order.    Blood Culture [[id number]] Collected: 04/06/22 1257    Order Status: Sent Specimen: Blood from Peripheral Updated: 04/06/22 1418    Narrative:      2 of 2 " "blood culture x2  Sites order. Per Hospital Policy:  Only change Specimen Src: to \"Line\" if specified by physician  order.    Urine Culture (NEW) [[id number]] Collected: 04/06/22 1146    Order Status: Sent Specimen: Urine Updated: 04/06/22 1305    Narrative:      Indication for culture:->Evaluation for sepsis without a  clear source of infection    Urinalysis [[id number]] Collected: 04/06/22 1146    Order Status: Completed Specimen: Blood Updated: 04/06/22 1236     Color Yellow     Character Clear     Specific Gravity 1.013     Ph 6.5     Glucose Negative mg/dL      Ketones Negative mg/dL      Protein Negative mg/dL      Bilirubin Negative     Urobilinogen, Urine 0.2     Nitrite Negative     Leukocyte Esterase Negative     Occult Blood Negative     Micro Urine Req see below     Comment: Microscopic examination not performed when specimen is clear  and chemically negative for protein, blood, leukocyte esterase  and nitrite.         Narrative:      Indication for culture:->Evaluation for sepsis without a  clear source of infection          IMAGES:  CT head:   1.  Cerebral atrophy.  2.  No evidence of acute infarction or acute hemorrhage or mass lesion.    CT Cerebral Perfusion:  1.  Cerebral blood flow less than 30% likely representing completed infarct = 0 mL.     2.  T Max more than 6 seconds likely representing combination of completed infarct and ischemia = 4 mL.     3.  Mismatched volume likely representing ischemic brain/penumbra = 4 mL     4.  Please note that the cerebral perfusion was performed on the limited brain tissue around the basal ganglia region. Infarct/ischemia outside the CT perfusion sections can be missed in this study.    CTA Head:   No large vessel occlusion, hemodynamically significant stenosis or aneurysm is seen.    CTA neck:   1.  Moderate atherosclerotic narrowing of the proximal right internal carotid artery.  2.  Significant streak artifact adjacent to the distal left vertebral artery " "provides severely suboptimal evaluation. There is contrast proximally and distally, however high-grade stenosis cannot be entirely excluded.  3.  Suboptimal evaluation of the proximal left and bilateral vertebral arteries due to streak artifact.  4.  No high-grade stenosis or dissection in the visualized portions of the arteries of the neck.  5.  6 mm left upper lobe pulmonary nodule that appears similar to prior PET/CT.  6.  Remote appearing superior endplate deformity of T3.    CONSULTS:   Neurology consulted from the ER.     ASSESSMENT/PLAN: 81 y.o. male with an extensive medical history of Meckel's cell carcinoma, sick sinus syndrome with pacemaker, ascites, cirrhosis, hepatic encephalopathy, non-Hodgekin's lymphoma, HTN, prostate cancer, polycythemia, seizure, and stroke who presented with altered mental status.    # Altered mental status  # Generalized weakness  Patient with altered mental status and weakness starting this morning. Last known normal was last night. Neurology was consulted from the ER. Patient is likely not having a stroke due to the non-focal weakness and disorientation. He is also improving since arrival and is now oriented to person and place, when asked about the year he said \"2002.\" His altered mental status and weakness are likely toxic in cause as he takes tramadol and ambien at home. His wife is in charge of his oxycodone dosing and states he did not get too much of that last night. He reports taking a benadryl last night which also likely contributed. Metabolic and infectious work up has been unremarkable so far.   - Admit to inpatient telemetry  - Blood cultures and urine cultures pending  - trend troponins  - Avoid opioids and other sedative medications  - If further workup is unrevealing, can consider an MRI or EEG. Patient with hx of seizures.    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 He recently had biopsy of T11 that was negative for carcinoma.   - repeat CT of spine ordered  - pain control with toradol x24 hours (avoiding opioids due to altered mental status)  - PT/OT    # Muscle jerks  Patient with muscle spasms and shaking today after CT spine. He reports he had this happen one other time after he was given dye for a CT. No other history of seizures that he knows of. The nurse describes that both legs and arms were jerking, but he was talking during this. No signs of post-ictal state on exam. Electrolytes normal.  - will monitor, if recurs will order EEG. Does not sound like a seizure at this time.     # Chest pain  Patient complains of \"tugging on my heart\" that started yesterday. No hx of CAD. EKG and initial tropoinin unremarkable  - trend troponin and EKG 6 hours after initial.   - telemetry    # Hypertensive Urgency  Patient with BP as high as 217/97 on presentation. Started to decrease after dose of Toradol.   - continue Toradol for 4 doses to control pain without opioids.   - PRN antihypertensives for BP >180/110  - may need daily antihypertensives.    # BPH  Continue Flomax    # Meckle's Carcinoma  Patient had radiation from 10/20/21-12/01/21. He had concern for T11 metastasis on PET scan with negative biopsy. No signs of infection at the site of the biopsy. Patient to follow up with oncologist. Not on chemotherapy.     Abx: received 1 dose rocephin in ER, no indication to continue  Lines: PIV  DVT PPX: SCDs and Lovenox  Dispo: Admit to inpatient telemetry    Code Status: Full Code, family to bring in living will tomorrow as they are unsure exactly what it says.     Discussed plan of care with attending physician, Dr. Cotto.     Nick Abdul M.D., PGY-3  [facility]  "

## 2022-04-07 LAB
ALBUMIN SERPL BCP-MCNC: 3.9 G/DL (ref 3.2–4.9)
ALBUMIN/GLOB SERPL: 1.3 G/DL
ALP SERPL-CCNC: 96 U/L (ref 30–99)
ALT SERPL-CCNC: 19 U/L (ref 2–50)
AMMONIA PLAS-SCNC: 21 UMOL/L (ref 11–45)
ANION GAP SERPL CALC-SCNC: 14 MMOL/L (ref 7–16)
ANION GAP SERPL CALC-SCNC: 14 MMOL/L (ref 7–16)
AST SERPL-CCNC: 26 U/L (ref 12–45)
BASOPHILS # BLD AUTO: 0.1 % (ref 0–1.8)
BASOPHILS # BLD: 0.01 K/UL (ref 0–0.12)
BILIRUB SERPL-MCNC: 0.8 MG/DL (ref 0.1–1.5)
BUN SERPL-MCNC: 28 MG/DL (ref 8–22)
BUN SERPL-MCNC: 30 MG/DL (ref 8–22)
CALCIUM SERPL-MCNC: 9.6 MG/DL (ref 8.5–10.5)
CALCIUM SERPL-MCNC: 9.7 MG/DL (ref 8.5–10.5)
CHLORIDE SERPL-SCNC: 108 MMOL/L (ref 96–112)
CHLORIDE SERPL-SCNC: 109 MMOL/L (ref 96–112)
CK SERPL-CCNC: 216 U/L (ref 0–154)
CO2 SERPL-SCNC: 18 MMOL/L (ref 20–33)
CO2 SERPL-SCNC: 18 MMOL/L (ref 20–33)
CREAT SERPL-MCNC: 1.26 MG/DL (ref 0.5–1.4)
CREAT SERPL-MCNC: 1.27 MG/DL (ref 0.5–1.4)
EKG IMPRESSION: NORMAL
EOSINOPHIL # BLD AUTO: 0 K/UL (ref 0–0.51)
EOSINOPHIL NFR BLD: 0 % (ref 0–6.9)
ERYTHROCYTE [DISTWIDTH] IN BLOOD BY AUTOMATED COUNT: 42.9 FL (ref 35.9–50)
GFR SERPLBLD CREATININE-BSD FMLA CKD-EPI: 57 ML/MIN/1.73 M 2
GFR SERPLBLD CREATININE-BSD FMLA CKD-EPI: 57 ML/MIN/1.73 M 2
GLOBULIN SER CALC-MCNC: 3 G/DL (ref 1.9–3.5)
GLUCOSE SERPL-MCNC: 137 MG/DL (ref 65–99)
GLUCOSE SERPL-MCNC: 176 MG/DL (ref 65–99)
HCT VFR BLD AUTO: 44 % (ref 42–52)
HGB BLD-MCNC: 14.7 G/DL (ref 14–18)
IMM GRANULOCYTES # BLD AUTO: 0.05 K/UL (ref 0–0.11)
IMM GRANULOCYTES NFR BLD AUTO: 0.6 % (ref 0–0.9)
LYMPHOCYTES # BLD AUTO: 0.4 K/UL (ref 1–4.8)
LYMPHOCYTES NFR BLD: 4.9 % (ref 22–41)
MAGNESIUM SERPL-MCNC: 2.3 MG/DL (ref 1.5–2.5)
MAGNESIUM SERPL-MCNC: 2.3 MG/DL (ref 1.5–2.5)
MCH RBC QN AUTO: 30.1 PG (ref 27–33)
MCHC RBC AUTO-ENTMCNC: 33.4 G/DL (ref 33.7–35.3)
MCV RBC AUTO: 90.2 FL (ref 81.4–97.8)
MONOCYTES # BLD AUTO: 0.25 K/UL (ref 0–0.85)
MONOCYTES NFR BLD AUTO: 3.1 % (ref 0–13.4)
NEUTROPHILS # BLD AUTO: 7.43 K/UL (ref 1.82–7.42)
NEUTROPHILS NFR BLD: 91.3 % (ref 44–72)
NRBC # BLD AUTO: 0 K/UL
NRBC BLD-RTO: 0 /100 WBC
PLATELET # BLD AUTO: 127 K/UL (ref 164–446)
PMV BLD AUTO: 11.3 FL (ref 9–12.9)
POTASSIUM SERPL-SCNC: 4.1 MMOL/L (ref 3.6–5.5)
POTASSIUM SERPL-SCNC: 4.2 MMOL/L (ref 3.6–5.5)
PROT SERPL-MCNC: 6.9 G/DL (ref 6–8.2)
RBC # BLD AUTO: 4.88 M/UL (ref 4.7–6.1)
SODIUM SERPL-SCNC: 140 MMOL/L (ref 135–145)
SODIUM SERPL-SCNC: 141 MMOL/L (ref 135–145)
T PALLIDUM AB SER QL IA: NORMAL
TROPONIN T SERPL-MCNC: 19 NG/L (ref 6–19)
WBC # BLD AUTO: 8.1 K/UL (ref 4.8–10.8)

## 2022-04-07 PROCEDURE — 93005 ELECTROCARDIOGRAM TRACING: CPT | Performed by: STUDENT IN AN ORGANIZED HEALTH CARE EDUCATION/TRAINING PROGRAM

## 2022-04-07 PROCEDURE — 700111 HCHG RX REV CODE 636 W/ 250 OVERRIDE (IP): Performed by: STUDENT IN AN ORGANIZED HEALTH CARE EDUCATION/TRAINING PROGRAM

## 2022-04-07 PROCEDURE — 85025 COMPLETE CBC W/AUTO DIFF WBC: CPT

## 2022-04-07 PROCEDURE — 83735 ASSAY OF MAGNESIUM: CPT

## 2022-04-07 PROCEDURE — 93005 ELECTROCARDIOGRAM TRACING: CPT

## 2022-04-07 PROCEDURE — 93010 ELECTROCARDIOGRAM REPORT: CPT | Mod: 77 | Performed by: INTERNAL MEDICINE

## 2022-04-07 PROCEDURE — 99232 SBSQ HOSP IP/OBS MODERATE 35: CPT | Mod: GC | Performed by: FAMILY MEDICINE

## 2022-04-07 PROCEDURE — 700101 HCHG RX REV CODE 250: Performed by: FAMILY MEDICINE

## 2022-04-07 PROCEDURE — 82550 ASSAY OF CK (CPK): CPT

## 2022-04-07 PROCEDURE — 99222 1ST HOSP IP/OBS MODERATE 55: CPT | Performed by: INTERNAL MEDICINE

## 2022-04-07 PROCEDURE — 80048 BASIC METABOLIC PNL TOTAL CA: CPT

## 2022-04-07 PROCEDURE — 80053 COMPREHEN METABOLIC PANEL: CPT

## 2022-04-07 PROCEDURE — 97162 PT EVAL MOD COMPLEX 30 MIN: CPT

## 2022-04-07 PROCEDURE — 82140 ASSAY OF AMMONIA: CPT

## 2022-04-07 PROCEDURE — 86780 TREPONEMA PALLIDUM: CPT

## 2022-04-07 PROCEDURE — 84484 ASSAY OF TROPONIN QUANT: CPT

## 2022-04-07 PROCEDURE — 770020 HCHG ROOM/CARE - TELE (206)

## 2022-04-07 PROCEDURE — 36415 COLL VENOUS BLD VENIPUNCTURE: CPT

## 2022-04-07 PROCEDURE — 99232 SBSQ HOSP IP/OBS MODERATE 35: CPT | Performed by: NURSE PRACTITIONER

## 2022-04-07 PROCEDURE — 93010 ELECTROCARDIOGRAM REPORT: CPT | Mod: 76 | Performed by: INTERNAL MEDICINE

## 2022-04-07 PROCEDURE — 700105 HCHG RX REV CODE 258: Performed by: STUDENT IN AN ORGANIZED HEALTH CARE EDUCATION/TRAINING PROGRAM

## 2022-04-07 PROCEDURE — 93010 ELECTROCARDIOGRAM REPORT: CPT | Performed by: INTERNAL MEDICINE

## 2022-04-07 PROCEDURE — 97166 OT EVAL MOD COMPLEX 45 MIN: CPT

## 2022-04-07 RX ORDER — METOPROLOL TARTRATE 1 MG/ML
5 INJECTION, SOLUTION INTRAVENOUS EVERY 6 HOURS PRN
Status: DISCONTINUED | OUTPATIENT
Start: 2022-04-07 | End: 2022-04-15 | Stop reason: HOSPADM

## 2022-04-07 RX ORDER — LORAZEPAM 2 MG/ML
0.5 INJECTION INTRAMUSCULAR ONCE
Status: DISCONTINUED | OUTPATIENT
Start: 2022-04-07 | End: 2022-04-07

## 2022-04-07 RX ORDER — MAGNESIUM SULFATE HEPTAHYDRATE 40 MG/ML
2 INJECTION, SOLUTION INTRAVENOUS ONCE
Status: COMPLETED | OUTPATIENT
Start: 2022-04-07 | End: 2022-04-07

## 2022-04-07 RX ORDER — MORPHINE SULFATE 4 MG/ML
0.5 INJECTION INTRAVENOUS EVERY 4 HOURS PRN
Status: DISCONTINUED | OUTPATIENT
Start: 2022-04-07 | End: 2022-04-10

## 2022-04-07 RX ORDER — LORAZEPAM 2 MG/ML
0.5 INJECTION INTRAMUSCULAR ONCE
Status: COMPLETED | OUTPATIENT
Start: 2022-04-07 | End: 2022-04-07

## 2022-04-07 RX ORDER — METOPROLOL TARTRATE 1 MG/ML
2.5 INJECTION, SOLUTION INTRAVENOUS ONCE
Status: COMPLETED | OUTPATIENT
Start: 2022-04-07 | End: 2022-04-07

## 2022-04-07 RX ORDER — LORAZEPAM 2 MG/ML
0.5 INJECTION INTRAMUSCULAR ONCE
Status: ACTIVE | OUTPATIENT
Start: 2022-04-07 | End: 2022-04-08

## 2022-04-07 RX ADMIN — MORPHINE SULFATE 0.5 MG: 4 INJECTION INTRAVENOUS at 21:58

## 2022-04-07 RX ADMIN — DEXTROSE AND SODIUM CHLORIDE: 5; 900 INJECTION, SOLUTION INTRAVENOUS at 12:32

## 2022-04-07 RX ADMIN — METOPROLOL TARTRATE 2.5 MG: 5 INJECTION, SOLUTION INTRAVENOUS at 07:36

## 2022-04-07 RX ADMIN — LORAZEPAM 0.5 MG: 2 INJECTION INTRAMUSCULAR; INTRAVENOUS at 01:04

## 2022-04-07 RX ADMIN — MAGNESIUM SULFATE HEPTAHYDRATE 2 G: 40 INJECTION, SOLUTION INTRAVENOUS at 08:43

## 2022-04-07 RX ADMIN — DEXTROSE AND SODIUM CHLORIDE: 5; 900 INJECTION, SOLUTION INTRAVENOUS at 23:06

## 2022-04-07 RX ADMIN — MORPHINE SULFATE 0.5 MG: 4 INJECTION INTRAVENOUS at 17:08

## 2022-04-07 RX ADMIN — ENOXAPARIN SODIUM 40 MG: 40 INJECTION SUBCUTANEOUS at 05:57

## 2022-04-07 ASSESSMENT — COGNITIVE AND FUNCTIONAL STATUS - GENERAL
DAILY ACTIVITIY SCORE: 6
SUGGESTED CMS G CODE MODIFIER MOBILITY: CN
HELP NEEDED FOR BATHING: TOTAL
PERSONAL GROOMING: TOTAL
TOILETING: TOTAL
MOBILITY SCORE: 6
STANDING UP FROM CHAIR USING ARMS: TOTAL
DRESSING REGULAR LOWER BODY CLOTHING: TOTAL
SUGGESTED CMS G CODE MODIFIER DAILY ACTIVITY: CN
WALKING IN HOSPITAL ROOM: TOTAL
MOVING TO AND FROM BED TO CHAIR: UNABLE
DRESSING REGULAR UPPER BODY CLOTHING: TOTAL
MOVING FROM LYING ON BACK TO SITTING ON SIDE OF FLAT BED: UNABLE
EATING MEALS: TOTAL
CLIMB 3 TO 5 STEPS WITH RAILING: TOTAL
TURNING FROM BACK TO SIDE WHILE IN FLAT BAD: UNABLE

## 2022-04-07 ASSESSMENT — ACTIVITIES OF DAILY LIVING (ADL): TOILETING: INDEPENDENT

## 2022-04-07 ASSESSMENT — PAIN DESCRIPTION - PAIN TYPE
TYPE: ACUTE PAIN

## 2022-04-07 ASSESSMENT — GAIT ASSESSMENTS: GAIT LEVEL OF ASSIST: UNABLE TO PARTICIPATE

## 2022-04-07 NOTE — PROCEDURES
INPATIENT ROUTINE VIDEO ELECTROENCEPHALOGRAM REPORT      Referring provider:   Dr. Abdul    DOS: 04/06/22 (0 hours and 26 minutes of total recording time).     INDICATION:  Luigi Walters 81 y.o. male presenting with altered mental status    CURRENT ANTIEPILEPTIC AND/OR SEDATING REGIMEN:   No AEDs    TECHNIQUE: Routine VEEG was set up by a Neurodiagnostic technologist who performed education to the patient and staff. A minimum of 23 electrodes and 23 channel recording was setup and performed by Neurodiagnostic technologist, in accordance with the international 10-20 system. The study was reviewed in bipolar and referential montages. The recording examined the patient in the  awake and drowsy state(s).     DESCRIPTION OF THE RECORD:  During maximal wakefulness, the background was continuous and showed a 8-9 Hz posterior dominant rhythm, with a mixture of fast and slow frequencies.  Reactivity and state changes were present.  During drowsiness, theta/delta frequencies were seen.    EEG Sleep: Sleep was not captured.    ACTIVATION PROCEDURES:   Photic driving was performed in a stepwise fashion from 1-30 Hz and did not elicit any abnormalities on EEG.    ICTAL AND INTERICTAL FINDINGS:   No focal or generalized epileptiform activity noted.     No regional slowing was seen during this routine study.      No definite electrographic or electroclinical seizures.     EKG: Sampling of the EKG recording showed an abnormal rhythm      EVENTS:  Clinical events of patient stiffening arm/legs and events of patient shaking flexed arms in a semi-rhythmic fashion had no EEG correlate.    INTERPRETATION:   Abnormal video EEG recording in the awake and drowsy state(s):  - Mild diffuse background slowing suggestive of a non-specific encephalopathy   - No persistent focal asymmetries seen.  - No epileptiform discharges seen   - No definitive seizures. Clinical correlation is recommended.  - Clinical events of patient stiffening  arm/legs and events of patient shaking bilateral flexed arms in a semi-rhythmic fashion were captured and had no EEG correlate. Clinical correlation recommended    Talat Oneal MD  Epilepsy and General Neurology  Department of Neurology  Instructor of Clinical Neurology [facility].   Office: [phone]  Fax: [phone]

## 2022-04-07 NOTE — PROGRESS NOTES
Neurology Progress Note  [facility]    Referring Physician: Jan Cotto M.D.    Chief Complaint   Patient presents with   • ALOC     Pt went to bed at baseline last night at approximately 2200, wife found pt extremely weak and disoriented this morning. Upon arrival, pt is weak in all extremities with slurred speech. Pt oriented to self. BS PTA was 126 mg/dL per EMS. Pt recently underwent a bone marrow biopsy on 03/18 and per pt's wife, pt has been declining since.        HPI: Refer to initial documented Neurology H&P, as detailed in the patient's chart.    Interval History 4/7/2022: EEG completed yesterday which revealed mild generalized encephalopathy, but no epileptiform discharges and no seizures. Overnight, the patient reportedly had multiple runs of V-tach on telemetry treated with metoprolol 2.5mg IV. Cardiology was consulted, reported V-tach was actually atrial tachycardia with ventricular tracking, and provided further recommendations.     Currently the patient is laying in bed, lethargic but awakens to repeated verbal stimuli, follows commands, and oriented to self, place, and situation, but not time. Persistent drowsiness, disorientation, dysarthria, and generalized weakness. Spasms noted during exam with complaint of back pain. Otherwise denies headache, facial droop, numbness, and tingling, but ROS limited given encephalopathy.     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 Difficile   • Jaundice    • Melena    • Melena    • Merkel cell carcinoma of face (HCC)    • Non Hodgkin's lymphoma (HCC)    • Osteoarthritis    • Pacemaker     SSS   • Pain 2021    L shoulder, 5/10   • Pneumonia    • Polycythemia    • Presence of permanent cardiac pacemaker     Dr. Chaitanya Walters   • Prostate cancer (HCC)    • Prostate cancer (HCC)         • Renal disorder      Insufficiency....med related?; pt denies    • Seizure (HCC)     \"with Dobutamine Echo\"   • Stroke (HCC) 2020    Undiagnosed but went through speech and physical therapy; no residual effects.   • Thrombocytopenia (HCC)         FHx:  Family History   Problem Relation Age of Onset   • Cancer Father         Prostate CA   • Cancer Sister         unknown   • Cancer Brother         colon        SHx:  Social History     Socioeconomic History   • Marital status:      Spouse name: Not on file   • Number of children: Not on file   • Years of education: Not on file   • Highest education level: Not on file   Occupational History   • Not on file   Tobacco Use   • Smoking status: Former Smoker     Packs/day: 0.50     Years: 14.00     Pack years: 7.00     Types: Cigarettes     Quit date: 1970     Years since quittin.2   • Smokeless tobacco: Never Used   • Tobacco comment: quit in    Vaping Use   • Vaping Use: Never used   Substance and Sexual Activity   • Alcohol use: Not Currently     Alcohol/week: 0.5 oz     Types: 1 Glasses of wine per week   • Drug use: Never   • Sexual activity: Not Currently   Other Topics Concern   • Not on file   Social History Narrative    Retired , lives in [place] with wife. Has 2 children that both live out of state.     Social Determinants of Health "     Financial Resource Strain: Not on file   Food Insecurity: Not on file   Transportation Needs: Not on file   Physical Activity: Not on file   Stress: Not on file   Social Connections: Not on file   Intimate Partner Violence: Not on file   Housing Stability: Not on file        Medications:    Current Facility-Administered Medications:   •  metoprolol tartrate (LOPRESSOR) tablet 25 mg, 25 mg, Oral, TWICE DAILY, Annalise Choudhury M.D.  •  LORazepam (ATIVAN) injection 0.5 mg, 0.5 mg, Intravenous, Once, Annalise Choudhury M.D.  •  acetaminophen (TYLENOL) tablet 1,000 mg, 1,000 mg, Oral, Q6HRS PRN, Nick Abdul M.D.  •  calcitRIOL (ROCALTROL) capsule 0.25 mcg, 0.25 mcg, Oral, DAILY, Nick Abdul M.D.  •  famotidine (PEPCID) tablet 20 mg, 20 mg, Oral, DAILY, Nick Abdul M.D.  •  multivitamin (THERAGRAN) tablet 1 Tablet, 1 Tablet, Oral, DAILY, Nick Abdul M.D.  •  promethazine (PHENERGAN) tablet 25 mg, 25 mg, Oral, Q6HRS PRN, Nick Abdul M.D.  •  tamsulosin (FLOMAX) capsule 0.4 mg, 0.4 mg, Oral, QAM, Nick Abdul M.D.  •  senna-docusate (PERICOLACE or SENOKOT S) 8.6-50 MG per tablet 2 Tablet, 2 Tablet, Oral, BID **AND** polyethylene glycol/lytes (MIRALAX) PACKET 1 Packet, 1 Packet, Oral, QDAY PRN **AND** magnesium hydroxide (MILK OF MAGNESIA) suspension 30 mL, 30 mL, Oral, QDAY PRN **AND** bisacodyl (DULCOLAX) suppository 10 mg, 10 mg, Rectal, QDAY PRN, Nick Abdul M.D.  •  enoxaparin (LOVENOX) inj 40 mg, 40 mg, Subcutaneous, DAILY, Nick Abdul M.D., 40 mg at 04/07/22 0557  •  hydrALAZINE (APRESOLINE) injection 10 mg, 10 mg, Intravenous, Q4HRS PRN, Nick Abdul M.D.  •  [Held by provider] ondansetron (ZOFRAN) syringe/vial injection 4 mg, 4 mg, Intravenous, Q4HRS PRN, Nick Abdul M.D.  •  [Held by provider] ondansetron (ZOFRAN ODT) dispertab 4 mg, 4 mg, Oral, Q4HRS PRN, Nick Abdul M.D.  •  ketorolac (TORADOL) injection 30 mg, 30 mg, Intravenous, Q6HRS PRN,  "Nick Abdul M.D., 30 mg at 04/06/22 1838  •  D5 NS infusion, , Intravenous, Continuous, Annalise Choudhury M.D., Last Rate: 100 mL/hr at 04/07/22 1232, New Bag at 04/07/22 1232    Allergies:  Allergies   Allergen Reactions   • Contrast Media With Iodine [Iodine]      \"siezure\" 03/11/2022 States had many iodine contract scans w/out issue, then w/the last one in 2020 at [facility] had \"spasms\".   • Dobutamine      Seizures; taken abruptly off a Dobutamine gtt-no taper   • Latex      Could be adhesive \"red welts\"   • Tape Rash     Paper tape ok        Review of systems: In addition to what is detailed in the interval history above, all other systems reviewed and are negative.      Physical Examination:  Vitals:    04/07/22 0358 04/07/22 0555 04/07/22 0725 04/07/22 1152   BP: (!) 165/81 158/79 110/69 (!) 172/83   Pulse: 79  90 76   Resp: (!) 23   20   Temp: 36.3 °C (97.3 °F)  37.3 °C (99.1 °F) 36.4 °C (97.5 °F)   TempSrc: Temporal  Temporal Temporal   SpO2: 95%  93% 95%   Weight:       Height:         General: Patient in no acute distress.  HEENT: Normocephalic, no signs of acute trauma.   Neck: Supple, no meningeal signs or carotid bruits. There is normal range of motion. No tenderness on exam.   Chest: Regular and unlabored breaths on . No cough.   CV: RRR.   Skin: No signs of acute rashes or trauma.   Musculoskeletal: Joints exhibit full range of motion, without any pain to palpation. There are no signs of joint or muscle swelling. There is no tenderness to deep palpation of muscles. Complains of back pain and spasm.   Psychiatric: No hallucinatory behavior. Mood and affect appear withdrawn on exam.     NEUROLOGICAL EXAM:   Mental status, orientation: Lethargic but awakens to repeated verbal stimuli, follows commands, and oriented to self, place, and situation, but not time.    Speech and language: Speech is mildly dysarthric and fluent. The patient is able to name, repeat, and comprehend.   Cranial nerve " exam: Pupils are 3-4 mm bilaterally and equally reactive to light. Visual fields are intact by confrontation.  There is no nystagmus on primary or secondary gaze. Intact full EOM in all directions of gaze. Face appears symmetric. Sensation in the face is intact to light touch. Uvula is midline. Palate elevates symmetrically. Tongue is midline and without any signs of tongue biting or fasciculations.  Motor exam: Generalized weakness in all extremities, BUE 4-/5 and BLE 3/5. Tone is normal. Muscle spasm movements were seen intermittently on exam.   Sensory exam: Reveals normal sense of light touch and pinprick in all extremities.   Deep tendon reflexes:  Plantar responses are flexor. There is no clonus.   Coordination: No dali ataxia with observed movements. Normal rapidly alternating movements.   Gait: Deferred per patient preference.       Ancillary Data Reviewed:    Labs:  Lab Results   Component Value Date/Time    PROTHROMBTM 14.0 04/06/2022 11:02 AM    INR 1.12 04/06/2022 11:02 AM      Lab Results   Component Value Date/Time    WBC 8.1 04/07/2022 02:06 AM    RBC 4.88 04/07/2022 02:06 AM    HEMOGLOBIN 14.7 04/07/2022 02:06 AM    HEMATOCRIT 44.0 04/07/2022 02:06 AM    MCV 90.2 04/07/2022 02:06 AM    MCH 30.1 04/07/2022 02:06 AM    MCHC 33.4 (L) 04/07/2022 02:06 AM    MPV 11.3 04/07/2022 02:06 AM    NEUTSPOLYS 91.30 (H) 04/07/2022 02:06 AM    LYMPHOCYTES 4.90 (L) 04/07/2022 02:06 AM    MONOCYTES 3.10 04/07/2022 02:06 AM    EOSINOPHILS 0.00 04/07/2022 02:06 AM    BASOPHILS 0.10 04/07/2022 02:06 AM      Lab Results   Component Value Date/Time    SODIUM 141 04/07/2022 07:44 AM    POTASSIUM 4.1 04/07/2022 07:44 AM    CHLORIDE 109 04/07/2022 07:44 AM    CO2 18 (L) 04/07/2022 07:44 AM    GLUCOSE 137 (H) 04/07/2022 07:44 AM    BUN 30 (H) 04/07/2022 07:44 AM    CREATININE 1.26 04/07/2022 07:44 AM    CREATININE 1.0 05/28/2008 06:00 AM      Lab Results   Component Value Date/Time    CUONG 158 07/12/2021 10:24 AM      (H) 07/12/2021 10:24 AM    HDL 37 (A) 07/12/2021 10:24 AM    TRIGLYCERIDE 86 07/12/2021 10:24 AM       Lab Results   Component Value Date/Time    ALKPHOSPHAT 96 04/07/2022 02:06 AM    ASTSGOT 26 04/07/2022 02:06 AM    ALTSGPT 19 04/07/2022 02:06 AM    TBILIRUBIN 0.8 04/07/2022 02:06 AM        Imaging/Testing:    I interpreted and/or reviewed the patient's neuroimaging    CT-TSPINE W/O PLUS RECONS   Final Result      1.  Acute/subacute mild T12 superior endplate compression fracture.   2.  Age-indeterminate without likely chronic mild T1, T3, T4 and L1 compression fractures.      CT-LSPINE W/O PLUS RECONS   Final Result      1.  Acute-subacute wedge compression fracture of T12 with trace retropulsion.   2.  Age-indeterminate L1 compression deformity. Correlate with point tenderness.   3.  Degenerative changes of the lower lumbar spine.   4.  Moderate bilateral hydroureteronephrosis.      DX-CHEST-PORTABLE (1 VIEW)   Final Result      No acute cardiopulmonary abnormality.      CT-CEREBRAL PERFUSION ANALYSIS   Final Result      1.  Cerebral blood flow less than 30% likely representing completed infarct = 0 mL.      2.  T Max more than 6 seconds likely representing combination of completed infarct and ischemia = 4 mL.      3.  Mismatched volume likely representing ischemic brain/penumbra = 4 mL      4.  Please note that the cerebral perfusion was performed on the limited brain tissue around the basal ganglia region. Infarct/ischemia outside the CT perfusion sections can be missed in this study.      CT-CTA HEAD WITH & W/O-POST PROCESS   Final Result      No large vessel occlusion, hemodynamically significant stenosis or aneurysm is seen.      CT-CTA NECK WITH & W/O-POST PROCESSING   Final Result      1.  Moderate atherosclerotic narrowing of the proximal right internal carotid artery.   2.  Significant streak artifact adjacent to the distal left vertebral artery provides severely suboptimal evaluation. There is  contrast proximally and distally, however high-grade stenosis cannot be entirely excluded.   3.  Suboptimal evaluation of the proximal left and bilateral vertebral arteries due to streak artifact.   4.  No high-grade stenosis or dissection in the visualized portions of the arteries of the neck.   5.  6 mm left upper lobe pulmonary nodule that appears similar to prior PET/CT.   6.  Remote appearing superior endplate deformity of T3.      CT-HEAD W/O   Final Result      1.  Cerebral atrophy.   2.  No evidence of acute infarction or acute hemorrhage or mass lesion.         EC-ECHOCARDIOGRAM COMPLETE W/O CONT    (Results Pending)       Assessment and Plan:    Luigi Walters is a 81 y.o. male with relevant history of arrhythmia with pacemaker, ascites, cirrhosis of the liver, basal cell carcinoma, hepatic encephalopathy, non-Hodgkin's lymphoma, HTN, prostate cancer, polycythemia, seizure, and stroke (details limited) who presenting on 4/6/22 for ALOC, generalized weakness, and slurred speech and neurology was consulted for possible stroke. CT head wo contrast showed no acute abnormalities. CTA head and neck w/wo contrast showed no LVO or high grade stenosis. He was not a candidate for IV thrombolytics given wake up symptoms with LKW yesterday evening. Also not a thrombectomy candidate given no LVO on CTAs. Routine EEG yesterday evening was abnormal with mild diffuse background slowing suggestive of non-specific encephalopathy, but no focal asymmetries, no epileptiform discharges, and no seizures. Neurological exam remains unchanged with lethargy, disorientation, dysarthria, and generalized weakness. Given the EEG findings and his non-focal weakness and disorientation, symptoms and presentation appear consistent with toxic/metabolic encephalopathy.      Plan:     -q4h and PRN neuro assessment. VS per nursing/unit protocol.   -Normotensive BP goal 110-130/60-80. Antihypertensives per primary team.   -Ongoing  toxic/metabolic workup per primary team.         -Consider MRI Brain wo contrast if toxic/metabolic workup is unrevealing and patient does not improve.   -Attempt to minimize risk of delirium such as avoid day time napping and promote night time sleep, monitor for constipation, remove lines/tubing that is not needed, avoid early lab draws and vital checks, limit polypharmacy as able, and keep close to the window  -DVT PPX: SCDs and lovenox SQ daily    No further recommendations or further studies from a neurological standpoint at this time. Please re-consult if you have further questions or there is a change in status.    The evaluation of the patient, and recommended management, was discussed with Dr. Connolly, Dr. Echeverria, and bedside RN. I have performed a physical exam and reviewed and updated ROS and Plan today (4/7/2022). In review of yesterday's note (4/6/2022), there are no changes except as documented above.    Gerson Jimenez, MSN [facility]  Nurse Practitioner  [facility]  (t) [phone]

## 2022-04-07 NOTE — PROGRESS NOTES
0715 monitor check was called. V-tach showed from 0715 to 0716 per monitor tech. Pt came out of V-tach at 0716 up to 150 beats per minute, pt showed a left BBB via monitor tech and EKG tech . Pt came out of BBB at 0740, goes in and out of BBB. JEAN-CLAUDE Echeverria was at bedside during the event. Orders to push 2.5 mg of IV metoprolol. Stat EKG was done with repeat EKG ordered for later today. Troponin labs ordered per M.D.   - - -

## 2022-04-07 NOTE — PROGRESS NOTES
Patient having full body muscle contractions/spasms in bed. Patient alert and communicating with nurse during spasm episode. Dr. Cotto notified. Nick Abdul MD at bedside now. Will continue to monitor.

## 2022-04-07 NOTE — THERAPY
"Physical Therapy   Initial Evaluation     Patient Name: Luigi Walters  Age:  81 y.o., Sex:  male  Medical Record #: [id number]  Today's Date: 4/7/2022     Precautions  Precautions: (P) Fall Risk    Assessment  Patient is 81 y.o. male with admitting diagnosis of altered mental status, pain and weakness . PMHx includes CVA, seizures, HTN, Meckels cell carcinoma, SSS with pacemaker, T12 compression fracture, Non hodgkins lymphoma, cirrhosis, gradual decline in function over past month per spouses report. Pt demonstrates minimal participation in therapy session, answers questions with affirmative \"uh huh\" or declining \"uh uh\", requires total assist for bed mobility and mod/max assist to remain sitting at EOB, minimal use of UE for balance in sitting, able to lift head slightly on request, sits in forward slouch. Pt will benefit from continued PT services in acute setting, as well as in post acute setting.       Plan    Recommend Physical Therapy 3 times per week until therapy goals are met for the following treatments:  Bed Mobility, Gait Training, Neuro Re-Education / Balance, Therapeutic Activities, and Therapeutic Exercises    DC Equipment Recommendations: (P) Unable to determine at this time  Discharge Recommendations: (P) Recommend post-acute placement for additional physical therapy services prior to discharge home            04/07/22 1052   Initial Contact Note    Initial Contact Note Order Received and Verified, Physical Therapy Evaluation in Progress with Full Report to Follow.   Precautions   Precautions Fall Risk   Vitals   O2 (LPM) 0   O2 Delivery Device None - Room Air   Prior Living Situation   Prior Services Home-Independent   Housing / Facility 1 Story House   Steps Into Home 0   Steps In Home 0   Bathroom Set up Bathtub / Shower Combination;Grab Bars   Equipment Owned Single Point Cane   Lives with - Patient's Self Care Capacity Spouse   Comments per pt's report and chart   Prior Level of Functional " Mobility   Bed Mobility Unable To Determine At This Time   Transfer Status Unable To Determine At This Time   Ambulation Unable To Determine At This Time   Cognition    Cognition / Consciousness X   Orientation Level Not Oriented to Reason;Not Oriented to Place   Level of Consciousness Alert   Attention Impaired   Initiation Impaired   Comments attention affected by muscles spasms and pain   Strength Lower Body   Lower Body Strength  X   Gross Strength Generalized Weakness, Equal Bilaterally   Strength Upper Body   Upper Body Strength  X   Gross Strength Generalized Weakness, Equal Bilaterally.    Balance Assessment   Sitting Balance (Static) Poor   Sitting Balance (Dynamic) Poor -   Weight Shift Sitting Poor   Gait Analysis   Gait Level Of Assist Unable to Participate   Bed Mobility    Supine to Sit Total Assist   Sit to Supine Total Assist   Scooting Total Assist   Functional Mobility   Sit to Stand Unable to Participate   Bed, Chair, Wheelchair Transfer Unable to Participate   Mobility sit to/from supine to EOB, static sitting balace with assistance to maintain   How much difficulty does the patient currently have...   Turning over in bed (including adjusting bedclothes, sheets and blankets)? 1   Sitting down on and standing up from a chair with arms (e.g., wheelchair, bedside commode, etc.) 1   Moving from lying on back to sitting on the side of the bed? 1   How much help from another person does the patient currently need...   Moving to and from a bed to a chair (including a wheelchair)? 1   Need to walk in a hospital room? 1   Climbing 3-5 steps with a railing? 1   6 clicks Mobility Score 6   Activity Tolerance   Sitting in Chair NT   Sitting Edge of Bed 3 min   Standing NT   Short Term Goals    Short Term Goal # 1 supine to/from sit EOB with min assist in 6 visits   Short Term Goal # 2 static sitting balance fair with use of UE as needed in 6 visits   Short Term Goal # 3 sit to stand from EOB with min assist  in 6 visits   Education Group   Education Provided Role of Physical Therapist;Transfer Status   Role of Physical Therapist Patient Response Patient;Acceptance;Explanation;Verbal Demonstration   Transfer Status Patient Response Patient;Acceptance;Explanation;Verbal Demonstration   Problem List    Problems Pain;Impaired Bed Mobility;Impaired Transfers;Impaired Ambulation;Functional Strength Deficit;Impaired Balance;Decreased Activity Tolerance;Safety Awareness Deficits / Cognition   Anticipated Discharge Equipment and Recommendations   DC Equipment Recommendations Unable to determine at this time   Discharge Recommendations Recommend post-acute placement for additional physical therapy services prior to discharge home       Jessica Muñoz DPT

## 2022-04-07 NOTE — THERAPY
SLP CONTACT NOTE    Orders received for a clinical swallow evaluation. Per RN pt is not medically stable, SLP will assess when appropriate/able.   04/07/22 2000   Treatment Variance   Reason For Missed Therapy Medical - Patient Is Not Medically Stable   Total Time Spent   Total Time Spent (Mins) 2

## 2022-04-07 NOTE — PROGRESS NOTES
Patient continues to have frequent episodes of full body muscle spasms/contractures. Minimally verbal at this time. Opens eyes to voice. Annalise Choudhury MD notified.

## 2022-04-07 NOTE — CONSULTS
Cardiology Initial Consultation    Date of Service  4/7/2022    Referring Physician  Jan Cotto M.D.    Reason for Consultation  Ventricular tachycardia    History of Presenting Illness  Justin Walters is a 81 y.o. male with a history of multiple medical problems who presented 4/6/2022 with altered mental status with concern for stroke admitted to neuroscience unit with telemetry monitoring.    Since admission the patient was seen initially by neurology.  Head/neck CTA showed no evidence of large vessel occlusion and neurology did not feel the patient suffered a stroke but remains with altered mental status, essentially nonverbal to provide any medical history.  On telemetry it was reported that the patient had sustained ventricular tachycardia up to 3 minutes last evening to a rate of 150 bpm.  The patient received 1 dose of IV metoprolol and was started on p.o. metoprolol which the patient is yet to receive because of his altered mental status.    The patient is seen by [facility] Cardiology Chaitanya Walters with a history of PPM for sick sinus syndrome 2007, PAF (low burden) not felt to be a candidate for anticoagulation due to low burden, alcoholic cirrhosis and thrombocytopenia with failure to thrive.  Most recent echocardiogram on 11/12/2020 is normal.  Additionally the patient has a history of hypertension, non-Hodgkin's lymphoma, seizure disorder, prostate cancer, alcoholic encephalopathy, alcoholic cirrhosis and reportedly a stroke in 2020 though no available documentation    Review of Systems  Review of Systems   Unable to perform ROS: Mental status change       Past Medical History   has a past medical history of Arrhythmia, Arthritis, Ascites, Basal cell carcinoma of face, Bronchitis (1970), Cancer (HCC) (1999), Cancer (HCC) (2021), Chronic diarrhea, Chronic nausea, Chronic vomiting, Cirrhosis of liver not due to alcohol (HCC), Encephalopathy, End-stage liver disease (HCC), Esophageal varices in  alcoholic cirrhosis, Hepatic encephalopathy (HCC), Hepatitis, Hip fracture (HCC), History of falling, History of non-Hodgkin's lymphoma, HTN (hypertension), benign, Indigestion, Infectious disease (2008,2014), Jaundice, Melena, Melena, Merkel cell carcinoma of face (HCC), Non Hodgkin's lymphoma (HCC) (2019), Osteoarthritis, Pacemaker (2007), Pain (09/09/2021), Pneumonia (1972), Polycythemia, Presence of permanent cardiac pacemaker (2007/2015), Prostate cancer (HCC), Prostate cancer (HCC) (2000), Renal disorder ( ), Seizure (HCC), Stroke (HCC) (2020), and Thrombocytopenia (HCC).    Surgical History   has a past surgical history that includes shoulder arthroplasty total (Left, 2010); pacemaker insertion (2007); other; penile prosthesis ams inflatable (10/13/2014); recovery (3/18/2015); pacemaker insertion (March 2015); pr exc skin malig >4cm remaindr body (Left, 9/13/2021); neck dissection radical (Left, 9/13/2021); and flap free (Left, 9/13/2021).    Family History  family history includes Cancer in his brother, father, and sister.    Social History   reports that he quit smoking about 52 years ago. His smoking use included cigarettes. He has a 7.00 pack-year smoking history. He has never used smokeless tobacco. He reports previous alcohol use of about 0.5 oz of alcohol per week. He reports that he does not use drugs.    Medications  Prior to Admission Medications   Prescriptions Last Dose Informant Patient Reported? Taking?   Multiple Vitamin (MULTI VITAMIN DAILY PO) 4/5/2022 at Unknown time Patient Yes No   Sig: Take 1 Tablet by mouth every day. Combination d3,magnessium,vitamin a   NON SPECIFIED 4/5/2022 at Unknown time Patient Yes No   Sig: Take 1 Tablet by mouth every day. L-orinthate 500mg PO daily   NON SPECIFIED 4/5/2022 at Unknown time Patient Yes No   Sig: Take 500 mg by mouth every day. L-aspartate 500mg Po daily   SF 5000 PLUS 1.1 % Cream unknown at Unknown time Patient Yes No   Sig: Take 1 Each by mouth  every evening.   acetaminophen (TYLENOL) 500 MG Tab unknown at Unknown time Patient Yes No   Sig: Take 500-1,000 mg by mouth every 6 hours as needed. Indications: Pain   calcitRIOL (ROCALTROL) 0.25 MCG Cap 4/5/2022 at Unknown time Patient Yes No   Sig: Take 0.25 mcg by mouth every day.   famotidine (PEPCID) 20 MG Tab 4/5/2022 at Unknown time Patient Yes No   Sig: Take 20 mg by mouth every day. Indications: Heartburn   fluticasone (FLONASE) 50 MCG/ACT nasal spray unknown at Unknown time Patient Yes No   Sig: Administer 1 Spray into affected nostril(S) 1 time a day as needed. Indications: Allergic Rhinitis   lactulose 10 GM/15ML SOLN unknown at Unknown time Patient Yes No   Sig: Take 30 g by mouth 2 times a day as needed. Indications: Constipation, Impaired Brain Function due to Liver Disease   meclizine (ANTIVERT) 25 MG TABS unknown at Unknown time Patient Yes No   Sig: Take 25 mg by mouth 3 times a day as needed for Dizziness or Vertigo.   oxyCODONE immediate-release (ROXICODONE) 5 MG Tab 4/6/2022 at Unknown time Patient Yes No   Sig: Take 10 mg by mouth every 6 hours as needed for Severe Pain.   promethazine (PHENERGAN) 25 MG Tab unknown at Unknown time Patient Yes No   Sig: Take 25 mg by mouth every 6 hours as needed for Nausea/Vomiting.   ropinirole (REQUIP) 0.25 MG Tab 4/5/2022 at Unknown time Patient Yes No   Sig: Take 0.5 mg by mouth at bedtime. Indications: Restless Leg Syndrome   tamsulosin (FLOMAX) 0.4 MG capsule 4/5/2022 at Unknown time Patient Yes No   Sig: Take 0.4 mg by mouth every morning. Indications: Benign Enlargement of Prostate   tramadol (ULTRAM) 50 MG Tab unknown at Unknown time Patient Yes No   Sig: Take 50 mg by mouth every 8 hours as needed. Indications: Pain   zolpidem (AMBIEN) 10 MG Tab unknown at Unknown time Patient Yes No   Sig: Take 10 mg by mouth at bedtime as needed for Sleep.      Facility-Administered Medications: None     metoprolol tartrate, 25 mg, Oral, TWICE DAILY  LORazepam,  "0.5 mg, Intravenous, Once  calcitRIOL, 0.25 mcg, Oral, DAILY  famotidine, 20 mg, Oral, DAILY  multivitamin, 1 Tablet, Oral, DAILY  tamsulosin, 0.4 mg, Oral, QAM  senna-docusate, 2 Tablet, Oral, BID  enoxaparin (LOVENOX) injection, 40 mg, Subcutaneous, DAILY        Allergies  Allergies   Allergen Reactions   • Contrast Media With Iodine [Iodine]      \"siezure\" 03/11/2022 States had many iodine contract scans w/out issue, then w/the last one in 2020 at [facility] had \"spasms\".   • Dobutamine      Seizures; taken abruptly off a Dobutamine gtt-no taper   • Latex      Could be adhesive \"red welts\"   • Tape Rash     Paper tape ok       Vital signs in last 24 hours  Temp:  [36.3 °C (97.3 °F)-37.3 °C (99.1 °F)] 37.3 °C (99.1 °F)  Pulse:  [71-95] 90  Resp:  [15-26] 23  BP: (110-217)/(69-97) 110/69  SpO2:  [91 %-98 %] 93 %    Physical Exam  Physical Exam  Constitutional:       General: He is not in acute distress.     Comments: Withdrawn  Nearly nonverbal though can follow commands slowly with minimal effort but responds appropriately, mumbles his first name, can appropriately lift his right hand and bend his right knee as directed.   HENT:      Head: Normocephalic.   Eyes:      General: No scleral icterus.     Conjunctiva/sclera: Conjunctivae normal.      Pupils: Pupils are equal, round, and reactive to light.   Neck:      Thyroid: No thyromegaly.      Vascular: No carotid bruit.      Comments: Normal jugular venous pressure.  Cardiovascular:      Rate and Rhythm: Normal rate and regular rhythm.      Pulses:           Carotid pulses are 1+ on the right side and 1+ on the left side.       Radial pulses are 1+ on the right side and 1+ on the left side.        Posterior tibial pulses are 1+ on the right side and 1+ on the left side.      Heart sounds: S1 normal and S2 normal. No murmur heard.    No friction rub. No gallop.      Comments: Pacemaker generator  Pulmonary:      Effort: Pulmonary effort is normal.      Breath " sounds: Normal breath sounds. No wheezing, rhonchi or rales.   Abdominal:      General: Bowel sounds are normal. There is no abdominal bruit.      Palpations: Abdomen is soft. There is no mass or pulsatile mass.      Tenderness: There is no abdominal tenderness.   Lymphadenopathy:      Cervical: No cervical adenopathy.   Skin:     General: Skin is warm and dry.      Nails: There is no clubbing.   Psychiatric:         Behavior: Behavior normal.         Lab Review  Lab Results   Component Value Date/Time    WBC 8.1 04/07/2022 02:06 AM    RBC 4.88 04/07/2022 02:06 AM    HEMOGLOBIN 14.7 04/07/2022 02:06 AM    HEMATOCRIT 44.0 04/07/2022 02:06 AM    MCV 90.2 04/07/2022 02:06 AM    MCH 30.1 04/07/2022 02:06 AM    MCHC 33.4 (L) 04/07/2022 02:06 AM    MPV 11.3 04/07/2022 02:06 AM      Lab Results   Component Value Date/Time    SODIUM 141 04/07/2022 07:44 AM    POTASSIUM 4.1 04/07/2022 07:44 AM    CHLORIDE 109 04/07/2022 07:44 AM    CO2 18 (L) 04/07/2022 07:44 AM    GLUCOSE 137 (H) 04/07/2022 07:44 AM    BUN 30 (H) 04/07/2022 07:44 AM    CREATININE 1.26 04/07/2022 07:44 AM    CREATININE 1.0 05/28/2008 06:00 AM      Lab Results   Component Value Date/Time    ASTSGOT 26 04/07/2022 02:06 AM    ALTSGPT 19 04/07/2022 02:06 AM     Lab Results   Component Value Date/Time    CHOLSTRLTOT 158 07/12/2021 10:24 AM     (H) 07/12/2021 10:24 AM    HDL 37 (A) 07/12/2021 10:24 AM    TRIGLYCERIDE 86 07/12/2021 10:24 AM    TROPONINT 19 04/07/2022 07:44 AM       No results for input(s): NTPROBNP in the last 72 hours.    Cardiac Imaging and Procedures Review  EKG: My personal interpretation of the EKG dated 4/7/2022 0732 is AV pacing, rate 89.    EKG:  My personal interpretation of the EKG dated 4/7/2022 7:45:55 is A paced rhythm tracking, rate 89.    Reviewed all available rhythm strips and pacemaker interrogation.    Echocardiogram: 11/12/2020  No prior study is available for comparison.   Normal left ventricular systolic function.   No  evidence of valvular abnormality based on Doppler evaluation.   Estimated right ventricular systolic pressure is  20 mmHg.  Ascending aorta is dilated with a diameter of  4.1 cm.  Pacer/ICD wire seen in right ventricle.     Imaging  Chest X-Ray: 4/6/2022  No acute cardiopulmonary abnormality    Assessment  1.  S/P PPM.  2.  Atrial tachycardia with V paced tracking.  3.  H/O PAF, low burden, no recurrence.  4.  Altered mental status.  5.  Thrombocytopenia.  6.  Alcoholic cirrhosis  7.  Polypharmacy.    Recommendation Discussion  1.  I reviewed all the available data including monitor strips and pacemaker interrogation discussing the results with the pacemaker representative.  2.  The episode that was of concern about ventricular tachycardia actually is an atrial tachycardia with ventricular tracking.  3.  On pacemaker interrogation there is no evidence to suggest ventricular tachycardia, atrial fibrillation or atrial flutter.  4.  Recommend treatment with metoprolol 25 mg twice daily.  5.  If not effective could either change atrial mode switch rate from 171 down to a lower rate or add antiarrhythmic therapy in addition to metoprolol.  6.  No additional cardiac work-up recommended at this time.  7.  Discussed my above impression and recommendations with Chaitanya Echeverria MD, medical resident    Thank you for allowing me to participate in the care of this patient.    Please contact me with any questions.    Marvin Lynn M.D.   Cardiologist, [facility]  [phone]

## 2022-04-07 NOTE — PROGRESS NOTES
[facility] FAMILY MEDICINE PROGRESS NOTE     Attending: Dr. Dwyer   Senior Resident: Chaitanya Echeverria, PGY-2   PATIENT: Luigi Walters; [id number]; 1940 Hospital Day: 2    ID: 81 y.o. male with history of Meckel's cell carcinoma sinus sick syndrome with pacemaker, cirrhosis, non-Hodgkin's lymphoma, hypertension, prostate cancer and CVA admitted for altered mental status.    SUBJECTIVE: Patient had multiple runs of sustained V. tach on telemetry monitoring.  He has otherwise been hemodynamically stable and not hypoxic.  Cardiology was consulted, and pacemaker was interrogated.  Per cardiology, this was not ventricular tachycardia and rather atrial tachycardia with ventricular tracking.    Discussed patient's presentation to emergency department with patient's wife and grandson who reported that patient's mentation has been well up until sudden onset of altered mental status yesterday.  They do report after he underwent bone marrow biopsy he had been acting different.  He had also experienced an infection which they believed to be a urinary tract infection that was treated.    OBJECTIVE:     Vitals:    04/06/22 2320 04/07/22 0000 04/07/22 0358 04/07/22 0555   BP:   (!) 165/81 158/79   Pulse:   79    Resp: 18 20 (!) 23    Temp:   36.3 °C (97.3 °F)    TempSrc:   Temporal    SpO2:   95%    Weight:       Height:           Intake/Output Summary (Last 24 hours) at 4/7/2022 0644  Last data filed at 4/6/2022 2000  Gross per 24 hour   Intake 0 ml   Output --   Net 0 ml       PE:  General: No acute distress  HEENT: Normocephalic.  Bilateral erythematous facial cheeks.  Cardiovascular: RRR with no M/R/G.  Respiratory: Symmetrical chest. CTAB with no W/R/R  Abdomen: soft, nondistended  Neuro: Minimally follows commands.  Myoclonic jerking of upper extremity and lower extremities.    LABS:  Recent Labs     04/06/22  1102 04/07/22  0206   WBC 4.3* 8.1   RBC 4.89 4.88   HEMOGLOBIN 14.9 14.7   HEMATOCRIT 44.8 44.0   MCV 91.6 90.2   MCH  "30.5 30.1   RDW 43.3 42.9   PLATELETCT 116* 127*   MPV 10.7 11.3   NEUTSPOLYS 77.50* 91.30*   LYMPHOCYTES 12.30* 4.90*   MONOCYTES 7.20 3.10   EOSINOPHILS 2.30 0.00   BASOPHILS 0.20 0.10     Recent Labs     04/06/22  1102 04/07/22  0206   SODIUM 141 140   POTASSIUM 4.3 4.2   CHLORIDE 107 108   CO2 23 18*   BUN 21 28*   CREATININE 1.24 1.27   CALCIUM 9.7 9.7   MAGNESIUM  --  2.3   ALBUMIN 4.1 3.9     Estimated GFR/CRCL = Estimated Creatinine Clearance: 54.5 mL/min (by C-G formula based on SCr of 1.27 mg/dL).  Recent Labs     04/06/22  1102 04/07/22  0206   GLUCOSE 103* 176*     Recent Labs     04/06/22  1102 04/06/22  1213 04/07/22  0206   ASTSGOT 21  --  26   ALTSGPT 18  --  19   TBILIRUBIN 1.0  --  0.8   ALKPHOSPHAT 101*  --  96   GLOBULIN 3.0  --  3.0   INR 1.12  --   --    AMMONIA  --  12 21             Recent Labs     04/06/22  1102   INR 1.12   APTT 26.6       MICROBIOLOGY:   Results     Procedure Component Value Units Date/Time    Blood Culture [[id number]] Collected: 04/06/22 1257    Order Status: Completed Specimen: Blood from Peripheral Updated: 04/07/22 0905     Significant Indicator NEG     Source BLD     Site PERIPHERAL     Culture Result No Growth  Note: Blood cultures are incubated for 5 days and  are monitored continuously.Positive blood cultures  are called to the RN and reported as soon as  they are identified.      Narrative:      1 of 2 for Blood Culture x 2 sites order. Per Hospital  Policy: Only change Specimen Src: to \"Line\" if specified by  physician order.  No site indicated    Blood Culture [[id number]] Collected: 04/06/22 1257    Order Status: Completed Specimen: Blood from Peripheral Updated: 04/07/22 0905     Significant Indicator NEG     Source BLD     Site PERIPHERAL     Culture Result No Growth  Note: Blood cultures are incubated for 5 days and  are monitored continuously.Positive blood cultures  are called to the RN and reported as soon as  they are identified.      Narrative:      2 of " "2 blood culture x2  Sites order. Per Hospital Policy:  Only change Specimen Src: to \"Line\" if specified by physician  order.  No site indicated    Urine Culture (NEW) [[id number]] Collected: 04/06/22 1146    Order Status: Sent Specimen: Urine Updated: 04/06/22 1305    Narrative:      Indication for culture:->Evaluation for sepsis without a  clear source of infection    Urinalysis [[id number]] Collected: 04/06/22 1146    Order Status: Completed Specimen: Blood Updated: 04/06/22 1236     Color Yellow     Character Clear     Specific Gravity 1.013     Ph 6.5     Glucose Negative mg/dL      Ketones Negative mg/dL      Protein Negative mg/dL      Bilirubin Negative     Urobilinogen, Urine 0.2     Nitrite Negative     Leukocyte Esterase Negative     Occult Blood Negative     Micro Urine Req see below     Comment: Microscopic examination not performed when specimen is clear  and chemically negative for protein, blood, leukocyte esterase  and nitrite.         Narrative:      Indication for culture:->Evaluation for sepsis without a  clear source of infection    URINE CULTURE(NEW) [[id number]] Collected: 04/06/22 0000    Order Status: Canceled Specimen: Other from Urine, Clean Catch             IMAGING:   CT-TSPINE W/O PLUS RECONS   Final Result      1.  Acute/subacute mild T12 superior endplate compression fracture.   2.  Age-indeterminate without likely chronic mild T1, T3, T4 and L1 compression fractures.      CT-LSPINE W/O PLUS RECONS   Final Result      1.  Acute-subacute wedge compression fracture of T12 with trace retropulsion.   2.  Age-indeterminate L1 compression deformity. Correlate with point tenderness.   3.  Degenerative changes of the lower lumbar spine.   4.  Moderate bilateral hydroureteronephrosis.      DX-CHEST-PORTABLE (1 VIEW)   Final Result      No acute cardiopulmonary abnormality.      CT-CEREBRAL PERFUSION ANALYSIS   Final Result      1.  Cerebral blood flow less than 30% likely representing completed " infarct = 0 mL.      2.  T Max more than 6 seconds likely representing combination of completed infarct and ischemia = 4 mL.      3.  Mismatched volume likely representing ischemic brain/penumbra = 4 mL      4.  Please note that the cerebral perfusion was performed on the limited brain tissue around the basal ganglia region. Infarct/ischemia outside the CT perfusion sections can be missed in this study.      CT-CTA HEAD WITH & W/O-POST PROCESS   Final Result      No large vessel occlusion, hemodynamically significant stenosis or aneurysm is seen.      CT-CTA NECK WITH & W/O-POST PROCESSING   Final Result      1.  Moderate atherosclerotic narrowing of the proximal right internal carotid artery.   2.  Significant streak artifact adjacent to the distal left vertebral artery provides severely suboptimal evaluation. There is contrast proximally and distally, however high-grade stenosis cannot be entirely excluded.   3.  Suboptimal evaluation of the proximal left and bilateral vertebral arteries due to streak artifact.   4.  No high-grade stenosis or dissection in the visualized portions of the arteries of the neck.   5.  6 mm left upper lobe pulmonary nodule that appears similar to prior PET/CT.   6.  Remote appearing superior endplate deformity of T3.      CT-HEAD W/O   Final Result      1.  Cerebral atrophy.   2.  No evidence of acute infarction or acute hemorrhage or mass lesion.         EC-ECHOCARDIOGRAM COMPLETE W/O CONT    (Results Pending)   MR-BRAIN-WITH & W/O    (Results Pending)         MEDS:  Current Facility-Administered Medications   Medication Last Admin   • metoprolol tartrate (LOPRESSOR) tablet 25 mg     • LORazepam (ATIVAN) injection 0.5 mg     • acetaminophen (TYLENOL) tablet 1,000 mg     • calcitRIOL (ROCALTROL) capsule 0.25 mcg     • famotidine (PEPCID) tablet 20 mg     • multivitamin (THERAGRAN) tablet 1 Tablet     • promethazine (PHENERGAN) tablet 25 mg     • tamsulosin (FLOMAX) capsule 0.4 mg     •  senna-docusate (PERICOLACE or SENOKOT S) 8.6-50 MG per tablet 2 Tablet      And   • polyethylene glycol/lytes (MIRALAX) PACKET 1 Packet      And   • magnesium hydroxide (MILK OF MAGNESIA) suspension 30 mL      And   • bisacodyl (DULCOLAX) suppository 10 mg     • enoxaparin (LOVENOX) inj 40 mg 40 mg at 04/07/22 0557   • hydrALAZINE (APRESOLINE) injection 10 mg     • [Held by provider] ondansetron (ZOFRAN) syringe/vial injection 4 mg     • [Held by provider] ondansetron (ZOFRAN ODT) dispertab 4 mg     • ketorolac (TORADOL) injection 30 mg 30 mg at 04/06/22 1838   • D5 NS infusion New Bag at 04/06/22 2251       PROBLEM LIST:  No problems updated.    ASSESSMENT/PLAN: 81 y.o. male with an extensive medical history of Meckel's cell carcinoma, sick sinus syndrome with pacemaker, ascites, cirrhosis, hepatic encephalopathy, non-Hodgekin's lymphoma, HTN, prostate cancer, polycythemia, seizure, and stroke who presented with altered mental status.     # Altered mental status  # Muscle jerks  Patient presented with altered mental status 4/6/2022.    Work up has included:   -Neurology consulted, appreciate recommendations   -Per neurology, do not think this is stroke   -EEG showed non-specific abnormalities   -Metabolic work up including chem panel, CPK, and ammonia has been reassuring   -Toxicology positive for oxycodone, otherwise nml   -Infectious: UA reassuring. No leukocytosis. No source of infection.  Previous hepatitis and HIV serology unremarkable.  -Medication review: History of multiple sedating medications including tramadol, Ambien and oxycodone.  However given persistence of altered mental status unlikely to be contributory.  -MRI brain with and without contrast ordered  -If MRI brain with and without contrast is unremarkable, can consider obtaining lumbar puncture    Plan:  -Continue telemetry monitoring  -Follow-up blood cultures and urine cultures  -Minimize sedating medication  -Follow-up results of MRI brain w/  and w/o contrast   -Plan for discussion with oncologist  -Continue IV fluids, patient is n.p.o.  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  -Repeat CT spine showed  · Thoracic spine showed mild T12 compression fracture, and chronic T1, T3, T4 and L1 compression fractures  -Pain control with low-dose morphine.  Will monitor closely for oversedation.  However given patient's thrombocytopenia, will avoid NSAIDs.    # Chest pain  #Tachycardia  Patient presented with chest pain on admission.  He has received multiple EKGs and troponins since admission that have been reassuring.  He did have runs of V. tach on telemetry monitoring, however after further evaluation with cardiology it was not actually V. tach and rather atrial tachycardia with ventricular tracking.  -Continue to monitor on telemetry  -Cardiology consulted, appreciate recommendations      # Hypertensive Urgency  Patient had elevated blood pressure on admission  -As needed antihypertensives for BP greater than 160/110    # BPH  Continue Flomax     # Meckle's Carcinoma  Patient had radiation from 10/20/21-12/01/21. He had concern for T11 metastasis on PET scan with negative biopsy. No signs of infection at the site of the biopsy. Patient to follow up with oncologist. Not on chemotherapy.      Abx: received 1 dose rocephin in ER, no indication to continue  Lines: PIV  DVT PPX: SCDs and Lovenox  Dispo: Admit to inpatient telemetry     Code Status:  Full code    Chaitanya Echeverria D.O.  Family Medicine Resident PGY-2

## 2022-04-07 NOTE — THERAPY
"SLP CONTACT NOTE    Pt refused swallow evaluation despite encouragement and education, however, is agreeable for clinician to return in the afternoon. Pt was able to state his name and , otherwise responding with Yes/No to questions, repeating \"no\" when asked to sit up and have some ice chips/water or food. Pt experienced muscle spasms x2 while clinician was present and reported having 10/10 back pain; RN updated. SLP will re-attempt when able/appropriate.     22 1027   Treatment Variance   Reason For Missed Therapy Non-Medical - Patient Refused   Total Time Spent   Total Time Spent (Mins) 5     "

## 2022-04-07 NOTE — PROGRESS NOTES
Monitor summary: SR 79-96, MN 0.14, QRS 0.08, QT 0.35, with rare/occasional PVCs, in and out of sinus rhythm/paced per strip from monitor room.

## 2022-04-07 NOTE — PROGRESS NOTES
"2329-Monitor check was called. Per monitor room patient sustained VTach for a minute and a half. Pacer did fire multiple times. VS otherwise stable. Notified on-call [facility] provider, Annalise Choudhury. She is at bedside. Will continue to monitor.     0520- Monitor check called again. Per monitor room patient sustained VTach for 3 minutes. Pacer did fire during episode. VS otherwise stable. Notified provider Annalise Choudhury. Provider placed a STAT EKG. Will continue to monitor.     0616- Patient continued to have full body muscle contraction/spasms throughout the night and into the morning. He has been able to give one word answers during these episodes. [facility] provider has been at bedside monitoring the situation. Patient remains with stable VS during spastic episodes. Ativan given once 0104 with little effect on the muscle spasms, however the patient did state he feels like \"he can think easier\". This positive effect only lasted about 15 mins. Will continue to monitor the situation.   "

## 2022-04-07 NOTE — THERAPY
Occupational Therapy   Initial Evaluation     Patient Name: Luigi Walters  Age:  81 y.o., Sex:  male  Medical Record #: [id number]  Today's Date: 4/7/2022     Precautions  Precautions: (P) Fall Risk    Assessment  Patient is 81 y.o. male admitted for ALOC, AMS, generalized weakness; pt with history of PPM, cirrhosis, hepatic encephalopathy, stroke, seizure. Pt able to provide yes/no answers to questions, in obvious pain from possible muscle spasms but unable to clarify. Pt required total assist for bed mobility to sit at the EOB, unable to hold self at EOB, returned to supine, total assist for lower body ADLs. Will continue to see for skilled therapy while admitted as well as recommend post-acute placement.     Plan    Recommend Occupational Therapy 3 times per week until therapy goals are met for the following treatments:  Adaptive Equipment, Neuro Re-Education / Balance, Self Care/Activities of Daily Living, Therapeutic Activities, and Therapeutic Exercises.    DC Equipment Recommendations: (P) Unable to determine at this time  Discharge Recommendations: (P) Recommend post-acute placement for additional occupational therapy services prior to discharge home     Objective       04/07/22 1008   Prior Living Situation   Prior Services Home-Independent   Housing / Facility [facility]   Bathroom Set up Bathtub / Shower Combination;Grab Bars;Shower Chair   Equipment Owned Single Point Cane   Lives with - Patient's Self Care Capacity Spouse   Prior Level of ADL Function   Self Feeding Independent   Grooming / Hygiene Independent   Bathing Independent   Dressing Independent   Toileting Independent   Precautions   Precautions Fall Risk   Cognition    Cognition / Consciousness X   Orientation Level Not Oriented to Reason;Not Oriented to Place   Level of Consciousness Alert   Attention Impaired   Initiation Impaired   Strength Upper Body   Upper Body Strength  X   Gross Strength Generalized Weakness, Equal Bilaterally.     Balance Assessment   Sitting Balance (Static) Poor   Sitting Balance (Dynamic) Poor -   Weight Shift Sitting Poor   Bed Mobility    Supine to Sit Total Assist   Sit to Supine Total Assist   Scooting Total Assist   Rolling Unable to Participate   ADL Assessment   Upper Body Dressing Maximal Assist   Lower Body Dressing Maximal Assist   Toileting   (NT-refused)   How much help from another person does the patient currently need...   Putting on and taking off regular lower body clothing? 1   Bathing (including washing, rinsing, and drying)? 1   Toileting, which includes using a toilet, bedpan, or urinal? 1   Putting on and taking off regular upper body clothing? 1   Taking care of personal grooming such as brushing teeth? 1   Eating meals? 1   6 Clicks Daily Activity Score 6   Functional Mobility   Sit to Stand Refused   Bed, Chair, Wheelchair Transfer Refused   Toilet Transfers Refused   Mobility bed mobility, EOB, returned back to bed   Activity Tolerance   Sitting Edge of Bed 5 min   Short Term Goals   Short Term Goal # 1 Pt will complete ADL transfers with Janiya   Short Term Goal # 2 Pt will complete LB dressing with Janiya   Short Term Goal # 3 Pt will complete toileting with Janiya   Education Group   Education Provided Role of Occupational Therapist   Role of Occupational Therapist Patient Response Patient;Nonacceptance;Explanation;Reinforcement Needed;No Learning Evidence   Problem List   Problem List Decreased Active Daily Living Skills;Decreased Homemaking Skills;Decreased Upper Extremity Strength Right;Decreased Upper Extremity Strength Left;Decreased Functional Mobility;Decreased Activity Tolerance;Impaired Postural Control / Balance;Safety Awareness Deficits / Cognition   Anticipated Discharge Equipment and Recommendations   DC Equipment Recommendations Unable to determine at this time   Discharge Recommendations Recommend post-acute placement for additional occupational therapy services prior to discharge  home   Interdisciplinary Plan of Care Collaboration   IDT Collaboration with  Nursing;Physical Therapist   Patient Position at End of Therapy In Bed;Bed Alarm On;Call Light within Reach;Tray Table within Reach;Phone within Reach   Collaboration Comments RN updated

## 2022-04-07 NOTE — PROGRESS NOTES
4 Eyes Skin Assessment Completed by PAM Peguero and PAM Rojas.    Head WDL  Ears Jaundice  Nose WDL  Mouth WDL  Neck WDL  Breast/Chest WDL  Shoulder Blades WDL  Spine WDL  (R) Arm/Elbow/Hand skin tear  (L) Arm/Elbow/Hand WDL  Abdomen WDL  Groin WDL  Scrotum/Coccyx/Buttocks blanching redness; sacrum  (R) Leg WDL  (L) Leg WDL  (R) Heel/Foot/Toe WDL  (L) Heel/Foot/Toe WDL          Devices In Places Tele Box      Interventions In Place N/A    Possible Skin Injury No    Pictures Uploaded Into Epic N/A  Wound Consult Placed N/A  RN Wound Prevention Protocol Ordered No

## 2022-04-08 ENCOUNTER — APPOINTMENT (OUTPATIENT)
Dept: CARDIOLOGY | Facility: MEDICAL CENTER | Age: 82
DRG: 092 | End: 2022-04-08
Attending: STUDENT IN AN ORGANIZED HEALTH CARE EDUCATION/TRAINING PROGRAM
Payer: MEDICARE

## 2022-04-08 LAB
ALBUMIN SERPL BCP-MCNC: 3.7 G/DL (ref 3.2–4.9)
ALBUMIN/GLOB SERPL: 1.3 G/DL
ALP SERPL-CCNC: 94 U/L (ref 30–99)
ALT SERPL-CCNC: 30 U/L (ref 2–50)
ANION GAP SERPL CALC-SCNC: 11 MMOL/L (ref 7–16)
AST SERPL-CCNC: 75 U/L (ref 12–45)
BACTERIA UR CULT: NORMAL
BASOPHILS # BLD AUTO: 0.1 % (ref 0–1.8)
BASOPHILS # BLD: 0.01 K/UL (ref 0–0.12)
BILIRUB SERPL-MCNC: 1.1 MG/DL (ref 0.1–1.5)
BUN SERPL-MCNC: 23 MG/DL (ref 8–22)
CALCIUM SERPL-MCNC: 9.1 MG/DL (ref 8.5–10.5)
CHLORIDE SERPL-SCNC: 111 MMOL/L (ref 96–112)
CK SERPL-CCNC: 688 U/L (ref 0–154)
CO2 SERPL-SCNC: 22 MMOL/L (ref 20–33)
CREAT SERPL-MCNC: 0.95 MG/DL (ref 0.5–1.4)
EOSINOPHIL # BLD AUTO: 0.02 K/UL (ref 0–0.51)
EOSINOPHIL NFR BLD: 0.3 % (ref 0–6.9)
ERYTHROCYTE [DISTWIDTH] IN BLOOD BY AUTOMATED COUNT: 44 FL (ref 35.9–50)
GFR SERPLBLD CREATININE-BSD FMLA CKD-EPI: 80 ML/MIN/1.73 M 2
GLOBULIN SER CALC-MCNC: 2.9 G/DL (ref 1.9–3.5)
GLUCOSE SERPL-MCNC: 128 MG/DL (ref 65–99)
HCT VFR BLD AUTO: 44.2 % (ref 42–52)
HGB BLD-MCNC: 14.7 G/DL (ref 14–18)
IMM GRANULOCYTES # BLD AUTO: 0.04 K/UL (ref 0–0.11)
IMM GRANULOCYTES NFR BLD AUTO: 0.5 % (ref 0–0.9)
LV EJECT FRACT  99904: 65
LYMPHOCYTES # BLD AUTO: 0.55 K/UL (ref 1–4.8)
LYMPHOCYTES NFR BLD: 7.4 % (ref 22–41)
MCH RBC QN AUTO: 30.3 PG (ref 27–33)
MCHC RBC AUTO-ENTMCNC: 33.3 G/DL (ref 33.7–35.3)
MCV RBC AUTO: 91.1 FL (ref 81.4–97.8)
MONOCYTES # BLD AUTO: 0.58 K/UL (ref 0–0.85)
MONOCYTES NFR BLD AUTO: 7.8 % (ref 0–13.4)
NEUTROPHILS # BLD AUTO: 6.23 K/UL (ref 1.82–7.42)
NEUTROPHILS NFR BLD: 83.9 % (ref 44–72)
NRBC # BLD AUTO: 0 K/UL
NRBC BLD-RTO: 0 /100 WBC
PLATELET # BLD AUTO: 102 K/UL (ref 164–446)
PMV BLD AUTO: 11.2 FL (ref 9–12.9)
POTASSIUM SERPL-SCNC: 4.4 MMOL/L (ref 3.6–5.5)
PROT SERPL-MCNC: 6.6 G/DL (ref 6–8.2)
RBC # BLD AUTO: 4.85 M/UL (ref 4.7–6.1)
SIGNIFICANT IND 70042: NORMAL
SITE SITE: NORMAL
SODIUM SERPL-SCNC: 144 MMOL/L (ref 135–145)
SOURCE SOURCE: NORMAL
WBC # BLD AUTO: 7.4 K/UL (ref 4.8–10.8)

## 2022-04-08 PROCEDURE — 80053 COMPREHEN METABOLIC PANEL: CPT

## 2022-04-08 PROCEDURE — 99232 SBSQ HOSP IP/OBS MODERATE 35: CPT | Mod: GC | Performed by: FAMILY MEDICINE

## 2022-04-08 PROCEDURE — 700102 HCHG RX REV CODE 250 W/ 637 OVERRIDE(OP): Performed by: STUDENT IN AN ORGANIZED HEALTH CARE EDUCATION/TRAINING PROGRAM

## 2022-04-08 PROCEDURE — 770020 HCHG ROOM/CARE - TELE (206)

## 2022-04-08 PROCEDURE — 700105 HCHG RX REV CODE 258: Performed by: STUDENT IN AN ORGANIZED HEALTH CARE EDUCATION/TRAINING PROGRAM

## 2022-04-08 PROCEDURE — 85025 COMPLETE CBC W/AUTO DIFF WBC: CPT

## 2022-04-08 PROCEDURE — 82550 ASSAY OF CK (CPK): CPT

## 2022-04-08 PROCEDURE — 700111 HCHG RX REV CODE 636 W/ 250 OVERRIDE (IP): Performed by: FAMILY MEDICINE

## 2022-04-08 PROCEDURE — C9113 INJ PANTOPRAZOLE SODIUM, VIA: HCPCS | Performed by: STUDENT IN AN ORGANIZED HEALTH CARE EDUCATION/TRAINING PROGRAM

## 2022-04-08 PROCEDURE — A9270 NON-COVERED ITEM OR SERVICE: HCPCS | Performed by: STUDENT IN AN ORGANIZED HEALTH CARE EDUCATION/TRAINING PROGRAM

## 2022-04-08 PROCEDURE — 93308 TTE F-UP OR LMTD: CPT | Mod: 26 | Performed by: INTERNAL MEDICINE

## 2022-04-08 PROCEDURE — 700111 HCHG RX REV CODE 636 W/ 250 OVERRIDE (IP): Performed by: STUDENT IN AN ORGANIZED HEALTH CARE EDUCATION/TRAINING PROGRAM

## 2022-04-08 PROCEDURE — 92610 EVALUATE SWALLOWING FUNCTION: CPT

## 2022-04-08 PROCEDURE — 36415 COLL VENOUS BLD VENIPUNCTURE: CPT

## 2022-04-08 PROCEDURE — 93308 TTE F-UP OR LMTD: CPT

## 2022-04-08 RX ORDER — PROMETHAZINE HYDROCHLORIDE 25 MG/1
25 SUPPOSITORY RECTAL EVERY 6 HOURS PRN
Status: DISCONTINUED | OUTPATIENT
Start: 2022-04-08 | End: 2022-04-15 | Stop reason: HOSPADM

## 2022-04-08 RX ORDER — PANTOPRAZOLE SODIUM 40 MG/10ML
40 INJECTION, POWDER, LYOPHILIZED, FOR SOLUTION INTRAVENOUS DAILY
Status: DISCONTINUED | OUTPATIENT
Start: 2022-04-08 | End: 2022-04-10

## 2022-04-08 RX ADMIN — ENOXAPARIN SODIUM 40 MG: 40 INJECTION SUBCUTANEOUS at 11:01

## 2022-04-08 RX ADMIN — PROMETHAZINE HYDROCHLORIDE 25 MG: 25 TABLET ORAL at 12:46

## 2022-04-08 RX ADMIN — DEXTROSE AND SODIUM CHLORIDE: 5; 900 INJECTION, SOLUTION INTRAVENOUS at 09:04

## 2022-04-08 RX ADMIN — METOPROLOL TARTRATE 25 MG: 25 TABLET, FILM COATED ORAL at 17:03

## 2022-04-08 RX ADMIN — MORPHINE SULFATE 0.5 MG: 4 INJECTION INTRAVENOUS at 14:51

## 2022-04-08 RX ADMIN — PANTOPRAZOLE SODIUM 40 MG: 40 INJECTION, POWDER, LYOPHILIZED, FOR SOLUTION INTRAVENOUS at 14:03

## 2022-04-08 RX ADMIN — MORPHINE SULFATE 0.5 MG: 4 INJECTION INTRAVENOUS at 22:10

## 2022-04-08 RX ADMIN — PROMETHAZINE HYDROCHLORIDE 25 MG: 25 SUPPOSITORY RECTAL at 06:02

## 2022-04-08 ASSESSMENT — PAIN DESCRIPTION - PAIN TYPE
TYPE: ACUTE PAIN

## 2022-04-08 NOTE — PROGRESS NOTES
Monitor summary: SR 75-85, AL 0.13, QRS 0.08, QT 0.41, with PVCs, PACs, and bigem per strip from monitor room.

## 2022-04-08 NOTE — CARE PLAN
The patient is Stable - Low risk of patient condition declining or worsening    Shift Goals  Clinical Goals: Maintain skin integrity/ pain management   Patient Goals: Pain control  Family Goals: Comfort, pain contol, diagnosis    Progress made toward(s) clinical / shift goals: 0-10 pain scale in place. Pt medicated for pain per MAR. Pt repositioned every 2 hours to maintain skin integrity and for comfort. Oral care provided during shift.     Patient is not progressing towards the following goals: N/A

## 2022-04-08 NOTE — PROGRESS NOTES
S: Reevaluated patient this afternoon.  Mentation and hypertonia improved.  Patient is A&OX3.  He remembers events leading up to admission, and does not believe he took his Ambien the night before admission.  He does report experiencing hematuria in the middle of the night prior to admission.  He is complaining of back pain.  Denies experiencing chest pain or shortness of breath.  He denies history of drug use, heavy alcohol use or tobacco use.  He denies of taking excessive amounts of medications prior to admission.  He does remember conversations that took place today, including discussion that I had with his family.    Physical exam:  General: Alert, resting  CV: RRR  Lungs: CTA B  Abdomen: Soft, nontender nondistended  Extremities: No lower extremity edema  Skin: No cyanosis or jaundice  Neuro: Alert and oriented x3.  Distal sensation and motor intact.  Global hypotonia.  Psych: Cooperative    A/P:  #Altered mental status  Mentation improved. After discussion with RN, patient did receive dose of morphine, but not directly correlated with timing of his improved mentation. Patient is complaining of back pain.  On examination with lifting his left lower limb, he had a repeat episode of increased tone that lasted for approximately 1 minute.  -Recent CT scan of spine showed T1, T3, T4, and T12 fractures and L1 compression deformity, and degenerative changes of lumbar spine  -Contacted patient's radiation oncologist earlier today, who recommended brain MRI   -Continue pain control with low-dose morphine  -Consider obtaining MRI of spine   -Neurology consulted appreciate recommendations

## 2022-04-08 NOTE — PROGRESS NOTES
[facility] MEDICINE PROGRESS NOTE       Attending: Dr. Dwyer   Senior Resident: Chaitanya Echeverria, PGY-2   PATIENT: Luigi Walters; [id number]; 1940 Hospital Day: 3    SUBJECTIVE: No acute events overnight.  Vital signs stable, afebrile.  Sinus rhythm on telemetry.  Patient's mentation has continued to improve, he is alert and oriented.  He continues to experience back pain, but reports improvement of symptoms with morphine.     OBJECTIVE:     Vitals:    04/07/22 1700 04/07/22 1956 04/07/22 2345 04/08/22 0351   BP:  (!) 166/86 (!) 170/81 119/80   Pulse:  76 77 75   Resp: 16 18 18 16   Temp:  36.6 °C (97.9 °F) 36.5 °C (97.7 °F) 36.6 °C (97.8 °F)   TempSrc:  Temporal Temporal Temporal   SpO2:  98% 95% 94%   Weight:       Height:           Intake/Output Summary (Last 24 hours) at 4/8/2022 0545  Last data filed at 4/8/2022 0351  Gross per 24 hour   Intake --   Output 2100 ml   Net -2100 ml       PE:  General: Alert, resting comfortably  HEENT: NC/AT.   Cardiovascular: RRR with no M/R/G.  Respiratory: Symmetrical chest. CTAB with no W/R/R  Abdomen: soft, NT/ND, no masses  EXT: No lower extremity edema  Skin: No cyanosis or jaundice  Psych: Cooperative  Neuro: Global hypotonia.  Distal sensation and motor grossly intact.    LABS:  Recent Labs     04/06/22  1102 04/07/22  0206   WBC 4.3* 8.1   RBC 4.89 4.88   HEMOGLOBIN 14.9 14.7   HEMATOCRIT 44.8 44.0   MCV 91.6 90.2   MCH 30.5 30.1   RDW 43.3 42.9   PLATELETCT 116* 127*   MPV 10.7 11.3   NEUTSPOLYS 77.50* 91.30*   LYMPHOCYTES 12.30* 4.90*   MONOCYTES 7.20 3.10   EOSINOPHILS 2.30 0.00   BASOPHILS 0.20 0.10     Recent Labs     04/06/22  1102 04/07/22  0206 04/07/22  0744   SODIUM 141 140 141   POTASSIUM 4.3 4.2 4.1   CHLORIDE 107 108 109   CO2 23 18* 18*   BUN 21 28* 30*   CREATININE 1.24 1.27 1.26   CALCIUM 9.7 9.7 9.6   MAGNESIUM  --  2.3 2.3   ALBUMIN 4.1 3.9  --      Estimated GFR/CRCL = Estimated Creatinine Clearance: 55 mL/min (by C-G formula based on SCr of 1.26  "mg/dL).  Recent Labs     04/06/22  1102 04/07/22  0206 04/07/22  0744   GLUCOSE 103* 176* 137*     Recent Labs     04/06/22  1102 04/06/22  1213 04/07/22  0206   ASTSGOT 21  --  26   ALTSGPT 18  --  19   TBILIRUBIN 1.0  --  0.8   ALKPHOSPHAT 101*  --  96   GLOBULIN 3.0  --  3.0   INR 1.12  --   --    AMMONIA  --  12 21     Recent Labs     04/07/22  0206   CPKTOTAL 216*         Recent Labs     04/06/22  1102   INR 1.12   APTT 26.6       MICROBIOLOGY:   Results     Procedure Component Value Units Date/Time    Urine Culture (NEW) [[id number]] Collected: 04/06/22 1146    Order Status: Completed Specimen: Urine Updated: 04/07/22 1601     Significant Indicator NEG     Source UR     Site -     Culture Result Culture in progress.    Narrative:      Indication for culture:->Evaluation for sepsis without a  clear source of infection  Indication for culture:->Evaluation for sepsis without a    Blood Culture [[id number]] Collected: 04/06/22 1257    Order Status: Completed Specimen: Blood from Peripheral Updated: 04/07/22 0905     Significant Indicator NEG     Source BLD     Site PERIPHERAL     Culture Result No Growth  Note: Blood cultures are incubated for 5 days and  are monitored continuously.Positive blood cultures  are called to the RN and reported as soon as  they are identified.      Narrative:      1 of 2 for Blood Culture x 2 sites order. Per Hospital  Policy: Only change Specimen Src: to \"Line\" if specified by  physician order.  No site indicated    Blood Culture [[id number]] Collected: 04/06/22 1257    Order Status: Completed Specimen: Blood from Peripheral Updated: 04/07/22 0905     Significant Indicator NEG     Source BLD     Site PERIPHERAL     Culture Result No Growth  Note: Blood cultures are incubated for 5 days and  are monitored continuously.Positive blood cultures  are called to the RN and reported as soon as  they are identified.      Narrative:      2 of 2 blood culture x2  Sites order. Per Hospital " "Policy:  Only change Specimen Src: to \"Line\" if specified by physician  order.  No site indicated    Urinalysis [[id number]] Collected: 04/06/22 1146    Order Status: Completed Specimen: Blood Updated: 04/06/22 1236     Color Yellow     Character Clear     Specific Gravity 1.013     Ph 6.5     Glucose Negative mg/dL      Ketones Negative mg/dL      Protein Negative mg/dL      Bilirubin Negative     Urobilinogen, Urine 0.2     Nitrite Negative     Leukocyte Esterase Negative     Occult Blood Negative     Micro Urine Req see below     Comment: Microscopic examination not performed when specimen is clear  and chemically negative for protein, blood, leukocyte esterase  and nitrite.         Narrative:      Indication for culture:->Evaluation for sepsis without a  clear source of infection    URINE CULTURE(NEW) [[id number]] Collected: 04/06/22 0000    Order Status: Canceled Specimen: Other from Urine, Clean Catch             IMAGING:   CT-TSPINE W/O PLUS RECONS   Final Result      1.  Acute/subacute mild T12 superior endplate compression fracture.   2.  Age-indeterminate without likely chronic mild T1, T3, T4 and L1 compression fractures.      CT-LSPINE W/O PLUS RECONS   Final Result      1.  Acute-subacute wedge compression fracture of T12 with trace retropulsion.   2.  Age-indeterminate L1 compression deformity. Correlate with point tenderness.   3.  Degenerative changes of the lower lumbar spine.   4.  Moderate bilateral hydroureteronephrosis.      DX-CHEST-PORTABLE (1 VIEW)   Final Result      No acute cardiopulmonary abnormality.      CT-CEREBRAL PERFUSION ANALYSIS   Final Result      1.  Cerebral blood flow less than 30% likely representing completed infarct = 0 mL.      2.  T Max more than 6 seconds likely representing combination of completed infarct and ischemia = 4 mL.      3.  Mismatched volume likely representing ischemic brain/penumbra = 4 mL      4.  Please note that the cerebral perfusion was performed on the " limited brain tissue around the basal ganglia region. Infarct/ischemia outside the CT perfusion sections can be missed in this study.      CT-CTA HEAD WITH & W/O-POST PROCESS   Final Result      No large vessel occlusion, hemodynamically significant stenosis or aneurysm is seen.      CT-CTA NECK WITH & W/O-POST PROCESSING   Final Result      1.  Moderate atherosclerotic narrowing of the proximal right internal carotid artery.   2.  Significant streak artifact adjacent to the distal left vertebral artery provides severely suboptimal evaluation. There is contrast proximally and distally, however high-grade stenosis cannot be entirely excluded.   3.  Suboptimal evaluation of the proximal left and bilateral vertebral arteries due to streak artifact.   4.  No high-grade stenosis or dissection in the visualized portions of the arteries of the neck.   5.  6 mm left upper lobe pulmonary nodule that appears similar to prior PET/CT.   6.  Remote appearing superior endplate deformity of T3.      CT-HEAD W/O   Final Result      1.  Cerebral atrophy.   2.  No evidence of acute infarction or acute hemorrhage or mass lesion.         EC-ECHOCARDIOGRAM COMPLETE W/O CONT    (Results Pending)   MR-BRAIN-WITH & W/O    (Results Pending)         MEDS:  Current Facility-Administered Medications   Medication Last Admin   • promethazine (PHENERGAN) suppository 25 mg     • metoprolol tartrate (LOPRESSOR) tablet 25 mg     • LORazepam (ATIVAN) injection 0.5 mg     • morphine 4 MG/ML injection 0.5 mg 0.5 mg at 04/07/22 2158   • Metoprolol Tartrate (LOPRESSOR) injection 5 mg     • acetaminophen (TYLENOL) tablet 1,000 mg     • calcitRIOL (ROCALTROL) capsule 0.25 mcg     • famotidine (PEPCID) tablet 20 mg     • multivitamin (THERAGRAN) tablet 1 Tablet     • promethazine (PHENERGAN) tablet 25 mg     • tamsulosin (FLOMAX) capsule 0.4 mg     • senna-docusate (PERICOLACE or SENOKOT S) 8.6-50 MG per tablet 2 Tablet      And   • polyethylene  glycol/lytes (MIRALAX) PACKET 1 Packet      And   • magnesium hydroxide (MILK OF MAGNESIA) suspension 30 mL      And   • bisacodyl (DULCOLAX) suppository 10 mg     • enoxaparin (LOVENOX) inj 40 mg 40 mg at 04/07/22 0528   • hydrALAZINE (APRESOLINE) injection 10 mg     • [Held by provider] ondansetron (ZOFRAN) syringe/vial injection 4 mg     • [Held by provider] ondansetron (ZOFRAN ODT) dispertab 4 mg     • D5 NS infusion New Bag at 04/07/22 0340       PROBLEM LIST:  No problems updated.    ASSESSMENT/PLAN: 81 y.o. male with an extensive medical history of Meckel's cell carcinoma, sick sinus syndrome with pacemaker, ascites, cirrhosis, hepatic encephalopathy, non-Hodgekin's lymphoma, HTN, prostate cancer, polycythemia, seizure, and stroke who presented with altered mental status.     # Altered mental status  # Muscle jerks  Patient presented with altered mental status 4/6/2022.   Mentation significantly improved evening of 4/7/2022.   He reports memory of events leading up to hospitalization and during hospitalization, making acute delirium less likely etiology.  He continues to experience myoclonic jerks and global hypertonia.  Given his significant history of thoracic and lumbar fractures on CT scan, concern for possible spinal cord injury or underlying brain mass.  -Neurology consulted, appreciate recommendations   -Per neurology, do not think this is stroke   -EEG showed non-specific abnormalities   -Metabolic work up including chem panel, CPK, and ammonia has been reassuring   -Toxicology positive for oxycodone, otherwise nml   -Infectious: UA reassuring. No leukocytosis. No source of infection.  Previous hepatitis and HIV serology unremarkable.  -Medication review: History of multiple sedating medications including tramadol, Ambien and oxycodone.  However given persistence of altered mental status unlikely to be contributory.  -MRI brain with and without contrast ordered, consider obtaining thoracic and lumbar  spine MRI  -If brain/thoracolumbar spine MRI cannot be performed due to pacemaker, will consider consultation with neurosurgery for recommendations on management.  -Continue telemetry monitoring  -Follow-up blood cultures and urine cultures  -Continue to minimize sedating medications  -Continue pain control with low-dose morphine  -Continue IV fluids, patient is n.p.o.  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  -Repeat CT spine showed  · Thoracic spine showed mild T12 compression fracture, and chronic T1, T3, T4 and L1 compression fractures  -Pain control with low-dose morphine.  Will monitor closely for oversedation.  However given patient's thrombocytopenia, will avoid NSAIDs.  -Consider obtaining thoracolumbar MRI     # Chest pain  #Tachycardia  Patient presented with chest pain on admission.  He has received multiple EKGs and troponins since admission that have been reassuring.  He did have runs of V. tach on telemetry monitoring 4/7/2022, however after further evaluation with cardiology it was not actually V. tach and rather atrial tachycardia with ventricular tracking.  -Continue to monitor on telemetry  -Cardiology consulted, appreciate recommendations        # Hypertensive Urgency  Patient had elevated blood pressure on admission  -As needed antihypertensives for BP greater than 160/110     # BPH  Continue Flomax     # Meckle's Carcinoma  Patient had radiation from 10/20/21-12/01/21. He had concern for T11 metastasis on PET scan with negative biopsy. No signs of infection at the site of the biopsy. Patient to follow up with oncologist. Not on chemotherapy.      Abx: received 1 dose rocephin in ER, no indication to continue  Lines: PIV  DVT PPX: SCDs and Lovenox  Dispo:Inpatient telemetry     Code Status:  Full code     Chaitanya Echeverria D.O.  Family  Medicine Resident PGY-2

## 2022-04-08 NOTE — THERAPY
"Speech Language Pathology   Clinical Swallow Evaluation     Patient Name: Luigi Walters  AGE:  81 y.o., SEX:  male  Medical Record #: [id number]  Today's Date: 2022     Precautions  Precautions:     Assessment    HPI: 81 y.o. male with a past medical history of Meckel's cell carcinoma, sick sinus syndrome with pacemaker, ascites, cirrhosis, hepatic encephalopathy, non-Hodgekin's lymphoma, HTN, prostate cancer, polycythemia, seizure, and stroke who presented with altered mental status and slurred speech.    CT- Head : No large vessel occlusion, hemodynamically significant stenosis or aneurysm is seen.  CXR : No acute cardiopulmonary abnormality.    PMHx: Schatzki Ring (per patient report), Arrhythmia, Arthritis, Ascites, Basal cell carcinoma of face, Bronchitis, Cirrhosis of liver not due to alcohol, Encephalopathy, End-stage liver disease, Esophageal varices in alcoholic cirrhosis, Hepatic encephalopathy, Non Hodgkin's lymphoma, Osteoarthritis, Pacemaker,Seizure, Stroke ()       Level of Consciousness: Awake, Lethargic  Affect/Behavior: Appropriate, Calm, Cooperative  Follows Directives: Yes  Orientation: Self, , General place, Situation, Current year; disorientation to month briefly  Hearing: Functional hearing  Vision: Functional vision      Prior Living Situation & Level of Function:  Patient lives with spouse. Pt reports a hx of Schatzki Ring with dilation ~5yrs ago. Pt's wife cuts food items (i.e., chicken) into small bites. Pt reports it takes him a \"long time\" to eat his meals.     Oral Mechanism Evaluation  Facial Symmetry: Equal  Facial Sensation: Equal  Labial Observations: WFL  Lingual Observations: Midline  Dentition: Fair, Some missing dentition  Comments: generalized oral motor weakness and reduced ROM noted      Voice  Quality: WFL  Resonance: WFL  Intensity: Appropriate   Cough: WFL reflexively; perceptually weak volitionally  Comments:      Current Method of Nutrition   NPO " until cleared by speech pathology      Subjective  Patient initially needing several sternal rubs to remain awake. As session continued, alertness improved.      Assessment  Positioning: Dickinson's (60-90 degrees)  Bolus Administration: SLP and Patient  Oxygen Requirements: Room Air  Factor(s) Affecting Performance: Impaired endurance, Impaired mental status    Swallowing Trials  Ice: WFL x4  Thin Liquid (TN0): Impaired; given via tspx4/cup  Liquidised (LQ3): WFL  Pureed (PU4): WFL  Soft & Bite Sized (SB6): WFL  Regular (RG7): WFL    Comments:  Bolus containment, mastication and lingual transit appeared adequate. No oral residue post swallow noted. Timely pharyngeal swallow response appreciated. Immediate cough response x1 with first tsp of thin liquids appreciated, likely d/t sudden PO presentation and pt's eyes closed with reduced awareness of bolus. No other s/sx of aspiration appreciated with multiple presentations of water via tsp/cup.     Clinical Impressions  Clinical signs of oropharyngeal dysphagia include immediate cough response with single tsp of thin liquids. Dysphagia is likely acute on chronic related to AMS and hx of esophageal dysphagia. Diet modification is indicated at this time as pt needs to cut his meat into smaller bites at home at baseline. Expect diet advancement as mentation consisently improves.     Recommendations  1.  Initiate a diet of soft/bite sized solids and thin liquids  2.  Instrumentation: None indicated at this time  3.  Swallowing Instructions & Precautions:   Supervision: Monitor during meals  Positioning: Fully upright and midline during oral intake  Medication: Whole with liquid, cut/crush bigger pills  Strategies: Small bites/sips, Slow rate of intake, No straws, stay upright 30 mins after meals  Oral Care: after meals    Plan    Recommend Speech Therapy 3 times per week until therapy goals are met for the following treatments:  Dysphagia Training and Patient / Family /  "Caregiver Education.       Objective     04/08/22 0845   Patient / Family Goals   Patient / Family Goal #1 \"Oh that's good\" - when given water   Short Term Goals   Short Term Goal # 1 Patient will consume a diet of soft/bite sized solids and thin liquids with no s/sx of aspiration with monitoring during meals     "

## 2022-04-08 NOTE — CARE PLAN
The patient is Stable - Low risk of patient condition declining or worsening    Shift Goals  Clinical Goals: Pain management and monitor HR  Patient Goals: Eat  Family Goals: HEATHER    Progress made toward(s) clinical / shift goals:    Problem: Skin Integrity  Goal: Skin integrity is maintained or improved  Outcome: Progressing  Q2h turning and positioning provided to maintain skin integrity.     Problem: Fall Risk  Goal: Patient will remain free from falls  Outcome: Progressing  Bed low and locked, alarm set, call bell within reach. Hourly rounding continues.       Patient is not progressing towards the following goals: N/A

## 2022-04-08 NOTE — PROGRESS NOTES
Assumed care of pt at 0700. Pt alert and oriented x4. He c/o intermittent back pain that is resolved with rest. He does have spastic BLE. Reports frequent falls at home. No PT/OT currently ordered; awaiting for [facility] to round to discuss this and possibly transitioning if IVF to NS or discontinuing entirely d/t starting a diet. Q2h turning and positioning provided to maintain skin integrity. Bed low and locked, alarm set and call bell within reach. Hourly rounding continues.    1450: Pt's daughter Linda is at bedside, requesting an update from the doctor. [facility] called and I was told an on-call physician will call me back. Awaiting return phone call at this time.    1505: On-call physician said to text Dr. Echeverria.    1610: Dr. BURNETTE spoke with family and ordered another MRI. It was my and the family's understanding that an MRI could not be completed d/t pacer. MRI was called and they verbalized they have already spoken with the [employer] company. MRI cannot be completed for this patient. Dr. BURNETTE made aware via [employer] message.

## 2022-04-08 NOTE — CARE PLAN
"The patient is Watcher - Medium risk of patient condition declining or worsening    Shift Goals  Clinical Goals: Bowel movement, monitor heart activity, maintain skin integrity  Patient Goals: \"have a bowel movement and reduce back pain\"  Family Goals: Comfort, pain management    Progress made toward(s) clinical / shift goals:  Patient is A&Ox4. Educated patient on POC. Q2 hour turns in place. Patient had a bowel movement. Pharmacological intervention in place for pain management. Patient on strict NPO until seen by SLP. Bed is low and locked. Bed alarm on. Call light within reach. Hourly rounding continues.     Patient is not progressing towards the following goals:      Problem: Pain - Standard  Goal: Alleviation of pain or a reduction in pain to the patient’s comfort goal  Outcome: Not Progressing  Note: Patient complains of back pain/spasms. Pharmacological intervention in place.     "

## 2022-04-08 NOTE — DISCHARGE PLANNING
Care Transition Team Discharge Planning    Anticipated Discharge Disposition: DC plan - pending OT and PT reccomendations.     Action: Lsw met with patient to complete CTT assessment. Patient presented A&Ox4 e/b confirming the information on the facesheet. Lsw observed that the patient answered the questions appropriately. He was also able to describe the series of events that lead to his hosptial stay.     The patient reported having a HX of falls which lead to him using a cane. He reported that previously he was independent with all ADLs and IADLs. He reported that he had back pain and had to sleep in a chair because it was difficult getting in and out of bed.    Lsw met with the [facility] who reported that the patient will have a PT and OT evaluations. There is no DC plan yet.    Lsw spoke with dtr and grandson and informed them of the updates.    Dtr asked that she be called instead of the grand son (Gerson). The dtr's name is Linda Aguilar ([phone])     Barriers to Discharge:Patient is not medically cleared.    Plan: Lsw will assist medical team with DC planning.    Care Transition Team Assessment    Information Source  Orientation Level: Oriented X4  Information Given By: Patient  Informant's Name: Justin Walters  Who is responsible for making decisions for patient? : Patient    Readmission Evaluation  Is this a readmission?: Yes - unplanned readmission  Why do you think you were readmitted?: Problems with walking  Was an appointment arranged for you prior to discharge?: No  Were there new prescriptions you were supposed to fill after you were discharged?: No  Did you understand your discharge instructions?: Yes  Did you have enough support after your last discharge?: Yes    Elopement Risk  Legal Hold: No  Ambulatory or Self Mobile in Wheelchair: No-Not an Elopement Risk  Elopement Risk: Not at Risk for Elopement    Interdisciplinary Discharge Planning  Lives with - Patient's Self Care Capacity:  Spouse  Housing / Facility: [address] [facility] [place]  Prior Services: Home-Independent    Discharge Preparedness  What is your plan after discharge?: Uncertain - pending medical team collaboration  What are your discharge supports?: Spouse  Prior Functional Level: Ambulatory,Independent with Activities of Daily Living,Independent with Medication Management,Uses Cane  Difficulity with ADLs: Walking  Difficulty with ADLs Comment: Patient wallks with a cane  Difficulity with IADLs: None    Functional Assesment  Prior Functional Level: Ambulatory,Independent with Activities of Daily Living,Independent with Medication Management,Uses Cane    Finances  Financial Barriers to Discharge: No  Source of Income: Social Security,penitentiary  Prescription Coverage: Yes              Advance Directive  Advance Directive?: DPOA for Health Care  Durable Power of  Name and Contact : Shanon Walters    Domestic Abuse  Have you ever been the victim of abuse or violence?: No  Physical Abuse or Sexual Abuse: No  Verbal Abuse or Emotional Abuse: No  Possible Abuse/Neglect Reported to:: Not Applicable    Psychological Assessment  History of Substance Abuse: None  History of Psychiatric Problems: No  Non-compliant with Treatment: No  Newly Diagnosed Illness: No    Discharge Risks or Barriers  Discharge risks or barriers?: No    Anticipated Discharge Information  Discharge Disposition: Discharged to home/self care (01)  Discharge Address: [address], [place] [address]  Discharge Contact Phone Number: [phone]

## 2022-04-09 ENCOUNTER — APPOINTMENT (OUTPATIENT)
Dept: RADIOLOGY | Facility: MEDICAL CENTER | Age: 82
DRG: 092 | End: 2022-04-09
Attending: STUDENT IN AN ORGANIZED HEALTH CARE EDUCATION/TRAINING PROGRAM
Payer: MEDICARE

## 2022-04-09 LAB
ALBUMIN SERPL BCP-MCNC: 3.5 G/DL (ref 3.2–4.9)
ALBUMIN/GLOB SERPL: 1.2 G/DL
ALP SERPL-CCNC: 96 U/L (ref 30–99)
ALT SERPL-CCNC: 73 U/L (ref 2–50)
ANION GAP SERPL CALC-SCNC: 12 MMOL/L (ref 7–16)
AST SERPL-CCNC: 82 U/L (ref 12–45)
BASOPHILS # BLD AUTO: 0.4 % (ref 0–1.8)
BASOPHILS # BLD: 0.02 K/UL (ref 0–0.12)
BILIRUB SERPL-MCNC: 1.6 MG/DL (ref 0.1–1.5)
BUN SERPL-MCNC: 13 MG/DL (ref 8–22)
CALCIUM SERPL-MCNC: 8.8 MG/DL (ref 8.5–10.5)
CHLORIDE SERPL-SCNC: 109 MMOL/L (ref 96–112)
CK SERPL-CCNC: 166 U/L (ref 0–154)
CO2 SERPL-SCNC: 19 MMOL/L (ref 20–33)
CREAT SERPL-MCNC: 0.84 MG/DL (ref 0.5–1.4)
EOSINOPHIL # BLD AUTO: 0.12 K/UL (ref 0–0.51)
EOSINOPHIL NFR BLD: 2.5 % (ref 0–6.9)
ERYTHROCYTE [DISTWIDTH] IN BLOOD BY AUTOMATED COUNT: 43 FL (ref 35.9–50)
GFR SERPLBLD CREATININE-BSD FMLA CKD-EPI: 87 ML/MIN/1.73 M 2
GLOBULIN SER CALC-MCNC: 3 G/DL (ref 1.9–3.5)
GLUCOSE SERPL-MCNC: 119 MG/DL (ref 65–99)
HCT VFR BLD AUTO: 45 % (ref 42–52)
HGB BLD-MCNC: 14.9 G/DL (ref 14–18)
IMM GRANULOCYTES # BLD AUTO: 0.02 K/UL (ref 0–0.11)
IMM GRANULOCYTES NFR BLD AUTO: 0.4 % (ref 0–0.9)
LYMPHOCYTES # BLD AUTO: 0.59 K/UL (ref 1–4.8)
LYMPHOCYTES NFR BLD: 12.4 % (ref 22–41)
MCH RBC QN AUTO: 30.1 PG (ref 27–33)
MCHC RBC AUTO-ENTMCNC: 33.1 G/DL (ref 33.7–35.3)
MCV RBC AUTO: 90.9 FL (ref 81.4–97.8)
MONOCYTES # BLD AUTO: 0.42 K/UL (ref 0–0.85)
MONOCYTES NFR BLD AUTO: 8.9 % (ref 0–13.4)
NEUTROPHILS # BLD AUTO: 3.57 K/UL (ref 1.82–7.42)
NEUTROPHILS NFR BLD: 75.4 % (ref 44–72)
NRBC # BLD AUTO: 0 K/UL
NRBC BLD-RTO: 0 /100 WBC
PLATELET # BLD AUTO: 86 K/UL (ref 164–446)
PMV BLD AUTO: 11.8 FL (ref 9–12.9)
POTASSIUM SERPL-SCNC: 3.5 MMOL/L (ref 3.6–5.5)
PROT SERPL-MCNC: 6.5 G/DL (ref 6–8.2)
RBC # BLD AUTO: 4.95 M/UL (ref 4.7–6.1)
SODIUM SERPL-SCNC: 140 MMOL/L (ref 135–145)
WBC # BLD AUTO: 4.7 K/UL (ref 4.8–10.8)

## 2022-04-09 PROCEDURE — 36415 COLL VENOUS BLD VENIPUNCTURE: CPT

## 2022-04-09 PROCEDURE — 85025 COMPLETE CBC W/AUTO DIFF WBC: CPT

## 2022-04-09 PROCEDURE — 99232 SBSQ HOSP IP/OBS MODERATE 35: CPT | Mod: GC | Performed by: FAMILY MEDICINE

## 2022-04-09 PROCEDURE — A9270 NON-COVERED ITEM OR SERVICE: HCPCS | Performed by: STUDENT IN AN ORGANIZED HEALTH CARE EDUCATION/TRAINING PROGRAM

## 2022-04-09 PROCEDURE — 80053 COMPREHEN METABOLIC PANEL: CPT

## 2022-04-09 PROCEDURE — 700102 HCHG RX REV CODE 250 W/ 637 OVERRIDE(OP): Performed by: STUDENT IN AN ORGANIZED HEALTH CARE EDUCATION/TRAINING PROGRAM

## 2022-04-09 PROCEDURE — 700111 HCHG RX REV CODE 636 W/ 250 OVERRIDE (IP): Performed by: STUDENT IN AN ORGANIZED HEALTH CARE EDUCATION/TRAINING PROGRAM

## 2022-04-09 PROCEDURE — 82550 ASSAY OF CK (CPK): CPT

## 2022-04-09 PROCEDURE — 770020 HCHG ROOM/CARE - TELE (206)

## 2022-04-09 PROCEDURE — C9113 INJ PANTOPRAZOLE SODIUM, VIA: HCPCS | Performed by: STUDENT IN AN ORGANIZED HEALTH CARE EDUCATION/TRAINING PROGRAM

## 2022-04-09 PROCEDURE — 700111 HCHG RX REV CODE 636 W/ 250 OVERRIDE (IP): Performed by: FAMILY MEDICINE

## 2022-04-09 PROCEDURE — 76857 US EXAM PELVIC LIMITED: CPT

## 2022-04-09 PROCEDURE — 700105 HCHG RX REV CODE 258: Performed by: STUDENT IN AN ORGANIZED HEALTH CARE EDUCATION/TRAINING PROGRAM

## 2022-04-09 RX ORDER — CHOLECALCIFEROL (VITAMIN D3) 125 MCG
5 CAPSULE ORAL NIGHTLY
Status: DISCONTINUED | OUTPATIENT
Start: 2022-04-09 | End: 2022-04-10

## 2022-04-09 RX ORDER — SODIUM CHLORIDE 9 MG/ML
INJECTION, SOLUTION INTRAVENOUS CONTINUOUS
Status: DISCONTINUED | OUTPATIENT
Start: 2022-04-09 | End: 2022-04-10

## 2022-04-09 RX ORDER — POTASSIUM CHLORIDE 20 MEQ/1
40 TABLET, EXTENDED RELEASE ORAL ONCE
Status: COMPLETED | OUTPATIENT
Start: 2022-04-09 | End: 2022-04-09

## 2022-04-09 RX ORDER — POTASSIUM CHLORIDE 20 MEQ/1
40 TABLET, EXTENDED RELEASE ORAL DAILY
Status: DISCONTINUED | OUTPATIENT
Start: 2022-04-09 | End: 2022-04-09

## 2022-04-09 RX ORDER — GABAPENTIN 100 MG/1
100 CAPSULE ORAL 3 TIMES DAILY
Status: DISCONTINUED | OUTPATIENT
Start: 2022-04-09 | End: 2022-04-12

## 2022-04-09 RX ADMIN — GABAPENTIN 100 MG: 100 CAPSULE ORAL at 14:46

## 2022-04-09 RX ADMIN — SODIUM CHLORIDE: 9 INJECTION, SOLUTION INTRAVENOUS at 10:13

## 2022-04-09 RX ADMIN — DEXTROSE AND SODIUM CHLORIDE: 5; 900 INJECTION, SOLUTION INTRAVENOUS at 05:50

## 2022-04-09 RX ADMIN — Medication 5 MG: at 22:20

## 2022-04-09 RX ADMIN — METOPROLOL TARTRATE 25 MG: 25 TABLET, FILM COATED ORAL at 18:40

## 2022-04-09 RX ADMIN — THERA TABS 1 TABLET: TAB at 04:07

## 2022-04-09 RX ADMIN — METOPROLOL TARTRATE 25 MG: 25 TABLET, FILM COATED ORAL at 04:07

## 2022-04-09 RX ADMIN — PANTOPRAZOLE SODIUM 40 MG: 40 INJECTION, POWDER, LYOPHILIZED, FOR SOLUTION INTRAVENOUS at 04:08

## 2022-04-09 RX ADMIN — POTASSIUM CHLORIDE 40 MEQ: 1500 TABLET, EXTENDED RELEASE ORAL at 14:46

## 2022-04-09 RX ADMIN — MORPHINE SULFATE 0.5 MG: 4 INJECTION INTRAVENOUS at 03:47

## 2022-04-09 RX ADMIN — MORPHINE SULFATE 0.5 MG: 4 INJECTION INTRAVENOUS at 10:06

## 2022-04-09 RX ADMIN — ACETAMINOPHEN 1000 MG: 500 TABLET ORAL at 05:50

## 2022-04-09 RX ADMIN — FAMOTIDINE 20 MG: 20 TABLET ORAL at 04:07

## 2022-04-09 RX ADMIN — TAMSULOSIN HYDROCHLORIDE 0.4 MG: 0.4 CAPSULE ORAL at 04:07

## 2022-04-09 RX ADMIN — SODIUM CHLORIDE: 9 INJECTION, SOLUTION INTRAVENOUS at 17:02

## 2022-04-09 RX ADMIN — GABAPENTIN 100 MG: 100 CAPSULE ORAL at 18:41

## 2022-04-09 RX ADMIN — CALCITRIOL CAPSULES 0.25 MCG 0.25 MCG: 0.25 CAPSULE ORAL at 04:07

## 2022-04-09 RX ADMIN — ENOXAPARIN SODIUM 40 MG: 40 INJECTION SUBCUTANEOUS at 04:07

## 2022-04-09 RX ADMIN — SENNOSIDES AND DOCUSATE SODIUM 2 TABLET: 50; 8.6 TABLET ORAL at 18:41

## 2022-04-09 ASSESSMENT — PAIN DESCRIPTION - PAIN TYPE
TYPE: ACUTE PAIN
TYPE: ACUTE PAIN

## 2022-04-09 NOTE — PROGRESS NOTES
Monitor Summary: SR 73-77, Paced with a defibrillator, with PVCs, couplet, bigeminy and trigeminy per strip from monitor room.

## 2022-04-09 NOTE — PROGRESS NOTES
[facility] MEDICINE PROGRESS NOTE       Attending: Dr. Dwyer   Senior Resident: Chaitanya Echeverria, PGY-2   PATIENT: Luigi Walters; [id number]; 1940 Hospital Day: 3    ID: 81 y.o. male with history of Merckel's cell carcinoma sinus sick syndrome with pacemaker, cirrhosis, non-Hodgkin's lymphoma, hypertension, prostate cancer and CVA admitted for altered mental status.    SUBJECTIVE: No acute events overnight.  Patient reports improvement of his ability to speak and move limbs.  He has continued to experience low back pain.  He is tolerating p.o. intake.    Discussed with RN, patient's pacemaker was interrogated and confirmed to be noncompatible with MRI.    OBJECTIVE:     Vitals:    04/08/22 1600 04/08/22 1958 04/08/22 2338 04/09/22 0358   BP: 159/78 145/79 149/89 148/83   Pulse: 79 78 82 85   Resp: 18 16 16 16   Temp: 36.7 °C (98 °F) 37.2 °C (98.9 °F) 36.5 °C (97.7 °F) 37.3 °C (99.1 °F)   TempSrc: Temporal Temporal Temporal Temporal   SpO2: 93% 95% 94% 91%   Weight:       Height:           Intake/Output Summary (Last 24 hours) at 4/9/2022 0622  Last data filed at 4/9/2022 0505  Gross per 24 hour   Intake 300 ml   Output 1425 ml   Net -1125 ml       PE:  General: Alert, resting comfortably  HEENT: NC/AT.   Cardiovascular: RRR with no M/R/G.  Respiratory: Symmetrical chest. CTAB with no W/R/R  Abdomen: soft, NT/ND, no masses  EXT: No lower extremity edema  Skin: No cyanosis or jaundice  Psych: Cooperative  Neuro: Global hypertonia.  Distal sensation and motor grossly intact.    LABS:  Recent Labs     04/06/22  1102 04/07/22  0206 04/08/22  0726   WBC 4.3* 8.1 7.4   RBC 4.89 4.88 4.85   HEMOGLOBIN 14.9 14.7 14.7   HEMATOCRIT 44.8 44.0 44.2   MCV 91.6 90.2 91.1   MCH 30.5 30.1 30.3   RDW 43.3 42.9 44.0   PLATELETCT 116* 127* 102*   MPV 10.7 11.3 11.2   NEUTSPOLYS 77.50* 91.30* 83.90*   LYMPHOCYTES 12.30* 4.90* 7.40*   MONOCYTES 7.20 3.10 7.80   EOSINOPHILS 2.30 0.00 0.30   BASOPHILS 0.20 0.10 0.10     Recent Labs  "    04/06/22  1102 04/07/22  0206 04/07/22  0744 04/08/22  0726   SODIUM 141 140 141 144   POTASSIUM 4.3 4.2 4.1 4.4   CHLORIDE 107 108 109 111   CO2 23 18* 18* 22   BUN 21 28* 30* 23*   CREATININE 1.24 1.27 1.26 0.95   CALCIUM 9.7 9.7 9.6 9.1   MAGNESIUM  --  2.3 2.3  --    ALBUMIN 4.1 3.9  --  3.7     Estimated GFR/CRCL = Estimated Creatinine Clearance: 72.9 mL/min (by C-G formula based on SCr of 0.95 mg/dL).  Recent Labs     04/07/22  0206 04/07/22  0744 04/08/22  0726   GLUCOSE 176* 137* 128*     Recent Labs     04/06/22  1102 04/06/22  1213 04/07/22  0206 04/08/22  0726   ASTSGOT 21  --  26 75*   ALTSGPT 18  --  19 30   TBILIRUBIN 1.0  --  0.8 1.1   ALKPHOSPHAT 101*  --  96 94   GLOBULIN 3.0  --  3.0 2.9   INR 1.12  --   --   --    AMMONIA  --  12 21  --      Recent Labs     04/07/22  0206 04/08/22  0726   CPKTOTAL 216* 688*         Recent Labs     04/06/22  1102   INR 1.12   APTT 26.6       MICROBIOLOGY:   Results     Procedure Component Value Units Date/Time    Urine Culture (NEW) [[id number]] Collected: 04/06/22 1146    Order Status: Completed Specimen: Urine Updated: 04/08/22 0745     Significant Indicator NEG     Source UR     Site -     Culture Result Usual skin trevor <10,000 cfu/mL    Narrative:      Indication for culture:->Evaluation for sepsis without a  clear source of infection  Indication for culture:->Evaluation for sepsis without a    Blood Culture [[id number]] Collected: 04/06/22 1257    Order Status: Completed Specimen: Blood from Peripheral Updated: 04/07/22 0905     Significant Indicator NEG     Source BLD     Site PERIPHERAL     Culture Result No Growth  Note: Blood cultures are incubated for 5 days and  are monitored continuously.Positive blood cultures  are called to the RN and reported as soon as  they are identified.      Narrative:      1 of 2 for Blood Culture x 2 sites order. Per Hospital  Policy: Only change Specimen Src: to \"Line\" if specified by  physician order.  No site indicated " "   Blood Culture [[id number]] Collected: 04/06/22 1257    Order Status: Completed Specimen: Blood from Peripheral Updated: 04/07/22 0905     Significant Indicator NEG     Source BLD     Site PERIPHERAL     Culture Result No Growth  Note: Blood cultures are incubated for 5 days and  are monitored continuously.Positive blood cultures  are called to the RN and reported as soon as  they are identified.      Narrative:      2 of 2 blood culture x2  Sites order. Per Hospital Policy:  Only change Specimen Src: to \"Line\" if specified by physician  order.  No site indicated    Urinalysis [[id number]] Collected: 04/06/22 1146    Order Status: Completed Specimen: Blood Updated: 04/06/22 1236     Color Yellow     Character Clear     Specific Gravity 1.013     Ph 6.5     Glucose Negative mg/dL      Ketones Negative mg/dL      Protein Negative mg/dL      Bilirubin Negative     Urobilinogen, Urine 0.2     Nitrite Negative     Leukocyte Esterase Negative     Occult Blood Negative     Micro Urine Req see below     Comment: Microscopic examination not performed when specimen is clear  and chemically negative for protein, blood, leukocyte esterase  and nitrite.         Narrative:      Indication for culture:->Evaluation for sepsis without a  clear source of infection    URINE CULTURE(NEW) [[id number]] Collected: 04/06/22 0000    Order Status: Canceled Specimen: Other from Urine, Clean Catch             IMAGING:   EC-ECHOCARDIOGRAM LTD W/O CONT   Final Result      CT-TSPINE W/O PLUS RECONS   Final Result      1.  Acute/subacute mild T12 superior endplate compression fracture.   2.  Age-indeterminate without likely chronic mild T1, T3, T4 and L1 compression fractures.      CT-LSPINE W/O PLUS RECONS   Final Result      1.  Acute-subacute wedge compression fracture of T12 with trace retropulsion.   2.  Age-indeterminate L1 compression deformity. Correlate with point tenderness.   3.  Degenerative changes of the lower lumbar spine.   4.  " Moderate bilateral hydroureteronephrosis.      DX-CHEST-PORTABLE (1 VIEW)   Final Result      No acute cardiopulmonary abnormality.      CT-CEREBRAL PERFUSION ANALYSIS   Final Result      1.  Cerebral blood flow less than 30% likely representing completed infarct = 0 mL.      2.  T Max more than 6 seconds likely representing combination of completed infarct and ischemia = 4 mL.      3.  Mismatched volume likely representing ischemic brain/penumbra = 4 mL      4.  Please note that the cerebral perfusion was performed on the limited brain tissue around the basal ganglia region. Infarct/ischemia outside the CT perfusion sections can be missed in this study.      CT-CTA HEAD WITH & W/O-POST PROCESS   Final Result      No large vessel occlusion, hemodynamically significant stenosis or aneurysm is seen.      CT-CTA NECK WITH & W/O-POST PROCESSING   Final Result      1.  Moderate atherosclerotic narrowing of the proximal right internal carotid artery.   2.  Significant streak artifact adjacent to the distal left vertebral artery provides severely suboptimal evaluation. There is contrast proximally and distally, however high-grade stenosis cannot be entirely excluded.   3.  Suboptimal evaluation of the proximal left and bilateral vertebral arteries due to streak artifact.   4.  No high-grade stenosis or dissection in the visualized portions of the arteries of the neck.   5.  6 mm left upper lobe pulmonary nodule that appears similar to prior PET/CT.   6.  Remote appearing superior endplate deformity of T3.      CT-HEAD W/O   Final Result      1.  Cerebral atrophy.   2.  No evidence of acute infarction or acute hemorrhage or mass lesion.         MR-BRAIN-WITH & W/O    (Results Pending)   MR-CERVICAL SPINE-WITH & W/O    (Results Pending)   US-BLADDER    (Results Pending)       MEDS:  Current Facility-Administered Medications   Medication Last Admin   • promethazine (PHENERGAN) suppository 25 mg 25 mg at 04/08/22 0602   •  enoxaparin (LOVENOX) inj 40 mg 40 mg at 04/09/22 0407   • pantoprazole (Protonix) injection 40 mg 40 mg at 04/09/22 0408   • metoprolol tartrate (LOPRESSOR) tablet 25 mg 25 mg at 04/09/22 0407   • morphine 4 MG/ML injection 0.5 mg 0.5 mg at 04/09/22 0347   • Metoprolol Tartrate (LOPRESSOR) injection 5 mg     • acetaminophen (TYLENOL) tablet 1,000 mg 1,000 mg at 04/09/22 0550   • calcitRIOL (ROCALTROL) capsule 0.25 mcg 0.25 mcg at 04/09/22 0407   • famotidine (PEPCID) tablet 20 mg 20 mg at 04/09/22 0407   • multivitamin (THERAGRAN) tablet 1 Tablet 1 Tablet at 04/09/22 0407   • promethazine (PHENERGAN) tablet 25 mg 25 mg at 04/08/22 1246   • tamsulosin (FLOMAX) capsule 0.4 mg 0.4 mg at 04/09/22 0407   • senna-docusate (PERICOLACE or SENOKOT S) 8.6-50 MG per tablet 2 Tablet      And   • polyethylene glycol/lytes (MIRALAX) PACKET 1 Packet      And   • magnesium hydroxide (MILK OF MAGNESIA) suspension 30 mL      And   • bisacodyl (DULCOLAX) suppository 10 mg     • hydrALAZINE (APRESOLINE) injection 10 mg     • [Held by provider] ondansetron (ZOFRAN) syringe/vial injection 4 mg     • [Held by provider] ondansetron (ZOFRAN ODT) dispertab 4 mg     • D5 NS infusion New Bag at 04/09/22 0550       PROBLEM LIST:  No problems updated.    ASSESSMENT/PLAN: 81 y.o. male with an extensive medical history of Meckel's cell carcinoma, sick sinus syndrome with pacemaker, ascites, cirrhosis, hepatic encephalopathy, non-Hodgekin's lymphoma, HTN, prostate cancer, polycythemia, seizure, and stroke who presented with altered mental status.     # Altered mental status  # Muscle jerks  #Merkel Cell Carcinoma   #Non-hodgkins Lymphoma   Patient presented with altered mental status 4/6/2022.   Mentation significantly improved evening of 4/7/2022.   He reports memory of events leading up to hospitalization and during hospitalization, making acute delirium less likely etiology.  He continues to experience myoclonic jerks and global hypertonia.   Given his significant history of thoracic and lumbar fractures on CT scan, concern for possible spinal cord injury or underlying brain mass.  -EEG showed non-specific abnormalities   -Metabolic work up including chem panel, CPK, and ammonia has been reassuring   -Toxicology positive for oxycodone, otherwise nml   -Infectious: UA reassuring. No leukocytosis. No source of infection.  Previous hepatitis and HIV serology unremarkable.  -Medication review: History of multiple sedating medications including tramadol, Ambien and oxycodone.  However given persistence of altered mental status unlikely to be contributory.  Imaging:  · 3/8/2022 PET scan  did show increased uptake in brain and spinal cord concerning for metastatic disease.  · 3/18/2022 CT-guided thoracic needle biopsy  · 4/6/2022 CT head without contrast negative for hemorrhagic stroke  · 4/6/2022 CT head with contrast negative for occlusive disease or aneurysm  · 4/6/2022 CT neck with contrast negative for high-grade occlusive disease  · 4/6/2022 CT thoracic and lumbar spine showed multiple compression fractures  · Brain and cervical MRI ordered, however patient's pacemaker has been interrogated and not compatible with MRI.  -Neurology has signed off the case.  Per neurology, brain MRI and cervical spine MRI would be helpful, but not required prior to discharge.  -Patients dystonia and myoclonus has continued to improve. He is tolerating PO intake. Pending PT/OT/SLP consult.     Plan:  -Continue telemetry monitoring  -Follow-up blood cultures and urine cultures  -Continue to minimize sedating medications  -Continue pain control with low-dose morphine, will consider adding gabapentin or transitioning to p.o. medications today  -Wean IV fluids, patient is tolerating p.o. intake  -Monitor for signs of urinary retention.  Bladder ultrasound ordered.  -SLP/PT/OT consult  -Pending SLP/PT/OT recommendations, will consider PMNR consultation for acute  rehabilitation       #Elevated CPK   -CPK elevated likely secondary to dystonia   -Continue IVF  -Continue to trend CPK     #Thrombocytopenia   -Chronic   -CTM platelets   -Avoid NSAIDs      #Tachycardia  Patient presented with chest pain on admission.  He has received multiple EKGs and troponins since admission that have been reassuring.  He did have runs of V. tach on telemetry monitoring 4/7/2022, however after further evaluation with cardiology it was not actually V. tach and rather atrial tachycardia with ventricular tracking.  -Continue to monitor on telemetry  -4/8/2022 ECHO LVEF 65%    #Prolonged QTc  -Avoid QTc prolonging medications     #HTN  Continue home regimen     # BPH  Continue Flomax        Abx: None  Lines: PIV  IVF: D5NS  DVT PPX: SCDs and Lovenox  Allen: Yes  Dispo:Inpatient telemetry     Code Status:  Full code     Chaitanya Echeverria D.O.  Family Medicine Resident PGY-2

## 2022-04-09 NOTE — CARE PLAN
"The patient is Watcher - Medium risk of patient condition declining or worsening    Shift Goals  Clinical Goals: Pain management, monitor HR  Patient Goals: \"sleep tonight\"  Family Goals: HEATHER    Progress made toward(s) clinical / shift goals:  Patient is A&Ox4. Educated patient on POC. Pharmacological intervention in place for pain management. Q2 hour turns in place. Bed is low and locked. Bed alarm on. Call light within reach. Hourly rounding continues.     Patient is not progressing towards the following goals:      "

## 2022-04-09 NOTE — CARE PLAN
The patient is Stable - Low risk of patient condition declining or worsening    Shift Goals  Clinical Goals: Pain management, Imaging  Patient Goals: Rest, sleep, pain management  Family Goals: HEATHER    Progress made toward(s) clinical / shift goals:      Problem: Skin Integrity  Goal: Skin integrity is maintained or improved  Outcome: Progressing  Frequent turning and range of motion used to maintain skin integrity, site care done per wound care documentation     Problem: Fall Risk  Goal: Patient will remain free from falls  Outcome: Progressing  Patient verbalizes risk of falls and uses call light appropriately     Problem: Pain - Standard  Goal: Alleviation of pain or a reduction in pain to the patient’s comfort goal  Outcome: Progressing  Patient uses the pain scale appropriately        Patient is not progressing towards the following goals: NA

## 2022-04-10 LAB
ALBUMIN SERPL BCP-MCNC: 3.5 G/DL (ref 3.2–4.9)
ALBUMIN/GLOB SERPL: 1.3 G/DL
ALP SERPL-CCNC: 98 U/L (ref 30–99)
ALT SERPL-CCNC: 86 U/L (ref 2–50)
ANION GAP SERPL CALC-SCNC: 11 MMOL/L (ref 7–16)
AST SERPL-CCNC: 67 U/L (ref 12–45)
BILIRUB SERPL-MCNC: 1.1 MG/DL (ref 0.1–1.5)
BUN SERPL-MCNC: 13 MG/DL (ref 8–22)
CALCIUM SERPL-MCNC: 8.6 MG/DL (ref 8.5–10.5)
CHLORIDE SERPL-SCNC: 110 MMOL/L (ref 96–112)
CK SERPL-CCNC: 72 U/L (ref 0–154)
CO2 SERPL-SCNC: 18 MMOL/L (ref 20–33)
CREAT SERPL-MCNC: 0.85 MG/DL (ref 0.5–1.4)
ERYTHROCYTE [DISTWIDTH] IN BLOOD BY AUTOMATED COUNT: 41 FL (ref 35.9–50)
GFR SERPLBLD CREATININE-BSD FMLA CKD-EPI: 87 ML/MIN/1.73 M 2
GLOBULIN SER CALC-MCNC: 2.8 G/DL (ref 1.9–3.5)
GLUCOSE SERPL-MCNC: 94 MG/DL (ref 65–99)
HCT VFR BLD AUTO: 42.7 % (ref 42–52)
HGB BLD-MCNC: 14.3 G/DL (ref 14–18)
MCH RBC QN AUTO: 30.2 PG (ref 27–33)
MCHC RBC AUTO-ENTMCNC: 33.5 G/DL (ref 33.7–35.3)
MCV RBC AUTO: 90.3 FL (ref 81.4–97.8)
PLATELET # BLD AUTO: 94 K/UL (ref 164–446)
PMV BLD AUTO: 12.1 FL (ref 9–12.9)
POTASSIUM SERPL-SCNC: 4.1 MMOL/L (ref 3.6–5.5)
PROT SERPL-MCNC: 6.3 G/DL (ref 6–8.2)
RBC # BLD AUTO: 4.73 M/UL (ref 4.7–6.1)
SODIUM SERPL-SCNC: 139 MMOL/L (ref 135–145)
WBC # BLD AUTO: 4.5 K/UL (ref 4.8–10.8)

## 2022-04-10 PROCEDURE — 700102 HCHG RX REV CODE 250 W/ 637 OVERRIDE(OP): Performed by: STUDENT IN AN ORGANIZED HEALTH CARE EDUCATION/TRAINING PROGRAM

## 2022-04-10 PROCEDURE — 770020 HCHG ROOM/CARE - TELE (206)

## 2022-04-10 PROCEDURE — A9270 NON-COVERED ITEM OR SERVICE: HCPCS | Performed by: STUDENT IN AN ORGANIZED HEALTH CARE EDUCATION/TRAINING PROGRAM

## 2022-04-10 PROCEDURE — 700111 HCHG RX REV CODE 636 W/ 250 OVERRIDE (IP): Performed by: STUDENT IN AN ORGANIZED HEALTH CARE EDUCATION/TRAINING PROGRAM

## 2022-04-10 PROCEDURE — C9113 INJ PANTOPRAZOLE SODIUM, VIA: HCPCS | Performed by: STUDENT IN AN ORGANIZED HEALTH CARE EDUCATION/TRAINING PROGRAM

## 2022-04-10 PROCEDURE — 36415 COLL VENOUS BLD VENIPUNCTURE: CPT

## 2022-04-10 PROCEDURE — 700101 HCHG RX REV CODE 250: Performed by: STUDENT IN AN ORGANIZED HEALTH CARE EDUCATION/TRAINING PROGRAM

## 2022-04-10 PROCEDURE — 99232 SBSQ HOSP IP/OBS MODERATE 35: CPT | Mod: GC | Performed by: FAMILY MEDICINE

## 2022-04-10 PROCEDURE — 82550 ASSAY OF CK (CPK): CPT

## 2022-04-10 PROCEDURE — 80053 COMPREHEN METABOLIC PANEL: CPT

## 2022-04-10 PROCEDURE — 51798 US URINE CAPACITY MEASURE: CPT

## 2022-04-10 PROCEDURE — 700111 HCHG RX REV CODE 636 W/ 250 OVERRIDE (IP): Performed by: FAMILY MEDICINE

## 2022-04-10 PROCEDURE — 85027 COMPLETE CBC AUTOMATED: CPT

## 2022-04-10 RX ORDER — LIDOCAINE 50 MG/G
1 PATCH TOPICAL EVERY 24 HOURS
Status: DISCONTINUED | OUTPATIENT
Start: 2022-04-10 | End: 2022-04-15 | Stop reason: HOSPADM

## 2022-04-10 RX ORDER — ZOLPIDEM TARTRATE 5 MG/1
5 TABLET ORAL NIGHTLY PRN
Status: DISCONTINUED | OUTPATIENT
Start: 2022-04-10 | End: 2022-04-15 | Stop reason: HOSPADM

## 2022-04-10 RX ORDER — CARBOXYMETHYLCELLULOSE SODIUM 5 MG/ML
2 SOLUTION/ DROPS OPHTHALMIC PRN
Status: DISCONTINUED | OUTPATIENT
Start: 2022-04-10 | End: 2022-04-15 | Stop reason: HOSPADM

## 2022-04-10 RX ORDER — POTASSIUM CHLORIDE 20 MEQ/1
40 TABLET, EXTENDED RELEASE ORAL ONCE
Status: DISCONTINUED | OUTPATIENT
Start: 2022-04-10 | End: 2022-04-10

## 2022-04-10 RX ORDER — ACETAMINOPHEN 325 MG/1
650 TABLET ORAL 3 TIMES DAILY
Status: DISCONTINUED | OUTPATIENT
Start: 2022-04-10 | End: 2022-04-15 | Stop reason: HOSPADM

## 2022-04-10 RX ORDER — OXYCODONE HYDROCHLORIDE 10 MG/1
10 TABLET ORAL EVERY 4 HOURS PRN
Status: DISCONTINUED | OUTPATIENT
Start: 2022-04-10 | End: 2022-04-15 | Stop reason: HOSPADM

## 2022-04-10 RX ORDER — IBUPROFEN 600 MG/1
600 TABLET ORAL 3 TIMES DAILY
Status: DISCONTINUED | OUTPATIENT
Start: 2022-04-10 | End: 2022-04-15 | Stop reason: HOSPADM

## 2022-04-10 RX ORDER — OXYCODONE HYDROCHLORIDE 5 MG/1
5 TABLET ORAL EVERY 4 HOURS PRN
Status: DISCONTINUED | OUTPATIENT
Start: 2022-04-10 | End: 2022-04-15 | Stop reason: HOSPADM

## 2022-04-10 RX ADMIN — GABAPENTIN 100 MG: 100 CAPSULE ORAL at 17:51

## 2022-04-10 RX ADMIN — ENOXAPARIN SODIUM 40 MG: 40 INJECTION SUBCUTANEOUS at 05:08

## 2022-04-10 RX ADMIN — METOPROLOL TARTRATE 25 MG: 25 TABLET, FILM COATED ORAL at 17:51

## 2022-04-10 RX ADMIN — ACETAMINOPHEN 650 MG: 325 TABLET, FILM COATED ORAL at 22:05

## 2022-04-10 RX ADMIN — METOPROLOL TARTRATE 25 MG: 25 TABLET, FILM COATED ORAL at 05:07

## 2022-04-10 RX ADMIN — PANTOPRAZOLE SODIUM 40 MG: 40 INJECTION, POWDER, LYOPHILIZED, FOR SOLUTION INTRAVENOUS at 05:08

## 2022-04-10 RX ADMIN — IBUPROFEN 600 MG: 600 TABLET ORAL at 12:40

## 2022-04-10 RX ADMIN — OXYCODONE HYDROCHLORIDE 10 MG: 10 TABLET ORAL at 22:07

## 2022-04-10 RX ADMIN — MORPHINE SULFATE 0.5 MG: 4 INJECTION INTRAVENOUS at 08:03

## 2022-04-10 RX ADMIN — CALCITRIOL CAPSULES 0.25 MCG 0.25 MCG: 0.25 CAPSULE ORAL at 05:07

## 2022-04-10 RX ADMIN — IBUPROFEN 600 MG: 600 TABLET ORAL at 16:21

## 2022-04-10 RX ADMIN — MORPHINE SULFATE 0.5 MG: 4 INJECTION INTRAVENOUS at 01:43

## 2022-04-10 RX ADMIN — CARBOXYMETHYLCELLULOSE SODIUM 2 DROP: 5 SOLUTION/ DROPS OPHTHALMIC at 14:08

## 2022-04-10 RX ADMIN — IBUPROFEN 600 MG: 600 TABLET ORAL at 22:05

## 2022-04-10 RX ADMIN — GABAPENTIN 100 MG: 100 CAPSULE ORAL at 05:07

## 2022-04-10 RX ADMIN — THERA TABS 1 TABLET: TAB at 05:07

## 2022-04-10 RX ADMIN — LIDOCAINE 1 PATCH: 50 PATCH TOPICAL at 14:07

## 2022-04-10 RX ADMIN — SENNOSIDES AND DOCUSATE SODIUM 2 TABLET: 50; 8.6 TABLET ORAL at 17:51

## 2022-04-10 RX ADMIN — TAMSULOSIN HYDROCHLORIDE 0.4 MG: 0.4 CAPSULE ORAL at 05:06

## 2022-04-10 RX ADMIN — SENNOSIDES AND DOCUSATE SODIUM 2 TABLET: 50; 8.6 TABLET ORAL at 05:06

## 2022-04-10 RX ADMIN — FAMOTIDINE 20 MG: 20 TABLET ORAL at 05:07

## 2022-04-10 RX ADMIN — OXYCODONE HYDROCHLORIDE 5 MG: 5 TABLET ORAL at 10:50

## 2022-04-10 RX ADMIN — GABAPENTIN 100 MG: 100 CAPSULE ORAL at 10:50

## 2022-04-10 RX ADMIN — ACETAMINOPHEN 650 MG: 325 TABLET, FILM COATED ORAL at 16:21

## 2022-04-10 RX ADMIN — OXYCODONE HYDROCHLORIDE 5 MG: 5 TABLET ORAL at 17:51

## 2022-04-10 ASSESSMENT — FIBROSIS 4 INDEX: FIB4 SCORE: 9.04

## 2022-04-10 ASSESSMENT — PAIN SCALES - WONG BAKER: WONGBAKER_NUMERICALRESPONSE: HURTS EVEN MORE

## 2022-04-10 ASSESSMENT — PAIN DESCRIPTION - PAIN TYPE
TYPE: ACUTE PAIN

## 2022-04-10 NOTE — PROGRESS NOTES
[facility]ESS [facility]     Attending: Dr. Dwyer    Resident: Nick Abdul M.D., PGY-3    PATIENT: Luigi Walters; [id number]; 1940    ID: 81 y.o. 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    SUBJECTIVE: No acute events overnight. He reports he is back to baseline with his mentation. He is speaking in full sentences and his family reports he is back to normal as well. He is tolerating PO intake well. His family brought in his medications from home and is concerned that he takes too many medications that cause him to be sedated. They are interested in him discharging to a rehab or SNF location prior to discharging home with his wife.     He is no longer having myoclonic jerks.     OBJECTIVE:     Vitals:    04/10/22 0456 04/10/22 0802 04/10/22 0803 04/10/22 1151   BP: 153/89 151/101  157/87   Pulse: 78 82  87   Resp: 18 18  18   Temp: 36.5 °C (97.7 °F) 36.6 °C (97.8 °F)  36.4 °C (97.6 °F)   TempSrc: Temporal Temporal  Temporal   SpO2: 94% 90%  93%   Weight:   99.9 kg (220 lb 3.8 oz)    Height:           Intake/Output Summary (Last 24 hours) at 4/10/2022 1203  Last data filed at 4/10/2022 0800  Gross per 24 hour   Intake 4096 ml   Output 3550 ml   Net 546 ml       PE:  General: No acute distress, resting comfortably in bed.  HEENT: NC/AT. PERRLA. EOMI. MMM  Cardiovascular: RRR with no M/R/G.  Respiratory: Symmetrical chest. CTAB with no W/R/R  Abdomen: soft, NT/ND, no masses, +BS   EXT:  RUDD, 5/5 strength, 2+ pulses   Neuro: non focal with no numbness, tingling or changes in sensation, Cranial nerves grossly intact    LABS:  Recent Labs     04/08/22  0726 04/09/22  1000 04/10/22  1023   WBC 7.4 4.7* 4.5*   RBC 4.85 4.95 4.73   HEMOGLOBIN 14.7 14.9 14.3   HEMATOCRIT 44.2 45.0 42.7   MCV 91.1 90.9 90.3   MCH 30.3 30.1 30.2   RDW 44.0 43.0 41.0   PLATELETCT  102* 86* 94*   MPV 11.2 11.8 12.1   NEUTSPOLYS 83.90* 75.40*  --    LYMPHOCYTES 7.40* 12.40*  --    MONOCYTES 7.80 8.90  --    EOSINOPHILS 0.30 2.50  --    BASOPHILS 0.10 0.40  --      Recent Labs     04/08/22  0726 04/09/22  1000 04/10/22  1023   SODIUM 144 140 139   POTASSIUM 4.4 3.5* 4.1   CHLORIDE 111 109 110   CO2 22 19* 18*   BUN 23* 13 13   CREATININE 0.95 0.84 0.85   CALCIUM 9.1 8.8 8.6   ALBUMIN 3.7 3.5 3.5     Estimated GFR/CRCL = Estimated Creatinine Clearance: 82 mL/min (by C-G formula based on SCr of 0.85 mg/dL).  Recent Labs     04/08/22  0726 04/09/22  1000 04/10/22  1023   GLUCOSE 128* 119* 94     Recent Labs     04/08/22  0726 04/09/22  1000 04/10/22  1023   ASTSGOT 75* 82* 67*   ALTSGPT 30 73* 86*   TBILIRUBIN 1.1 1.6* 1.1   ALKPHOSPHAT 94 96 98   GLOBULIN 2.9 3.0 2.8     Recent Labs     04/08/22 0726 04/09/22  1000 04/10/22  1023   CPKTOTAL 688* 166* 72         No results for input(s): INR, APTT, FIBRINOGEN in the last 72 hours.    Invalid input(s): DIMER      IMAGING:   US bladder: Distended urinary bladder. Volume of 919 mL.    MEDS:  Current Facility-Administered Medications   Medication Last Admin   • oxyCODONE immediate-release (ROXICODONE) tablet 5 mg 5 mg at 04/10/22 1050   • oxyCODONE immediate release (ROXICODONE) tablet 10 mg     • potassium chloride SA (Kdur) tablet 40 mEq     • zolpidem (AMBIEN) tablet 5 mg     • ibuprofen (MOTRIN) tablet 600 mg     • acetaminophen (Tylenol) tablet 650 mg     • gabapentin (NEURONTIN) capsule 100 mg 100 mg at 04/10/22 1050   • melatonin tablet 5 mg 5 mg at 04/09/22 2220   • promethazine (PHENERGAN) suppository 25 mg 25 mg at 04/08/22 0602   • enoxaparin (LOVENOX) inj 40 mg 40 mg at 04/10/22 0508   • pantoprazole (Protonix) injection 40 mg 40 mg at 04/10/22 0508   • metoprolol tartrate (LOPRESSOR) tablet 25 mg 25 mg at 04/10/22 0507   • Metoprolol Tartrate (LOPRESSOR) injection 5 mg     • calcitRIOL (ROCALTROL) capsule 0.25 mcg 0.25 mcg at 04/10/22 0507    • famotidine (PEPCID) tablet 20 mg 20 mg at 04/10/22 0507   • multivitamin (THERAGRAN) tablet 1 Tablet 1 Tablet at 04/10/22 0507   • promethazine (PHENERGAN) tablet 25 mg 25 mg at 04/08/22 1246   • tamsulosin (FLOMAX) capsule 0.4 mg 0.4 mg at 04/10/22 0506   • senna-docusate (PERICOLACE or SENOKOT S) 8.6-50 MG per tablet 2 Tablet 2 Tablet at 04/10/22 0506    And   • polyethylene glycol/lytes (MIRALAX) PACKET 1 Packet      And   • magnesium hydroxide (MILK OF MAGNESIA) suspension 30 mL      And   • bisacodyl (DULCOLAX) suppository 10 mg     • hydrALAZINE (APRESOLINE) injection 10 mg     • [Held by provider] ondansetron (ZOFRAN) syringe/vial injection 4 mg     • [Held by provider] ondansetron (ZOFRAN ODT) dispertab 4 mg         PROBLEM LIST:    ASSESSMENT/PLAN:   81 y.o. male with an extensive medical history of Meckel's cell carcinoma, sick sinus syndrome with pacemaker, ascites, cirrhosis, hepatic encephalopathy, non-Hodgekin's lymphoma, HTN, prostate cancer, polycythemia, seizure, and stroke who presented with altered mental status. His mental status has resolved, and now we are working towards pain control from his T-12 fracture prior to discharge.      # Altered mental status  # Muscle jerks  #Merkel Cell Carcinoma   #Non-hodgkins Lymphoma   # Polypharmacy  Patient presented with altered mental status 4/6/2022. He now reports memory of events leading up to hospitalization and during hospitalization, making acute delirium less likely etiology.  He was experiencing myclonic jerks and hypertonia that he previously experienced after getting IV contrast for a CT.  Given his significant history of thoracic and lumbar fractures on CT scan, concern for possible spinal cord injury or underlying brain mass, but as these jerks have stopped, this is less likely. May have been due to reaction to IV contrast.   -EEG showed non-specific abnormalities   -Metabolic work up including chem panel, CPK, and ammonia has been  reassuring   -Toxicology positive for oxycodone, otherwise nml   -Infectious: UA reassuring. No leukocytosis. No source of infection.  Previous hepatitis and HIV serology unremarkable.  -Medication review: History of multiple sedating medications including tramadol, Ambien and oxycodone.  However given persistence of altered mental status unlikely to be contributory.  Imaging:  · 3/8/2022 PET scan  did show increased uptake in brain and spinal cord concerning for metastatic disease.  · 3/18/2022 CT-guided thoracic needle biopsy  · 4/6/2022 CT head without contrast negative for hemorrhagic stroke  · 4/6/2022 CT head with contrast negative for occlusive disease or aneurysm  · 4/6/2022 CT neck with contrast negative for high-grade occlusive disease  · 4/6/2022 CT thoracic and lumbar spine showed multiple compression fractures  · Brain and cervical MRI ordered, however patient's pacemaker has been interrogated and not compatible with MRI, so these will not be done.   -Neurology has signed off the case.  Per neurology, brain MRI and cervical spine MRI would be helpful, but not required prior to discharge.  Plan:  -Continue telemetry monitoring  -Follow-up blood cultures and urine cultures, NGTD  -Continue to minimize sedating medications. Reviewed home medications with the family. He will stop taking baclofen, benadryl, and oxybutynin daily at home. Will also work towards decreasing opioid and Ambien use at home.   -Pain control with scheduled ibuprofen and tylenol. Low dose oxy for break through pain. Added gabapentin as well. Lidocaine on back daily.  -SLP/PT/OT consult  -PMR consult placed to determine if patient is a good candidate for rehab. He would like to go to rehab to build up his strength.   - discharge with prescription for Narcan    # Insomnia:   Patient takes either ambien 10mg or 3 tabs of benadryl nightly with ropinerole. He has been instructed to stop ropinerole. Will trial Ambien 5mg tonight.   -  outpatient referral to sleep medicine as family concerned of apneic events at home. No events while inpatient, but he has slept <5 hours since admission.     #Elevated CPK, resolved  -CPK elevated likely secondary to dystonia. Normal today. No longer need to trend.      #Thrombocytopenia   -Chronic   -CTM platelets   - >50,000. Can start NSAIDs. If drops <50,000, will discontinue.    #Tachycardia  Patient presented with chest pain on admission.  He has received multiple EKGs and troponins since admission that have been reassuring.  He did have runs of V. tach on telemetry monitoring 4/7/2022, however after further evaluation with cardiology it was not actually V. tach and rather atrial tachycardia with ventricular tracking.  -Continue to monitor on telemetry  -4/8/2022 ECHO LVEF 65%     #Prolonged QTc  -Avoid QTc prolonging medications     #HTN  Continue home regimen      # BPH  Continue Flomax     Abx: None  Lines: PIV  IVF: D5NS  DVT PPX: SCDs and Lovenox  Allen: Yes  Dispo:Inpatient telemetry     Code Status:  Full code  Nick Abdul M.D., PGY-3  [facility]

## 2022-04-10 NOTE — CARE PLAN
The patient is Stable - Low risk of patient condition declining or worsening    Shift Goals  Clinical Goals: pain control  Patient Goals: rest  Family Goals: HEATHER    Progress made toward(s) clinical / shift goals:  pt updated on POC, pain medicated per MAR, bed locked and in lowest position, [id number] checks in place    Patient is not progressing towards the following goals:

## 2022-04-10 NOTE — CARE PLAN
The patient is Stable - Low risk of patient condition declining or worsening    Shift Goals  Clinical Goals: Pain control and stable neuro exams  Patient Goals: Sleep  Family Goals: HEATHER    Progress made toward(s) clinical / shift goals:      Problem: Knowledge Deficit - Standard  Goal: Patient and family/care givers will demonstrate understanding of plan of care, disease process/condition, diagnostic tests and medications  Outcome: Progressing  Patient verbalizes understanding of plan of care and medication regimen      Problem: Skin Integrity  Goal: Skin integrity is maintained or improved  Outcome: Progressing  Skin integrity has been maintained with prevention measures     Problem: Pain - Standard  Goal: Alleviation of pain or a reduction in pain to the patient’s comfort goal  Outcome: Progressing  Patient verbalizes pain and adequately discusses this with the care team, medications have been updated as seen fit by provider.        Patient is not progressing towards the following goals: NA

## 2022-04-10 NOTE — PROGRESS NOTES
Monitor Summary: SR/Paced 75-95, in and out of a BBB with rare and occasional PVCs, bigem, and trigem per strip from monitor room.

## 2022-04-11 LAB
BACTERIA BLD CULT: NORMAL
BACTERIA BLD CULT: NORMAL
SIGNIFICANT IND 70042: NORMAL
SIGNIFICANT IND 70042: NORMAL
SITE SITE: NORMAL
SITE SITE: NORMAL
SOURCE SOURCE: NORMAL
SOURCE SOURCE: NORMAL

## 2022-04-11 PROCEDURE — 700102 HCHG RX REV CODE 250 W/ 637 OVERRIDE(OP): Performed by: STUDENT IN AN ORGANIZED HEALTH CARE EDUCATION/TRAINING PROGRAM

## 2022-04-11 PROCEDURE — A9270 NON-COVERED ITEM OR SERVICE: HCPCS | Performed by: STUDENT IN AN ORGANIZED HEALTH CARE EDUCATION/TRAINING PROGRAM

## 2022-04-11 PROCEDURE — 700101 HCHG RX REV CODE 250: Performed by: STUDENT IN AN ORGANIZED HEALTH CARE EDUCATION/TRAINING PROGRAM

## 2022-04-11 PROCEDURE — 700111 HCHG RX REV CODE 636 W/ 250 OVERRIDE (IP): Performed by: FAMILY MEDICINE

## 2022-04-11 PROCEDURE — 97530 THERAPEUTIC ACTIVITIES: CPT

## 2022-04-11 PROCEDURE — 770020 HCHG ROOM/CARE - TELE (206)

## 2022-04-11 PROCEDURE — 99232 SBSQ HOSP IP/OBS MODERATE 35: CPT | Mod: GC | Performed by: FAMILY MEDICINE

## 2022-04-11 PROCEDURE — 97116 GAIT TRAINING THERAPY: CPT

## 2022-04-11 RX ADMIN — METOPROLOL TARTRATE 25 MG: 25 TABLET, FILM COATED ORAL at 04:19

## 2022-04-11 RX ADMIN — ACETAMINOPHEN 650 MG: 325 TABLET, FILM COATED ORAL at 16:06

## 2022-04-11 RX ADMIN — CALCITRIOL CAPSULES 0.25 MCG 0.25 MCG: 0.25 CAPSULE ORAL at 04:18

## 2022-04-11 RX ADMIN — SENNOSIDES AND DOCUSATE SODIUM 2 TABLET: 50; 8.6 TABLET ORAL at 04:19

## 2022-04-11 RX ADMIN — ACETAMINOPHEN 650 MG: 325 TABLET, FILM COATED ORAL at 19:28

## 2022-04-11 RX ADMIN — THERA TABS 1 TABLET: TAB at 04:18

## 2022-04-11 RX ADMIN — TAMSULOSIN HYDROCHLORIDE 0.4 MG: 0.4 CAPSULE ORAL at 04:20

## 2022-04-11 RX ADMIN — GABAPENTIN 100 MG: 100 CAPSULE ORAL at 16:06

## 2022-04-11 RX ADMIN — OXYCODONE HYDROCHLORIDE 10 MG: 10 TABLET ORAL at 19:28

## 2022-04-11 RX ADMIN — GABAPENTIN 100 MG: 100 CAPSULE ORAL at 11:13

## 2022-04-11 RX ADMIN — IBUPROFEN 600 MG: 600 TABLET ORAL at 19:28

## 2022-04-11 RX ADMIN — IBUPROFEN 600 MG: 600 TABLET ORAL at 16:05

## 2022-04-11 RX ADMIN — LIDOCAINE 1 PATCH: 50 PATCH TOPICAL at 11:11

## 2022-04-11 RX ADMIN — ACETAMINOPHEN 650 MG: 325 TABLET, FILM COATED ORAL at 07:41

## 2022-04-11 RX ADMIN — GABAPENTIN 100 MG: 100 CAPSULE ORAL at 04:18

## 2022-04-11 RX ADMIN — OXYCODONE HYDROCHLORIDE 10 MG: 10 TABLET ORAL at 04:18

## 2022-04-11 RX ADMIN — ENOXAPARIN SODIUM 40 MG: 40 INJECTION SUBCUTANEOUS at 04:18

## 2022-04-11 RX ADMIN — IBUPROFEN 600 MG: 600 TABLET ORAL at 07:41

## 2022-04-11 RX ADMIN — METOPROLOL TARTRATE 25 MG: 25 TABLET, FILM COATED ORAL at 16:06

## 2022-04-11 RX ADMIN — OXYCODONE HYDROCHLORIDE 10 MG: 10 TABLET ORAL at 11:12

## 2022-04-11 RX ADMIN — FAMOTIDINE 20 MG: 20 TABLET ORAL at 04:20

## 2022-04-11 ASSESSMENT — GAIT ASSESSMENTS
GAIT LEVEL OF ASSIST: MODERATE ASSIST
DEVIATION: DECREASED BASE OF SUPPORT;BRADYKINETIC
ASSISTIVE DEVICE: FRONT WHEEL WALKER
DISTANCE (FEET): 125

## 2022-04-11 ASSESSMENT — COGNITIVE AND FUNCTIONAL STATUS - GENERAL
WALKING IN HOSPITAL ROOM: A LITTLE
SUGGESTED CMS G CODE MODIFIER MOBILITY: CL
MOBILITY SCORE: 14
TURNING FROM BACK TO SIDE WHILE IN FLAT BAD: A LITTLE
STANDING UP FROM CHAIR USING ARMS: A LITTLE
MOVING FROM LYING ON BACK TO SITTING ON SIDE OF FLAT BED: UNABLE
CLIMB 3 TO 5 STEPS WITH RAILING: A LITTLE
MOVING TO AND FROM BED TO CHAIR: UNABLE

## 2022-04-11 ASSESSMENT — PAIN DESCRIPTION - PAIN TYPE
TYPE: ACUTE PAIN

## 2022-04-11 NOTE — THERAPY
Physical Therapy   Daily Treatment     Patient Name: Luigi Walters  Age:  81 y.o., Sex:  male  Medical Record #: [id number]  Today's Date: 4/11/2022     Precautions  Precautions: Fall Risk;Swallow Precautions ( See Comments)    Assessment    Pt seen for PT treatment session. Profound improvement in functional mobility noted today compared to initial PT evaluation. Pt was able to complete functional mobility and gait with FWW at Min A as outlined below. Overall improved activity tolerance, but remains a high fall risk due to intermittent shuffled steps leading to LOB which required Min - Mod A to correct (pt experienced 3 such LOB during ambulation). PT will continue to follow, Goals updated.     Plan    Continue current treatment plan.      DC Equipment Recommendations: Unable to determine at this time  Discharge Recommendations: Recommend post-acute placement for additional physical therapy services prior to discharge home (Consider a PHYSIATRY CONSULT)     04/11/22 2589   Precautions   Precautions Fall Risk;Swallow Precautions ( See Comments)   Vitals   Pulse Oximetry 90 %  (-95% during ambulation)   O2 Delivery Device None - Room Air   Pain 0 - 10 Group   Therapist Pain Assessment During Activity;Nurse Notified  (back pain, not rated)   Cognition    Level of Consciousness Alert   Attention Impaired   Comments pleasant and motivated to work with PT. Does not recall inital evaluation performed last week   Active ROM Lower Body    Active ROM Lower Body  WDL   Strength Lower Body   Lower Body Strength  X   Gross Strength Generalized Weakness, Equal Bilaterally   Sitting Lower Body Exercises   Sitting Lower Body Exercises Yes   Long Arc Quad Bilateral;1 set of 10   Standing Lower Body Exercises   Standing Lower Body Exercises Yes   Marching 1 set of 10   Balance   Sitting Balance (Static) Fair +   Sitting Balance (Dynamic) Fair   Standing Balance (Static) Fair -   Standing Balance (Dynamic) Fair -   Weight Shift  Sitting Fair   Weight Shift Standing Fair   Skilled Intervention Verbal Cuing;Sequencing;Compensatory Strategies   Comments intermittent posterior LOB during sit to stand transition and stumble steps with ambulation and turns particularly to R   Gait Analysis   Gait Level Of Assist Moderate Assist  (-> Min A depending on balance)   Assistive Device Front Wheel Walker   Distance (Feet) 125   # of Times Distance was Traveled 1   Deviation Decreased Base Of Support;Bradykinetic  (decreased step length)   # of Stairs Climbed 0   Weight Bearing Status no restrictions   Skilled Intervention Verbal Cuing;Tactile Cuing   Comments High fall risk noted with intermittent, sudden shuffle gait wiht narrow CHERRIE requiring Mod A to recover balance and normalize step length/gait pattern. Greater difficulty with turning R than L   Bed Mobility    Supine to Sit Moderate Assist   Sit to Supine Minimal Assist   Scooting Contact Guard Assist  (seated)   Rolling Minimum Assist to Lt.   Skilled Intervention Verbal Cuing;Sequencing;Compensatory Strategies   Comments utilized log roll to ease OOB   Functional Mobility   Sit to Stand Minimal Assist   Bed, Chair, Wheelchair Transfer Minimal Assist   Transfer Method Stand Step   Skilled Intervention Verbal Cuing;Tactile Cuing   How much difficulty does the patient currently have...   Turning over in bed (including adjusting bedclothes, sheets and blankets)? 3   Sitting down on and standing up from a chair with arms (e.g., wheelchair, bedside commode, etc.) 1   Moving from lying on back to sitting on the side of the bed? 1   How much help from another person does the patient currently need...   Moving to and from a bed to a chair (including a wheelchair)? 3   Need to walk in a hospital room? 3   Climbing 3-5 steps with a railing? 3   6 clicks Mobility Score 14   Activity Tolerance   Sitting Edge of Bed 5 min total   Standing 15 min total   Short Term Goals    Short Term Goal # 1 supine to/from  sit EOB with min assist in 6 visits   Goal Outcome # 1 Progressing as expected   Short Term Goal # 2 static sitting balance fair with use of UE as needed in 6 visits   Goal Outcome # 2 Goal met   Short Term Goal # 3 sit to stand from EOB with min assist in 6 visits   Goal Outcome # 3 Goal met, new goal added   Short Term Goal # 3 B Pt will perform sit <> stand and transfers with SPV to improve mobliity independence in 6 visits   Short Term Goal # 4 pt will ambulate > 200 ft with LRAD and SPV to access community in 6 visits   Short Term Goal # 5 pt will negotiate 2 steps with LRAD and SPV to access home environment in 6 visits   Anticipated Discharge Equipment and Recommendations   DC Equipment Recommendations Unable to determine at this time   Discharge Recommendations Recommend post-acute placement for additional physical therapy services prior to discharge home  (Consider a PHYSIATRY CONSULT)   Interdisciplinary Plan of Care Collaboration   IDT Collaboration with  Nursing   Patient Position at End of Therapy In Bed;Bed Alarm On;Call Light within Reach;Tray Table within Reach   Collaboration Comments aware of PT session and recs   Session Information   Date / Session Number  4/11- 2 (2/3, 4/13)

## 2022-04-11 NOTE — DISCHARGE PLANNING
Anticipated Discharge Disposition:   SNF for rehab    Action:    Spoke with patient, spouse, Shanon and daughter in hospital room. Pt and spouse live in [place] with their dog.  Pt stated he is a retired .  He has non hodgkin lymphoma and merckle's cancer.  He has a fx at [facility].  He hasn't been able to walk at home and been staying on the couch for 2 weeks.  He's able to move himself in bed at this time.  He stated he has not been out of bed here.  Discussed SNF for rehab.  They requested referrals to [facility].  Choice form faxed to [facility].  Pt stated his daughter lives in [place], [place] and their son lives in [place].    Barriers to Discharge:    SNF acceptance  Medical clearance    Plan:    F/U on referrals.

## 2022-04-11 NOTE — CARE PLAN
The patient is Stable - Low risk of patient condition declining or worsening    Shift Goals  Clinical Goals: pain control and monitor neuro status  Patient Goals: Sleep/ Pain  Family Goals: HEATHER    Progress made toward(s) clinical / shift goals:    Problem: Knowledge Deficit - Standard  Goal: Patient and family/care givers will demonstrate understanding of plan of care, disease process/condition, diagnostic tests and medications  Outcome: Progressing     Problem: Skin Integrity  Goal: Skin integrity is maintained or improved  Outcome: Progressing     Problem: Fall Risk  Goal: Patient will remain free from falls  Outcome: Progressing     Problem: Pain - Standard  Goal: Alleviation of pain or a reduction in pain to the patient’s comfort goal  Outcome: Progressing       Patient is not progressing towards the following goals:

## 2022-04-11 NOTE — PROGRESS NOTES
[facility]     Attending: Dr. Page     Senior Resident: Chaitanya Echeverria D.O.  Family Medicine Resident PGY-2      PATIENT: Luigi Walters; [id number]; 1940 Hospital Day: 6    ID: 81 y.o. 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    SUBJECTIVE: Patient reports experiencing sudden onset of shortness of breath overnight, which she relates to similar presentation he experienced prior to admission.  He describes sensation of feeling in between awake and asleep throughout the night.  He expressed concerns for sleep apnea.  He continues to experience back pain.  Denies chest pain, shortness of breath, or abdominal pain.    OBJECTIVE:     Vitals:    04/10/22 1525 04/10/22 2001 04/10/22 2329 04/11/22 0527   BP: 142/80 156/86 156/104 145/90   Pulse: 78 79 100 72   Resp: 18 18 18 17   Temp: 36.4 °C (97.6 °F) 36.5 °C (97.7 °F) 36.5 °C (97.7 °F) 36.1 °C (97 °F)   TempSrc: Temporal Temporal Temporal Temporal   SpO2: 96% 96% 95% 95%   Weight:       Height:           Intake/Output Summary (Last 24 hours) at 4/11/2022 0643  Last data filed at 4/10/2022 1600  Gross per 24 hour   Intake 4596 ml   Output 2350 ml   Net 2246 ml       PE:  General: No acute distress, resting comfortably in bed.  HEENT: NC/AT. EOMI. MMM  Cardiovascular: RRR with no M/R/G.  Respiratory: Symmetrical chest. CTAB with no W/R/R  Abdomen: soft, NT/ND, no masses, +BS   EXT: No lower extremity edema.  MSK: Upper and lower extremity motor grossly intact  Neuro: No focal deficits.  Sensation grossly intact.    LABS:  Recent Labs     04/08/22  0726 04/09/22  1000 04/10/22  1023   WBC 7.4 4.7* 4.5*   RBC 4.85 4.95 4.73   HEMOGLOBIN 14.7 14.9 14.3   HEMATOCRIT 44.2 45.0 42.7   MCV 91.1 90.9 90.3   MCH 30.3 30.1 30.2   RDW 44.0 43.0 41.0   PLATELETCT 102* 86* 94*   MPV 11.2 11.8 12.1  "  NEUTSPOLYS 83.90* 75.40*  --    LYMPHOCYTES 7.40* 12.40*  --    MONOCYTES 7.80 8.90  --    EOSINOPHILS 0.30 2.50  --    BASOPHILS 0.10 0.40  --      Recent Labs     04/08/22  0726 04/09/22  1000 04/10/22  1023   SODIUM 144 140 139   POTASSIUM 4.4 3.5* 4.1   CHLORIDE 111 109 110   CO2 22 19* 18*   BUN 23* 13 13   CREATININE 0.95 0.84 0.85   CALCIUM 9.1 8.8 8.6   ALBUMIN 3.7 3.5 3.5     Estimated GFR/CRCL = Estimated Creatinine Clearance: 82 mL/min (by C-G formula based on SCr of 0.85 mg/dL).  Recent Labs     04/08/22  0726 04/09/22  1000 04/10/22  1023   GLUCOSE 128* 119* 94     Recent Labs     04/08/22  0726 04/09/22  1000 04/10/22  1023   ASTSGOT 75* 82* 67*   ALTSGPT 30 73* 86*   TBILIRUBIN 1.1 1.6* 1.1   ALKPHOSPHAT 94 96 98   GLOBULIN 2.9 3.0 2.8     Recent Labs     04/08/22  0726 04/09/22  1000 04/10/22  1023   CPKTOTAL 688* 166* 72         No results for input(s): INR, APTT, FIBRINOGEN in the last 72 hours.    Invalid input(s): DIMER    MICROBIOLOGY:   Results     Procedure Component Value Units Date/Time    Urine Culture (NEW) [[id number]] Collected: 04/06/22 1146    Order Status: Completed Specimen: Urine Updated: 04/08/22 0745     Significant Indicator NEG     Source UR     Site -     Culture Result Usual skin trevor <10,000 cfu/mL    Narrative:      Indication for culture:->Evaluation for sepsis without a  clear source of infection  Indication for culture:->Evaluation for sepsis without a    Blood Culture [[id number]] Collected: 04/06/22 1257    Order Status: Completed Specimen: Blood from Peripheral Updated: 04/07/22 0905     Significant Indicator NEG     Source BLD     Site PERIPHERAL     Culture Result No Growth  Note: Blood cultures are incubated for 5 days and  are monitored continuously.Positive blood cultures  are called to the RN and reported as soon as  they are identified.      Narrative:      1 of 2 for Blood Culture x 2 sites order. Per Hospital  Policy: Only change Specimen Src: to \"Line\" if " "specified by  physician order.  No site indicated    Blood Culture [[id number]] Collected: 04/06/22 1257    Order Status: Completed Specimen: Blood from Peripheral Updated: 04/07/22 0905     Significant Indicator NEG     Source BLD     Site PERIPHERAL     Culture Result No Growth  Note: Blood cultures are incubated for 5 days and  are monitored continuously.Positive blood cultures  are called to the RN and reported as soon as  they are identified.      Narrative:      2 of 2 blood culture x2  Sites order. Per Hospital Policy:  Only change Specimen Src: to \"Line\" if specified by physician  order.  No site indicated    Urinalysis [[id number]] Collected: 04/06/22 1146    Order Status: Completed Specimen: Blood Updated: 04/06/22 1236     Color Yellow     Character Clear     Specific Gravity 1.013     Ph 6.5     Glucose Negative mg/dL      Ketones Negative mg/dL      Protein Negative mg/dL      Bilirubin Negative     Urobilinogen, Urine 0.2     Nitrite Negative     Leukocyte Esterase Negative     Occult Blood Negative     Micro Urine Req see below     Comment: Microscopic examination not performed when specimen is clear  and chemically negative for protein, blood, leukocyte esterase  and nitrite.         Narrative:      Indication for culture:->Evaluation for sepsis without a  clear source of infection    URINE CULTURE(NEW) [[id number]] Collected: 04/06/22 0000    Order Status: Canceled Specimen: Other from Urine, Clean Catch           IMAGING:   US-BLADDER   Final Result      1.  Distended urinary bladder.      EC-ECHOCARDIOGRAM LTD W/O CONT   Final Result      CT-TSPINE W/O PLUS RECONS   Final Result      1.  Acute/subacute mild T12 superior endplate compression fracture.   2.  Age-indeterminate without likely chronic mild T1, T3, T4 and L1 compression fractures.      CT-LSPINE W/O PLUS RECONS   Final Result      1.  Acute-subacute wedge compression fracture of T12 with trace retropulsion.   2.  Age-indeterminate L1 " compression deformity. Correlate with point tenderness.   3.  Degenerative changes of the lower lumbar spine.   4.  Moderate bilateral hydroureteronephrosis.      DX-CHEST-PORTABLE (1 VIEW)   Final Result      No acute cardiopulmonary abnormality.      CT-CEREBRAL PERFUSION ANALYSIS   Final Result      1.  Cerebral blood flow less than 30% likely representing completed infarct = 0 mL.      2.  T Max more than 6 seconds likely representing combination of completed infarct and ischemia = 4 mL.      3.  Mismatched volume likely representing ischemic brain/penumbra = 4 mL      4.  Please note that the cerebral perfusion was performed on the limited brain tissue around the basal ganglia region. Infarct/ischemia outside the CT perfusion sections can be missed in this study.      CT-CTA HEAD WITH & W/O-POST PROCESS   Final Result      No large vessel occlusion, hemodynamically significant stenosis or aneurysm is seen.      CT-CTA NECK WITH & W/O-POST PROCESSING   Final Result      1.  Moderate atherosclerotic narrowing of the proximal right internal carotid artery.   2.  Significant streak artifact adjacent to the distal left vertebral artery provides severely suboptimal evaluation. There is contrast proximally and distally, however high-grade stenosis cannot be entirely excluded.   3.  Suboptimal evaluation of the proximal left and bilateral vertebral arteries due to streak artifact.   4.  No high-grade stenosis or dissection in the visualized portions of the arteries of the neck.   5.  6 mm left upper lobe pulmonary nodule that appears similar to prior PET/CT.   6.  Remote appearing superior endplate deformity of T3.      CT-HEAD W/O   Final Result      1.  Cerebral atrophy.   2.  No evidence of acute infarction or acute hemorrhage or mass lesion.         MR-BRAIN-WITH & W/O    (Results Pending)   MR-CERVICAL SPINE-WITH & W/O    (Results Pending)         MEDS:  Current Facility-Administered Medications   Medication Last  Admin   • oxyCODONE immediate-release (ROXICODONE) tablet 5 mg 5 mg at 04/10/22 1751   • oxyCODONE immediate release (ROXICODONE) tablet 10 mg 10 mg at 04/11/22 0418   • zolpidem (AMBIEN) tablet 5 mg     • ibuprofen (MOTRIN) tablet 600 mg 600 mg at 04/10/22 2205   • acetaminophen (Tylenol) tablet 650 mg 650 mg at 04/10/22 2205   • lidocaine (LIDODERM) 5 % 1 Patch 1 Patch at 04/10/22 1407   • carboxymethylcellulose (REFRESH TEARS) 0.5 % ophthalmic drops 2 Drop 2 Drop at 04/10/22 1408   • gabapentin (NEURONTIN) capsule 100 mg 100 mg at 04/11/22 0418   • promethazine (PHENERGAN) suppository 25 mg 25 mg at 04/08/22 0602   • enoxaparin (LOVENOX) inj 40 mg 40 mg at 04/11/22 0418   • metoprolol tartrate (LOPRESSOR) tablet 25 mg 25 mg at 04/11/22 0419   • Metoprolol Tartrate (LOPRESSOR) injection 5 mg     • calcitRIOL (ROCALTROL) capsule 0.25 mcg 0.25 mcg at 04/11/22 0418   • famotidine (PEPCID) tablet 20 mg 20 mg at 04/11/22 0420   • multivitamin (THERAGRAN) tablet 1 Tablet 1 Tablet at 04/11/22 0418   • promethazine (PHENERGAN) tablet 25 mg 25 mg at 04/08/22 1246   • tamsulosin (FLOMAX) capsule 0.4 mg 0.4 mg at 04/11/22 0420   • senna-docusate (PERICOLACE or SENOKOT S) 8.6-50 MG per tablet 2 Tablet 2 Tablet at 04/11/22 0419    And   • polyethylene glycol/lytes (MIRALAX) PACKET 1 Packet      And   • magnesium hydroxide (MILK OF MAGNESIA) suspension 30 mL      And   • bisacodyl (DULCOLAX) suppository 10 mg     • hydrALAZINE (APRESOLINE) injection 10 mg     • [Held by provider] ondansetron (ZOFRAN) syringe/vial injection 4 mg     • [Held by provider] ondansetron (ZOFRAN ODT) dispertab 4 mg         PROBLEM LIST:  No problems updated.    ASSESSMENT/PLAN: 81 y.o. male with an extensive medical history of Meckel's cell carcinoma, sick sinus syndrome with pacemaker, ascites, cirrhosis, hepatic encephalopathy, non-Hodgekin's lymphoma, HTN, prostate cancer, polycythemia, seizure, and stroke who presented with altered mental status.  His mental status has resolved, and now we are working towards pain control from his T-12 fracture prior to discharge.      # Altered mental status  # Muscle jerks  #Merkel Cell Carcinoma   #Non-hodgkins Lymphoma   # Polypharmacy  Patient presented with altered mental status 4/6/2022. He now reports memory of events leading up to hospitalization and during hospitalization, making acute delirium less likely etiology.  He was experiencing myclonic jerks and hypertonia that he previously experienced after getting IV contrast for a CT.  Given his significant history of thoracic and lumbar fractures on CT scan, concern for possible spinal cord injury or underlying brain mass, but as these jerks have stopped, this is less likely. May have been due to reaction to IV contrast.   -EEG showed non-specific abnormalities   -Metabolic work up including chem panel, CPK, and ammonia has been reassuring   -Toxicology positive for oxycodone, otherwise nml   -Infectious: UA reassuring. No leukocytosis. No source of infection.  Previous hepatitis and HIV serology unremarkable.  -Medication review: History of multiple sedating medications including tramadol, Ambien and oxycodone.  However given persistence of altered mental status unlikely to be contributory.  Imaging:  · 3/8/2022 PET scan  did show increased uptake in brain and spinal cord concerning for metastatic disease.  · 3/18/2022 CT-guided thoracic needle biopsy  · 4/6/2022 CT head without contrast negative for hemorrhagic stroke  · 4/6/2022 CT head with contrast negative for occlusive disease or aneurysm  · 4/6/2022 CT neck with contrast negative for high-grade occlusive disease  · 4/6/2022 CT thoracic and lumbar spine showed multiple compression fractures  · Brain and cervical MRI ordered, however patient's pacemaker has been interrogated and not compatible with MRI, so these will not be done.   -Neurology has signed off the case.  Per neurology, brain MRI and cervical spine  MRI would be helpful, but not required prior to discharge.    Plan:  -Continue telemetry monitoring  -Follow-up blood cultures and urine cultures, NGTD  -Continue to minimize sedating medications.  Medications have been reviewed, and baclofen, Benadryl and oxybutynin home medications will be held.  In efforts to reduce sedation with opioid medications, will treat pain with ibuprofen and Tylenol. Gabapentin scheduled TID.  Low-dose oxycodone for breakthrough pain.  -SLP/PT/OT consult  -PMR consult placed to determine if patient is a good candidate for rehab. He would like to go to rehab to build up his strength.   - discharge with prescription for Narcan     # Insomnia:   Patient takes either ambien 10mg or 3 tabs of benadryl nightly with ropinerole.   -He did not receive ambien overnight, recommend trial of half dose tonight   -He has been instructed to stop ropinerole.   -Plan to trial different sleep medication if no improvement with ambien.   - outpatient referral to sleep medicine as family concerned of apneic events at home. No events while inpatient.      #Elevated CPK, resolved  -CPK elevated likely secondary to dystonia. Normal today. No longer need to trend.      #Thrombocytopenia   -Chronic   -CTM platelets   - >50,000. Can start NSAIDs. If drops <50,000, will discontinue.     #Tachycardia  Patient presented with chest pain on admission.  He has received multiple EKGs and troponins since admission that have been reassuring.  He did have runs of V. tach on telemetry monitoring 4/7/2022, however after further evaluation with cardiology it was not actually V. tach and rather atrial tachycardia with ventricular tracking.  -Continue to monitor on telemetry  -4/8/2022 ECHO LVEF 65%     #Prolonged QTc  -Avoid QTc prolonging medications     #HTN  Continue home regimen      # BPH  Continue Flomax     Abx: None  Lines: PIV  IVF: D5NS  DVT PPX: SCDs and Lovenox  Allen: Yes  Dispo:Inpatient telemetry     Code  Status:  Full code

## 2022-04-11 NOTE — PROGRESS NOTES
Monitor Summary: Paced , with rare PACs and PVCs and trigeminy per strip from monitor room.

## 2022-04-11 NOTE — DISCHARGE PLANNING
[facility] Transitional Care Coordination    Referral from:  Dr. Abdul    Insurance Provider on Facesheet: MCR/WALTP    Potential Rehab Diagnosis: TBD    Chart review indicates patient may have on going medical management and may have therapy needs to possibly meet inpatient rehab facility criteria with the goal of returning to community.    D/C support: TBD     Physiatry consultation forwarded per protocol.     Would appreciate updated [place] evals once appropriate.      Thank you for the referral.

## 2022-04-11 NOTE — CARE PLAN
The patient is Stable - Low risk of patient condition declining or worsening    Shift Goals  Clinical Goals: pain control  Patient Goals: sleep/pain  Family Goals: HEATHER    Progress made toward(s) clinical / shift goals:  Pt.'s pain has remained controlled with medications administered as prescribed    Patient is not progressing towards the following goals:n/a

## 2022-04-11 NOTE — DISCHARGE PLANNING
Received Choice form at 1250  Agency/Facility Name: [facility]  Referral sent per Choice form @ 2228

## 2022-04-12 PROCEDURE — 700111 HCHG RX REV CODE 636 W/ 250 OVERRIDE (IP): Performed by: FAMILY MEDICINE

## 2022-04-12 PROCEDURE — 700101 HCHG RX REV CODE 250: Performed by: STUDENT IN AN ORGANIZED HEALTH CARE EDUCATION/TRAINING PROGRAM

## 2022-04-12 PROCEDURE — A9270 NON-COVERED ITEM OR SERVICE: HCPCS | Performed by: STUDENT IN AN ORGANIZED HEALTH CARE EDUCATION/TRAINING PROGRAM

## 2022-04-12 PROCEDURE — 99232 SBSQ HOSP IP/OBS MODERATE 35: CPT | Mod: GC | Performed by: FAMILY MEDICINE

## 2022-04-12 PROCEDURE — 770020 HCHG ROOM/CARE - TELE (206)

## 2022-04-12 PROCEDURE — 99223 1ST HOSP IP/OBS HIGH 75: CPT | Performed by: PHYSICAL MEDICINE & REHABILITATION

## 2022-04-12 PROCEDURE — 700102 HCHG RX REV CODE 250 W/ 637 OVERRIDE(OP): Performed by: STUDENT IN AN ORGANIZED HEALTH CARE EDUCATION/TRAINING PROGRAM

## 2022-04-12 PROCEDURE — 92526 ORAL FUNCTION THERAPY: CPT

## 2022-04-12 PROCEDURE — 97530 THERAPEUTIC ACTIVITIES: CPT

## 2022-04-12 PROCEDURE — 97535 SELF CARE MNGMENT TRAINING: CPT

## 2022-04-12 RX ORDER — CALCIUM CARBONATE 500 MG/1
1000 TABLET, CHEWABLE ORAL ONCE
Status: COMPLETED | OUTPATIENT
Start: 2022-04-12 | End: 2022-04-12

## 2022-04-12 RX ADMIN — GABAPENTIN 100 MG: 100 CAPSULE ORAL at 04:41

## 2022-04-12 RX ADMIN — IBUPROFEN 600 MG: 600 TABLET ORAL at 16:30

## 2022-04-12 RX ADMIN — TAMSULOSIN HYDROCHLORIDE 0.4 MG: 0.4 CAPSULE ORAL at 04:40

## 2022-04-12 RX ADMIN — FAMOTIDINE 20 MG: 20 TABLET ORAL at 04:40

## 2022-04-12 RX ADMIN — ACETAMINOPHEN 650 MG: 325 TABLET, FILM COATED ORAL at 16:30

## 2022-04-12 RX ADMIN — ACETAMINOPHEN 650 MG: 325 TABLET, FILM COATED ORAL at 09:13

## 2022-04-12 RX ADMIN — CALCITRIOL CAPSULES 0.25 MCG 0.25 MCG: 0.25 CAPSULE ORAL at 04:41

## 2022-04-12 RX ADMIN — OXYCODONE HYDROCHLORIDE 10 MG: 10 TABLET ORAL at 09:13

## 2022-04-12 RX ADMIN — THERA TABS 1 TABLET: TAB at 04:41

## 2022-04-12 RX ADMIN — METOPROLOL TARTRATE 25 MG: 25 TABLET, FILM COATED ORAL at 04:40

## 2022-04-12 RX ADMIN — OXYCODONE HYDROCHLORIDE 10 MG: 10 TABLET ORAL at 16:30

## 2022-04-12 RX ADMIN — LIDOCAINE 1 PATCH: 50 PATCH TOPICAL at 11:45

## 2022-04-12 RX ADMIN — CALCIUM CARBONATE 1000 MG: 500 TABLET, CHEWABLE ORAL at 23:40

## 2022-04-12 RX ADMIN — OXYCODONE HYDROCHLORIDE 10 MG: 10 TABLET ORAL at 04:40

## 2022-04-12 RX ADMIN — ACETAMINOPHEN 650 MG: 325 TABLET, FILM COATED ORAL at 20:47

## 2022-04-12 RX ADMIN — ENOXAPARIN SODIUM 40 MG: 40 INJECTION SUBCUTANEOUS at 04:41

## 2022-04-12 RX ADMIN — IBUPROFEN 600 MG: 600 TABLET ORAL at 09:12

## 2022-04-12 RX ADMIN — METOPROLOL TARTRATE 25 MG: 25 TABLET, FILM COATED ORAL at 16:33

## 2022-04-12 RX ADMIN — IBUPROFEN 600 MG: 600 TABLET ORAL at 20:47

## 2022-04-12 ASSESSMENT — COGNITIVE AND FUNCTIONAL STATUS - GENERAL
DAILY ACTIVITIY SCORE: 21
TOILETING: A LITTLE
DRESSING REGULAR LOWER BODY CLOTHING: A LITTLE
HELP NEEDED FOR BATHING: A LITTLE
SUGGESTED CMS G CODE MODIFIER DAILY ACTIVITY: CJ

## 2022-04-12 ASSESSMENT — PAIN DESCRIPTION - PAIN TYPE
TYPE: ACUTE PAIN

## 2022-04-12 NOTE — DISCHARGE PLANNING
Anticipated Discharge Disposition:   SNF vs Acute Rehab    Action:    [facility] declined as pt has an active worker's comp case.  I spoke to patient and he stated it is only for his heart.  He is retired [employer] from [employer].  First case awarded at that time.  He asked me to call his wife to get the worker's comp worker's name and necessary information needed to proceed with placement.  Pt is agreeable to placement with rehabilitation.    Spoke with patient's wife, Shanon via telephone.  Worker's comp  is Helga Chavis [phone].  Case # [id number].  RN ARSENIO left  with request to return call. Shanon stated she prefers a facility closest to her home in [place]. Choices at this time are 1) [facility] 2) [facility] 3) [facility] 4) [facility].  I informed Shanon that there are currently no beds at [facility].    Barriers to Discharge:    Placement acceptance  Open worker's comp    Plan:    F/U with worker's comp .

## 2022-04-12 NOTE — DISCHARGE PLANNING
Would appreciate an updated OT eval once appropriate.  Physiatry to consult.     [facility] is no longer requiring 2 of 3 disciplines.  [facility] will no longer follow.  Kimberlee CESAR is aware.

## 2022-04-12 NOTE — PROGRESS NOTES
[facility] FAMILY MEDICINE PROGRESS NOTE       Attending: Dr. Page      Senior Resident: Chaitanya Echeverria D.O.  Family Medicine Resident PGY-2        PATIENT: Luigi Walters; [id number]; 1940 Hospital Day: 7    ID: 81 y.o. 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    SUBJECTIVE: No acute events overnight.  Vital signs stable, afebrile.  Patient reports sleeping well overnight.  He denies experiencing any muscle jerking movements.  He reports improvement of pain control of his back.  He expressed concerns for possible urinary tract infection as he has had urinary tract infections in the past during hospitalizations.  His concern was regarding difficulty completely emptying bladder. He expressed concern to check urinalysis for asymptomatic UTI screening.     OBJECTIVE:     Vitals:    04/11/22 1600 04/11/22 2000 04/11/22 2340 04/12/22 0400   BP: 123/79 152/87 136/86 (!) 165/86   Pulse: 72 86 86 87   Resp: 17 18 18 18   Temp: 36.3 °C (97.4 °F) 36.1 °C (97 °F) 36.1 °C (96.9 °F) 36.3 °C (97.3 °F)   TempSrc: Temporal Temporal Temporal Temporal   SpO2: 93% 94% 93% 94%   Weight:       Height:           Intake/Output Summary (Last 24 hours) at 4/12/2022 0558  Last data filed at 4/12/2022 0500  Gross per 24 hour   Intake --   Output 900 ml   Net -900 ml       PE:  General: No acute distress, resting comfortably in bed.  HEENT: NC/AT. EOMI. MMM  Cardiovascular: RRR with no M/R/G.  Respiratory: Symmetrical chest. CTAB with no W/R/R  Abdomen: soft, NT/ND, no masses, +BS   EXT:  No LE edema   Neuro: No focal deficits     LABS:  Recent Labs     04/09/22  1000 04/10/22  1023   WBC 4.7* 4.5*   RBC 4.95 4.73   HEMOGLOBIN 14.9 14.3   HEMATOCRIT 45.0 42.7   MCV 90.9 90.3   MCH 30.1 30.2   RDW 43.0 41.0   PLATELETCT 86* 94*   MPV 11.8 12.1   NEUTSPOLYS 75.40*  --    LYMPHOCYTES 12.40*  --   "  MONOCYTES 8.90  --    EOSINOPHILS 2.50  --    BASOPHILS 0.40  --      Recent Labs     04/09/22  1000 04/10/22  1023   SODIUM 140 139   POTASSIUM 3.5* 4.1   CHLORIDE 109 110   CO2 19* 18*   BUN 13 13   CREATININE 0.84 0.85   CALCIUM 8.8 8.6   ALBUMIN 3.5 3.5     Estimated GFR/CRCL = Estimated Creatinine Clearance: 82 mL/min (by C-G formula based on SCr of 0.85 mg/dL).  Recent Labs     04/09/22  1000 04/10/22  1023   GLUCOSE 119* 94     Recent Labs     04/09/22  1000 04/10/22  1023   ASTSGOT 82* 67*   ALTSGPT 73* 86*   TBILIRUBIN 1.6* 1.1   ALKPHOSPHAT 96 98   GLOBULIN 3.0 2.8     Recent Labs     04/09/22  1000 04/10/22  1023   CPKTOTAL 166* 72         No results for input(s): INR, APTT, FIBRINOGEN in the last 72 hours.    Invalid input(s): DIMER    MICROBIOLOGY:   Results     Procedure Component Value Units Date/Time    Blood Culture [[id number]] Collected: 04/06/22 1257    Order Status: Completed Specimen: Blood from Peripheral Updated: 04/11/22 1500     Significant Indicator NEG     Source BLD     Site PERIPHERAL     Culture Result No growth after 5 days of incubation.    Narrative:      1 of 2 for Blood Culture x 2 sites order. Per Hospital  Policy: Only change Specimen Src: to \"Line\" if specified by  physician order.  No site indicated    Blood Culture [[id number]] Collected: 04/06/22 1257    Order Status: Completed Specimen: Blood from Peripheral Updated: 04/11/22 1500     Significant Indicator NEG     Source BLD     Site PERIPHERAL     Culture Result No growth after 5 days of incubation.    Narrative:      2 of 2 blood culture x2  Sites order. Per Hospital Policy:  Only change Specimen Src: to \"Line\" if specified by physician  order.  No site indicated    Urine Culture (NEW) [[id number]] Collected: 04/06/22 1146    Order Status: Completed Specimen: Urine Updated: 04/08/22 0745     Significant Indicator NEG     Source UR     Site -     Culture Result Usual skin trevor <10,000 cfu/mL    Narrative:      Indication " for culture:->Evaluation for sepsis without a  clear source of infection  Indication for culture:->Evaluation for sepsis without a    Urinalysis [[id number]] Collected: 04/06/22 1146    Order Status: Completed Specimen: Blood Updated: 04/06/22 1236     Color Yellow     Character Clear     Specific Gravity 1.013     Ph 6.5     Glucose Negative mg/dL      Ketones Negative mg/dL      Protein Negative mg/dL      Bilirubin Negative     Urobilinogen, Urine 0.2     Nitrite Negative     Leukocyte Esterase Negative     Occult Blood Negative     Micro Urine Req see below     Comment: Microscopic examination not performed when specimen is clear  and chemically negative for protein, blood, leukocyte esterase  and nitrite.         Narrative:      Indication for culture:->Evaluation for sepsis without a  clear source of infection    URINE CULTURE(NEW) [[id number]] Collected: 04/06/22 0000    Order Status: Canceled Specimen: Other from Urine, Clean Catch           IMAGING:   US-BLADDER   Final Result      1.  Distended urinary bladder.      EC-ECHOCARDIOGRAM LTD W/O CONT   Final Result      CT-TSPINE W/O PLUS RECONS   Final Result      1.  Acute/subacute mild T12 superior endplate compression fracture.   2.  Age-indeterminate without likely chronic mild T1, T3, T4 and L1 compression fractures.      CT-LSPINE W/O PLUS RECONS   Final Result      1.  Acute-subacute wedge compression fracture of T12 with trace retropulsion.   2.  Age-indeterminate L1 compression deformity. Correlate with point tenderness.   3.  Degenerative changes of the lower lumbar spine.   4.  Moderate bilateral hydroureteronephrosis.      DX-CHEST-PORTABLE (1 VIEW)   Final Result      No acute cardiopulmonary abnormality.      CT-CEREBRAL PERFUSION ANALYSIS   Final Result      1.  Cerebral blood flow less than 30% likely representing completed infarct = 0 mL.      2.  T Max more than 6 seconds likely representing combination of completed infarct and ischemia = 4  mL.      3.  Mismatched volume likely representing ischemic brain/penumbra = 4 mL      4.  Please note that the cerebral perfusion was performed on the limited brain tissue around the basal ganglia region. Infarct/ischemia outside the CT perfusion sections can be missed in this study.      CT-CTA HEAD WITH & W/O-POST PROCESS   Final Result      No large vessel occlusion, hemodynamically significant stenosis or aneurysm is seen.      CT-CTA NECK WITH & W/O-POST PROCESSING   Final Result      1.  Moderate atherosclerotic narrowing of the proximal right internal carotid artery.   2.  Significant streak artifact adjacent to the distal left vertebral artery provides severely suboptimal evaluation. There is contrast proximally and distally, however high-grade stenosis cannot be entirely excluded.   3.  Suboptimal evaluation of the proximal left and bilateral vertebral arteries due to streak artifact.   4.  No high-grade stenosis or dissection in the visualized portions of the arteries of the neck.   5.  6 mm left upper lobe pulmonary nodule that appears similar to prior PET/CT.   6.  Remote appearing superior endplate deformity of T3.      CT-HEAD W/O   Final Result      1.  Cerebral atrophy.   2.  No evidence of acute infarction or acute hemorrhage or mass lesion.         MR-BRAIN-WITH & W/O    (Results Pending)   MR-CERVICAL SPINE-WITH & W/O    (Results Pending)       MEDS:  Current Facility-Administered Medications   Medication Last Admin   • oxyCODONE immediate-release (ROXICODONE) tablet 5 mg 5 mg at 04/10/22 1751   • oxyCODONE immediate release (ROXICODONE) tablet 10 mg 10 mg at 04/12/22 0440   • zolpidem (AMBIEN) tablet 5 mg     • ibuprofen (MOTRIN) tablet 600 mg 600 mg at 04/11/22 1928   • acetaminophen (Tylenol) tablet 650 mg 650 mg at 04/11/22 1928   • lidocaine (LIDODERM) 5 % 1 Patch 1 Patch at 04/11/22 1111   • carboxymethylcellulose (REFRESH TEARS) 0.5 % ophthalmic drops 2 Drop 2 Drop at 04/10/22 1408   •  gabapentin (NEURONTIN) capsule 100 mg 100 mg at 04/12/22 0441   • promethazine (PHENERGAN) suppository 25 mg 25 mg at 04/08/22 0602   • enoxaparin (LOVENOX) inj 40 mg 40 mg at 04/12/22 0441   • metoprolol tartrate (LOPRESSOR) tablet 25 mg 25 mg at 04/12/22 0440   • Metoprolol Tartrate (LOPRESSOR) injection 5 mg     • calcitRIOL (ROCALTROL) capsule 0.25 mcg 0.25 mcg at 04/12/22 0441   • famotidine (PEPCID) tablet 20 mg 20 mg at 04/12/22 0440   • multivitamin (THERAGRAN) tablet 1 Tablet 1 Tablet at 04/12/22 0441   • promethazine (PHENERGAN) tablet 25 mg 25 mg at 04/08/22 1246   • tamsulosin (FLOMAX) capsule 0.4 mg 0.4 mg at 04/12/22 0440   • senna-docusate (PERICOLACE or SENOKOT S) 8.6-50 MG per tablet 2 Tablet 2 Tablet at 04/11/22 0419    And   • polyethylene glycol/lytes (MIRALAX) PACKET 1 Packet      And   • magnesium hydroxide (MILK OF MAGNESIA) suspension 30 mL      And   • bisacodyl (DULCOLAX) suppository 10 mg     • hydrALAZINE (APRESOLINE) injection 10 mg     • [Held by provider] ondansetron (ZOFRAN) syringe/vial injection 4 mg     • [Held by provider] ondansetron (ZOFRAN ODT) dispertab 4 mg         PROBLEM LIST:  No problems updated.    ASSESSMENT/PLAN: 81 y.o. male with an extensive medical history of Meckel's cell carcinoma, sick sinus syndrome with pacemaker, ascites, cirrhosis, hepatic encephalopathy, non-Hodgekin's lymphoma, HTN, prostate cancer, polycythemia, seizure, and stroke who presented with altered mental status. His mental status has resolved, and now we are working towards pain control from his T-12 fracture prior to discharge.      # Altered mental status  # Muscle jerks  #Merkel Cell Carcinoma   #Non-hodgkins Lymphoma   # Polypharmacy  Patient presented with altered mental status 4/6/2022. He now reports memory of events leading up to hospitalization and during hospitalization, making acute delirium less likely etiology.  He was experiencing myclonic jerks and hypertonia that he previously  experienced after getting IV contrast for a CT.  Given his significant history of thoracic and lumbar fractures on CT scan, concern for possible spinal cord injury or underlying brain mass, but as these jerks have stopped, this is less likely. May have been due to reaction to IV contrast.   -EEG showed non-specific abnormalities   -Metabolic work up including chem panel, CPK, and ammonia has been reassuring   -Toxicology positive for oxycodone, otherwise nml   -Infectious: UA reassuring. No leukocytosis. No source of infection.  Previous hepatitis and HIV serology unremarkable.  -Medication review: History of multiple sedating medications including tramadol, Ambien and oxycodone.  However given persistence of altered mental status unlikely to be contributory.  Imaging:  · 3/8/2022 PET scan  did show increased uptake in brain and spinal cord concerning for metastatic disease.  · 3/18/2022 CT-guided thoracic needle biopsy  · 4/6/2022 CT head without contrast negative for hemorrhagic stroke  · 4/6/2022 CT head with contrast negative for occlusive disease or aneurysm  · 4/6/2022 CT neck with contrast negative for high-grade occlusive disease  · 4/6/2022 CT thoracic and lumbar spine showed multiple compression fractures  · Brain and cervical MRI ordered, however patient's pacemaker has been interrogated and not compatible with MRI, so these will not be done.   -Neurology has signed off the case.  Per neurology, brain MRI and cervical spine MRI would be helpful, but not required prior to discharge.     Plan:  -Continue telemetry monitoring  -Follow-up blood cultures and urine cultures, NGTD  -Continue to minimize sedating medications.  Medications have been reviewed, and baclofen, Benadryl and oxybutynin home medications will be held.  In efforts to reduce sedation will schedule non-opoid medications and opioid medications for breath through pain.   - Tylenol/Ibuprofen scheduled, Gabapentin scheduled TID and Low-dose  oxycodone for breakthrough pain.  -SLP/PT/OT consult  -PMR consult placed to determine if patient is a good candidate for rehab. He would like to go to rehab to build up his strength.   - discharge with prescription for Narcan     # Insomnia:   Patient takes either ambien 10mg or 3 tabs of benadryl nightly with ropinerole.   -Ambien PRN for insomnia   -Discontinue ropinerole.   -Plan to trial different sleep medication if no improvement with ambien.   - outpatient referral to sleep medicine as family concerned of apneic events at home. No events while inpatient.      #Elevated CPK, resolved  -CPK elevated likely secondary to dystonia.      #Thrombocytopenia   -Chronic   -CTM platelets   - >50,000. Can start NSAIDs. If drops <50,000, will discontinue.     #Tachycardia  Patient presented with chest pain on admission.  He has received multiple EKGs and troponins since admission that have been reassuring.  He did have runs of V. tach on telemetry monitoring 4/7/2022, however after further evaluation with cardiology it was not actually V. tach and rather atrial tachycardia with ventricular tracking.  -Continue to monitor on telemetry  -4/8/2022 ECHO LVEF 65%     #Prolonged QTc  -Avoid QTc prolonging medications     #HTN  Continue home regimen      # BPH  Continue Flomax  Patient expressed concerns for asymptomatic UA for UTI. I discussed with patient that we do not routinely screen for asymptomatic UTI. His only concerning symptom is difficulty completely emptying bladder which I suspect is chronic in setting of BPH. He expressed understanding and agreement to hold off checking UA. I also discussed with RN who reports there was no concerns for foul odor in urine yesterday. If patient develops any new symptoms concerning for UTI will check UA.      Abx: None  Lines: PIV  IVF: None  DVT PPX: SCDs and Lovenox  Allen: Yes  Dispo:Inpatient telemetry     Code Status:  Full code

## 2022-04-12 NOTE — CARE PLAN
The patient is Stable - Low risk of patient condition declining or worsening    Shift Goals  Clinical Goals: pain management  Patient Goals: sleep  Family Goals: HEATHER    Progress made toward(s) clinical / shift goals:  Pt.'s pain has been controlled with medications administered as prescribed. Pt. Resting in bed.     Patient is not progressing towards the following goals:n/a

## 2022-04-12 NOTE — THERAPY
Occupational Therapy  Daily Treatment     Patient Name: Luigi Walters  Age:  81 y.o., Sex:  male  Medical Record #: [id number]  Today's Date: 4/12/2022     Precautions  Precautions: (P) Fall Risk    Assessment    Pt seen for follow up OT tx session, pt able to complete all functional mobility and ADLs with supervision, limited by chronic vertigo symptoms that he states have been a problem for years and managed with medication does not remember having any formal therapy for it. Anticipate pt is close to his baseline, will recommend home health therapy which patient states hes had in the past and prefers over going to another facility for therapy feels comfortable going home with spouse.    Plan    Discharge secondary to goals met.    DC Equipment Recommendations: (P) None  Discharge Recommendations: (P) Recommend home health for continued occupational therapy services    Objective       04/12/22 0931   Precautions   Precautions Fall Risk   Cognition    Cognition / Consciousness WDL   Level of Consciousness Alert   Comments Pleasant, cooperative, receptive to therapy aware of deficits   Strength Upper Body   Upper Body Strength  WDL   Balance   Sitting Balance (Static) Fair +   Sitting Balance (Dynamic) Fair   Standing Balance (Static) Fair   Standing Balance (Dynamic) Fair   Weight Shift Sitting Fair   Weight Shift Standing Fair   Skilled Intervention Verbal Cuing;Facilitation   Comments w/ FWW in standing   Bed Mobility    Supine to Sit Supervised   Sit to Supine Supervised   Scooting Supervised   Rolling Supervised   Skilled Intervention Verbal Cuing   Comments cues for log roll, HOB flat   Activities of Daily Living   Upper Body Dressing Supervision   Lower Body Dressing Supervision   Toileting   (NT-states went earlier did not need assistance)   Skilled Intervention Verbal Cuing   How much help from another person does the patient currently need...   Putting on and taking off regular lower body clothing? 3    Bathing (including washing, rinsing, and drying)? 3   Toileting, which includes using a toilet, bedpan, or urinal? 3   Putting on and taking off regular upper body clothing? 4   Taking care of personal grooming such as brushing teeth? 4   Eating meals? 4   6 Clicks Daily Activity Score 21   Functional Mobility   Sit to Stand Supervised   Bed, Chair, Wheelchair Transfer Supervised   Toilet Transfers Supervised   Transfer Method Stand Step   Mobility bed mobility, bathroom mobility, hallway, back to bed   Skilled Intervention Verbal Cuing   Comments w/ FWW   Activity Tolerance   Sitting Edge of Bed 10 min   Standing 10 min   Short Term Goals   Short Term Goal # 1 Pt will complete ADL transfers with Janiya   Goal Outcome # 1 Goal met   Short Term Goal # 2 Pt will complete LB dressing with Janiya   Goal Outcome # 2 Goal met   Short Term Goal # 3 Pt will complete toileting with Janiya   Goal Outcome # 3 Goal met   Education Group   Education Provided Role of Occupational Therapist   Role of Occupational Therapist Patient Response Patient;Acceptance;Explanation   Anticipated Discharge Equipment and Recommendations   DC Equipment Recommendations None   Discharge Recommendations Recommend home health for continued occupational therapy services   Interdisciplinary Plan of Care Collaboration   IDT Collaboration with  Nursing   Patient Position at End of Therapy In Bed;Bed Alarm On;Call Light within Reach;Tray Table within Reach;Phone within Reach   Collaboration Comments RN updated

## 2022-04-12 NOTE — THERAPY
"Speech Language Pathology  Daily Treatment     Patient Name: Luigi Walters  Age:  81 y.o., Sex:  male  Medical Record #: [id number]  Today's Date: 4/12/2022     Precautions  Precautions: Fall Risk,Swallow Precautions ( See Comments)    Assessment    Patient was seen for dysphagia tx with tx trials of regular solids and prescribed thin liquids. Pt was A&Ox4, pleasant and cooperative. He independently took small bites/sips, ate slowly and was able to verbalize GERD precautions. Pt reports he's been tolerating soft solids and is eager to advance his diet. Patient does have a hx of Schatzki's ring but states he independently cuts his his chicken really small to avoid impaction. No s/sx of aspiration, globus sensation or esophageal dysphagia noted during PO trials. Diet advancement is indicated.    Recommendations:  Upgrade diet to regular solids/thin liquids   -No supervision needed; independent   -Pills whole one at a time w/ thins   -SLP will no longer actively follow as pt appears back to baseline in terms of mental status/swallow function and demonstrates good tolerance of regular/thin textures; please reconsult with any new concerns    Plan    Discharge secondary to goals met.    Discharge Recommendations: Anticipate that the patient will have no further speech therapy needs after discharge from the hospital       Objective     04/12/22 0830   Dysphagia    Other Treatments tx trials regular/thin   Diet / Liquid Recommendation Regular (7);Thin (0)   Recommended Route of Medication Administration   Medication Administration  Whole with Liquid Wash   Patient / Family Goals   Patient / Family Goal #1 \"Oh that's good\" - when given water   Goal #1 Outcome Goal met   Short Term Goals   Short Term Goal # 1 Patient will consume a diet of soft/bite sized solids and thin liquids with no s/sx of aspiration with monitoring during meals   Goal Outcome # 1 Goal met     "

## 2022-04-12 NOTE — CONSULTS
Physical Medicine and Rehabilitation Consultation              Date of initial consultation: 4/12/2022  Consulting provider: Nick Abdul MD  Reason for consultation: assess for acute inpatient rehab appropriateness  LOS: 6 Day(s)    Chief complaint: Back pain     HPI: The patient is a 81 y.o. right hand dominant male with a past medical history of Merkel cell carcinoma, sick sinus syndrome with pacemaker, ascites, cirrhosis, hepatic encephalopathy, non-Hodgkin's lymphoma, hypertension, prostate cancer, polycythemia, seizure, stroke;  who presented on 4/6/2022 11:00 AM with altered mental status.  Per wife, patient recently had a bone marrow biopsy on 3/18/2022 and has been declining since.  Patient was seen by neurology on arrival to [facility], found to have a NIH score of 13.  Stroke work-up with CT head showed no acute abnormalities, CTA showed no LVO.  MRI has not been completed.  EEG showed mild generalized encephalopathy.  Patient had a run of V. tach while hospitalized and is currently on telemetry monitoring.  Patient has elevated CPK secondary to dystonia    The patient currently reports back pain, feels he has a fuzzy head, is experiencing poor sleep, states his pain is about 75% controlled.  We discussed IPR, patient is considering it, wants to talk to his wife first.  States he needs help with balance and coordination.    ROS  Pertinent positives are mentioned in the HPI, all others reviewed and are negative.    Social Hx:  1 SH  0 FIORDALIZA  With: Spouse    THERAPY:  Restrictions: Fall risk  PT: Functional mobility   4/11: Walking 125 feet with front wheel walker at mod assist.    OT: ADLs  4/7: Max assist dressing    SLP:   4/12: Regular diet thin liquids    IMAGING:  CT T-spine 4/6/2022  .  Acute/subacute mild T12 superior endplate compression fracture.  2.  Age-indeterminate without likely chronic mild T1, T3, T4 and L1 compression fractures.    CT L-spine 4/6/2022  1.  Acute-subacute wedge compression  fracture of T12 with trace retropulsion.  2.  Age-indeterminate L1 compression deformity. Correlate with point tenderness.  3.  Degenerative changes of the lower lumbar spine.  4.  Moderate bilateral hydroureteronephrosis.    CT cerebral perfusion 4/6/22  1.  Cerebral blood flow less than 30% likely representing completed infarct = 0 mL.     2.  T Max more than 6 seconds likely representing combination of completed infarct and ischemia = 4 mL.     3.  Mismatched volume likely representing ischemic brain/penumbra = 4 mL     4.  Please note that the cerebral perfusion was performed on the limited brain tissue around the basal ganglia region. Infarct/ischemia outside the CT perfusion sections can be missed in this study.    PROCEDURES:  Talat Oneal MD EEG 4/6/2022   Abnormal video EEG recording in the awake and drowsy state(s):  - Mild diffuse background slowing suggestive of a non-specific encephalopathy   - No persistent focal asymmetries seen.  - No epileptiform discharges seen   - No definitive seizures. Clinical correlation is recommended.  - Clinical events of patient stiffening arm/legs and events of patient shaking bilateral flexed arms in a semi-rhythmic fashion were captured and had no EEG correlate. 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 Difficile   • Jaundice    • Melena    • Melena    • Merkel cell carcinoma of face (HCC)    • Non Hodgkin's lymphoma (HCC) 2019   • Osteoarthritis    • Pacemaker 2007    SSS   • Pain 09/09/2021    L shoulder, 5/10   • Pneumonia 1972   • Polycythemia    • Presence of permanent cardiac pacemaker 2007/2015    Dr. Chaitanya Walters   • Prostate cancer (HCC)    • Prostate cancer (HCC) 2000        • Renal disorder      Insufficiency....med related?; pt denies 02/19   • Seizure (HCC)     \"with Dobutamine Echo\"   • Stroke (HCC) 2020    Undiagnosed but went through speech and physical therapy; no residual effects.   • Thrombocytopenia (HCC)        PSH:  Past Surgical History:   Procedure Laterality Date   • AK EXC SKIN MALIG >4CM REMAINDR BODY Left 9/13/2021    Procedure: EXCISION, MASS- LEFT FACIAL LESION;  Surgeon: Chaitanya Saha M.D.;  Location: SURGERY SAME DAY [facility];  Service: Ent   • NECK DISSECTION RADICAL Left 9/13/2021    Procedure: DISSECTION, NECK, RADICAL - MODIFIED;  Surgeon: Chaitanya Saha M.D.;  Location: SURGERY SAME DAY [facility];  Service: Ent   • FLAP FREE Left 9/13/2021    Procedure: ADJACENT TISSUE TRANSFER CLOSURE;  Surgeon: Chaitanya Saha M.D.;  Location: SURGERY SAME DAY [facility];  Service: Ent   • RECOVERY  3/18/2015    Performed by [facility] at SURGERY PRE-POST PROC UNIT [facility]   • PACEMAKER INSERTION  March 2015    Generator replacement with  Medtronic Adapta ADDRL1 implanted by Dr. Chaitanya Walters. Original device implanted in 2007.   • PENILE PROSTHESIS AMS INFLATABLE  10/13/2014    Performed by Sedrick Pittman M.D. at SURGERY [facility]   • SHOULDER ARTHROPLASTY TOTAL Left 2010   • PACEMAKER INSERTION  2007        • OTHER      Endoscopy every 3-6 months to track esophageal varices       FHX:  Non-pertinent to today's issue  Family History   Problem Relation Age of Onset   • Cancer Father         Prostate CA   • Cancer " "Sister         unknown   • Cancer Brother         colon       Medications:  Current Facility-Administered Medications   Medication Dose   • oxyCODONE immediate-release (ROXICODONE) tablet 5 mg  5 mg   • oxyCODONE immediate release (ROXICODONE) tablet 10 mg  10 mg   • zolpidem (AMBIEN) tablet 5 mg  5 mg   • ibuprofen (MOTRIN) tablet 600 mg  600 mg   • acetaminophen (Tylenol) tablet 650 mg  650 mg   • lidocaine (LIDODERM) 5 % 1 Patch  1 Patch   • carboxymethylcellulose (REFRESH TEARS) 0.5 % ophthalmic drops 2 Drop  2 Drop   • gabapentin (NEURONTIN) capsule 100 mg  100 mg   • promethazine (PHENERGAN) suppository 25 mg  25 mg   • enoxaparin (LOVENOX) inj 40 mg  40 mg   • metoprolol tartrate (LOPRESSOR) tablet 25 mg  25 mg   • Metoprolol Tartrate (LOPRESSOR) injection 5 mg  5 mg   • calcitRIOL (ROCALTROL) capsule 0.25 mcg  0.25 mcg   • famotidine (PEPCID) tablet 20 mg  20 mg   • multivitamin (THERAGRAN) tablet 1 Tablet  1 Tablet   • promethazine (PHENERGAN) tablet 25 mg  25 mg   • tamsulosin (FLOMAX) capsule 0.4 mg  0.4 mg   • senna-docusate (PERICOLACE or SENOKOT S) 8.6-50 MG per tablet 2 Tablet  2 Tablet    And   • polyethylene glycol/lytes (MIRALAX) PACKET 1 Packet  1 Packet    And   • magnesium hydroxide (MILK OF MAGNESIA) suspension 30 mL  30 mL    And   • bisacodyl (DULCOLAX) suppository 10 mg  10 mg   • hydrALAZINE (APRESOLINE) injection 10 mg  10 mg   • [Held by provider] ondansetron (ZOFRAN) syringe/vial injection 4 mg  4 mg   • [Held by provider] ondansetron (ZOFRAN ODT) dispertab 4 mg  4 mg       Allergies:  Allergies   Allergen Reactions   • Contrast Media With Iodine [Iodine]      \"siezure\" 03/11/2022 States had many iodine contract scans w/out issue, then w/the last one in 2020 at [facility] had \"spasms\".   • Dobutamine      Seizures; taken abruptly off a Dobutamine gtt-no taper   • Latex      Could be adhesive \"red welts\"   • Tape Rash     Paper tape ok         Physical Exam:  Vitals: /91   " "Pulse 82   Temp 36.2 °C (97.2 °F) (Temporal)   Resp 17   Ht 1.803 m (5' 11\")   Wt 99.9 kg (220 lb 3.8 oz)   SpO2 92%   Gen: NAD  Head: NC/AT  Eyes/ Nose/ Mouth: moist mucous membranes  Cardio: RRR, good distal perfusion, warm extremities  Pulm: normal respiratory effort, no cyanosis   Abd: Soft NTND, negative borborygmi   Ext: No peripheral edema. No calf tenderness. No clubbing.    Mental status: answers questions appropriately follows commands  Speech: fluent, no aphasia or dysarthria    Motor:      Upper Extremity  Myotome R L   Shoulder flexion C5 5 5   Elbow flexion C5 5 5   Wrist extension C6 5 5   Elbow extension C7 5 5   Finger flexion C8 5 5   Finger abduction T1 5 5     Lower Extremity Myotome R L   Hip flexion L2 5 5   Knee extension L3 5 5   Ankle dorsiflexion L4 5 5   Toe extension L5 5 5   Ankle plantarflexion S1 5 5     Sensory:   intact to light touch through out    DTRs:  Right  Left    Brachioradialis  1+  1+   Patella tendon  2+ 2+       Labs: Reviewed and significant for   Recent Labs     04/09/22  1000 04/10/22  1023   RBC 4.95 4.73   HEMOGLOBIN 14.9 14.3   HEMATOCRIT 45.0 42.7   PLATELETCT 86* 94*     Recent Labs     04/09/22  1000 04/10/22  1023   SODIUM 140 139   POTASSIUM 3.5* 4.1   CHLORIDE 109 110   CO2 19* 18*   GLUCOSE 119* 94   BUN 13 13   CREATININE 0.84 0.85   CALCIUM 8.8 8.6     No results found for this or any previous visit (from the past 24 hour(s)).      ASSESSMENT:  Patient is a 81 y.o. male admitted with altered mental status concerning for stroke, more consistent with encephalopathy, also found to have compression fractures with the most acute one being at T12    [facility] Code / Diagnosis to Support: 0002.1 - Brain Dysfunction: Non-Traumatic    Rehabilitation: Impaired ADLs and mobility  Patient is a good candidate for inpatient rehab based on needs for PT, OT.  Patient will also benefit from family training.  Patient has a good discharge situation which will be home with " spouse.     Barriers to transfer include: Insurance authorization, TCCs to verify disposition, medical clearance and bed availability     All cases are subject to administrative review and recommendations may change    Additional Recommendations:  -Patient is a candidate for [facility] if he wants to come.  Patient currently has deficits with mobility and ADLs secondary to encephalopathy, he is still experiencing some slight cognitive delay.  Patient also has back pain which is limiting his ability to complete ADLs.  Back pain secondary to T12 compression fracture.  -TCC to follow-up with patient and wife about choice  -Patient would be a candidate for short stay at [facility] to work on functional independence with mobility and ADLs  -Pain control per primary team  -DC Gabapentin 100 mg 3 times daily this is a low dose, likely not contributing to patient's overall pain control high risk for contributing to his confusion.  I am Stopping this medication today.  High risk medication, labs reviewed, creatinine clearance 82, GFR 87  -Continue PT OT while in-house  -Look for updates in the discharge tab for placement.  If patient does not come to [facility], he can likely go home with home health and outpatient PT OT.   -PMR to follow in the periphery for rehab appropriateness, please reach out with questions or request for medical management      Medical Complexity:  Encephalopathy  Hypertension    DVT PPX: Lovenox 40 mg daily      Thank you for allowing us to participate in the care of this patient.     Mahesh Fernandez, DO   Physical Medicine and Rehabilitation     Please note that this dictation was created using voice recognition software. I have made every reasonable attempt to correct obvious errors, but there may be errors of grammar and possibly content that I did not discover before finalizing the note.

## 2022-04-12 NOTE — PROGRESS NOTES
Monitor Summary: SR/Paced , KS -, QRS -, QT -, in and out of paced rhythm per strip from the monitor room.

## 2022-04-12 NOTE — CARE PLAN
The patient is Stable - Low risk of patient condition declining or worsening    Shift Goals  Clinical Goals: pain management  Patient Goals: sleep  Family Goals: HEATHER    Progress made toward(s) clinical / shift goals:    Problem: Knowledge Deficit - Standard  Goal: Patient and family/care givers will demonstrate understanding of plan of care, disease process/condition, diagnostic tests and medications  Outcome: Progressing     Problem: Skin Integrity  Goal: Skin integrity is maintained or improved  Outcome: Progressing     Problem: Fall Risk  Goal: Patient will remain free from falls  Outcome: Progressing     Problem: Pain - Standard  Goal: Alleviation of pain or a reduction in pain to the patient’s comfort goal  Outcome: Progressing       Patient is not progressing towards the following goals:

## 2022-04-13 LAB
ANION GAP SERPL CALC-SCNC: 11 MMOL/L (ref 7–16)
BASOPHILS # BLD AUTO: 0.7 % (ref 0–1.8)
BASOPHILS # BLD: 0.03 K/UL (ref 0–0.12)
BUN SERPL-MCNC: 25 MG/DL (ref 8–22)
CALCIUM SERPL-MCNC: 9.9 MG/DL (ref 8.5–10.5)
CHLORIDE SERPL-SCNC: 106 MMOL/L (ref 96–112)
CO2 SERPL-SCNC: 24 MMOL/L (ref 20–33)
CREAT SERPL-MCNC: 1.3 MG/DL (ref 0.5–1.4)
EKG IMPRESSION: NORMAL
EKG IMPRESSION: NORMAL
EOSINOPHIL # BLD AUTO: 0.14 K/UL (ref 0–0.51)
EOSINOPHIL NFR BLD: 3.1 % (ref 0–6.9)
ERYTHROCYTE [DISTWIDTH] IN BLOOD BY AUTOMATED COUNT: 41.8 FL (ref 35.9–50)
GFR SERPLBLD CREATININE-BSD FMLA CKD-EPI: 55 ML/MIN/1.73 M 2
GLUCOSE SERPL-MCNC: 125 MG/DL (ref 65–99)
HCT VFR BLD AUTO: 43.8 % (ref 42–52)
HGB BLD-MCNC: 14.6 G/DL (ref 14–18)
IMM GRANULOCYTES # BLD AUTO: 0.03 K/UL (ref 0–0.11)
IMM GRANULOCYTES NFR BLD AUTO: 0.7 % (ref 0–0.9)
LYMPHOCYTES # BLD AUTO: 0.39 K/UL (ref 1–4.8)
LYMPHOCYTES NFR BLD: 8.6 % (ref 22–41)
MCH RBC QN AUTO: 30.4 PG (ref 27–33)
MCHC RBC AUTO-ENTMCNC: 33.3 G/DL (ref 33.7–35.3)
MCV RBC AUTO: 91.1 FL (ref 81.4–97.8)
MONOCYTES # BLD AUTO: 0.31 K/UL (ref 0–0.85)
MONOCYTES NFR BLD AUTO: 6.8 % (ref 0–13.4)
NEUTROPHILS # BLD AUTO: 3.64 K/UL (ref 1.82–7.42)
NEUTROPHILS NFR BLD: 80.1 % (ref 44–72)
NRBC # BLD AUTO: 0 K/UL
NRBC BLD-RTO: 0 /100 WBC
PLATELET # BLD AUTO: 88 K/UL (ref 164–446)
PMV BLD AUTO: 12.4 FL (ref 9–12.9)
POTASSIUM SERPL-SCNC: 4.2 MMOL/L (ref 3.6–5.5)
RBC # BLD AUTO: 4.81 M/UL (ref 4.7–6.1)
SODIUM SERPL-SCNC: 141 MMOL/L (ref 135–145)
TROPONIN T SERPL-MCNC: 19 NG/L (ref 6–19)
TROPONIN T SERPL-MCNC: 21 NG/L (ref 6–19)
WBC # BLD AUTO: 4.5 K/UL (ref 4.8–10.8)

## 2022-04-13 PROCEDURE — 36415 COLL VENOUS BLD VENIPUNCTURE: CPT

## 2022-04-13 PROCEDURE — 700101 HCHG RX REV CODE 250: Performed by: STUDENT IN AN ORGANIZED HEALTH CARE EDUCATION/TRAINING PROGRAM

## 2022-04-13 PROCEDURE — 93005 ELECTROCARDIOGRAM TRACING: CPT | Performed by: STUDENT IN AN ORGANIZED HEALTH CARE EDUCATION/TRAINING PROGRAM

## 2022-04-13 PROCEDURE — 99232 SBSQ HOSP IP/OBS MODERATE 35: CPT | Mod: GC | Performed by: FAMILY MEDICINE

## 2022-04-13 PROCEDURE — 700111 HCHG RX REV CODE 636 W/ 250 OVERRIDE (IP): Performed by: FAMILY MEDICINE

## 2022-04-13 PROCEDURE — A9270 NON-COVERED ITEM OR SERVICE: HCPCS | Performed by: STUDENT IN AN ORGANIZED HEALTH CARE EDUCATION/TRAINING PROGRAM

## 2022-04-13 PROCEDURE — 80048 BASIC METABOLIC PNL TOTAL CA: CPT

## 2022-04-13 PROCEDURE — 93010 ELECTROCARDIOGRAM REPORT: CPT | Performed by: INTERNAL MEDICINE

## 2022-04-13 PROCEDURE — 93010 ELECTROCARDIOGRAM REPORT: CPT | Mod: 76 | Performed by: INTERNAL MEDICINE

## 2022-04-13 PROCEDURE — 97116 GAIT TRAINING THERAPY: CPT | Mod: CQ

## 2022-04-13 PROCEDURE — 700102 HCHG RX REV CODE 250 W/ 637 OVERRIDE(OP): Performed by: STUDENT IN AN ORGANIZED HEALTH CARE EDUCATION/TRAINING PROGRAM

## 2022-04-13 PROCEDURE — 770001 HCHG ROOM/CARE - MED/SURG/GYN PRIV*

## 2022-04-13 PROCEDURE — 84484 ASSAY OF TROPONIN QUANT: CPT

## 2022-04-13 PROCEDURE — 97530 THERAPEUTIC ACTIVITIES: CPT | Mod: CQ

## 2022-04-13 PROCEDURE — 85025 COMPLETE CBC W/AUTO DIFF WBC: CPT

## 2022-04-13 RX ADMIN — FAMOTIDINE 20 MG: 20 TABLET ORAL at 04:19

## 2022-04-13 RX ADMIN — OXYCODONE HYDROCHLORIDE 10 MG: 10 TABLET ORAL at 09:01

## 2022-04-13 RX ADMIN — CALCITRIOL CAPSULES 0.25 MCG 0.25 MCG: 0.25 CAPSULE ORAL at 04:19

## 2022-04-13 RX ADMIN — IBUPROFEN 600 MG: 600 TABLET ORAL at 09:50

## 2022-04-13 RX ADMIN — ENOXAPARIN SODIUM 40 MG: 40 INJECTION SUBCUTANEOUS at 04:19

## 2022-04-13 RX ADMIN — IBUPROFEN 600 MG: 600 TABLET ORAL at 14:50

## 2022-04-13 RX ADMIN — ACETAMINOPHEN 650 MG: 325 TABLET, FILM COATED ORAL at 21:22

## 2022-04-13 RX ADMIN — ACETAMINOPHEN 650 MG: 325 TABLET, FILM COATED ORAL at 09:50

## 2022-04-13 RX ADMIN — TAMSULOSIN HYDROCHLORIDE 0.4 MG: 0.4 CAPSULE ORAL at 04:19

## 2022-04-13 RX ADMIN — METOPROLOL TARTRATE 25 MG: 25 TABLET, FILM COATED ORAL at 04:19

## 2022-04-13 RX ADMIN — IBUPROFEN 600 MG: 600 TABLET ORAL at 21:23

## 2022-04-13 RX ADMIN — SENNOSIDES AND DOCUSATE SODIUM 2 TABLET: 50; 8.6 TABLET ORAL at 17:27

## 2022-04-13 RX ADMIN — THERA TABS 1 TABLET: TAB at 04:19

## 2022-04-13 RX ADMIN — ACETAMINOPHEN 650 MG: 325 TABLET, FILM COATED ORAL at 14:50

## 2022-04-13 RX ADMIN — METOPROLOL TARTRATE 25 MG: 25 TABLET, FILM COATED ORAL at 17:27

## 2022-04-13 RX ADMIN — OXYCODONE HYDROCHLORIDE 10 MG: 10 TABLET ORAL at 14:50

## 2022-04-13 RX ADMIN — LIDOCAINE 1 PATCH: 50 PATCH TOPICAL at 13:06

## 2022-04-13 ASSESSMENT — COGNITIVE AND FUNCTIONAL STATUS - GENERAL
MOBILITY SCORE: 18
STANDING UP FROM CHAIR USING ARMS: A LITTLE
MOVING TO AND FROM BED TO CHAIR: A LITTLE
SUGGESTED CMS G CODE MODIFIER MOBILITY: CK
WALKING IN HOSPITAL ROOM: A LITTLE
CLIMB 3 TO 5 STEPS WITH RAILING: A LITTLE
TURNING FROM BACK TO SIDE WHILE IN FLAT BAD: A LITTLE
MOVING FROM LYING ON BACK TO SITTING ON SIDE OF FLAT BED: A LITTLE

## 2022-04-13 ASSESSMENT — GAIT ASSESSMENTS
DISTANCE (FEET): 200
DEVIATION: DECREASED BASE OF SUPPORT
ASSISTIVE DEVICE: FRONT WHEEL WALKER
GAIT LEVEL OF ASSIST: MINIMAL ASSIST

## 2022-04-13 ASSESSMENT — PAIN DESCRIPTION - PAIN TYPE: TYPE: ACUTE PAIN

## 2022-04-13 ASSESSMENT — FIBROSIS 4 INDEX: FIB4 SCORE: 6.65

## 2022-04-13 NOTE — PROGRESS NOTES
[facility] [place] FAMILY MEDICINE PROGRESS NOTE     Attending: Dr. Page      Senior Resident: Chaitanya Echeverria D.O.  Family Medicine Resident PGY-2        PATIENT: Luigi Walters; [id number]; 1940 Hospital Day: 8    ID: 81 y.o. 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    SUBJECTIVE: Patient had chest pain overnight, which is since resolved.  He describes the chest pain as substernal burning sensation.  He denies experiencing shortness of breath, radiating pain, or pleuritic chest pain.     OBJECTIVE:     Vitals:    04/12/22 1633 04/12/22 2000 04/12/22 2343 04/13/22 0400   BP: 140/79 129/71 149/93 155/81   Pulse: 79 78 94 77   Resp:  18 18 18   Temp: 36.4 °C (97.6 °F) 36.5 °C (97.7 °F) 36.1 °C (97 °F) 36.1 °C (97 °F)   TempSrc: Temporal Temporal Temporal Temporal   SpO2: 91% 92% 92% 95%   Weight:       Height:           Intake/Output Summary (Last 24 hours) at 4/13/2022 0537  Last data filed at 4/12/2022 2200  Gross per 24 hour   Intake --   Output 850 ml   Net -850 ml       PE:  General: No acute distress, resting comfortably in bed.  HEENT: NC/AT. EOMI. MMM  Cardiovascular: RRR with no M/R/G.  Respiratory: Symmetrical chest. CTAB with no W/R/R  Abdomen: soft, NT/ND, no masses, +BS   EXT:  No LE edema   Neuro: No focal deficits        LABS:  Recent Labs     04/10/22  1023   WBC 4.5*   RBC 4.73   HEMOGLOBIN 14.3   HEMATOCRIT 42.7   MCV 90.3   MCH 30.2   RDW 41.0   PLATELETCT 94*   MPV 12.1     Recent Labs     04/10/22  1023   SODIUM 139   POTASSIUM 4.1   CHLORIDE 110   CO2 18*   BUN 13   CREATININE 0.85   CALCIUM 8.6   ALBUMIN 3.5     Estimated GFR/CRCL = Estimated Creatinine Clearance: 82 mL/min (by C-G formula based on SCr of 0.85 mg/dL).  Recent Labs     04/10/22  1023   GLUCOSE 94     Recent Labs     04/10/22  1023   ASTSGOT 67*   ALTSGPT 86*   TBILIRUBIN 1.1  "  ALKPHOSPHAT 98   GLOBULIN 2.8     Recent Labs     04/10/22  1023   CPKTOTAL 72         No results for input(s): INR, APTT, FIBRINOGEN in the last 72 hours.    Invalid input(s): DIMER    MICROBIOLOGY:    Results     Procedure Component Value Units Date/Time    Blood Culture [[id number]] Collected: 04/06/22 1257    Order Status: Completed Specimen: Blood from Peripheral Updated: 04/11/22 1500     Significant Indicator NEG     Source BLD     Site PERIPHERAL     Culture Result No growth after 5 days of incubation.    Narrative:      1 of 2 for Blood Culture x 2 sites order. Per Hospital  Policy: Only change Specimen Src: to \"Line\" if specified by  physician order.  No site indicated    Blood Culture [[id number]] Collected: 04/06/22 1257    Order Status: Completed Specimen: Blood from Peripheral Updated: 04/11/22 1500     Significant Indicator NEG     Source BLD     Site PERIPHERAL     Culture Result No growth after 5 days of incubation.    Narrative:      2 of 2 blood culture x2  Sites order. Per Hospital Policy:  Only change Specimen Src: to \"Line\" if specified by physician  order.  No site indicated    Urine Culture (NEW) [[id number]] Collected: 04/06/22 1146    Order Status: Completed Specimen: Urine Updated: 04/08/22 0745     Significant Indicator NEG     Source UR     Site -     Culture Result Usual skin trevor <10,000 cfu/mL    Narrative:      Indication for culture:->Evaluation for sepsis without a  clear source of infection  Indication for culture:->Evaluation for sepsis without a    Urinalysis [[id number]] Collected: 04/06/22 1146    Order Status: Completed Specimen: Blood Updated: 04/06/22 1236     Color Yellow     Character Clear     Specific Gravity 1.013     Ph 6.5     Glucose Negative mg/dL      Ketones Negative mg/dL      Protein Negative mg/dL      Bilirubin Negative     Urobilinogen, Urine 0.2     Nitrite Negative     Leukocyte Esterase Negative     Occult Blood Negative     Micro Urine Req see below     " Comment: Microscopic examination not performed when specimen is clear  and chemically negative for protein, blood, leukocyte esterase  and nitrite.         Narrative:      Indication for culture:->Evaluation for sepsis without a  clear source of infection            IMAGING:   US-BLADDER   Final Result      1.  Distended urinary bladder.      EC-ECHOCARDIOGRAM LTD W/O CONT   Final Result      CT-TSPINE W/O PLUS RECONS   Final Result      1.  Acute/subacute mild T12 superior endplate compression fracture.   2.  Age-indeterminate without likely chronic mild T1, T3, T4 and L1 compression fractures.      CT-LSPINE W/O PLUS RECONS   Final Result      1.  Acute-subacute wedge compression fracture of T12 with trace retropulsion.   2.  Age-indeterminate L1 compression deformity. Correlate with point tenderness.   3.  Degenerative changes of the lower lumbar spine.   4.  Moderate bilateral hydroureteronephrosis.      DX-CHEST-PORTABLE (1 VIEW)   Final Result      No acute cardiopulmonary abnormality.      CT-CEREBRAL PERFUSION ANALYSIS   Final Result      1.  Cerebral blood flow less than 30% likely representing completed infarct = 0 mL.      2.  T Max more than 6 seconds likely representing combination of completed infarct and ischemia = 4 mL.      3.  Mismatched volume likely representing ischemic brain/penumbra = 4 mL      4.  Please note that the cerebral perfusion was performed on the limited brain tissue around the basal ganglia region. Infarct/ischemia outside the CT perfusion sections can be missed in this study.      CT-CTA HEAD WITH & W/O-POST PROCESS   Final Result      No large vessel occlusion, hemodynamically significant stenosis or aneurysm is seen.      CT-CTA NECK WITH & W/O-POST PROCESSING   Final Result      1.  Moderate atherosclerotic narrowing of the proximal right internal carotid artery.   2.  Significant streak artifact adjacent to the distal left vertebral artery provides severely suboptimal  evaluation. There is contrast proximally and distally, however high-grade stenosis cannot be entirely excluded.   3.  Suboptimal evaluation of the proximal left and bilateral vertebral arteries due to streak artifact.   4.  No high-grade stenosis or dissection in the visualized portions of the arteries of the neck.   5.  6 mm left upper lobe pulmonary nodule that appears similar to prior PET/CT.   6.  Remote appearing superior endplate deformity of T3.      CT-HEAD W/O   Final Result      1.  Cerebral atrophy.   2.  No evidence of acute infarction or acute hemorrhage or mass lesion.               MEDS:  Current Facility-Administered Medications   Medication Last Admin   • oxyCODONE immediate-release (ROXICODONE) tablet 5 mg 5 mg at 04/10/22 1751   • oxyCODONE immediate release (ROXICODONE) tablet 10 mg 10 mg at 04/12/22 1630   • zolpidem (AMBIEN) tablet 5 mg     • ibuprofen (MOTRIN) tablet 600 mg 600 mg at 04/12/22 2047   • acetaminophen (Tylenol) tablet 650 mg 650 mg at 04/12/22 2047   • lidocaine (LIDODERM) 5 % 1 Patch 1 Patch at 04/12/22 1145   • carboxymethylcellulose (REFRESH TEARS) 0.5 % ophthalmic drops 2 Drop 2 Drop at 04/10/22 1408   • promethazine (PHENERGAN) suppository 25 mg 25 mg at 04/08/22 0602   • enoxaparin (LOVENOX) inj 40 mg 40 mg at 04/13/22 0419   • metoprolol tartrate (LOPRESSOR) tablet 25 mg 25 mg at 04/13/22 0419   • Metoprolol Tartrate (LOPRESSOR) injection 5 mg     • calcitRIOL (ROCALTROL) capsule 0.25 mcg 0.25 mcg at 04/13/22 0419   • famotidine (PEPCID) tablet 20 mg 20 mg at 04/13/22 0419   • multivitamin (THERAGRAN) tablet 1 Tablet 1 Tablet at 04/13/22 0419   • promethazine (PHENERGAN) tablet 25 mg 25 mg at 04/08/22 1246   • tamsulosin (FLOMAX) capsule 0.4 mg 0.4 mg at 04/13/22 0419   • senna-docusate (PERICOLACE or SENOKOT S) 8.6-50 MG per tablet 2 Tablet 2 Tablet at 04/11/22 0419    And   • polyethylene glycol/lytes (MIRALAX) PACKET 1 Packet      And   • magnesium hydroxide (MILK OF  MAGNESIA) suspension 30 mL      And   • bisacodyl (DULCOLAX) suppository 10 mg     • hydrALAZINE (APRESOLINE) injection 10 mg     • [Held by provider] ondansetron (ZOFRAN) syringe/vial injection 4 mg     • [Held by provider] ondansetron (ZOFRAN ODT) dispertab 4 mg         PROBLEM LIST:  No problems updated.    ASSESSMENT/PLAN: 81 y.o. male with an extensive medical history of Meckel's cell carcinoma, sick sinus syndrome with pacemaker, cirrhosis, non-Hodgekin's lymphoma, HTN, prostate cancer, seizure, and stroke who presented with altered mental status.      # Altered mental status, resolved  Patient presented with altered mental status 4/6/2022.    Since admission he has had CTA head and neck with and without contrast, which have been essentially unremarkable for acute stroke.  He was on multiple medications believed to be contributory including Ambien and oxycodone.  -Minimize sedating medications    #Myoclonus, resolved  Patient was experiencing significant myoclonus in dystonia, which is since resolved.  Episodic myoclonic jerking movements and hypertonia possibly related to IV contrast.  In review of literature there are rare reported histories of contrast-induced spinal myoclonus.  -Work-up this hospitalization has included CT head with and without contrast, CT neck with contrast, CT thoracic and lumbar spine, and EEG which have been reassuring.  Metabolic and infectious etiologies have been evaluated for and have been reassuring.  Case has been discussed with neurology during hospitalization, neurology has signed off.  -MRI brain and cervical spine was ordered to evaluate for possible upper motor lesion.  However, pacemaker was recently interrogated and incompatible with MRI.  -SLP/PT/OT and physiatry have been consulted, and patient is pending discharge to rehabilitation upon acceptance.    #T12 compression fracture  #Chronic Pain   -Continue minimizing risk of sedation by using ibuprofen and Tylenol.     -Roxicodeine as needed for breakthrough pain.  -We will provide prescription for Narcan when discharged from hospital     #Chest pain, resolved  Patient had episode of chest pain around 0 400 today.  Initial EKG did show possible ST depressions in leads V5, and V6.  Repeat EKG showed no significant ST elevations or new depressions.  Troponins have been trended 19 and 21, which have been within range of previous troponins during hospitalization.  He has remained in atrial fibrillation on telemetry monitoring during events.  He has had no significant hemodynamic changes or increased oxygen requirements.  Chest pain has since resolved.  -Will not continue to trend EKG or troponin as chest pain has resolved and there has been no significant increase in troponins    # Insomnia   Patient takes either ambien 10mg or 3 tabs of benadryl nightly with ropinerole.   -Continue Ambien as needed for insomnia.  Of note, this is not first-line recommended medication for insomnia in this age group, however patient does have a history of cirrhosis and prolonged QTC.  -Discontinue ropinerole.   -If patient is not achieving adequate sleep with insomnia as needed will consider trial of other medication.  -Outpatient referral to sleep medicine, as family has concerns of patient having apneic episodes at home.       Chronic Medical Problems:       #Thrombocytopenia   -Chronic, stable  -CTM platelets   - >50,000.  Continue NSAIDs. If drops <50,000, will discontinue.     #Prolonged QTc  -Avoid QTc prolonging medications     #HTN  Continue home regimen      # BPH  Continue Flomax     #Merkel Cell Carcinoma   #Non-hodgkins Lymphoma   Patients hospitalization has been discussed with patients Radiation oncologist Dr. Patiño and Oncologist Dr. Bautista. Dr. Patiño recommended obtaining brain MRI, but has described as above his pacemaker is not compatible with MRI.       Abx: None  Lines: PIV  IVF: None  DVT PPX: SCDs and Lovenox  Allen:  None  Dispo: Medically clear for discharge. Pending acceptance to rehabilitation center.      Code Status:  Full code

## 2022-04-13 NOTE — PROGRESS NOTES
Monitor Summary: Paced 73-81, VT -, QRS -, QT -, with rare PVC, in and out of alternating BBB per strip from the monitor room.

## 2022-04-13 NOTE — THERAPY
Physical Therapy   Daily Treatment     Patient Name: Luigi Walters  Age:  81 y.o., Sex:  male  Medical Record #: [id number]  Today's Date: 4/13/2022       Precautions: Fall Risk    Assessment    Pt was pleasant, highly motivated to participate in therapy session. He reached EOB with spv and hob elevated. He required CGA and Janiya for STS And transfers with fww. He is quick to move with impairments in balance. He was able to increase total gait distance with fww and Janiya for balance. Presents with occasional lateral lean due to being quick but no overt lob. Educated on focusing on heel to toe gait pattern and balance with out fww. He continues to report he is below his baseline and will benefit from continued acute skilled thearpy at this time.     Plan    Continue current treatment plan.    DC Equipment Recommendations: Unable to determine at this time  Discharge Recommendations: Recommend post-acute placement for additional physical therapy services prior to discharge home       04/13/22 1600   Balance   Sitting Balance (Static) Fair +   Sitting Balance (Dynamic) Fair   Standing Balance (Static) Fair   Standing Balance (Dynamic) Fair   Weight Shift Sitting Fair   Weight Shift Standing Fair   Skilled Intervention Verbal Cuing;Facilitation   Comments with fww in standing   Gait Analysis   Gait Level Of Assist Minimal Assist   Assistive Device Front Wheel Walker   Distance (Feet) 200   # of Times Distance was Traveled 2   Deviation Decreased Base Of Support  (quick to move)   Skilled Intervention Verbal Cuing;Tactile Cuing   Comments high fall risk   Bed Mobility    Supine to Sit Supervised   Sit to Supine Supervised   Scooting Supervised   Functional Mobility   Sit to Stand Minimal Assist   Bed, Chair, Wheelchair Transfer Minimal Assist   Comments with fww, quick to move, Janiya for balance and safety   Short Term Goals    Short Term Goal # 1 supine to/from sit EOB with min assist in 6 visits   Goal Outcome # 1 Goal  met   Short Term Goal # 3 B Pt will perform sit <> stand and transfers with SPV to improve mobliity independence in 6 visits   Goal Outcome # 3 B Goal not met   Short Term Goal # 4 pt will ambulate > 200 ft with LRAD and SPV to access community in 6 visits   Goal Outcome # 4 Goal not met   Short Term Goal # 5 pt will negotiate 2 steps with LRAD and SPV to access home environment in 6 visits   Goal Outcome # 5 Goal not met

## 2022-04-13 NOTE — PROGRESS NOTES
"0430: Pt notified this RN of increased chest pain after administration of Tums. Pt states that, \"It feels like a burning sensation in the middle of my chest\". Pt noticed to have increased HR of 120-130 on tele monitor. On call Hospitalist Annalise Choudhury MD notified of pt condition. Orders received via telephone for STAT EKG and Trops. Orders read back to Kei GARRETT. Will continue to monitor.    "

## 2022-04-13 NOTE — PROGRESS NOTES
S: Patient had chest pain overnight.  On evaluation and discussion with patient this morning, he is resting comfortably at bedside in no acute distress.  He does report experiencing substernal chest pain and warming sensation overnight, which has since resolved.  He denies experiencing associated radiating chest pain, shortness of breath, pleuritic chest pain, or exertional worsening of chest pain.      Physical exam:  General: Alert, no acute distress  CV: Regular rate and rhythm.  S1-S2  Lungs: Clear to auscultation bilaterally, nonlabored  Abdomen: Soft, nontender nondistended,+BS  Skin: No cyanosis or jaundice  Neuro: No focal deficits     A/P:  #Chest pain  Patient had atypical chest pain overnight after receiving Tums, which has since resolved.  He has remained he medically stable with no increase in oxygen demands.  He is currently on supplemental nocturnal oxygen due to possible underlying sleep apnea.  He otherwise has not had increased work of breathing. History of chest pain resolving and made worse with lying flat more consistent with GERD. He has been monitored on telemetry since admission and had multiple ECGs and troponin's which have been reassuring. Cardiology was previously consulted for possible sustained VTach on telemetry monitoring. His pacemaker was interrogated and it was determined that heh was actually atrial paced. In addition he had recent ECHO 4/8/2022 that showed LVEF 65% with no significant valvular abnormalities.   -No significant arrhythmias reported on telemetry overnight  -EKG showed possible worsening ST depressions V5/V6  -Troponin 19 which is unchanged from last troponin  -Every 4 hours troponin and EKG  -Given atypical presentation of chest pain, duration which has since resolved, unchanged hemodynamics, and minimally changed ST depressions in V5/V6 this is unlikely an NSTEMI.  But we will continue to monitor closely on telemetry and obtain repeat EKG/troponins.    -Any changes  in hemodynamics, EKG or worsening of chest pain/shortness of breath low threshold to consult cardiology.

## 2022-04-13 NOTE — CARE PLAN
The patient is Stable - Low risk of patient condition declining or worsening    Shift Goals  Clinical Goals: pain control, DC planning  Patient Goals: sleep  Family Goals: HEATHER    Progress made toward(s) clinical / shift goals:  pt updated on POC, bed locked and in lowest position, updated on DC planning, Q4 neuro checks in place, pt remains free from falls    Patient is not progressing towards the following goals:

## 2022-04-13 NOTE — CARE PLAN
The patient is Stable - Low risk of patient condition declining or worsening    Shift Goals  Clinical Goals: Pain control, discharge planning  Patient Goals: Sleep schedule  Family Goals: HEATHER    Progress made toward(s) clinical / shift goals:      Problem: Skin Integrity  Goal: Skin integrity is maintained or improved  Outcome: Progressing  Patient verbalizes understanding of importance of turning in bed, ambulating, and other skin integrity management techniques     Problem: Fall Risk  Goal: Patient will remain free from falls  Outcome: Progressing  Patient demonstrated appropriate use of call light, waits for assistance, and utilizes equipment for ambulation      Problem: Pain - Standard  Goal: Alleviation of pain or a reduction in pain to the patient’s comfort goal  Outcome: Progressing  Patient verbalizes understanding of pain scale and pain management plan       Patient is not progressing towards the following goals: NA

## 2022-04-13 NOTE — DISCHARGE PLANNING
Anticipated Discharge Disposition:   SNF     Action:   RN CM called Worker's comp , Helga Chavis at [phone]. No answer, left voicemail for call back.    Barriers to Discharge:   Placement acceptance and bed availability.  Open worker's comp case    Plan:   Follow up with .  Hospital Care Management will continue to follow and assist with discharge planning needs.

## 2022-04-14 PROCEDURE — 700102 HCHG RX REV CODE 250 W/ 637 OVERRIDE(OP): Performed by: STUDENT IN AN ORGANIZED HEALTH CARE EDUCATION/TRAINING PROGRAM

## 2022-04-14 PROCEDURE — 700111 HCHG RX REV CODE 636 W/ 250 OVERRIDE (IP): Performed by: FAMILY MEDICINE

## 2022-04-14 PROCEDURE — 700101 HCHG RX REV CODE 250: Performed by: STUDENT IN AN ORGANIZED HEALTH CARE EDUCATION/TRAINING PROGRAM

## 2022-04-14 PROCEDURE — 99232 SBSQ HOSP IP/OBS MODERATE 35: CPT | Mod: GC | Performed by: FAMILY MEDICINE

## 2022-04-14 PROCEDURE — A9270 NON-COVERED ITEM OR SERVICE: HCPCS | Performed by: STUDENT IN AN ORGANIZED HEALTH CARE EDUCATION/TRAINING PROGRAM

## 2022-04-14 PROCEDURE — 770001 HCHG ROOM/CARE - MED/SURG/GYN PRIV*

## 2022-04-14 RX ORDER — FAMOTIDINE 20 MG/1
20 TABLET, FILM COATED ORAL 2 TIMES DAILY
Status: DISCONTINUED | OUTPATIENT
Start: 2022-04-14 | End: 2022-04-15 | Stop reason: HOSPADM

## 2022-04-14 RX ADMIN — METOPROLOL TARTRATE 25 MG: 25 TABLET, FILM COATED ORAL at 18:28

## 2022-04-14 RX ADMIN — METOPROLOL TARTRATE 25 MG: 25 TABLET, FILM COATED ORAL at 04:37

## 2022-04-14 RX ADMIN — OXYCODONE HYDROCHLORIDE 5 MG: 5 TABLET ORAL at 08:53

## 2022-04-14 RX ADMIN — IBUPROFEN 600 MG: 600 TABLET ORAL at 08:52

## 2022-04-14 RX ADMIN — ACETAMINOPHEN 650 MG: 325 TABLET, FILM COATED ORAL at 08:53

## 2022-04-14 RX ADMIN — FAMOTIDINE 20 MG: 20 TABLET ORAL at 18:28

## 2022-04-14 RX ADMIN — OXYCODONE HYDROCHLORIDE 10 MG: 10 TABLET ORAL at 18:59

## 2022-04-14 RX ADMIN — ENOXAPARIN SODIUM 40 MG: 40 INJECTION SUBCUTANEOUS at 04:38

## 2022-04-14 RX ADMIN — ACETAMINOPHEN 650 MG: 325 TABLET, FILM COATED ORAL at 20:13

## 2022-04-14 RX ADMIN — CALCITRIOL CAPSULES 0.25 MCG 0.25 MCG: 0.25 CAPSULE ORAL at 04:38

## 2022-04-14 RX ADMIN — IBUPROFEN 600 MG: 600 TABLET ORAL at 14:07

## 2022-04-14 RX ADMIN — SENNOSIDES AND DOCUSATE SODIUM 2 TABLET: 50; 8.6 TABLET ORAL at 04:37

## 2022-04-14 RX ADMIN — THERA TABS 1 TABLET: TAB at 04:37

## 2022-04-14 RX ADMIN — LIDOCAINE 1 PATCH: 50 PATCH TOPICAL at 14:12

## 2022-04-14 RX ADMIN — TAMSULOSIN HYDROCHLORIDE 0.4 MG: 0.4 CAPSULE ORAL at 04:37

## 2022-04-14 RX ADMIN — OXYCODONE HYDROCHLORIDE 10 MG: 10 TABLET ORAL at 14:07

## 2022-04-14 RX ADMIN — FAMOTIDINE 20 MG: 20 TABLET ORAL at 04:37

## 2022-04-14 RX ADMIN — IBUPROFEN 600 MG: 600 TABLET ORAL at 20:13

## 2022-04-14 RX ADMIN — ACETAMINOPHEN 650 MG: 325 TABLET, FILM COATED ORAL at 14:07

## 2022-04-14 ASSESSMENT — PAIN DESCRIPTION - PAIN TYPE: TYPE: ACUTE PAIN

## 2022-04-14 NOTE — PROGRESS NOTES
[facility] FAMILY MEDICINE PROGRESS NOTE     Attending: Dr. Page      Senior Resident: Chaitanya Echeverria D.O.  Family Medicine Resident PGY-2        PATIENT: Luigi Walters; [id number]; 1940 Hospital Day: 9     ID: 81 y.o. 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    SUBJECTIVE: No acute events overnight. Patient continues to experience nocturnal chest burning. Denies SOB or recurrent muscle jerks.     OBJECTIVE:     Vitals:    04/13/22 1920 04/14/22 0321 04/14/22 0800 04/14/22 1200   BP: 135/73 (!) 172/97 (!) 167/77 149/81   Pulse: 77 82 79 76   Resp: 16 17 17 17   Temp: 36.9 °C (98.5 °F) 36.9 °C (98.5 °F) 36.5 °C (97.7 °F) 36.4 °C (97.5 °F)   TempSrc: Temporal Temporal Temporal Temporal   SpO2: 94% 93% 94% 94%   Weight:       Height:           Intake/Output Summary (Last 24 hours) at 4/14/2022 1300  Last data filed at 4/14/2022 1200  Gross per 24 hour   Intake 720 ml   Output 675 ml   Net 45 ml       PE:  General: No acute distress, resting comfortably in bed.  HEENT: NC/AT. EOMI. MMM  Cardiovascular: RRR with no M/R/G.  Respiratory: Symmetrical chest. CTAB with no W/R/R  Abdomen: soft, NT/ND, no masses, +BS   EXT:  No LE edema   Neuro: No focal deficits. Sensation and motor grossly intact.     LABS:  Recent Labs     04/13/22  0951   WBC 4.5*   RBC 4.81   HEMOGLOBIN 14.6   HEMATOCRIT 43.8   MCV 91.1   MCH 30.4   RDW 41.8   PLATELETCT 88*   MPV 12.4   NEUTSPOLYS 80.10*   LYMPHOCYTES 8.60*   MONOCYTES 6.80   EOSINOPHILS 3.10   BASOPHILS 0.70     Recent Labs     04/13/22  0951   SODIUM 141   POTASSIUM 4.2   CHLORIDE 106   CO2 24   BUN 25*   CREATININE 1.30   CALCIUM 9.9     Estimated GFR/CRCL = Estimated Creatinine Clearance: 52.8 mL/min (by C-G formula based on SCr of 1.3 mg/dL).  Recent Labs     04/13/22  0951   GLUCOSE 125*                 No results for input(s):  "INR, APTT, FIBRINOGEN in the last 72 hours.    Invalid input(s): DIMER    MICROBIOLOGY:   Results     Procedure Component Value Units Date/Time    Blood Culture [[id number]] Collected: 04/06/22 1257    Order Status: Completed Specimen: Blood from Peripheral Updated: 04/11/22 1500     Significant Indicator NEG     Source BLD     Site PERIPHERAL     Culture Result No growth after 5 days of incubation.    Narrative:      1 of 2 for Blood Culture x 2 sites order. Per Hospital  Policy: Only change Specimen Src: to \"Line\" if specified by  physician order.  No site indicated    Blood Culture [[id number]] Collected: 04/06/22 1257    Order Status: Completed Specimen: Blood from Peripheral Updated: 04/11/22 1500     Significant Indicator NEG     Source BLD     Site PERIPHERAL     Culture Result No growth after 5 days of incubation.    Narrative:      2 of 2 blood culture x2  Sites order. Per Hospital Policy:  Only change Specimen Src: to \"Line\" if specified by physician  order.  No site indicated    Urine Culture (NEW) [[id number]] Collected: 04/06/22 1146    Order Status: Completed Specimen: Urine Updated: 04/08/22 0745     Significant Indicator NEG     Source UR     Site -     Culture Result Usual skin trevor <10,000 cfu/mL    Narrative:      Indication for culture:->Evaluation for sepsis without a  clear source of infection  Indication for culture:->Evaluation for sepsis without a          IMAGING:   US-BLADDER   Final Result      1.  Distended urinary bladder.      EC-ECHOCARDIOGRAM LTD W/O CONT   Final Result      CT-TSPINE W/O PLUS RECONS   Final Result      1.  Acute/subacute mild T12 superior endplate compression fracture.   2.  Age-indeterminate without likely chronic mild T1, T3, T4 and L1 compression fractures.      CT-LSPINE W/O PLUS RECONS   Final Result      1.  Acute-subacute wedge compression fracture of T12 with trace retropulsion.   2.  Age-indeterminate L1 compression deformity. Correlate with point tenderness. "   3.  Degenerative changes of the lower lumbar spine.   4.  Moderate bilateral hydroureteronephrosis.      DX-CHEST-PORTABLE (1 VIEW)   Final Result      No acute cardiopulmonary abnormality.      CT-CEREBRAL PERFUSION ANALYSIS   Final Result      1.  Cerebral blood flow less than 30% likely representing completed infarct = 0 mL.      2.  T Max more than 6 seconds likely representing combination of completed infarct and ischemia = 4 mL.      3.  Mismatched volume likely representing ischemic brain/penumbra = 4 mL      4.  Please note that the cerebral perfusion was performed on the limited brain tissue around the basal ganglia region. Infarct/ischemia outside the CT perfusion sections can be missed in this study.      CT-CTA HEAD WITH & W/O-POST PROCESS   Final Result      No large vessel occlusion, hemodynamically significant stenosis or aneurysm is seen.      CT-CTA NECK WITH & W/O-POST PROCESSING   Final Result      1.  Moderate atherosclerotic narrowing of the proximal right internal carotid artery.   2.  Significant streak artifact adjacent to the distal left vertebral artery provides severely suboptimal evaluation. There is contrast proximally and distally, however high-grade stenosis cannot be entirely excluded.   3.  Suboptimal evaluation of the proximal left and bilateral vertebral arteries due to streak artifact.   4.  No high-grade stenosis or dissection in the visualized portions of the arteries of the neck.   5.  6 mm left upper lobe pulmonary nodule that appears similar to prior PET/CT.   6.  Remote appearing superior endplate deformity of T3.      CT-HEAD W/O   Final Result      1.  Cerebral atrophy.   2.  No evidence of acute infarction or acute hemorrhage or mass lesion.               MEDS:  Current Facility-Administered Medications   Medication Last Admin   • famotidine (PEPCID) tablet 20 mg     • oxyCODONE immediate-release (ROXICODONE) tablet 5 mg 5 mg at 04/14/22 0853   • oxyCODONE immediate  release (ROXICODONE) tablet 10 mg 10 mg at 04/13/22 1450   • zolpidem (AMBIEN) tablet 5 mg     • ibuprofen (MOTRIN) tablet 600 mg 600 mg at 04/14/22 0852   • acetaminophen (Tylenol) tablet 650 mg 650 mg at 04/14/22 0853   • lidocaine (LIDODERM) 5 % 1 Patch 1 Patch at 04/13/22 1306   • carboxymethylcellulose (REFRESH TEARS) 0.5 % ophthalmic drops 2 Drop 2 Drop at 04/10/22 1408   • promethazine (PHENERGAN) suppository 25 mg 25 mg at 04/08/22 0602   • enoxaparin (LOVENOX) inj 40 mg 40 mg at 04/14/22 0438   • metoprolol tartrate (LOPRESSOR) tablet 25 mg 25 mg at 04/14/22 0437   • Metoprolol Tartrate (LOPRESSOR) injection 5 mg     • calcitRIOL (ROCALTROL) capsule 0.25 mcg 0.25 mcg at 04/14/22 0438   • multivitamin (THERAGRAN) tablet 1 Tablet 1 Tablet at 04/14/22 0437   • promethazine (PHENERGAN) tablet 25 mg 25 mg at 04/08/22 1246   • tamsulosin (FLOMAX) capsule 0.4 mg 0.4 mg at 04/14/22 0437   • senna-docusate (PERICOLACE or SENOKOT S) 8.6-50 MG per tablet 2 Tablet 2 Tablet at 04/14/22 0437    And   • polyethylene glycol/lytes (MIRALAX) PACKET 1 Packet      And   • magnesium hydroxide (MILK OF MAGNESIA) suspension 30 mL      And   • bisacodyl (DULCOLAX) suppository 10 mg     • hydrALAZINE (APRESOLINE) injection 10 mg     • [Held by provider] ondansetron (ZOFRAN) syringe/vial injection 4 mg     • [Held by provider] ondansetron (ZOFRAN ODT) dispertab 4 mg         PROBLEM LIST:  No problems updated.    ASSESSMENT/PLAN:81 y.o. male with an extensive medical history of Meckel's cell carcinoma, sick sinus syndrome with pacemaker, cirrhosis, non-Hodgekin's lymphoma, HTN, prostate cancer, seizure, and stroke who presented with altered mental status.      # Altered mental status, resolved  Patient presented with altered mental status 4/6/2022.    Since admission he has had CTA head and neck with and without contrast, which have been essentially unremarkable for acute stroke.  He was on multiple medications believed to be  contributory including Ambien and oxycodone.  -Resolved   -Minimize sedating medications     #Myoclonus, resolved  Patient was experiencing significant myoclonus in dystonia, which is since resolved.  Episodic myoclonic jerking movements and hypertonia possibly related to IV contrast.  In review of literature there are rare reported histories of contrast-induced spinal myoclonus.  -Work-up this hospitalization has included CT head with and without contrast, CT neck with contrast, CT thoracic and lumbar spine, and EEG which have been reassuring.  Metabolic and infectious etiologies have been evaluated for and have been reassuring.  Case has been discussed with neurology during hospitalization, neurology has signed off.  -MRI brain and cervical spine was ordered to evaluate for possible upper motor lesion.  However, pacemaker was recently interrogated and incompatible with MRI.  -SLP/PT/OT and physiatry have been consulted, and patient is medically stable for discharge pending acceptance to SNF.     #T12 compression fracture  #Chronic Pain   -Continue minimizing risk of sedation by using ibuprofen and Tylenol.    -Roxicodeine as needed for breakthrough pain.  -We will provide prescription for Narcan when discharged from hospital     #GERD   -Increase Pepcid to BID      # Insomnia   -Continue Ambien as needed for insomnia.  Of note, this is not first-line recommended medication for insomnia in this age group, however patient does have a history of cirrhosis and prolonged QTC.  -Discontinue ropinerole.   -If patient is not achieving adequate sleep with insomnia as needed will consider trial of other medication.  -Outpatient referral to sleep medicine, as family has concerns of patient having apneic episodes at home.        Chronic Medical Problems:         #Thrombocytopenia   -Chronic, stable  -CTM platelets   - >50,000.  Continue NSAIDs. If drops <50,000, will discontinue.      #Prolonged QTc  -Avoid QTc prolonging  medications     #HTN  Continue home regimen      # BPH  Continue Flomax      #Merkel Cell Carcinoma   #Non-hodgkins Lymphoma   Patients hospitalization has been discussed with patients Radiation oncologist Dr. Patiño and Oncologist Dr. Bautista. Dr. Patiño recommended obtaining brain MRI, but has described as above his pacemaker is not compatible with MRI.         Abx: None  Lines: PIV  IVF: None  DVT PPX: SCDs and Lovenox  Allen: None  Dispo: Medically clear for discharge. Pending acceptance to rehabilitation center.

## 2022-04-14 NOTE — CARE PLAN
The patient is Stable - Low risk of patient condition declining or worsening    Shift Goals  Clinical Goals: pain control, dc planning  Patient Goals: sleep  Family Goals: HEATHER    Progress made toward(s) clinical / shift goals:  pt updated on POC, bed locked and in lowest position, dc planning, Q4 neuro checks    Patient is not progressing towards the following goals:

## 2022-04-14 NOTE — PROGRESS NOTES
Monitor summary: Afib/paced , SD --, QRS --, QT --, with rare PVCs per strip from monitor room.

## 2022-04-14 NOTE — CARE PLAN
The patient is Stable - Low risk of patient condition declining or worsening    Shift Goals  Clinical Goals: Pain control, discharge planning  Patient Goals: Sleep, ambulation  Family Goals: HEATHER    Progress made toward(s) clinical / shift goals:      Problem: Knowledge Deficit - Standard  Goal: Patient and family/care givers will demonstrate understanding of plan of care, disease process/condition, diagnostic tests and medications  Outcome: Met  Patient verbalizes understanding of plan of care, condition, medication facilitation, pain management regimen, etc.     Problem: Skin Integrity  Goal: Skin integrity is maintained or improved  Outcome: Met  Patient has maintained skin integrity with wound improvement on RUE     Problem: Pain - Standard  Goal: Alleviation of pain or a reduction in pain to the patient’s comfort goal  Outcome: Met  Patient verbalizes understanding of pain medication plan and how to achieve comfort goal.       Patient is not progressing towards the following goals: NA

## 2022-04-14 NOTE — DISCHARGE PLANNING
Anticipated Discharge Disposition:   SNF    Action:    Resent referral to [facility] with response from worker's comp.  Please be advised that [facility] is NOT responsible for Mr. Luigi Walters’s in-patient stay as of 04-06-22 to current, as well NOT responsible for the skilled nursing he may need at this time.  You indicated that he has Merkel’s Carcinoma of the back and this does not fall under any coverage we have for him.     I would recommend contacting Medicare for any assistance you may need.        Thank you,  Helga Nowak  Ex[employer] Claims Workers Compensation 1  [phone]     Barriers to Discharge:    Placement acceptance    Plan:    F/U with [facility]   Problem: Falls - Risk of:  Goal: Will remain free from falls  Description: Will remain free from falls  Outcome: Met This Shift  Goal: Absence of physical injury  Description: Absence of physical injury  Outcome: Met This Shift     Problem: Skin Integrity:  Goal: Will show no infection signs and symptoms  Description: Will show no infection signs and symptoms  Outcome: Met This Shift  Goal: Absence of new skin breakdown  Description: Absence of new skin breakdown  Outcome: Met This Shift

## 2022-04-14 NOTE — DISCHARGE PLANNING
Anticipated Discharge Disposition:   SNF    Action:    I was able to speak with worker's comp claim adjustor, Helga Chavis this a.m. and she will be consulting with her supervisor on their process for SNF authorization.      Barriers to Discharge:    Payor source    Plan:    F/U with Helga Chavis with [employer]'s [employer].

## 2022-04-15 VITALS
DIASTOLIC BLOOD PRESSURE: 75 MMHG | HEIGHT: 71 IN | OXYGEN SATURATION: 94 % | BODY MASS INDEX: 29.69 KG/M2 | RESPIRATION RATE: 16 BRPM | WEIGHT: 212.08 LBS | HEART RATE: 76 BPM | SYSTOLIC BLOOD PRESSURE: 140 MMHG | TEMPERATURE: 97.3 F

## 2022-04-15 PROCEDURE — 99238 HOSP IP/OBS DSCHRG MGMT 30/<: CPT | Mod: GC | Performed by: FAMILY MEDICINE

## 2022-04-15 PROCEDURE — 700101 HCHG RX REV CODE 250: Performed by: STUDENT IN AN ORGANIZED HEALTH CARE EDUCATION/TRAINING PROGRAM

## 2022-04-15 PROCEDURE — 700102 HCHG RX REV CODE 250 W/ 637 OVERRIDE(OP): Performed by: STUDENT IN AN ORGANIZED HEALTH CARE EDUCATION/TRAINING PROGRAM

## 2022-04-15 PROCEDURE — 700111 HCHG RX REV CODE 636 W/ 250 OVERRIDE (IP): Performed by: FAMILY MEDICINE

## 2022-04-15 PROCEDURE — A9270 NON-COVERED ITEM OR SERVICE: HCPCS | Performed by: STUDENT IN AN ORGANIZED HEALTH CARE EDUCATION/TRAINING PROGRAM

## 2022-04-15 RX ORDER — FAMOTIDINE 20 MG/1
20 TABLET, FILM COATED ORAL 2 TIMES DAILY
Qty: 60 TABLET | Refills: 1 | Status: SHIPPED | OUTPATIENT
Start: 2022-04-15

## 2022-04-15 RX ORDER — NALOXONE HYDROCHLORIDE 4 MG/.1ML
1 SPRAY NASAL
Qty: 1 EACH | Refills: 2 | Status: SHIPPED | OUTPATIENT
Start: 2022-04-15

## 2022-04-15 RX ORDER — ACETAMINOPHEN 325 MG/1
650 TABLET ORAL 3 TIMES DAILY
Qty: 30 TABLET | Refills: 0 | Status: SHIPPED | OUTPATIENT
Start: 2022-04-15

## 2022-04-15 RX ORDER — IBUPROFEN 600 MG/1
600 TABLET ORAL 3 TIMES DAILY
Qty: 30 TABLET | Refills: 3 | Status: SHIPPED | OUTPATIENT
Start: 2022-04-15

## 2022-04-15 RX ORDER — OXYCODONE HYDROCHLORIDE 5 MG/1
5 TABLET ORAL EVERY 6 HOURS PRN
Qty: 30 TABLET | Refills: 0
Start: 2022-04-15 | End: 2022-05-15

## 2022-04-15 RX ORDER — ZOLPIDEM TARTRATE 10 MG/1
5 TABLET ORAL NIGHTLY PRN
Qty: 30 TABLET | Refills: 0
Start: 2022-04-15 | End: 2022-05-15

## 2022-04-15 RX ORDER — FAMOTIDINE 20 MG/1
TABLET, FILM COATED ORAL
Qty: 180 TABLET | OUTPATIENT
Start: 2022-04-15

## 2022-04-15 RX ADMIN — THERA TABS 1 TABLET: TAB at 04:26

## 2022-04-15 RX ADMIN — METOPROLOL TARTRATE 25 MG: 25 TABLET, FILM COATED ORAL at 04:26

## 2022-04-15 RX ADMIN — SENNOSIDES AND DOCUSATE SODIUM 2 TABLET: 50; 8.6 TABLET ORAL at 04:27

## 2022-04-15 RX ADMIN — ACETAMINOPHEN 650 MG: 325 TABLET, FILM COATED ORAL at 08:19

## 2022-04-15 RX ADMIN — ENOXAPARIN SODIUM 40 MG: 40 INJECTION SUBCUTANEOUS at 04:26

## 2022-04-15 RX ADMIN — OXYCODONE HYDROCHLORIDE 5 MG: 5 TABLET ORAL at 12:31

## 2022-04-15 RX ADMIN — OXYCODONE HYDROCHLORIDE 5 MG: 5 TABLET ORAL at 06:49

## 2022-04-15 RX ADMIN — LIDOCAINE 1 PATCH: 50 PATCH TOPICAL at 12:31

## 2022-04-15 RX ADMIN — IBUPROFEN 600 MG: 600 TABLET ORAL at 08:19

## 2022-04-15 RX ADMIN — TAMSULOSIN HYDROCHLORIDE 0.4 MG: 0.4 CAPSULE ORAL at 04:26

## 2022-04-15 RX ADMIN — FAMOTIDINE 20 MG: 20 TABLET ORAL at 04:26

## 2022-04-15 RX ADMIN — IBUPROFEN 600 MG: 600 TABLET ORAL at 16:15

## 2022-04-15 RX ADMIN — ACETAMINOPHEN 650 MG: 325 TABLET, FILM COATED ORAL at 16:15

## 2022-04-15 RX ADMIN — CALCITRIOL CAPSULES 0.25 MCG 0.25 MCG: 0.25 CAPSULE ORAL at 04:26

## 2022-04-15 ASSESSMENT — PAIN DESCRIPTION - PAIN TYPE
TYPE: ACUTE PAIN;CHRONIC PAIN
TYPE: ACUTE PAIN;CHRONIC PAIN
TYPE: ACUTE PAIN

## 2022-04-15 NOTE — DISCHARGE INSTRUCTIONS
Discharge Instructions    Discharged to home by car with relative. Discharged via wheelchair, hospital escort: Yes.  Special equipment needed: Not Applicable    Be sure to schedule a follow-up appointment with your primary care doctor or any specialists as instructed.     Discharge Plan:        I understand that a diet low in cholesterol, fat, and sodium is recommended for good health. Unless I have been given specific instructions below for another diet, I accept this instruction as my diet prescription.   Other diet: Regular    Special Instructions: None    · Is patient discharged on Warfarin / Coumadin?   No     Depression / Suicide Risk    As you are discharged from this [facility] facility, it is important to learn how to keep safe from harming yourself.    Recognize the warning signs:  · Abrupt changes in personality, positive or negative- including increase in energy   · Giving away possessions  · Change in eating patterns- significant weight changes-  positive or negative  · Change in sleeping patterns- unable to sleep or sleeping all the time   · Unwillingness or inability to communicate  · Depression  · Unusual sadness, discouragement and loneliness  · Talk of wanting to die  · Neglect of personal appearance   · Rebelliousness- reckless behavior  · Withdrawal from people/activities they love  · Confusion- inability to concentrate     If you or a loved one observes any of these behaviors or has concerns about self-harm, here's what you can do:  · Talk about it- your feelings and reasons for harming yourself  · Remove any means that you might use to hurt yourself (examples: pills, rope, extension cords, firearm)  · Get professional help from the community (Mental Health, Substance Abuse, psychological counseling)  · Do not be alone:Call your Safe Contact- someone whom you trust who will be there for you.  · Call your local CRISIS HOTLINE 519-1046 or 047-947-9346  · Call your local Children's Mobile Crisis  Response Team [facility] (211) 161-7667 or www.Rollad  · Call the toll free National Suicide Prevention Hotlines   · National Suicide Prevention Lifeline 295-912-QQRL (4994)  · National Hope Line Network 800-SUICIDE (898-2020)        Opioid Overdose  Opioids are drugs that are often used to treat pain. Opioids include illegal drugs, such as heroin, as well as prescription pain medicines, such as codeine, morphine, hydrocodone, oxycodone, and fentanyl. An opioid overdose happens when you take too much of an opioid. An overdose may be intentional or accidental and can happen with any type of opioid.  The effects of an overdose can be mild, dangerous, or even deadly. Opioid overdose is a medical emergency.  What are the causes?  This condition may be caused by:  · Taking too much of an opioid on purpose.  · Taking too much of an opioid by accident.  · Using two or more substances that contain opioids at the same time.  · Taking an opioid with a substance that affects your heart, breathing, or blood pressure. These include alcohol, tranquilizers, sleeping pills, illegal drugs, and some over-the-counter medicines.  This condition may also happen due to an error made by:  · A health care provider who prescribes a medicine.  · The pharmacist who fills the prescription order.  What increases the risk?  This condition is more likely in:  · Children. They may be attracted to colorful pills. Because of a child's small size, even a small amount of a drug can be dangerous.  · Older people. They may be taking many different drugs. Older people may have difficulty reading labels or remembering when they last took their medicine. They may also be more sensitive to the effects of opioids.  · People with chronic medical conditions, especially heart, liver, kidney, or neurological diseases.  · People who take an opioid for a long period of time.  · People who use:  ? Illegal drugs. IV heroin is especially  dangerous.  ? Other substances, including alcohol, while using an opioid.  · People who have:  ? A history of drug or alcohol abuse.  ? Certain mental health conditions.  ? A history of previous drug overdoses.  · People who take opioids that are not prescribed for them.  What are the signs or symptoms?  Symptoms of this condition depend on the type of opioid and the amount that was taken. Common symptoms include:  · Sleepiness or difficulty waking from sleep.  · Decrease in attention.  · Confusion.  · Slurred speech.  · Slowed breathing and a slow pulse (bradycardia).  · Nausea and vomiting.  · Abnormally small pupils.  Signs and symptoms that require emergency treatment include:  · Cold, clammy, and pale skin.  · Blue lips and fingernails.  · Vomiting.  · Gurgling sounds in the throat.  · A pulse that is very slow or difficult to detect.  · Breathing that is very irregular, slow, noisy, or difficult to detect.  · Limp body.  · Inability to respond to speech or be awakened from sleep (stupor).  · Seizures.  How is this diagnosed?  This condition is diagnosed based on your symptoms and medical history. It is important to tell your health care provider:  · About all of the opioids that you took.  · When you took the opioids.  · Whether you were drinking alcohol or using marijuana, cocaine, or other drugs.  Your health care provider will do a physical exam. This exam may include:  · Checking and monitoring your heart rate and rhythm, breathing rate, temperature, and blood pressure (vital signs).  · Measuring oxygen levels in your blood.  · Checking for abnormally small pupils.  You may also have blood tests or urine tests. You may have X-rays if you are having severe breathing problems.  How is this treated?  This condition requires immediate medical treatment and hospitalization. Treatment is given in the hospital intensive care (ICU) setting. Supporting your vital signs and your breathing is the first step in  treating an opioid overdose. Treatment may also include:  · Giving salts and minerals (electrolytes) along with fluids through an IV.  · Inserting a breathing tube (endotracheal tube) in your airway to help you breathe if you cannot breathe on your own or you are in danger of not being able to breathe on your own.  · Giving oxygen through a small tube under your nose.  · Passing a tube through your nose and into your stomach (nasogastric tube, or NG tube) to empty your stomach.  · Giving medicines that:  ? Increase your blood pressure.  ? Relieve nausea and vomiting.  ? Relieve abdominal pain and cramping.  ? Reverse the effects of the opioid (naloxone).  · Monitoring your heart and oxygen levels.  · Ongoing counseling and mental health support if you intentionally overdosed or used an illegal drug.  Follow these instructions at home:    Medicines  · Take over-the-counter and prescription medicines only as told by your health care provider.  · Always ask your health care provider about possible side effects and interactions of any new medicine that you start taking.  · Keep a list of all the medicines that you take, including over-the-counter medicines. Bring this list with you to all your medical visits.  General instructions  · Drink enough fluid to keep your urine pale yellow.  · Keep all follow-up visits as told by your health care provider. This is important.  How is this prevented?  · Read the drug inserts that come with your opioid pain medicines.  · Take medicines only as told by your health care provider. Do not take more medicine than you are told. Do not take medicines more frequently than you are told.  · Do not drink alcohol or take sedatives when taking opioids.  · Do not use illegal or recreational drugs, including cocaine, ecstasy, and marijuana.  · Do not take opioid medicines that are not prescribed for you.  · Store all medicines in safety containers that are out of the reach of children.  · Get  help if you are struggling with:  ? Alcohol or drug use.  ? Depression or another mental health problem.  ? Thoughts of hurting yourself or another person.  · Keep the phone number of your local poison control center near your phone or in your mobile phone. In the [place] the hotline of the National Poison Control Center is (262) 073-9984.  · If you were prescribed naloxone, make sure you understand how to take it.  Contact a health care provider if you:  · Need help understanding how to take your pain medicines.  · Feel your medicines are too strong.  · Are concerned that your pain medicines are not working well for your pain.  · Develop new symptoms or side effects when you are taking medicines.  Get help right away if:  · You or someone else is having symptoms of an opioid overdose. Get help even if you are not sure.  · You have serious thoughts about hurting yourself or others.  · You have:  ? Chest pain.  ? Difficulty breathing.  ? A loss of consciousness.  These symptoms may represent a serious problem that is an emergency. Do not wait to see if the symptoms will go away. Get medical help right away. Call your local emergency services (670 in the [place] Do not drive yourself to the hospital.  If you ever feel like you may hurt yourself or others, or have thoughts about taking your own life, get help right away. You can go to your nearest emergency department or call:  · Your local emergency services (680 in the [place]  · A suicide crisis helpline, such as the National Suicide Prevention Lifeline at 1-903.538.6397. This is open 24 hours a day.  Summary  · Opioids are drugs that are often used to treat pain. Opioids include illegal drugs, such as heroin, as well as prescription pain medicines.  · An opioid overdose happens when you take too much of an opioid.  · Overdoses can be intentional or accidental.  · Opioid overdose is very dangerous. It is a life-threatening emergency.  · If you or someone you know is  experiencing an opioid overdose, get help right away.  This information is not intended to replace advice given to you by your health care provider. Make sure you discuss any questions you have with your health care provider.  Document Released: 01/25/2006 Document Revised: 12/05/2019 Document Reviewed: 12/05/2019  Elsevier Patient Education © 2020 [employer]

## 2022-04-15 NOTE — DISCHARGE PLANNING
Anticipated Discharge Disposition:   Home  Home Health    Action:    Spoke with patient at bedside and wife, Wendy via telephone.  I explained the barriers to going to SNF because of the worker's comp that shows when facilities run his insurance.  The Liaison escalated it to her leadership and we have not had a response.  They verbalized frustration and understanding.  Wendy said to go ahead with home health for patient as pt needs to get out of hospital and home is best place.  Choice obtained for [facility].  Voalte msg to Dr. Echeverria.  Choice form faxed to [facility].    Barriers to Discharge:    Home health acceptance    Plan:    F/U on referrals.

## 2022-04-15 NOTE — PROGRESS NOTES
[facility] [place] FAMILY MEDICINE PROGRESS NOTE     Attending: Dr. Page      Senior Resident: Chaitanya Echeverria D.O.  Family Medicine Resident PGY-2        PATIENT: Luigi Walters; [id number]; 1940 Hospital Day: 9     ID: 81 y.o. 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    SUBJECTIVE: No acute events overnight.  Vital signs stable, afebrile.  Patient does report experiencing recurrence of his substernal chest burning last night, but says was much milder than previous episodes.  He denies experience recurrence of his myoclonic jerking episodes.    OBJECTIVE:     Vitals:    04/14/22 1700 04/14/22 2001 04/15/22 0357 04/15/22 0426   BP: 153/87 146/81 122/67 142/89   Pulse: 77 80 87 81   Resp: 17 18 16    Temp: 36.5 °C (97.7 °F) 36.1 °C (96.9 °F) 37.2 °C (98.9 °F)    TempSrc: Temporal Temporal Temporal    SpO2: 95% 95% 95%    Weight:       Height:           Intake/Output Summary (Last 24 hours) at 4/15/2022 0548  Last data filed at 4/15/2022 0426  Gross per 24 hour   Intake 720 ml   Output 950 ml   Net -230 ml       PE:  General: No acute distress, resting comfortably in bed.  HEENT: NC/AT. EOMI. MMM  Cardiovascular: RRR with no M/R/G.  Respiratory: Symmetrical chest. CTAB with no W/R/R  Abdomen: soft, NT/ND, no masses, +BS   EXT:  No LE edema   Neuro: No focal deficits. Sensation and motor grossly intact.     LABS:  Recent Labs     04/13/22  0951   WBC 4.5*   RBC 4.81   HEMOGLOBIN 14.6   HEMATOCRIT 43.8   MCV 91.1   MCH 30.4   RDW 41.8   PLATELETCT 88*   MPV 12.4   NEUTSPOLYS 80.10*   LYMPHOCYTES 8.60*   MONOCYTES 6.80   EOSINOPHILS 3.10   BASOPHILS 0.70     Recent Labs     04/13/22  0951   SODIUM 141   POTASSIUM 4.2   CHLORIDE 106   CO2 24   BUN 25*   CREATININE 1.30   CALCIUM 9.9     Estimated GFR/CRCL = Estimated Creatinine Clearance: 52.8 mL/min (by C-G formula based on SCr  "of 1.3 mg/dL).  Recent Labs     04/13/22  0951   GLUCOSE 125*                 No results for input(s): INR, APTT, FIBRINOGEN in the last 72 hours.    Invalid input(s): DIMER    MICROBIOLOGY:   Results     Procedure Component Value Units Date/Time    Blood Culture [[id number]] Collected: 04/06/22 1257    Order Status: Completed Specimen: Blood from Peripheral Updated: 04/11/22 1500     Significant Indicator NEG     Source BLD     Site PERIPHERAL     Culture Result No growth after 5 days of incubation.    Narrative:      1 of 2 for Blood Culture x 2 sites order. Per Hospital  Policy: Only change Specimen Src: to \"Line\" if specified by  physician order.  No site indicated    Blood Culture [[id number]] Collected: 04/06/22 1257    Order Status: Completed Specimen: Blood from Peripheral Updated: 04/11/22 1500     Significant Indicator NEG     Source BLD     Site PERIPHERAL     Culture Result No growth after 5 days of incubation.    Narrative:      2 of 2 blood culture x2  Sites order. Per Hospital Policy:  Only change Specimen Src: to \"Line\" if specified by physician  order.  No site indicated    Urine Culture (NEW) [[id number]] Collected: 04/06/22 1146    Order Status: Completed Specimen: Urine Updated: 04/08/22 0745     Significant Indicator NEG     Source UR     Site -     Culture Result Usual skin trevor <10,000 cfu/mL    Narrative:      Indication for culture:->Evaluation for sepsis without a  clear source of infection  Indication for culture:->Evaluation for sepsis without a            IMAGING:   US-BLADDER   Final Result      1.  Distended urinary bladder.      EC-ECHOCARDIOGRAM LTD W/O CONT   Final Result      CT-TSPINE W/O PLUS RECONS   Final Result      1.  Acute/subacute mild T12 superior endplate compression fracture.   2.  Age-indeterminate without likely chronic mild T1, T3, T4 and L1 compression fractures.      CT-LSPINE W/O PLUS RECONS   Final Result      1.  Acute-subacute wedge compression fracture of T12 " with trace retropulsion.   2.  Age-indeterminate L1 compression deformity. Correlate with point tenderness.   3.  Degenerative changes of the lower lumbar spine.   4.  Moderate bilateral hydroureteronephrosis.      DX-CHEST-PORTABLE (1 VIEW)   Final Result      No acute cardiopulmonary abnormality.      CT-CEREBRAL PERFUSION ANALYSIS   Final Result      1.  Cerebral blood flow less than 30% likely representing completed infarct = 0 mL.      2.  T Max more than 6 seconds likely representing combination of completed infarct and ischemia = 4 mL.      3.  Mismatched volume likely representing ischemic brain/penumbra = 4 mL      4.  Please note that the cerebral perfusion was performed on the limited brain tissue around the basal ganglia region. Infarct/ischemia outside the CT perfusion sections can be missed in this study.      CT-CTA HEAD WITH & W/O-POST PROCESS   Final Result      No large vessel occlusion, hemodynamically significant stenosis or aneurysm is seen.      CT-CTA NECK WITH & W/O-POST PROCESSING   Final Result      1.  Moderate atherosclerotic narrowing of the proximal right internal carotid artery.   2.  Significant streak artifact adjacent to the distal left vertebral artery provides severely suboptimal evaluation. There is contrast proximally and distally, however high-grade stenosis cannot be entirely excluded.   3.  Suboptimal evaluation of the proximal left and bilateral vertebral arteries due to streak artifact.   4.  No high-grade stenosis or dissection in the visualized portions of the arteries of the neck.   5.  6 mm left upper lobe pulmonary nodule that appears similar to prior PET/CT.   6.  Remote appearing superior endplate deformity of T3.      CT-HEAD W/O   Final Result      1.  Cerebral atrophy.   2.  No evidence of acute infarction or acute hemorrhage or mass lesion.               MEDS:  Current Facility-Administered Medications   Medication Last Admin   • famotidine (PEPCID) tablet 20 mg  20 mg at 04/15/22 0426   • oxyCODONE immediate-release (ROXICODONE) tablet 5 mg 5 mg at 04/14/22 0853   • oxyCODONE immediate release (ROXICODONE) tablet 10 mg 10 mg at 04/14/22 1859   • zolpidem (AMBIEN) tablet 5 mg     • ibuprofen (MOTRIN) tablet 600 mg 600 mg at 04/14/22 2013   • acetaminophen (Tylenol) tablet 650 mg 650 mg at 04/14/22 2013   • lidocaine (LIDODERM) 5 % 1 Patch 1 Patch at 04/14/22 1412   • carboxymethylcellulose (REFRESH TEARS) 0.5 % ophthalmic drops 2 Drop 2 Drop at 04/10/22 1408   • promethazine (PHENERGAN) suppository 25 mg 25 mg at 04/08/22 0602   • enoxaparin (LOVENOX) inj 40 mg 40 mg at 04/15/22 0426   • metoprolol tartrate (LOPRESSOR) tablet 25 mg 25 mg at 04/15/22 0426   • Metoprolol Tartrate (LOPRESSOR) injection 5 mg     • calcitRIOL (ROCALTROL) capsule 0.25 mcg 0.25 mcg at 04/15/22 0426   • multivitamin (THERAGRAN) tablet 1 Tablet 1 Tablet at 04/15/22 0426   • promethazine (PHENERGAN) tablet 25 mg 25 mg at 04/08/22 1246   • tamsulosin (FLOMAX) capsule 0.4 mg 0.4 mg at 04/15/22 0426   • senna-docusate (PERICOLACE or SENOKOT S) 8.6-50 MG per tablet 2 Tablet 2 Tablet at 04/15/22 0427    And   • polyethylene glycol/lytes (MIRALAX) PACKET 1 Packet      And   • magnesium hydroxide (MILK OF MAGNESIA) suspension 30 mL      And   • bisacodyl (DULCOLAX) suppository 10 mg     • hydrALAZINE (APRESOLINE) injection 10 mg     • [Held by provider] ondansetron (ZOFRAN) syringe/vial injection 4 mg     • [Held by provider] ondansetron (ZOFRAN ODT) dispertab 4 mg         PROBLEM LIST:  No problems updated.    ASSESSMENT/PLAN:81 y.o. male with an extensive medical history of Meckel's cell carcinoma, sick sinus syndrome with pacemaker, cirrhosis, non-Hodgekin's lymphoma, HTN, prostate cancer, seizure, and stroke who presented with altered mental status.      # Altered mental status, resolved  Patient presented with altered mental status 4/6/2022.    Since admission he has had CTA head and neck with and  without contrast, which have been essentially unremarkable for acute stroke.  He was on multiple medications believed to be contributory including Ambien and oxycodone.  -Resolved   -Minimize sedating medications     #Myoclonus, resolved  Patient was experiencing significant myoclonus in dystonia, which is since resolved.  Episodic myoclonic jerking movements and hypertonia possibly related to IV contrast.  In review of literature there are rare reported histories of contrast-induced spinal myoclonus.  -Work-up this hospitalization has included CT head with and without contrast, CT neck with contrast, CT thoracic and lumbar spine, and EEG which have been reassuring.  Metabolic and infectious etiologies have been evaluated for and have been reassuring.  Case has been discussed with neurology during hospitalization, neurology has signed off.  -MRI brain and cervical spine was ordered to evaluate for possible upper motor lesion.  However, pacemaker was recently interrogated and incompatible with MRI.  -SLP/PT/OT and physiatry have been consulted, and patient is medically stable for discharge pending acceptance to SNF.        #T12 compression fracture  #Chronic Pain   -Continue minimizing risk of sedation by using ibuprofen and Tylenol.    -Roxicodeine as needed for breakthrough pain.  -We will provide prescription for Narcan when discharged from hospital     #GERD   -Improved  -Increase Pepcid to BID      # Insomnia   -Continue Ambien as needed for insomnia.  Of note, this is not first-line recommended medication for insomnia in this age group, however patient does have a history of cirrhosis and prolonged QTC.  -Discontinue ropinerole.   -Outpatient referral to sleep medicine, as family has concerns of patient having apneic episodes at home.        Chronic Medical Problems:         #Thrombocytopenia   -Chronic, stable  -CTM platelets   - >50,000.  Continue NSAIDs. If drops <50,000, will discontinue.      #Prolonged  QTc  -Avoid QTc prolonging medications     #HTN  Continue home regimen      # BPH  Continue Flomax      #Merkel Cell Carcinoma   #Non-hodgkins Lymphoma   Patients hospitalization has been discussed with patients Radiation oncologist Dr. Patiño and Oncologist Dr. Bautista. Dr. Patiño recommended obtaining brain MRI, but has described as above his pacemaker is not compatible with MRI.         Abx: None  Lines: PIV  IVF: None  DVT PPX: SCDs and Lovenox  Allen: None  Dispo: Medically clear for discharge. Pending acceptance to rehabilitation center.  Plan for case management to meet with family to discuss discharge plan.

## 2022-04-15 NOTE — PROGRESS NOTES
Report received. Assumed care. Pt in bed awake, watching TV. A/O x4, q4 neuros in place. VSS. Responds appropriately. C/O pain, medicated per MAR, denies SOB on RA. Assessment complete. Discussed POC, pt verbalizes understanding. Explained importance of calling before getting OOB. Call light and belongings within reach. Bed alarm on. Bed in the lowest position. Treaded socks in place. Hourly rounding in progress. Pt ambulated x2 laps around unit with standby assist using FWW, steady gait. All needs met at this time.

## 2022-04-15 NOTE — DISCHARGE SUMMARY
"[facility] [place] FAMILY MEDICINE PROGRESS NOTE     Attending:   Dr. Balderas     Resident:   Chaitanya Echeverria DO    PATIENT: Luigi Walters; [id number]; 1940    ID: 81 y.o. 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    OBJECTIVE:     Vitals:    04/14/22 2001 04/15/22 0357 04/15/22 0426 04/15/22 0730   BP: 146/81 122/67 142/89 142/86   Pulse: 80 87 81 86   Resp: 18 16  16   Temp: 36.1 °C (96.9 °F) 37.2 °C (98.9 °F)  36.2 °C (97.2 °F)   TempSrc: Temporal Temporal  Temporal   SpO2: 95% 95%  96%   Weight:       Height:           Intake/Output Summary (Last 24 hours) at 4/15/2022 1200  Last data filed at 4/15/2022 0650  Gross per 24 hour   Intake --   Output 700 ml   Net -700 ml       LABS:  Recent Labs     04/13/22  0951   WBC 4.5*   RBC 4.81   HEMOGLOBIN 14.6   HEMATOCRIT 43.8   MCV 91.1   MCH 30.4   RDW 41.8   PLATELETCT 88*   MPV 12.4   NEUTSPOLYS 80.10*   LYMPHOCYTES 8.60*   MONOCYTES 6.80   EOSINOPHILS 3.10   BASOPHILS 0.70     Recent Labs     04/13/22  0951   SODIUM 141   POTASSIUM 4.2   CHLORIDE 106   CO2 24   BUN 25*   CREATININE 1.30   CALCIUM 9.9     Estimated GFR/CRCL = Estimated Creatinine Clearance: 52.8 mL/min (by C-G formula based on SCr of 1.3 mg/dL).  Recent Labs     04/13/22  0951   GLUCOSE 125*                 No results for input(s): INR, APTT, FIBRINOGEN in the last 72 hours.    Invalid input(s): DIMER    MICROBIOLOGY:   Results     Procedure Component Value Units Date/Time    Blood Culture [[id number]] Collected: 04/06/22 1257    Order Status: Completed Specimen: Blood from Peripheral Updated: 04/11/22 1500     Significant Indicator NEG     Source BLD     Site PERIPHERAL     Culture Result No growth after 5 days of incubation.    Narrative:      1 of 2 for Blood Culture x 2 sites order. Per Hospital  Policy: Only change Specimen Src: to \"Line\" if specified " "by  physician order.  No site indicated    Blood Culture [[id number]] Collected: 04/06/22 1257    Order Status: Completed Specimen: Blood from Peripheral Updated: 04/11/22 1500     Significant Indicator NEG     Source BLD     Site PERIPHERAL     Culture Result No growth after 5 days of incubation.    Narrative:      2 of 2 blood culture x2  Sites order. Per Hospital Policy:  Only change Specimen Src: to \"Line\" if specified by physician  order.  No site indicated            IMAGING:   US-BLADDER   Final Result      1.  Distended urinary bladder.      EC-ECHOCARDIOGRAM LTD W/O CONT   Final Result      CT-TSPINE W/O PLUS RECONS   Final Result      1.  Acute/subacute mild T12 superior endplate compression fracture.   2.  Age-indeterminate without likely chronic mild T1, T3, T4 and L1 compression fractures.      CT-LSPINE W/O PLUS RECONS   Final Result      1.  Acute-subacute wedge compression fracture of T12 with trace retropulsion.   2.  Age-indeterminate L1 compression deformity. Correlate with point tenderness.   3.  Degenerative changes of the lower lumbar spine.   4.  Moderate bilateral hydroureteronephrosis.      DX-CHEST-PORTABLE (1 VIEW)   Final Result      No acute cardiopulmonary abnormality.      CT-CEREBRAL PERFUSION ANALYSIS   Final Result      1.  Cerebral blood flow less than 30% likely representing completed infarct = 0 mL.      2.  T Max more than 6 seconds likely representing combination of completed infarct and ischemia = 4 mL.      3.  Mismatched volume likely representing ischemic brain/penumbra = 4 mL      4.  Please note that the cerebral perfusion was performed on the limited brain tissue around the basal ganglia region. Infarct/ischemia outside the CT perfusion sections can be missed in this study.      CT-CTA HEAD WITH & W/O-POST PROCESS   Final Result      No large vessel occlusion, hemodynamically significant stenosis or aneurysm is seen.      CT-CTA NECK WITH & W/O-POST PROCESSING   Final " "Result      1.  Moderate atherosclerotic narrowing of the proximal right internal carotid artery.   2.  Significant streak artifact adjacent to the distal left vertebral artery provides severely suboptimal evaluation. There is contrast proximally and distally, however high-grade stenosis cannot be entirely excluded.   3.  Suboptimal evaluation of the proximal left and bilateral vertebral arteries due to streak artifact.   4.  No high-grade stenosis or dissection in the visualized portions of the arteries of the neck.   5.  6 mm left upper lobe pulmonary nodule that appears similar to prior PET/CT.   6.  Remote appearing superior endplate deformity of T3.      CT-HEAD W/O   Final Result      1.  Cerebral atrophy.   2.  No evidence of acute infarction or acute hemorrhage or mass lesion.                 DISCHARGE SUMMARY:   Per admission HPI:     \"Luigi Walters is a 81 y.o. male with a past medical history of Meckel's cell carcinoma, sick sinus syndrome with pacemaker, ascites, cirrhosis, hepatic encephalopathy, non-Hodgekin's lymphoma, HTN, prostate cancer, polycythemia, seizure, and stroke who presented with altered mental status.  His wife reports he was last known normal last night. When she went to wake him up today, she reports he was cold and difficult to rouse. He was breathing on his own, but was breathing very slowly. She reports he was complaining of a lot of pain last night. He takes tramadol, oxycodone, and ambien PRN, but his wife gives him the oxycodone because she does not want him to overdue. She reports he only had 1 dose last night. He reports he did not take ambien last night and is unsure if he took tramadol. He reports taking some benadryl last night. He is in charge of all of his own medications other than the oxycodone. When he presented to the ER he was diffusely weak and had slurred speech. He was able to talk in short complete sentences on my exam and reported \"Tugging\" on his heart that " "started yesterday. He also reports severe lower back pain. CT on 4/2 showed compression fracture of his T12 vertebrae and osteopenia. His wife denies falls. He reports that he passes out often and passed out yesterday, but his wife does not think this happened. He reports he has been feeling poor since bone marrow biopsy on 3/18 and states he had an infection afterwards that caused him to \"urinate and poop blood.\" He states these have both resolved, but continues to endorse pain with urination. He reports lower abdominal pain. Last BM was yesterday and was hard with lots of straining required. \"    Admission Diagnosis:   Altered mental status   Myoclonic jerking  Chest pain   Hypertensive urgency     Discharge Diagnosis:  Myoclonus  T12 compression fracture     Chronic Diagnoses:               Chronic back pain   GERD   BPH   HTN     Hospital Course:   Patient was brought into the hospital for altered level of consciousness, generalized weakness and slurred speech concerning for possible stroke.  Neurology was consulted in the emergency department.  CT head and CTA head and neck were unremarkable.  CT head perfusion study was negative for ischemia.  Patient is on multiple sedating medications at home including tramadol, oxycodone, and Ambien which was thought to be contributing to his altered level of consciousness.  Metabolic and infectious work-up was essentially unremarkable.  EEG showed some abnormalities but no definitive seizures.  Patient developed diffuse myoclonic jerking movements that lasted for a few days during hospitalization.  Myoclonic jerking movements eventually resolved, and patient's strength gradually improved during hospitalization.  He was able to remember multiple events that were occurring during these myoclonic episodes.  Case was discussed with neurology, and further inpatient evaluation was not indicated.  There was concern for possible underlying upper motor neuron lesion and patient's " history of recent PET scan that showed lesions in the spine and brain.  However, brain MRI and cervical spine MRI was unable to be obtained due to pacemaker being incompatible with MRI.  Hospital course was complicated by telemetry reading ventricular tachycardia.  Cardiology was consulted, pacemaker was interrogated and it was found that patient was actually in atrial paced rhythm and not ventricular tachycardia.  Physical therapy and Occupational Therapy were consulted and recommended postacute placement.  Physiatry was consulted and recommended rehabilitation.  Patient strength continued to improve during duration of hospitalization.  Due to difficulty finding placement acceptance to skilled nursing facility or rehabilitation center, case management discussed different options with the family and ultimately decided for patient to go home on home health.    Consultants:      Neurology   Cardiology     Procedures:        None     Disposition:   Home with home health     Diet:  Regular     Activity:   As tolerated     Discharge Medications:           Medication List      ASK your doctor about these medications      Instructions   acetaminophen 500 MG Tabs  Commonly known as: TYLENOL   Take 500-1,000 mg by mouth every 6 hours as needed. Indications: Pain  Dose: 500-1,000 mg     calcitRIOL 0.25 MCG Caps  Commonly known as: ROCALTROL   Take 0.25 mcg by mouth every day.  Dose: 0.25 mcg     famotidine 20 MG Tabs  Commonly known as: PEPCID   Take 20 mg by mouth every day. Indications: Heartburn  Dose: 20 mg     fluticasone 50 MCG/ACT nasal spray  Commonly known as: FLONASE   Administer 1 Spray into affected nostril(S) 1 time a day as needed. Indications: Allergic Rhinitis  Dose: 1 Spray     lactulose 10 GM/15ML Soln   Take 30 g by mouth 2 times a day as needed. Indications: Constipation, Impaired Brain Function due to Liver Disease  Dose: 30 g     meclizine 25 MG Tabs  Commonly known as: ANTIVERT   Take 25 mg by mouth 3  times a day as needed for Dizziness or Vertigo.  Dose: 25 mg     MULTI VITAMIN DAILY PO   Take 1 Tablet by mouth every day. Combination d3,magnessium,vitamin a  Dose: 1 Tablet     NON SPECIFIED   Take 1 Tablet by mouth every day. L-orinthate 500mg PO daily  Dose: 1 Tablet     NON SPECIFIED   Take 500 mg by mouth every day. L-aspartate 500mg Po daily  Dose: 500 mg     oxyCODONE immediate-release 5 MG Tabs  Commonly known as: ROXICODONE   Take 10 mg by mouth every 6 hours as needed for Severe Pain.  Dose: 10 mg     promethazine 25 MG Tabs  Commonly known as: PHENERGAN   Take 25 mg by mouth every 6 hours as needed for Nausea/Vomiting.  Dose: 25 mg     ROPINIRole 0.25 MG Tabs  Commonly known as: REQUIP   Take 0.5 mg by mouth at bedtime. Indications: Restless Leg Syndrome  Dose: 0.5 mg     SF 5000 Plus 1.1 % Crea  Generic drug: SODIUM FLUORIDE (DENTAL GEL)   Take 1 Each by mouth every evening.  Dose: 1 Each     tamsulosin 0.4 MG capsule  Commonly known as: FLOMAX   Take 0.4 mg by mouth every morning. Indications: Benign Enlargement of Prostate  Dose: 0.4 mg     traMADol 50 MG Tabs  Commonly known as: ULTRAM   Take 50 mg by mouth every 8 hours as needed. Indications: Pain  Dose: 50 mg     zolpidem 10 MG Tabs  Commonly known as: AMBIEN   Take 10 mg by mouth at bedtime as needed for Sleep.  Dose: 10 mg            Instructions:      Follow-up with neurosurgery outpatient  Follow-up with neurology outpatient  Follow-up with sleep medicine outpatient  Follow-up with PCP    Medication recommendations:  Ropinirole discontinue  Tramadol discontinue  Decrease dose of Ambien to 5 mg as needed  Tylenol and ibuprofen for pain control  Roxicodone for breakthrough pain    Please CC: Denver J Miller, M.D.

## 2022-04-15 NOTE — FACE TO FACE
Face to Face Supporting Documentation - Home Health    The encounter with this patient was in whole or in part the primary reason for home health admission.    Date of encounter:   Patient:                    MRN:                       YOB: 2022  Luigi Walters  [id number]  1940     Home health to see patient for:  Skilled Nursing care for assessment, interventions & education, Medical social work consult, Home health aide, Physical Therapy evaluation and treatment and Occupational therapy evaluation and treatment    Skilled need for:  Recent Deterioration of Health Status altered mental status     Skilled nursing interventions to include:  Comment: Medical management     Homebound status evidenced by:  Needs the assistance of another person in order to leave the home. Leaving home requires a considerable and taxing effort. There is a normal inability to leave the home.    Community Physician to provide follow up care: Denver J Miller, M.D.     Optional Interventions? No      I certify the face to face encounter for this home health care referral meets the CMS requirements and the encounter/clinical assessment with the patient was, in whole, or in part, for the medical condition(s) listed above, which is the primary reason for home health care. Based on my clinical findings: the service(s) are medically necessary, support the need for home health care, and the homebound criteria are met.  I certify that this patient has had a face to face encounter by myself.  Chaitanya Echeverria D.O. - PABLOI: [phone]

## 2022-04-15 NOTE — DISCHARGE PLANNING
Anticipated Discharge Disposition:   Home  [facility]    Action:    [facility] and willl see patient this weekend or this Monday.  RN ARSENIO spoke with patient's wife, Wendy and this is acceptable.  She is on her way to hospital to drive patient home.  Voalte msg to bedside RN, Sury.    Barriers to Discharge:    None    Plan:    DC

## 2022-04-15 NOTE — DISCHARGE PLANNING
Received Choice form at 112  Agency/Facility Name: [facility]   Referral sent per Choice form @ 1203     Agency/Facility Name: [facility]   Spoke To: Annalise   Outcome: Per Annalise she will find out if they are pat to see pt starting this weekend. If not it will have to be the beginning of next week. She would like to know if this will be okay.  RN CM Notified     [id number]  Agency/Facility Name: [facility]    Outcome: DPA left a voicemail for Annalise regarding referral.

## 2022-04-15 NOTE — CARE PLAN
The patient is Stable - Low risk of patient condition declining or worsening    Shift Goals  Clinical Goals: pain control, safety, rest, d/c instructions  Patient Goals: sleep, go for walk  Family Goals: HEATHER    Progress made toward(s) clinical / shift goals:    Problem: Fall Risk  Goal: Patient will remain free from falls  Outcome: Progressing       Patient is not progressing towards the following goals:

## 2022-06-17 ENCOUNTER — APPOINTMENT (RX ONLY)
Dept: URBAN - METROPOLITAN AREA CLINIC 22 | Facility: CLINIC | Age: 82
Setting detail: DERMATOLOGY
End: 2022-06-17

## 2022-06-17 DIAGNOSIS — L57.0 ACTINIC KERATOSIS: ICD-10-CM

## 2022-06-17 DIAGNOSIS — L82.1 OTHER SEBORRHEIC KERATOSIS: ICD-10-CM

## 2022-06-17 DIAGNOSIS — D18.0 HEMANGIOMA: ICD-10-CM

## 2022-06-17 DIAGNOSIS — D22 MELANOCYTIC NEVI: ICD-10-CM

## 2022-06-17 DIAGNOSIS — L81.4 OTHER MELANIN HYPERPIGMENTATION: ICD-10-CM

## 2022-06-17 DIAGNOSIS — Z85.821 PERSONAL HISTORY OF MERKEL CELL CARCINOMA: ICD-10-CM

## 2022-06-17 DIAGNOSIS — Z71.89 OTHER SPECIFIED COUNSELING: ICD-10-CM

## 2022-06-17 PROBLEM — D22.5 MELANOCYTIC NEVI OF TRUNK: Status: ACTIVE | Noted: 2022-06-17

## 2022-06-17 PROBLEM — D48.5 NEOPLASM OF UNCERTAIN BEHAVIOR OF SKIN: Status: ACTIVE | Noted: 2022-06-17

## 2022-06-17 PROBLEM — D18.01 HEMANGIOMA OF SKIN AND SUBCUTANEOUS TISSUE: Status: ACTIVE | Noted: 2022-06-17

## 2022-06-17 PROCEDURE — ? LIQUID NITROGEN

## 2022-06-17 PROCEDURE — ? SUNSCREEN RECOMMENDATIONS

## 2022-06-17 PROCEDURE — 11102 TANGNTL BX SKIN SINGLE LES: CPT | Mod: 59

## 2022-06-17 PROCEDURE — ? COUNSELING

## 2022-06-17 PROCEDURE — 11103 TANGNTL BX SKIN EA SEP/ADDL: CPT

## 2022-06-17 PROCEDURE — ? BIOPSY BY SHAVE METHOD

## 2022-06-17 PROCEDURE — 17004 DESTROY PREMAL LESIONS 15/>: CPT

## 2022-06-17 PROCEDURE — 99213 OFFICE O/P EST LOW 20 MIN: CPT | Mod: 25

## 2022-06-17 ASSESSMENT — LOCATION SIMPLE DESCRIPTION DERM
LOCATION SIMPLE: LEFT FOREARM
LOCATION SIMPLE: RIGHT FOREARM
LOCATION SIMPLE: LEFT FOREHEAD
LOCATION SIMPLE: RIGHT FOREHEAD
LOCATION SIMPLE: RIGHT THUMB
LOCATION SIMPLE: LEFT HAND
LOCATION SIMPLE: RIGHT HAND
LOCATION SIMPLE: LEFT CHEEK
LOCATION SIMPLE: LEFT EAR
LOCATION SIMPLE: RIGHT LOWER BACK
LOCATION SIMPLE: RIGHT EAR
LOCATION SIMPLE: LEFT UPPER BACK
LOCATION SIMPLE: ABDOMEN

## 2022-06-17 ASSESSMENT — LOCATION DETAILED DESCRIPTION DERM
LOCATION DETAILED: LEFT SUPERIOR HELIX
LOCATION DETAILED: LEFT LATERAL FOREHEAD
LOCATION DETAILED: RIGHT PROXIMAL DORSAL FOREARM
LOCATION DETAILED: LEFT ULNAR DORSAL HAND
LOCATION DETAILED: RIGHT ANTITRAGUS
LOCATION DETAILED: RIGHT PROXIMAL RADIAL DORSAL FOREARM
LOCATION DETAILED: LEFT PROXIMAL RADIAL DORSAL FOREARM
LOCATION DETAILED: LEFT CENTRAL MALAR CHEEK
LOCATION DETAILED: LEFT FOREHEAD
LOCATION DETAILED: LEFT PROXIMAL DORSAL FOREARM
LOCATION DETAILED: LEFT SUPERIOR MEDIAL UPPER BACK
LOCATION DETAILED: RIGHT SUPERIOR FOREHEAD
LOCATION DETAILED: RIGHT PROXIMAL DORSAL THUMB
LOCATION DETAILED: RIGHT ULNAR DORSAL HAND
LOCATION DETAILED: RIGHT SUPERIOR MEDIAL MIDBACK
LOCATION DETAILED: EPIGASTRIC SKIN
LOCATION DETAILED: RIGHT RADIAL DORSAL HAND

## 2022-06-17 ASSESSMENT — LOCATION ZONE DERM
LOCATION ZONE: ARM
LOCATION ZONE: FACE
LOCATION ZONE: EAR
LOCATION ZONE: HAND
LOCATION ZONE: FINGER
LOCATION ZONE: TRUNK

## 2022-06-17 NOTE — PROCEDURE: LIQUID NITROGEN
Number Of Freeze-Thaw Cycles: 2 freeze-thaw cycles
Render Post-Care Instructions In Note?: no
Duration Of Freeze Thaw-Cycle (Seconds): 3
Post-Care Instructions: I reviewed with the patient in detail post-care instructions. Patient is to wear sunprotection, and avoid picking at any of the treated lesions. Pt may apply Vaseline to crusted or scabbing areas.
Show Applicator Variable?: Yes
Detail Level: Detailed
Consent: The patient's consent was obtained including but not limited to risks of crusting, scabbing, blistering, scarring, darker or lighter pigmentary change, recurrence, incomplete removal and infection.

## 2022-07-11 ENCOUNTER — NON-PROVIDER VISIT (OUTPATIENT)
Dept: CARDIOLOGY | Facility: MEDICAL CENTER | Age: 82
End: 2022-07-11
Payer: MEDICARE

## 2022-07-11 PROCEDURE — 93294 REM INTERROG EVL PM/LDLS PM: CPT | Performed by: INTERNAL MEDICINE

## 2022-07-12 ENCOUNTER — TELEPHONE (OUTPATIENT)
Dept: CARDIOLOGY | Facility: MEDICAL CENTER | Age: 82
End: 2022-07-12
Payer: MEDICARE

## 2022-07-12 NOTE — TELEPHONE ENCOUNTER
FYI-Patient transmitted via home monitor 7/11.  Patient had 2 AMS episodes lasting 4 and 4.5 hours--4/13 and 7/5.  Previous notes state patient not able to take AC.    To be scanned

## 2022-07-12 NOTE — CARDIAC REMOTE MONITOR - SCAN
Device transmission reviewed. Device demonstrated appropriate function.   PAF not felt to be a watchman or anticoagulation candidate    Electronically Signed by: Chaitanya Walters M.D.    7/26/2022  11:55 AM

## 2022-07-12 NOTE — TELEPHONE ENCOUNTER
Phone Number Called: [phone]    Call outcome: Spoke to patient regarding message below.    Message: Called to inform patient AMS episodes 4/13 and 7/5. Patient had a heart attack in April and was in the hospital during the episode. During episode 2 patient does not recall any new or unusual symptoms.

## 2022-07-14 ENCOUNTER — APPOINTMENT (RX ONLY)
Dept: URBAN - METROPOLITAN AREA CLINIC 22 | Facility: CLINIC | Age: 82
Setting detail: DERMATOLOGY
End: 2022-07-14

## 2022-07-14 DIAGNOSIS — Z71.89 OTHER SPECIFIED COUNSELING: ICD-10-CM

## 2022-07-14 DIAGNOSIS — L57.0 ACTINIC KERATOSIS: ICD-10-CM

## 2022-07-14 DIAGNOSIS — L85.3 XEROSIS CUTIS: ICD-10-CM

## 2022-07-14 DIAGNOSIS — D69.2 OTHER NONTHROMBOCYTOPENIC PURPURA: ICD-10-CM

## 2022-07-14 PROBLEM — C44.519 BASAL CELL CARCINOMA OF SKIN OF OTHER PART OF TRUNK: Status: ACTIVE | Noted: 2022-07-14

## 2022-07-14 PROBLEM — D48.5 NEOPLASM OF UNCERTAIN BEHAVIOR OF SKIN: Status: ACTIVE | Noted: 2022-07-14

## 2022-07-14 PROCEDURE — ? BIOPSY BY SHAVE METHOD

## 2022-07-14 PROCEDURE — 17003 DESTRUCT PREMALG LES 2-14: CPT

## 2022-07-14 PROCEDURE — 99213 OFFICE O/P EST LOW 20 MIN: CPT | Mod: 25

## 2022-07-14 PROCEDURE — ? CURETTAGE AND DESTRUCTION

## 2022-07-14 PROCEDURE — ? TREATMENT REGIMEN

## 2022-07-14 PROCEDURE — 11102 TANGNTL BX SKIN SINGLE LES: CPT | Mod: 59

## 2022-07-14 PROCEDURE — 17000 DESTRUCT PREMALG LESION: CPT | Mod: 59

## 2022-07-14 PROCEDURE — ? COUNSELING

## 2022-07-14 PROCEDURE — 17262 DSTRJ MAL LES T/A/L 1.1-2.0: CPT

## 2022-07-14 PROCEDURE — ? LIQUID NITROGEN

## 2022-07-14 ASSESSMENT — LOCATION SIMPLE DESCRIPTION DERM
LOCATION SIMPLE: LEFT FOREARM
LOCATION SIMPLE: RIGHT UPPER ARM
LOCATION SIMPLE: RIGHT FOREHEAD
LOCATION SIMPLE: LEFT CHEEK
LOCATION SIMPLE: LEFT FOREHEAD
LOCATION SIMPLE: LEFT UPPER ARM
LOCATION SIMPLE: RIGHT FOREARM
LOCATION SIMPLE: LEFT SCALP

## 2022-07-14 ASSESSMENT — LOCATION DETAILED DESCRIPTION DERM
LOCATION DETAILED: LEFT VENTRAL PROXIMAL FOREARM
LOCATION DETAILED: LEFT LATERAL MALAR CHEEK
LOCATION DETAILED: RIGHT VENTRAL PROXIMAL FOREARM
LOCATION DETAILED: LEFT FOREHEAD
LOCATION DETAILED: RIGHT DISTAL DORSAL FOREARM
LOCATION DETAILED: RIGHT DISTAL POSTERIOR UPPER ARM
LOCATION DETAILED: RIGHT FOREHEAD
LOCATION DETAILED: LEFT PROXIMAL DORSAL FOREARM
LOCATION DETAILED: LEFT DISTAL POSTERIOR UPPER ARM
LOCATION DETAILED: RIGHT PROXIMAL DORSAL FOREARM
LOCATION DETAILED: LEFT MEDIAL FRONTAL SCALP

## 2022-07-14 ASSESSMENT — LOCATION ZONE DERM
LOCATION ZONE: SCALP
LOCATION ZONE: FACE
LOCATION ZONE: ARM

## 2022-07-14 NOTE — PROCEDURE: LIQUID NITROGEN
Post-Care Instructions: I reviewed with the patient in detail post-care instructions. Patient is to wear sunprotection, and avoid picking at any of the treated lesions. Pt may apply Vaseline to crusted or scabbing areas.
Render Note In Bullet Format When Appropriate: No
Consent: The patient's consent was obtained including but not limited to risks of crusting, scabbing, blistering, scarring, darker or lighter pigmentary change, recurrence, incomplete removal and infection.
Number Of Freeze-Thaw Cycles: 2 freeze-thaw cycles
Show Applicator Variable?: Yes
Duration Of Freeze Thaw-Cycle (Seconds): 3
Detail Level: Detailed

## 2022-07-14 NOTE — PROCEDURE: CURETTAGE AND DESTRUCTION
Detail Level: Detailed
Biopsy Photograph Reviewed: Yes
Accession # (Optional): [id number]
Number Of Curettages: 2
Size Of Lesion In Cm: 1
Size Of Lesion After Curettage: 1.5
Add Intralesional Injection: No
Anesthesia Type: 1% lidocaine without epinephrine
Anesthesia Volume In Cc: 6
Cautery Type: hot cautery
What Was Performed First?: Curettage
Final Size Statement: The size of the lesion after curettage was
Additional Information: (Optional): The wound was cleaned, and a pressure dressing was applied.  The patient received detailed post-op instructions.
Consent was obtained from the patient. The risks, benefits and alternatives to therapy were discussed in detail. Specifically, the risks of infection, scarring, bleeding, prolonged wound healing, nerve injury, incomplete removal, allergy to anesthesia and recurrence were addressed. Alternatives to ED&C, such as: surgical removal and XRT were also discussed.  Prior to the procedure, the treatment site was clearly identified and confirmed by the patient. All components of Universal Protocol/PAUSE Rule completed.
Post-Care Instructions: I reviewed with the patient in detail post-care instructions. Patient is to keep the area dry for 48 hours, and not to engage in any swimming until the area is healed. Should the patient develop any fevers, chills, bleeding, severe pain patient will contact the office immediately.
Bill As A Line Item Or As Units: Line Item

## 2022-07-22 ENCOUNTER — APPOINTMENT (RX ONLY)
Dept: URBAN - METROPOLITAN AREA CLINIC 22 | Facility: CLINIC | Age: 82
Setting detail: DERMATOLOGY
End: 2022-07-22

## 2022-07-22 PROBLEM — C44.622 SQUAMOUS CELL CARCINOMA OF SKIN OF RIGHT UPPER LIMB, INCLUDING SHOULDER: Status: ACTIVE | Noted: 2022-07-22

## 2022-07-22 PROCEDURE — 17311 MOHS 1 STAGE H/N/HF/G: CPT | Mod: 79

## 2022-07-22 PROCEDURE — 12042 INTMD RPR N-HF/GENIT2.6-7.5: CPT | Mod: 79

## 2022-07-22 PROCEDURE — ? MOHS SURGERY

## 2022-07-22 NOTE — PROCEDURE: MOHS SURGERY
Mohs Case Number: [id number]
Previous Accession (Optional): [id number]
Biopsy Photograph Reviewed: Yes
Referring Physician (Optional): ALICIA Tejada
Consent Type: Consent 1 (Standard)
Eye Shield Used: No
Surgeon Performing Repair (Optional): Usman Gil M.D.
Initial Size Of Lesion: 0.8
X Size Of Lesion In Cm (Optional): 0.5
Number Of Stages: 1
Primary Defect Length In Cm (Final Defect Size - Required For Flaps/Grafts): 0.9
Primary Defect Width In Cm (Final Defect Size - Required For Flaps/Grafts): 0.6
Repair Type: Intermediate Layered Repair
Oculoplastic Surgeon Procedure Text (A): After obtaining clear surgical margins the patient was sent to oculoplastics for surgical repair.  The patient understands they will receive post-surgical care and follow-up from the referring physician's office.
Otolaryngologist Procedure Text (A): After obtaining clear surgical margins the patient was sent to otolaryngology for surgical repair.  The patient understands they will receive post-surgical care and follow-up from the referring physician's office.
Plastic Surgeon Procedure Text (A): After obtaining clear surgical margins the patient was sent to plastics for surgical repair.  The patient understands they will receive post-surgical care and follow-up from the referring physician's office.
Mid-Level Procedure Text (A): After obtaining clear surgical margins the patient was sent to a mid-level provider for surgical repair.  The patient understands they will receive post-surgical care and follow-up from the mid-level provider.
Provider Procedure Text (A): After obtaining clear surgical margins the defect was repaired by another provider.
Asc Procedure Text (A): After obtaining clear surgical margins the patient was sent to an ASC for surgical repair.  The patient understands they will receive post-surgical care and follow-up from the ASC physician.
Simple / Intermediate / Complex Repair - Final Wound Length In Cm: 3.4
Suturegard Retention Suture: 2-0 Nylon
Retention Suture Bite Size: 3 mm
Length To Time In Minutes Device Was In Place: 10
Undermining Type: Entire Wound
Debridement Text: The wound edges were debrided prior to proceeding with the closure to facilitate wound healing.
Helical Rim Text: The closure involved the helical rim.
Vermilion Border Text: The closure involved the vermilion border.
Nostril Rim Text: The closure involved the nostril rim.
Retention Suture Text: Retention sutures were placed to support the closure and prevent dehiscence.
Secondary Defect Length In Cm (Required For Flaps): 0
Location Indication Override (Is Already Calculated Based On Selected Body Location): Area L
Area H Indication Text: Tumors in this location are included in Area H (eyelids, eyebrows, nose, lips, chin, ear, pre-auricular, post-auricular, temple, genitalia, hands, feet, ankles and areola).  Tissue conservation is critical in these anatomic locations.
Area M Indication Text: Tumors in this location are included in Area M (cheek, forehead, scalp, neck, jawline and pretibial skin).  Mohs surgery is indicated for tumors in these anatomic locations.
Area L Indication Text: Tumors in this location are included in Area L (trunk and extremities).  Mohs surgery is indicated for larger tumors, or tumors with aggressive histologic features, in these anatomic locations.
Tumor Debulked?: curette
Depth Of Tumor Invasion (For Histology): tumor not visualized (deep and peripheral margins are clear of tumor)
Special Stains Stage 1 - Results: Base On Clearance Noted Above
Stage 2: Additional Anesthesia Type: 1% lidocaine with epinephrine and a 1:10 solution of 8.4% sodium bicarbonate
Stage 4: Additional Anesthesia Type: 1% lidocaine with epinephrine
Include Tumor Staging In Mohs Note?: Please Select the Appropriate Response
Staging Info: By selecting yes to the question above you will include information on AJCC 8 tumor staging in your Mohs note. Information on tumor staging will be automatically added for SCCs on the head and neck. AJCC 8 includes tumor size, tumor depth, perineural involvement and bone invasion.
Tumor Depth: Less than 6mm from granular layer and no invasion beyond the subcutaneous fat
Medical Necessity Statement: Based on my medical judgement, Mohs surgery is the most appropriate treatment for this cancer compared to other treatments.
Alternatives Discussed Intro (Do Not Add Period): I discussed alternative treatments to Mohs surgery and specifically discussed the risks and benefits of
Consent 1/Introductory Paragraph: The rationale for Mohs was explained to the patient and consent was obtained. The risks, benefits and alternatives to therapy were discussed in detail. Specifically, the risks of infection, scarring, bleeding, prolonged wound healing, incomplete removal, allergy to anesthesia, nerve injury and recurrence were addressed. Prior to the procedure, the treatment site was clearly identified and confirmed by the patient. All components of Universal Protocol/PAUSE Rule completed.
Consent 2/Introductory Paragraph: Mohs surgery was explained to the patient and consent was obtained. The risks, benefits and alternatives to therapy were discussed in detail. Specifically, the risks of infection, scarring, bleeding, prolonged wound healing, incomplete removal, allergy to anesthesia, nerve injury and recurrence were addressed. Prior to the procedure, the treatment site was clearly identified and confirmed by the patient. All components of Universal Protocol/PAUSE Rule completed.
Consent 3/Introductory Paragraph: I gave the patient a chance to ask questions they had about the procedure.  Following this I explained the Mohs procedure and consent was obtained. The risks, benefits and alternatives to therapy were discussed in detail. Specifically, the risks of infection, scarring, bleeding, prolonged wound healing, incomplete removal, allergy to anesthesia, nerve injury and recurrence were addressed. Prior to the procedure, the treatment site was clearly identified and confirmed by the patient. All components of Universal Protocol/PAUSE Rule completed.
Consent (Temporal Branch)/Introductory Paragraph: The rationale for Mohs was explained to the patient and consent was obtained. The risks, benefits and alternatives to therapy were discussed in detail. Specifically, the risks of damage to the temporal branch of the facial nerve, infection, scarring, bleeding, prolonged wound healing, incomplete removal, allergy to anesthesia, and recurrence were addressed. Prior to the procedure, the treatment site was clearly identified and confirmed by the patient. All components of Universal Protocol/PAUSE Rule completed.
Consent (Marginal Mandibular)/Introductory Paragraph: The rationale for Mohs was explained to the patient and consent was obtained. The risks, benefits and alternatives to therapy were discussed in detail. Specifically, the risks of damage to the marginal mandibular branch of the facial nerve, infection, scarring, bleeding, prolonged wound healing, incomplete removal, allergy to anesthesia, and recurrence were addressed. Prior to the procedure, the treatment site was clearly identified and confirmed by the patient. All components of Universal Protocol/PAUSE Rule completed.
Consent (Spinal Accessory)/Introductory Paragraph: The rationale for Mohs was explained to the patient and consent was obtained. The risks, benefits and alternatives to therapy were discussed in detail. Specifically, the risks of damage to the spinal accessory nerve, infection, scarring, bleeding, prolonged wound healing, incomplete removal, allergy to anesthesia, and recurrence were addressed. Prior to the procedure, the treatment site was clearly identified and confirmed by the patient. All components of Universal Protocol/PAUSE Rule completed.
Consent (Near Eyelid Margin)/Introductory Paragraph: The rationale for Mohs was explained to the patient and consent was obtained. The risks, benefits and alternatives to therapy were discussed in detail. Specifically, the risks of ectropion or eyelid deformity, infection, scarring, bleeding, prolonged wound healing, incomplete removal, allergy to anesthesia, nerve injury and recurrence were addressed. Prior to the procedure, the treatment site was clearly identified and confirmed by the patient. All components of Universal Protocol/PAUSE Rule completed.
Consent (Ear)/Introductory Paragraph: The rationale for Mohs was explained to the patient and consent was obtained. The risks, benefits and alternatives to therapy were discussed in detail. Specifically, the risks of ear deformity, infection, scarring, bleeding, prolonged wound healing, incomplete removal, allergy to anesthesia, nerve injury and recurrence were addressed. Prior to the procedure, the treatment site was clearly identified and confirmed by the patient. All components of Universal Protocol/PAUSE Rule completed.
Consent (Nose)/Introductory Paragraph: The rationale for Mohs was explained to the patient and consent was obtained. The risks, benefits and alternatives to therapy were discussed in detail. Specifically, the risks of nasal deformity, changes in the flow of air through the nose, infection, scarring, bleeding, prolonged wound healing, incomplete removal, allergy to anesthesia, nerve injury and recurrence were addressed. Prior to the procedure, the treatment site was clearly identified and confirmed by the patient. All components of Universal Protocol/PAUSE Rule completed.
Consent (Lip)/Introductory Paragraph: The rationale for Mohs was explained to the patient and consent was obtained. The risks, benefits and alternatives to therapy were discussed in detail. Specifically, the risks of lip deformity, changes in the oral aperture, infection, scarring, bleeding, prolonged wound healing, incomplete removal, allergy to anesthesia, nerve injury and recurrence were addressed. Prior to the procedure, the treatment site was clearly identified and confirmed by the patient. All components of Universal Protocol/PAUSE Rule completed.
Consent (Scalp)/Introductory Paragraph: The rationale for Mohs was explained to the patient and consent was obtained. The risks, benefits and alternatives to therapy were discussed in detail. Specifically, the risks of changes in hair growth pattern secondary to repair, infection, scarring, bleeding, prolonged wound healing, incomplete removal, allergy to anesthesia, nerve injury and recurrence were addressed. Prior to the procedure, the treatment site was clearly identified and confirmed by the patient. All components of Universal Protocol/PAUSE Rule completed.
Detail Level: Detailed
Postop Diagnosis: same
Anesthesia Type: 0.5% lidocaine with 1:200,000 epinephrine and a 1:10 solution of 8.4% sodium bicarbonate
Anesthesia Volume In Cc: 8
Additional Anesthesia Volume In Cc: 6
Hemostasis: Electrodesiccation
Estimated Blood Loss (Cc): minimal
Repair Anesthesia Method: local infiltration
Brow Lift Text: A midfrontal incision was made medially to the defect to allow access to the tissues just superior to the left eyebrow. Following careful dissection inferiorly in a supraperiosteal plane to the level of the left eyebrow, several 3-0 monocryl sutures were used to resuspend the eyebrow orbicularis oculi muscular unit to the superior frontal bone periosteum. This resulted in an appropriate reapproximation of static eyebrow symmetry and correction of the left brow ptosis.
Deep Sutures: 4-0 Polysorb
Epidermal Sutures: 4-0 Surgipro
Epidermal Closure: running cuticular
Suturegard Intro: Intraoperative tissue expansion was performed, utilizing the SUTUREGARD device, in order to reduce wound tension.
Suturegard Body: The suture ends were repeatedly re-tightened and re-clamped to achieve the desired tissue expansion.
Hemigard Intro: Due to skin fragility and wound tension, it was decided to use HEMIGARD adhesive retention suture devices to permit a linear closure. The skin was cleaned and dried for a 6cm distance away from the wound. Excessive hair, if present, was removed to allow for adhesion.
Hemigard Postcare Instructions: The HEMIGARD strips are to remain completely dry for at least 5-7 days.
Donor Site Anesthesia Type: same as repair anesthesia
Epidermal Closure Graft Donor Site (Optional): simple interrupted
Graft Donor Site Bandage (Optional-Leave Blank If You Don't Want In Note): Steri-strips and a pressure bandage were applied to the donor site.
Closure 2 Information: This tab is for additional flaps and grafts, including complex repair and grafts and complex repair and flaps. You can also specify a different location for the additional defect, if the location is the same you do not need to select a new one. We will insert the automated text for the repair you select below just as we do for solitary flaps and grafts. Please note that at this time if you select a location with a different insurance zone you will need to override the ICD10 and CPT if appropriate.
Closure 3 Information: This tab is for additional flaps and grafts above and beyond our usual structured repairs.  Please note if you enter information here it will not currently bill and you will need to add the billing information manually.
Wound Care: Petrolatum
Dressing: pressure dressing with telfa
Dressing (No Sutures): dry sterile dressing
Suture Removal: 14 days
Unna Boot Text: An Unna boot was placed to help immobilize the limb and facilitate more rapid healing.
Home Suture Removal Text: Patient was provided instructions on removing sutures and will remove their sutures at home.  If they have any questions or difficulties they will call the office.
Post-Care Instructions: I reviewed with the patient in detail post-care instructions. Patient is not to engage in any heavy lifting, exercise, or swimming for the next 14 days. Should the patient develop any fevers, chills, bleeding, severe pain patient will contact the office immediately.
Pain Refusal Text: I offered to prescribe pain medication but the patient refused to take this medication.
Mauc Instructions: By selecting yes to the question below the MAUC number will be added into the note.  This will be calculated automatically based on the diagnosis chosen, the size entered, the body zone selected (H,M,L) and the specific indications you chose. You will also have the option to override the Mohs AUC if you disagree with the automatically calculated number and this option is found in the Case Summary tab.
Where Do You Want The Question To Include Opioid Counseling Located?: Case Summary Tab
Eye Protection Verbiage: Before proceeding with the stage, a plastic scleral shield was inserted. The globe was anesthetized with a few drops of 1% lidocaine with 1:100,000 epinephrine. Then, an appropriate sized scleral shield was chosen and coated with lacrilube ointment. The shield was gently inserted and left in place for the duration of each stage. After the stage was completed, the shield was gently removed.
Mohs Method Verbiage: An incision at a 45 degree angle following the standard Mohs approach was done and the specimen was harvested as a microscopic controlled layer.
Surgeon/Pathologist Verbiage (Will Incorporate Name Of Surgeon From Intro If Not Blank): operated in two distinct and integrated capacities as the surgeon and pathologist.
Mohs Histo Method Verbiage: Each section was then chromacoded and processed in the Mohs lab using the Mohs protocol and submitted for frozen section.
Subsequent Stages Histo Method Verbiage: Using a similar technique to that described above, a thin layer of tissue was removed from all areas where tumor was visible on the previous stage.  The tissue was again oriented, mapped, dyed, and processed as above.
Mohs Rapid Report Verbiage: The area of clinically evident tumor was marked with skin marking ink and appropriately hatched.  The initial incision was made following the Mohs approach through the skin.  The specimen was taken to the lab, divided into the necessary number of pieces, chromacoded and processed according to the Mohs protocol.  This was repeated in successive stages until a tumor free defect was achieved.
Complex Repair Preamble Text (Leave Blank If You Do Not Want): Extensive wide undermining was performed.
Intermediate Repair Preamble Text (Leave Blank If You Do Not Want): Undermining was performed with blunt dissection.
Non-Graft Cartilage Fenestration Text: The cartilage was fenestrated with a 2mm punch biopsy to help facilitate healing.
Graft Cartilage Fenestration Text: The cartilage was fenestrated with a 2mm punch biopsy to help facilitate graft survival and healing.
Secondary Intention Text (Leave Blank If You Do Not Want): The defect will heal with secondary intention.
No Repair - Repaired With Adjacent Surgical Defect Text (Leave Blank If You Do Not Want): After obtaining clear surgical margins the defect was repaired concurrently with another surgical defect which was in close approximation.
Adjacent Tissue Transfer Text: The defect edges were debeveled with a #15 scalpel blade.  Given the location of the defect and the proximity to free margins an adjacent tissue transfer was deemed most appropriate.  Using a sterile surgical marker, an appropriate flap was drawn incorporating the defect and placing the expected incisions within the relaxed skin tension lines where possible.    The area thus outlined was incised deep to adipose tissue with a #15 scalpel blade.  The skin margins were undermined to an appropriate distance in all directions utilizing iris scissors.
Advancement Flap (Single) Text: The defect edges were debeveled with a #15 scalpel blade.  Given the location of the defect and the proximity to free margins a single advancement flap was deemed most appropriate.  Using a sterile surgical marker, an appropriate advancement flap was drawn incorporating the defect and placing the expected incisions within the relaxed skin tension lines where possible.    The area thus outlined was incised deep to adipose tissue with a #15 scalpel blade.  The skin margins were undermined to an appropriate distance in all directions utilizing iris scissors.
Advancement Flap (Double) Text: The defect edges were debeveled with a #15 scalpel blade.  Given the location of the defect and the proximity to free margins a double advancement flap was deemed most appropriate.  Using a sterile surgical marker, the appropriate advancement flaps were drawn incorporating the defect and placing the expected incisions within the relaxed skin tension lines where possible.    The area thus outlined was incised deep to adipose tissue with a #15 scalpel blade.  The skin margins were undermined to an appropriate distance in all directions utilizing iris scissors.
Burow's Advancement Flap Text: The defect edges were debeveled with a #15 scalpel blade.  Given the location of the defect and the proximity to free margins a Burow's advancement flap was deemed most appropriate.  Using a sterile surgical marker, the appropriate advancement flap was drawn incorporating the defect and placing the expected incisions within the relaxed skin tension lines where possible.    The area thus outlined was incised deep to adipose tissue with a #15 scalpel blade.  The skin margins were undermined to an appropriate distance in all directions utilizing iris scissors.
Chonodrocutaneous Helical Advancement Flap Text: The defect edges were debeveled with a #15 scalpel blade.  Given the location of the defect and the proximity to free margins a chondrocutaneous helical advancement flap was deemed most appropriate.  Using a sterile surgical marker, the appropriate advancement flap was drawn incorporating the defect and placing the expected incisions within the relaxed skin tension lines where possible.    The area thus outlined was incised deep to adipose tissue with a #15 scalpel blade.  The skin margins were undermined to an appropriate distance in all directions utilizing iris scissors.
Crescentic Advancement Flap Text: The defect edges were debeveled with a #15 scalpel blade.  Given the location of the defect and the proximity to free margins a crescentic advancement flap was deemed most appropriate.  Using a sterile surgical marker, the appropriate advancement flap was drawn incorporating the defect and placing the expected incisions within the relaxed skin tension lines where possible.    The area thus outlined was incised deep to adipose tissue with a #15 scalpel blade.  The skin margins were undermined to an appropriate distance in all directions utilizing iris scissors.
A-T Advancement Flap Text: The defect edges were debeveled with a #15 scalpel blade.  Given the location of the defect, shape of the defect and the proximity to free margins an A-T advancement flap was deemed most appropriate.  Using a sterile surgical marker, an appropriate advancement flap was drawn incorporating the defect and placing the expected incisions within the relaxed skin tension lines where possible.    The area thus outlined was incised deep to adipose tissue with a #15 scalpel blade.  The skin margins were undermined to an appropriate distance in all directions utilizing iris scissors.
O-T Advancement Flap Text: The defect edges were debeveled with a #15 scalpel blade.  Given the location of the defect, shape of the defect and the proximity to free margins an O-T advancement flap was deemed most appropriate.  Using a sterile surgical marker, an appropriate advancement flap was drawn incorporating the defect and placing the expected incisions within the relaxed skin tension lines where possible.    The area thus outlined was incised deep to adipose tissue with a #15 scalpel blade.  The skin margins were undermined to an appropriate distance in all directions utilizing iris scissors.
O-L Flap Text: The defect edges were debeveled with a #15 scalpel blade.  Given the location of the defect, shape of the defect and the proximity to free margins an O-L flap was deemed most appropriate.  Using a sterile surgical marker, an appropriate advancement flap was drawn incorporating the defect and placing the expected incisions within the relaxed skin tension lines where possible.    The area thus outlined was incised deep to adipose tissue with a #15 scalpel blade.  The skin margins were undermined to an appropriate distance in all directions utilizing iris scissors.
O-Z Flap Text: The defect edges were debeveled with a #15 scalpel blade.  Given the location of the defect, shape of the defect and the proximity to free margins an O-Z flap was deemed most appropriate.  Using a sterile surgical marker, an appropriate transposition flap was drawn incorporating the defect and placing the expected incisions within the relaxed skin tension lines where possible. The area thus outlined was incised deep to adipose tissue with a #15 scalpel blade.  The skin margins were undermined to an appropriate distance in all directions utilizing iris scissors.
Double O-Z Flap Text: The defect edges were debeveled with a #15 scalpel blade.  Given the location of the defect, shape of the defect and the proximity to free margins a Double O-Z flap was deemed most appropriate.  Using a sterile surgical marker, an appropriate transposition flap was drawn incorporating the defect and placing the expected incisions within the relaxed skin tension lines where possible. The area thus outlined was incised deep to adipose tissue with a #15 scalpel blade.  The skin margins were undermined to an appropriate distance in all directions utilizing iris scissors.
V-Y Flap Text: The defect edges were debeveled with a #15 scalpel blade.  Given the location of the defect, shape of the defect and the proximity to free margins a V-Y flap was deemed most appropriate.  Using a sterile surgical marker, an appropriate advancement flap was drawn incorporating the defect and placing the expected incisions within the relaxed skin tension lines where possible.    The area thus outlined was incised deep to adipose tissue with a #15 scalpel blade.  The skin margins were undermined to an appropriate distance in all directions utilizing iris scissors.
Advancement-Rotation Flap Text: The defect edges were debeveled with a #15 scalpel blade.  Given the location of the defect, shape of the defect and the proximity to free margins an advancement-rotation flap was deemed most appropriate.  Using a sterile surgical marker, an appropriate flap was drawn incorporating the defect and placing the expected incisions within the relaxed skin tension lines where possible. The area thus outlined was incised deep to adipose tissue with a #15 scalpel blade.  The skin margins were undermined to an appropriate distance in all directions utilizing iris scissors.
Mercedes Flap Text: The defect edges were debeveled with a #15 scalpel blade.  Given the location of the defect, shape of the defect and the proximity to free margins a Mercedes flap was deemed most appropriate.  Using a sterile surgical marker, an appropriate advancement flap was drawn incorporating the defect and placing the expected incisions within the relaxed skin tension lines where possible. The area thus outlined was incised deep to adipose tissue with a #15 scalpel blade.  The skin margins were undermined to an appropriate distance in all directions utilizing iris scissors.
Modified Advancement Flap Text: The defect edges were debeveled with a #15 scalpel blade.  Given the location of the defect, shape of the defect and the proximity to free margins a modified advancement flap was deemed most appropriate.  Using a sterile surgical marker, an appropriate advancement flap was drawn incorporating the defect and placing the expected incisions within the relaxed skin tension lines where possible.    The area thus outlined was incised deep to adipose tissue with a #15 scalpel blade.  The skin margins were undermined to an appropriate distance in all directions utilizing iris scissors.
Mucosal Advancement Flap Text: Given the location of the defect, shape of the defect and the proximity to free margins a mucosal advancement flap was deemed most appropriate. Incisions were made with a 15 blade scalpel in the appropriate fashion along the cutaneous vermilion border and the mucosal lip. The remaining actinically damaged mucosal tissue was excised.  The mucosal advancement flap was then elevated to the gingival sulcus with care taken to preserve the neurovascular structures and advanced into the primary defect. Care was taken to ensure that precise realignment of the vermilion border was achieved.
Peng Advancement Flap Text: The defect edges were debeveled with a #15 scalpel blade.  Given the location of the defect, shape of the defect and the proximity to free margins a Peng advancement flap was deemed most appropriate.  Using a sterile surgical marker, an appropriate advancement flap was drawn incorporating the defect and placing the expected incisions within the relaxed skin tension lines where possible. The area thus outlined was incised deep to adipose tissue with a #15 scalpel blade.  The skin margins were undermined to an appropriate distance in all directions utilizing iris scissors.
Hatchet Flap Text: The defect edges were debeveled with a #15 scalpel blade.  Given the location of the defect, shape of the defect and the proximity to free margins a hatchet flap was deemed most appropriate.  Using a sterile surgical marker, an appropriate hatchet flap was drawn incorporating the defect and placing the expected incisions within the relaxed skin tension lines where possible.    The area thus outlined was incised deep to adipose tissue with a #15 scalpel blade.  The skin margins were undermined to an appropriate distance in all directions utilizing iris scissors.
Rotation Flap Text: The defect edges were debeveled with a #15 scalpel blade.  Given the location of the defect, shape of the defect and the proximity to free margins a rotation flap was deemed most appropriate.  Using a sterile surgical marker, an appropriate rotation flap was drawn incorporating the defect and placing the expected incisions within the relaxed skin tension lines where possible.    The area thus outlined was incised deep to adipose tissue with a #15 scalpel blade.  The skin margins were undermined to an appropriate distance in all directions utilizing iris scissors.
Spiral Flap Text: The defect edges were debeveled with a #15 scalpel blade.  Given the location of the defect, shape of the defect and the proximity to free margins a spiral flap was deemed most appropriate.  Using a sterile surgical marker, an appropriate rotation flap was drawn incorporating the defect and placing the expected incisions within the relaxed skin tension lines where possible. The area thus outlined was incised deep to adipose tissue with a #15 scalpel blade.  The skin margins were undermined to an appropriate distance in all directions utilizing iris scissors.
Staged Advancement Flap Text: The defect edges were debeveled with a #15 scalpel blade.  Given the location of the defect, shape of the defect and the proximity to free margins a staged advancement flap was deemed most appropriate.  Using a sterile surgical marker, an appropriate advancement flap was drawn incorporating the defect and placing the expected incisions within the relaxed skin tension lines where possible. The area thus outlined was incised deep to adipose tissue with a #15 scalpel blade.  The skin margins were undermined to an appropriate distance in all directions utilizing iris scissors.
Star Wedge Flap Text: The defect edges were debeveled with a #15 scalpel blade.  Given the location of the defect, shape of the defect and the proximity to free margins a star wedge flap was deemed most appropriate.  Using a sterile surgical marker, an appropriate rotation flap was drawn incorporating the defect and placing the expected incisions within the relaxed skin tension lines where possible. The area thus outlined was incised deep to adipose tissue with a #15 scalpel blade.  The skin margins were undermined to an appropriate distance in all directions utilizing iris scissors.
Transposition Flap Text: The defect edges were debeveled with a #15 scalpel blade.  Given the location of the defect and the proximity to free margins a transposition flap was deemed most appropriate.  Using a sterile surgical marker, an appropriate transposition flap was drawn incorporating the defect.    The area thus outlined was incised deep to adipose tissue with a #15 scalpel blade.  The skin margins were undermined to an appropriate distance in all directions utilizing iris scissors.
Muscle Hinge Flap Text: The defect edges were debeveled with a #15 scalpel blade.  Given the size, depth and location of the defect and the proximity to free margins a muscle hinge flap was deemed most appropriate.  Using a sterile surgical marker, an appropriate hinge flap was drawn incorporating the defect. The area thus outlined was incised with a #15 scalpel blade.  The skin margins were undermined to an appropriate distance in all directions utilizing iris scissors.
Mustarde Flap Text: The defect edges were debeveled with a #15 scalpel blade.  Given the size, depth and location of the defect and the proximity to free margins a Mustarde flap was deemed most appropriate.  Using a sterile surgical marker, an appropriate flap was drawn incorporating the defect. The area thus outlined was incised with a #15 scalpel blade.  The skin margins were undermined to an appropriate distance in all directions utilizing iris scissors.
Nasal Turnover Hinge Flap Text: The defect edges were debeveled with a #15 scalpel blade.  Given the size, depth, location of the defect and the defect being full thickness a nasal turnover hinge flap was deemed most appropriate.  Using a sterile surgical marker, an appropriate hinge flap was drawn incorporating the defect. The area thus outlined was incised with a #15 scalpel blade. The flap was designed to recreate the nasal mucosal lining and the alar rim. The skin margins were undermined to an appropriate distance in all directions utilizing iris scissors.
Nasalis-Muscle-Based Myocutaneous Island Pedicle Flap Text: Using a #15 blade, an incision was made around the donor flap to the level of the nasalis muscle. Wide lateral undermining was then performed in both the subcutaneous plane above the nasalis muscle, and in a submuscular plane just above periosteum. This allowed the formation of a free nasalis muscle axial pedicle (based on the angular artery) which was still attached to the actual cutaneous flap, increasing its mobility and vascular viability. Hemostasis was obtained with pinpoint electrocoagulation. The flap was mobilized into position and the pivotal anchor points positioned and stabilized with buried interrupted sutures. Subcutaneous and dermal tissues were closed in a multilayered fashion with sutures. Tissue redundancies were excised, and the epidermal edges were apposed without significant tension and sutured with sutures.
Orbicularis Oris Muscle Flap Text: The defect edges were debeveled with a #15 scalpel blade.  Given that the defect affected the competency of the oral sphincter an orbicularis oris muscle flap was deemed most appropriate to restore this competency and normal muscle function.  Using a sterile surgical marker, an appropriate flap was drawn incorporating the defect. The area thus outlined was incised with a #15 scalpel blade.
Melolabial Transposition Flap Text: The defect edges were debeveled with a #15 scalpel blade.  Given the location of the defect and the proximity to free margins a melolabial flap was deemed most appropriate.  Using a sterile surgical marker, an appropriate melolabial transposition flap was drawn incorporating the defect.    The area thus outlined was incised deep to adipose tissue with a #15 scalpel blade.  The skin margins were undermined to an appropriate distance in all directions utilizing iris scissors.
Rhombic Flap Text: The defect edges were debeveled with a #15 scalpel blade.  Given the location of the defect and the proximity to free margins a rhombic flap was deemed most appropriate.  Using a sterile surgical marker, an appropriate rhombic flap was drawn incorporating the defect.    The area thus outlined was incised deep to adipose tissue with a #15 scalpel blade.  The skin margins were undermined to an appropriate distance in all directions utilizing iris scissors.
Rhomboid Transposition Flap Text: The defect edges were debeveled with a #15 scalpel blade.  Given the location of the defect and the proximity to free margins a rhomboid transposition flap was deemed most appropriate.  Using a sterile surgical marker, an appropriate rhomboid flap was drawn incorporating the defect.    The area thus outlined was incised deep to adipose tissue with a #15 scalpel blade.  The skin margins were undermined to an appropriate distance in all directions utilizing iris scissors.
Bi-Rhombic Flap Text: The defect edges were debeveled with a #15 scalpel blade.  Given the location of the defect and the proximity to free margins a bi-rhombic flap was deemed most appropriate.  Using a sterile surgical marker, an appropriate rhombic flap was drawn incorporating the defect. The area thus outlined was incised deep to adipose tissue with a #15 scalpel blade.  The skin margins were undermined to an appropriate distance in all directions utilizing iris scissors.
Helical Rim Advancement Flap Text: The defect edges were debeveled with a #15 blade scalpel.  Given the location of the defect and the proximity to free margins (helical rim) a double helical rim advancement flap was deemed most appropriate.  Using a sterile surgical marker, the appropriate advancement flaps were drawn incorporating the defect and placing the expected incisions between the helical rim and antihelix where possible.  The area thus outlined was incised through and through with a #15 scalpel blade.  With a skin hook and iris scissors, the flaps were gently and sharply undermined and freed up.
Bilateral Helical Rim Advancement Flap Text: The defect edges were debeveled with a #15 blade scalpel.  Given the location of the defect and the proximity to free margins (helical rim) a bilateral helical rim advancement flap was deemed most appropriate.  Using a sterile surgical marker, the appropriate advancement flaps were drawn incorporating the defect and placing the expected incisions between the helical rim and antihelix where possible.  The area thus outlined was incised through and through with a #15 scalpel blade.  With a skin hook and iris scissors, the flaps were gently and sharply undermined and freed up.
Ear Star Wedge Flap Text: The defect edges were debeveled with a #15 blade scalpel.  Given the location of the defect and the proximity to free margins (helical rim) an ear star wedge flap was deemed most appropriate.  Using a sterile surgical marker, the appropriate flap was drawn incorporating the defect and placing the expected incisions between the helical rim and antihelix where possible.  The area thus outlined was incised through and through with a #15 scalpel blade.
Banner Transposition Flap Text: The defect edges were debeveled with a #15 scalpel blade.  Given the location of the defect and the proximity to free margins a Banner transposition flap was deemed most appropriate.  Using a sterile surgical marker, an appropriate flap drawn around the defect. The area thus outlined was incised deep to adipose tissue with a #15 scalpel blade.  The skin margins were undermined to an appropriate distance in all directions utilizing iris scissors.
Bilobed Flap Text: The defect edges were debeveled with a #15 scalpel blade.  Given the location of the defect and the proximity to free margins a bilobe flap was deemed most appropriate.  Using a sterile surgical marker, an appropriate bilobe flap drawn around the defect.    The area thus outlined was incised deep to adipose tissue with a #15 scalpel blade.  The skin margins were undermined to an appropriate distance in all directions utilizing iris scissors.
Bilobed Transposition Flap Text: The defect edges were debeveled with a #15 scalpel blade.  Given the location of the defect and the proximity to free margins a bilobed transposition flap was deemed most appropriate.  Using a sterile surgical marker, an appropriate bilobe flap drawn around the defect.    The area thus outlined was incised deep to adipose tissue with a #15 scalpel blade.  The skin margins were undermined to an appropriate distance in all directions utilizing iris scissors.
Trilobed Flap Text: The defect edges were debeveled with a #15 scalpel blade.  Given the location of the defect and the proximity to free margins a trilobed flap was deemed most appropriate.  Using a sterile surgical marker, an appropriate trilobed flap drawn around the defect.    The area thus outlined was incised deep to adipose tissue with a #15 scalpel blade.  The skin margins were undermined to an appropriate distance in all directions utilizing iris scissors.
Dorsal Nasal Flap Text: The defect edges were debeveled with a #15 scalpel blade.  Given the location of the defect and the proximity to free margins a dorsal nasal flap was deemed most appropriate.  Using a sterile surgical marker, an appropriate dorsal nasal flap was drawn around the defect.    The area thus outlined was incised deep to adipose tissue with a #15 scalpel blade.  The skin margins were undermined to an appropriate distance in all directions utilizing iris scissors.
Island Pedicle Flap Text: The defect edges were debeveled with a #15 scalpel blade.  Given the location of the defect, shape of the defect and the proximity to free margins an island pedicle advancement flap was deemed most appropriate.  Using a sterile surgical marker, an appropriate advancement flap was drawn incorporating the defect, outlining the appropriate donor tissue and placing the expected incisions within the relaxed skin tension lines where possible.    The area thus outlined was incised deep to adipose tissue with a #15 scalpel blade.  The skin margins were undermined to an appropriate distance in all directions around the primary defect and laterally outward around the island pedicle utilizing iris scissors.  There was minimal undermining beneath the pedicle flap.
Island Pedicle Flap With Canthal Suspension Text: The defect edges were debeveled with a #15 scalpel blade.  Given the location of the defect, shape of the defect and the proximity to free margins an island pedicle advancement flap was deemed most appropriate.  Using a sterile surgical marker, an appropriate advancement flap was drawn incorporating the defect, outlining the appropriate donor tissue and placing the expected incisions within the relaxed skin tension lines where possible. The area thus outlined was incised deep to adipose tissue with a #15 scalpel blade.  The skin margins were undermined to an appropriate distance in all directions around the primary defect and laterally outward around the island pedicle utilizing iris scissors.  There was minimal undermining beneath the pedicle flap. A suspension suture was placed in the canthal tendon to prevent tension and prevent ectropion.
Alar Island Pedicle Flap Text: The defect edges were debeveled with a #15 scalpel blade.  Given the location of the defect, shape of the defect and the proximity to the alar rim an island pedicle advancement flap was deemed most appropriate.  Using a sterile surgical marker, an appropriate advancement flap was drawn incorporating the defect, outlining the appropriate donor tissue and placing the expected incisions within the nasal ala running parallel to the alar rim. The area thus outlined was incised with a #15 scalpel blade.  The skin margins were undermined minimally to an appropriate distance in all directions around the primary defect and laterally outward around the island pedicle utilizing iris scissors.  There was minimal undermining beneath the pedicle flap.
Double Island Pedicle Flap Text: The defect edges were debeveled with a #15 scalpel blade.  Given the location of the defect, shape of the defect and the proximity to free margins a double island pedicle advancement flap was deemed most appropriate.  Using a sterile surgical marker, an appropriate advancement flap was drawn incorporating the defect, outlining the appropriate donor tissue and placing the expected incisions within the relaxed skin tension lines where possible.    The area thus outlined was incised deep to adipose tissue with a #15 scalpel blade.  The skin margins were undermined to an appropriate distance in all directions around the primary defect and laterally outward around the island pedicle utilizing iris scissors.  There was minimal undermining beneath the pedicle flap.
Island Pedicle Flap-Requiring Vessel Identification Text: The defect edges were debeveled with a #15 scalpel blade.  Given the location of the defect, shape of the defect and the proximity to free margins an island pedicle advancement flap was deemed most appropriate.  Using a sterile surgical marker, an appropriate advancement flap was drawn, based on the axial vessel mentioned above, incorporating the defect, outlining the appropriate donor tissue and placing the expected incisions within the relaxed skin tension lines where possible.    The area thus outlined was incised deep to adipose tissue with a #15 scalpel blade.  The skin margins were undermined to an appropriate distance in all directions around the primary defect and laterally outward around the island pedicle utilizing iris scissors.  There was minimal undermining beneath the pedicle flap.
Keystone Flap Text: The defect edges were debeveled with a #15 scalpel blade.  Given the location of the defect, shape of the defect a keystone flap was deemed most appropriate.  Using a sterile surgical marker, an appropriate keystone flap was drawn incorporating the defect, outlining the appropriate donor tissue and placing the expected incisions within the relaxed skin tension lines where possible. The area thus outlined was incised deep to adipose tissue with a #15 scalpel blade.  The skin margins were undermined to an appropriate distance in all directions around the primary defect and laterally outward around the flap utilizing iris scissors.
O-T Plasty Text: The defect edges were debeveled with a #15 scalpel blade.  Given the location of the defect, shape of the defect and the proximity to free margins an O-T plasty was deemed most appropriate.  Using a sterile surgical marker, an appropriate O-T plasty was drawn incorporating the defect and placing the expected incisions within the relaxed skin tension lines where possible.    The area thus outlined was incised deep to adipose tissue with a #15 scalpel blade.  The skin margins were undermined to an appropriate distance in all directions utilizing iris scissors.
O-Z Plasty Text: The defect edges were debeveled with a #15 scalpel blade.  Given the location of the defect, shape of the defect and the proximity to free margins an O-Z plasty (double transposition flap) was deemed most appropriate.  Using a sterile surgical marker, the appropriate transposition flaps were drawn incorporating the defect and placing the expected incisions within the relaxed skin tension lines where possible.    The area thus outlined was incised deep to adipose tissue with a #15 scalpel blade.  The skin margins were undermined to an appropriate distance in all directions utilizing iris scissors.  Hemostasis was achieved with electrocautery.  The flaps were then transposed into place, one clockwise and the other counterclockwise, and anchored with interrupted buried subcutaneous sutures.
Double O-Z Plasty Text: The defect edges were debeveled with a #15 scalpel blade.  Given the location of the defect, shape of the defect and the proximity to free margins a Double O-Z plasty (double transposition flap) was deemed most appropriate.  Using a sterile surgical marker, the appropriate transposition flaps were drawn incorporating the defect and placing the expected incisions within the relaxed skin tension lines where possible. The area thus outlined was incised deep to adipose tissue with a #15 scalpel blade.  The skin margins were undermined to an appropriate distance in all directions utilizing iris scissors.  Hemostasis was achieved with electrocautery.  The flaps were then transposed into place, one clockwise and the other counterclockwise, and anchored with interrupted buried subcutaneous sutures.
V-Y Plasty Text: The defect edges were debeveled with a #15 scalpel blade.  Given the location of the defect, shape of the defect and the proximity to free margins an V-Y advancement flap was deemed most appropriate.  Using a sterile surgical marker, an appropriate advancement flap was drawn incorporating the defect and placing the expected incisions within the relaxed skin tension lines where possible.    The area thus outlined was incised deep to adipose tissue with a #15 scalpel blade.  The skin margins were undermined to an appropriate distance in all directions utilizing iris scissors.
H Plasty Text: Given the location of the defect, shape of the defect and the proximity to free margins a H-plasty was deemed most appropriate for repair.  Using a sterile surgical marker, the appropriate advancement arms of the H-plasty were drawn incorporating the defect and placing the expected incisions within the relaxed skin tension lines where possible. The area thus outlined was incised deep to adipose tissue with a #15 scalpel blade. The skin margins were undermined to an appropriate distance in all directions utilizing iris scissors.  The opposing advancement arms were then advanced into place in opposite direction and anchored with interrupted buried subcutaneous sutures.
W Plasty Text: The lesion was extirpated to the level of the fat with a #15 scalpel blade.  Given the location of the defect, shape of the defect and the proximity to free margins a W-plasty was deemed most appropriate for repair.  Using a sterile surgical marker, the appropriate transposition arms of the W-plasty were drawn incorporating the defect and placing the expected incisions within the relaxed skin tension lines where possible.    The area thus outlined was incised deep to adipose tissue with a #15 scalpel blade.  The skin margins were undermined to an appropriate distance in all directions utilizing iris scissors.  The opposing transposition arms were then transposed into place in opposite direction and anchored with interrupted buried subcutaneous sutures.
Z Plasty Text: The lesion was extirpated to the level of the fat with a #15 scalpel blade.  Given the location of the defect, shape of the defect and the proximity to free margins a Z-plasty was deemed most appropriate for repair.  Using a sterile surgical marker, the appropriate transposition arms of the Z-plasty were drawn incorporating the defect and placing the expected incisions within the relaxed skin tension lines where possible.    The area thus outlined was incised deep to adipose tissue with a #15 scalpel blade.  The skin margins were undermined to an appropriate distance in all directions utilizing iris scissors.  The opposing transposition arms were then transposed into place in opposite direction and anchored with interrupted buried subcutaneous sutures.
Zygomaticofacial Flap Text: Given the location of the defect, shape of the defect and the proximity to free margins a zygomaticofacial flap was deemed most appropriate for repair.  Using a sterile surgical marker, the appropriate flap was drawn incorporating the defect and placing the expected incisions within the relaxed skin tension lines where possible. The area thus outlined was incised deep to adipose tissue with a #15 scalpel blade with preservation of a vascular pedicle.  The skin margins were undermined to an appropriate distance in all directions utilizing iris scissors.  The flap was then placed into the defect and anchored with interrupted buried subcutaneous sutures.
Cheek Interpolation Flap Text: A decision was made to reconstruct the defect utilizing an interpolation axial flap and a staged reconstruction.  A telfa template was made of the defect.  This telfa template was then used to outline the Cheek Interpolation flap.  The donor area for the pedicle flap was then injected with anesthesia.  The flap was excised through the skin and subcutaneous tissue down to the layer of the underlying musculature.  The interpolation flap was carefully excised within this deep plane to maintain its blood supply.  The edges of the donor site were undermined.   The donor site was closed in a primary fashion.  The pedicle was then rotated into position and sutured.  Once the tube was sutured into place, adequate blood supply was confirmed with blanching and refill.  The pedicle was then wrapped with xeroform gauze and dressed appropriately with a telfa and gauze bandage to ensure continued blood supply and protect the attached pedicle.
Cheek-To-Nose Interpolation Flap Text: A decision was made to reconstruct the defect utilizing an interpolation axial flap and a staged reconstruction.  A telfa template was made of the defect.  This telfa template was then used to outline the Cheek-To-Nose Interpolation flap.  The donor area for the pedicle flap was then injected with anesthesia.  The flap was excised through the skin and subcutaneous tissue down to the layer of the underlying musculature.  The interpolation flap was carefully excised within this deep plane to maintain its blood supply.  The edges of the donor site were undermined.   The donor site was closed in a primary fashion.  The pedicle was then rotated into position and sutured.  Once the tube was sutured into place, adequate blood supply was confirmed with blanching and refill.  The pedicle was then wrapped with xeroform gauze and dressed appropriately with a telfa and gauze bandage to ensure continued blood supply and protect the attached pedicle.
Interpolation Flap Text: A decision was made to reconstruct the defect utilizing an interpolation axial flap and a staged reconstruction.  A telfa template was made of the defect.  This telfa template was then used to outline the interpolation flap.  The donor area for the pedicle flap was then injected with anesthesia.  The flap was excised through the skin and subcutaneous tissue down to the layer of the underlying musculature.  The interpolation flap was carefully excised within this deep plane to maintain its blood supply.  The edges of the donor site were undermined.   The donor site was closed in a primary fashion.  The pedicle was then rotated into position and sutured.  Once the tube was sutured into place, adequate blood supply was confirmed with blanching and refill.  The pedicle was then wrapped with xeroform gauze and dressed appropriately with a telfa and gauze bandage to ensure continued blood supply and protect the attached pedicle.
Melolabial Interpolation Flap Text: A decision was made to reconstruct the defect utilizing an interpolation axial flap and a staged reconstruction.  A telfa template was made of the defect.  This telfa template was then used to outline the melolabial interpolation flap.  The donor area for the pedicle flap was then injected with anesthesia.  The flap was excised through the skin and subcutaneous tissue down to the layer of the underlying musculature.  The pedicle flap was carefully excised within this deep plane to maintain its blood supply.  The edges of the donor site were undermined.   The donor site was closed in a primary fashion.  The pedicle was then rotated into position and sutured.  Once the tube was sutured into place, adequate blood supply was confirmed with blanching and refill.  The pedicle was then wrapped with xeroform gauze and dressed appropriately with a telfa and gauze bandage to ensure continued blood supply and protect the attached pedicle.
Mastoid Interpolation Flap Text: A decision was made to reconstruct the defect utilizing an interpolation axial flap and a staged reconstruction.  A telfa template was made of the defect.  This telfa template was then used to outline the mastoid interpolation flap.  The donor area for the pedicle flap was then injected with anesthesia.  The flap was excised through the skin and subcutaneous tissue down to the layer of the underlying musculature.  The pedicle flap was carefully excised within this deep plane to maintain its blood supply.  The edges of the donor site were undermined.   The donor site was closed in a primary fashion.  The pedicle was then rotated into position and sutured.  Once the tube was sutured into place, adequate blood supply was confirmed with blanching and refill.  The pedicle was then wrapped with xeroform gauze and dressed appropriately with a telfa and gauze bandage to ensure continued blood supply and protect the attached pedicle.
Posterior Auricular Interpolation Flap Text: A decision was made to reconstruct the defect utilizing an interpolation axial flap and a staged reconstruction.  A telfa template was made of the defect.  This telfa template was then used to outline the posterior auricular interpolation flap.  The donor area for the pedicle flap was then injected with anesthesia.  The flap was excised through the skin and subcutaneous tissue down to the layer of the underlying musculature.  The pedicle flap was carefully excised within this deep plane to maintain its blood supply.  The edges of the donor site were undermined.   The donor site was closed in a primary fashion.  The pedicle was then rotated into position and sutured.  Once the tube was sutured into place, adequate blood supply was confirmed with blanching and refill.  The pedicle was then wrapped with xeroform gauze and dressed appropriately with a telfa and gauze bandage to ensure continued blood supply and protect the attached pedicle.
Paramedian Forehead Flap Text: A decision was made to reconstruct the defect utilizing an interpolation axial flap and a staged reconstruction.  A telfa template was made of the defect.  This telfa template was then used to outline the paramedian forehead pedicle flap.  The donor area for the pedicle flap was then injected with anesthesia.  The flap was excised through the skin and subcutaneous tissue down to the layer of the underlying musculature.  The pedicle flap was carefully excised within this deep plane to maintain its blood supply.  The edges of the donor site were undermined.   The donor site was closed in a primary fashion.  The pedicle was then rotated into position and sutured.  Once the tube was sutured into place, adequate blood supply was confirmed with blanching and refill.  The pedicle was then wrapped with xeroform gauze and dressed appropriately with a telfa and gauze bandage to ensure continued blood supply and protect the attached pedicle.
Abbe Flap (Upper To Lower Lip) Text: The defect of the lower lip was assessed and measured.  Given the location and size of the defect, an Abbe flap was deemed most appropriate.  Using a sterile surgical marker, an appropriate Abbe flap was measured and drawn on the upper lip. Local anesthesia was then infiltrated.  A scalpel was then used to incise the upper lip through and through the skin, vermilion, muscle and mucosa, leaving the flap pedicled on the opposite side.  The flap was then rotated and transferred to the lower lip defect.  The flap was then sutured into place with a three layer technique, closing the orbicularis oris muscle layer with subcutaneous buried sutures, followed by a mucosal layer and an epidermal layer.
Abbe Flap (Lower To Upper Lip) Text: The defect of the upper lip was assessed and measured.  Given the location and size of the defect, an Abbe flap was deemed most appropriate.  Using a sterile surgical marker, an appropriate Abbe flap was measured and drawn on the lower lip. Local anesthesia was then infiltrated. A scalpel was then used to incise the upper lip through and through the skin, vermilion, muscle and mucosa, leaving the flap pedicled on the opposite side.  The flap was then rotated and transferred to the lower lip defect.  The flap was then sutured into place with a three layer technique, closing the orbicularis oris muscle layer with subcutaneous buried sutures, followed by a mucosal layer and an epidermal layer.
Estlander Flap (Upper To Lower Lip) Text: The defect of the lower lip was assessed and measured.  Given the location and size of the defect, an Estlander flap was deemed most appropriate.  Using a sterile surgical marker, an appropriate Estlander flap was measured and drawn on the upper lip. Local anesthesia was then infiltrated. A scalpel was then used to incise the lateral aspect of the flap, through skin, muscle and mucosa, leaving the flap pedicled medially.  The flap was then rotated and positioned to fill the lower lip defect.  The flap was then sutured into place with a three layer technique, closing the orbicularis oris muscle layer with subcutaneous buried sutures, followed by a mucosal layer and an epidermal layer.
Cheiloplasty (Less Than 50%) Text: A decision was made to reconstruct the defect with a  cheiloplasty.  The defect was undermined extensively.  Additional obicularis oris muscle was excised with a 15 blade scalpel.  The defect was converted into a full thickness wedge, of less than 50% of the vertical height of the lip, to facilite a better cosmetic result.  Small vessels were then tied off with 5-0 monocyrl. The obicularis oris, superficial fascia, adipose and dermis were then reapproximated.  After the deeper layers were approximated the epidermis was reapproximated with particular care given to realign the vermilion border.
Cheiloplasty (Complex) Text: A decision was made to reconstruct the defect with a  cheiloplasty.  The defect was undermined extensively.  Additional obicularis oris muscle was excised with a 15 blade scalpel.  The defect was converted into a full thickness wedge to facilite a better cosmetic result.  Small vessels were then tied off with 5-0 monocyrl. The obicularis oris, superficial fascia, adipose and dermis were then reapproximated.  After the deeper layers were approximated the epidermis was reapproximated with particular care given to realign the vermilion border.
Ear Wedge Repair Text: A wedge excision was completed by carrying down an excision through the full thickness of the ear and cartilage with an inward facing Burow's triangle. The wound was then closed in a layered fashion.
Full Thickness Lip Wedge Repair (Flap) Text: Given the location of the defect and the proximity to free margins a full thickness wedge repair was deemed most appropriate.  Using a sterile surgical marker, the appropriate repair was drawn incorporating the defect and placing the expected incisions perpendicular to the vermilion border.  The vermilion border was also meticulously outlined to ensure appropriate reapproximation during the repair.  The area thus outlined was incised through and through with a #15 scalpel blade.  The muscularis and dermis were reaproximated with deep sutures following hemostasis. Care was taken to realign the vermilion border before proceeding with the superficial closure.  Once the vermilion was realigned the superfical and mucosal closure was finished.
Ftsg Text: The defect edges were debeveled with a #15 scalpel blade.  Given the location of the defect, shape of the defect and the proximity to free margins a full thickness skin graft was deemed most appropriate.  Using a sterile surgical marker, the primary defect shape was transferred to the donor site. The area thus outlined was incised deep to adipose tissue with a #15 scalpel blade.  The harvested graft was then trimmed of adipose tissue until only dermis and epidermis was left.  The skin margins of the secondary defect were undermined to an appropriate distance in all directions utilizing iris scissors.  The secondary defect was closed with interrupted buried subcutaneous sutures.  The skin edges were then re-apposed with running  sutures.  The skin graft was then placed in the primary defect and oriented appropriately.
Split-Thickness Skin Graft Text: The defect edges were debeveled with a #15 scalpel blade.  Given the location of the defect, shape of the defect and the proximity to free margins a split thickness skin graft was deemed most appropriate.  Using a sterile surgical marker, the primary defect shape was transferred to the donor site. The split thickness graft was then harvested.  The skin graft was then placed in the primary defect and oriented appropriately.
Burow's Graft Text: The defect edges were debeveled with a #15 scalpel blade.  Given the location of the defect, shape of the defect, the proximity to free margins and the presence of a standing cone deformity a Burow's skin graft was deemed most appropriate. The standing cone was removed and this tissue was then trimmed to the shape of the primary defect. The adipose tissue was also removed until only dermis and epidermis were left.  The skin margins of the secondary defect were undermined to an appropriate distance in all directions utilizing iris scissors.  The secondary defect was closed with interrupted buried subcutaneous sutures.  The skin edges were then re-apposed with running  sutures.  The skin graft was then placed in the primary defect and oriented appropriately.
Cartilage Graft Text: The defect edges were debeveled with a #15 scalpel blade.  Given the location of the defect, shape of the defect, the fact the defect involved a full thickness cartilage defect a cartilage graft was deemed most appropriate.  An appropriate donor site was identified, cleansed, and anesthetized. The cartilage graft was then harvested and transferred to the recipient site, oriented appropriately and then sutured into place.  The secondary defect was then repaired using a primary closure.
Composite Graft Text: The defect edges were debeveled with a #15 scalpel blade.  Given the location of the defect, shape of the defect, the proximity to free margins and the fact the defect was full thickness a composite graft was deemed most appropriate.  The defect was outline and then transferred to the donor site.  A full thickness graft was then excised from the donor site. The graft was then placed in the primary defect, oriented appropriately and then sutured into place.  The secondary defect was then repaired using a primary closure.
Epidermal Autograft Text: The defect edges were debeveled with a #15 scalpel blade.  Given the location of the defect, shape of the defect and the proximity to free margins an epidermal autograft was deemed most appropriate.  Using a sterile surgical marker, the primary defect shape was transferred to the donor site. The epidermal graft was then harvested.  The skin graft was then placed in the primary defect and oriented appropriately.
Dermal Autograft Text: The defect edges were debeveled with a #15 scalpel blade.  Given the location of the defect, shape of the defect and the proximity to free margins a dermal autograft was deemed most appropriate.  Using a sterile surgical marker, the primary defect shape was transferred to the donor site. The area thus outlined was incised deep to adipose tissue with a #15 scalpel blade.  The harvested graft was then trimmed of adipose and epidermal tissue until only dermis was left.  The skin graft was then placed in the primary defect and oriented appropriately.
Skin Substitute Text: The defect edges were debeveled with a #15 scalpel blade.  Given the location of the defect, shape of the defect and the proximity to free margins a skin substitute graft was deemed most appropriate.  The graft material was trimmed to fit the size of the defect. The graft was then placed in the primary defect and oriented appropriately.
Tissue Cultured Epidermal Autograft Text: The defect edges were debeveled with a #15 scalpel blade.  Given the location of the defect, shape of the defect and the proximity to free margins a tissue cultured epidermal autograft was deemed most appropriate.  The graft was then trimmed to fit the size of the defect.  The graft was then placed in the primary defect and oriented appropriately.
Xenograft Text: The defect edges were debeveled with a #15 scalpel blade.  Given the location of the defect, shape of the defect and the proximity to free margins a xenograft was deemed most appropriate.  The graft was then trimmed to fit the size of the defect.  The graft was then placed in the primary defect and oriented appropriately.
Purse String (Simple) Text: Given the location of the defect and the characteristics of the surrounding skin a purse string closure was deemed most appropriate.  Undermining was performed circumfirentially around the surgical defect.  A purse string suture was then placed and tightened.
Purse String (Intermediate) Text: Given the location of the defect and the characteristics of the surrounding skin a purse string intermediate closure was deemed most appropriate.  Undermining was performed circumfirentially around the surgical defect.  A purse string suture was then placed and tightened.
Partial Purse String (Simple) Text: Given the location of the defect and the characteristics of the surrounding skin a simple purse string closure was deemed most appropriate.  Undermining was performed circumfirentially around the surgical defect.  A purse string suture was then placed and tightened. Wound tension only allowed a partial closure of the circular defect.
Partial Purse String (Intermediate) Text: Given the location of the defect and the characteristics of the surrounding skin an intermediate purse string closure was deemed most appropriate.  Undermining was performed circumfirentially around the surgical defect.  A purse string suture was then placed and tightened. Wound tension only allowed a partial closure of the circular defect.
Localized Dermabrasion With Wire Brush Text: The patient was draped in routine manner.  Localized dermabrasion using 3 x 17 mm wire brush was performed in routine manner to papillary dermis. This spot dermabrasion is being performed to complete skin cancer reconstruction. It also will eliminate the other sun damaged precancerous cells that are known to be part of the regional effect of a lifetime's worth of sun exposure. This localized dermabrasion is therapeutic and should not be considered cosmetic in any regard.
Tarsorrhaphy Text: A tarsorrhaphy was performed using Frost sutures.
Complex Repair And Flap Additional Text (Will Appearing After The Standard Complex Repair Text): The complex repair was not sufficient to completely close the primary defect. The remaining additional defect was repaired with the flap mentioned below.
Complex Repair And Graft Additional Text (Will Appearing After The Standard Complex Repair Text): The complex repair was not sufficient to completely close the primary defect. The remaining additional defect was repaired with the graft mentioned below.
Manual Repair Warning Statement: We plan on removing the manually selected variable below in favor of our much easier automatic structured text blocks found in the previous tab. We decided to do this to help make the flow better and give you the full power of structured data. Manual selection is never going to be ideal in our platform and I would encourage you to avoid using manual selection from this point on, especially since I will be sunsetting this feature. It is important that you do one of two things with the customized text below. First, you can save all of the text in a word file so you can have it for future reference. Second, transfer the text to the appropriate area in the Library tab. Lastly, if there is a flap or graft type which we do not have you need to let us know right away so I can add it in before the variable is hidden. No need to panic, we plan to give you roughly 6 months to make the change.
Same Histology In Subsequent Stages Text: The pattern and morphology of the tumor is as described in the first stage.
No Residual Tumor Seen Histology Text: There were no malignant cells seen in the sections examined.
Inflammation Suggestive Of Cancer Camouflage Histology Text: There was a dense lymphocytic infiltrate which prevented adequate histologic evaluation of adjacent structures.
Bcc Histology Text: There were numerous aggregates of basaloid cells.
Bcc Infiltrative Histology Text: There were numerous aggregates of basaloid cells demonstrating an infiltrative pattern.
Mart-1 - Positive Histology Text: MART-1 staining demonstrates areas of higher density and clustering of melanocytes with Pagetoid spread upwards within the epidermis. The surgical margins are positive for tumor cells.
Mart-1 - Negative Histology Text: MART-1 staining demonstrates a normal density and pattern of melanocytes along the dermal-epidermal junction. The surgical margins are negative for tumor cells.
Information: Selecting Yes will display possible errors in your note based on the variables you have selected. This validation is only offered as a suggestion for you. PLEASE NOTE THAT THE VALIDATION TEXT WILL BE REMOVED WHEN YOU FINALIZE YOUR NOTE. IF YOU WANT TO FAX A PRELIMINARY NOTE YOU WILL NEED TO TOGGLE THIS TO 'NO' IF YOU DO NOT WANT IT IN YOUR FAXED NOTE.

## 2022-07-25 ENCOUNTER — HOSPITAL ENCOUNTER (OUTPATIENT)
Dept: RADIOLOGY | Facility: MEDICAL CENTER | Age: 82
End: 2022-07-25
Attending: INTERNAL MEDICINE
Payer: MEDICARE

## 2022-07-26 ENCOUNTER — HOSPITAL ENCOUNTER (OUTPATIENT)
Dept: LAB | Facility: MEDICAL CENTER | Age: 82
End: 2022-07-26
Attending: INTERNAL MEDICINE
Payer: MEDICARE

## 2022-07-26 LAB
ALBUMIN SERPL BCP-MCNC: 4 G/DL (ref 3.2–4.9)
ALBUMIN/GLOB SERPL: 1.4 G/DL
ALP SERPL-CCNC: 84 U/L (ref 30–99)
ALT SERPL-CCNC: 12 U/L (ref 2–50)
ANION GAP SERPL CALC-SCNC: 13 MMOL/L (ref 7–16)
AST SERPL-CCNC: 66 U/L (ref 12–45)
BASOPHILS # BLD AUTO: 1.7 % (ref 0–1.8)
BASOPHILS # BLD: 0.05 K/UL (ref 0–0.12)
BILIRUB SERPL-MCNC: 0.7 MG/DL (ref 0.1–1.5)
BUN SERPL-MCNC: 34 MG/DL (ref 8–22)
CALCIUM SERPL-MCNC: 10.1 MG/DL (ref 8.5–10.5)
CHLORIDE SERPL-SCNC: 107 MMOL/L (ref 96–112)
CHOLEST SERPL-MCNC: 153 MG/DL (ref 100–199)
CO2 SERPL-SCNC: 23 MMOL/L (ref 20–33)
CREAT SERPL-MCNC: 1.38 MG/DL (ref 0.5–1.4)
EOSINOPHIL # BLD AUTO: 0.1 K/UL (ref 0–0.51)
EOSINOPHIL NFR BLD: 3.4 % (ref 0–6.9)
ERYTHROCYTE [DISTWIDTH] IN BLOOD BY AUTOMATED COUNT: 42.3 FL (ref 35.9–50)
FASTING STATUS PATIENT QL REPORTED: NORMAL
GFR SERPLBLD CREATININE-BSD FMLA CKD-EPI: 51 ML/MIN/1.73 M 2
GLOBULIN SER CALC-MCNC: 2.8 G/DL (ref 1.9–3.5)
GLUCOSE SERPL-MCNC: 102 MG/DL (ref 65–99)
HCT VFR BLD AUTO: 41.8 % (ref 42–52)
HDLC SERPL-MCNC: 32 MG/DL
HGB BLD-MCNC: 13.7 G/DL (ref 14–18)
IMM GRANULOCYTES # BLD AUTO: 0.02 K/UL (ref 0–0.11)
IMM GRANULOCYTES NFR BLD AUTO: 0.7 % (ref 0–0.9)
LDLC SERPL CALC-MCNC: 103 MG/DL
LYMPHOCYTES # BLD AUTO: 0.39 K/UL (ref 1–4.8)
LYMPHOCYTES NFR BLD: 13.4 % (ref 22–41)
MCH RBC QN AUTO: 29.3 PG (ref 27–33)
MCHC RBC AUTO-ENTMCNC: 32.8 G/DL (ref 33.7–35.3)
MCV RBC AUTO: 89.3 FL (ref 81.4–97.8)
MONOCYTES # BLD AUTO: 0.24 K/UL (ref 0–0.85)
MONOCYTES NFR BLD AUTO: 8.3 % (ref 0–13.4)
NEUTROPHILS # BLD AUTO: 2.1 K/UL (ref 1.82–7.42)
NEUTROPHILS NFR BLD: 72.5 % (ref 44–72)
NRBC # BLD AUTO: 0 K/UL
NRBC BLD-RTO: 0 /100 WBC
PLATELET # BLD AUTO: 83 K/UL (ref 164–446)
PMV BLD AUTO: 12 FL (ref 9–12.9)
POTASSIUM SERPL-SCNC: 4.4 MMOL/L (ref 3.6–5.5)
PROT SERPL-MCNC: 6.8 G/DL (ref 6–8.2)
RBC # BLD AUTO: 4.68 M/UL (ref 4.7–6.1)
SODIUM SERPL-SCNC: 143 MMOL/L (ref 135–145)
TRIGL SERPL-MCNC: 91 MG/DL (ref 0–149)
WBC # BLD AUTO: 2.9 K/UL (ref 4.8–10.8)

## 2022-07-26 PROCEDURE — 80053 COMPREHEN METABOLIC PANEL: CPT

## 2022-07-26 PROCEDURE — 80061 LIPID PANEL: CPT

## 2022-07-26 PROCEDURE — 85025 COMPLETE CBC W/AUTO DIFF WBC: CPT

## 2022-07-26 PROCEDURE — 36415 COLL VENOUS BLD VENIPUNCTURE: CPT

## 2022-08-01 NOTE — OR SURGEON
Immediate Post- Operative Note        PostOp Diagnosis: NO LONGER REQUIRES PORT ACCESS FOR CHEMOTHERAPY      Procedure(s): RIGHT INTERNAL JUGULAR PORT REMOVAL, PORT RESERVOIR AND CATHETER REMOVED INTACT        Estimated Blood Loss: < 5CC        Complications: NONE            2/4/2021             2:15 PM                      Rayo Vizcaino M.D.       Abbe Flap (Lower To Upper Lip) Text: The defect of the upper lip was assessed and measured.  Given the location and size of the defect, an Abbe flap was deemed most appropriate.  Using a sterile surgical marker, an appropriate Abbe flap was measured and drawn on the lower lip. Local anesthesia was then infiltrated. A scalpel was then used to incise the upper lip through and through the skin, vermilion, muscle and mucosa, leaving the flap pedicled on the opposite side.  The flap was then rotated and transferred to the lower lip defect.  The flap was then sutured into place with a three layer technique, closing the orbicularis oris muscle layer with subcutaneous buried sutures, followed by a mucosal layer and an epidermal layer.

## 2022-08-17 ENCOUNTER — HOSPITAL ENCOUNTER (OUTPATIENT)
Facility: MEDICAL CENTER | Age: 82
End: 2022-08-17
Attending: NURSE PRACTITIONER
Payer: MEDICARE

## 2022-08-17 PROCEDURE — 86706 HEP B SURFACE ANTIBODY: CPT | Mod: GA

## 2022-08-17 PROCEDURE — 86704 HEP B CORE ANTIBODY TOTAL: CPT | Mod: GA

## 2022-08-17 PROCEDURE — 87340 HEPATITIS B SURFACE AG IA: CPT | Mod: GA

## 2022-08-18 LAB
HBV CORE AB SERPL QL IA: NONREACTIVE
HBV SURFACE AB SERPL IA-ACNC: <3.5 MIU/ML (ref 0–10)
HBV SURFACE AG SER QL: NORMAL

## 2022-11-02 ENCOUNTER — PATIENT MESSAGE (OUTPATIENT)
Dept: HEALTH INFORMATION MANAGEMENT | Facility: OTHER | Age: 82
End: 2022-11-02

## 2023-05-26 NOTE — TELEPHONE ENCOUNTER
Clearance faxed.   
Ok to proceed  
To Dr. Romeo edwards to proceed or should patient have FU first? Risk?     Received clearance request from [facility] for EGD/Colonoscopy 4/28/21.    HX s/p PPM for SSS, PAF, lymphoma, alcoholic cirrhosis, hypotension/FTT    
No

## 2023-06-15 NOTE — PROCEDURE: LIQUID NITROGEN
Duration Of Freeze Thaw-Cycle (Seconds): 0 Application Tool (Optional): Liquid Nitrogen Sprayer Render Note In Bullet Format When Appropriate: No Number Of Freeze-Thaw Cycles: 1 freeze-thaw cycle Post-Care Instructions: I reviewed with the patient in detail post-care instructions. Patient is to wear sunprotection, and avoid picking at any of the treated lesions. Pt may apply Vaseline to crusted or scabbing areas. Consent: The patient's consent was obtained including but not limited to risks of crusting, scabbing, blistering, scarring, darker or lighter pigmentary change, recurrence, incomplete removal and infection. Show Aperture Variable?: Yes Detail Level: Detailed Show Applicator Variable?: Yes

## 2024-08-13 NOTE — PROCEDURE: PHOTO-DOCUMENTATION
Photo Preface (Leave Blank If You Do Not Want): Photographs were obtained today
Detail Level: Detailed
Xray Chest 1 View- PORTABLE-Urgent

## 2024-12-12 NOTE — OR SURGEON
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    Anesthesiologist/Type of Anesthesia:  Anesthesiologist: Glory Santos M.D./General    Surgical Staff:  Circulator: Edie Cash R.N.  Relief Circulator: Pauline Booker R.N.; Keli Kelley R.N.  Relief Scrub: Rachel Lombardi  Scrub Person: Rae Medina; Kaylynn Ayala    Specimens removed if any:  ID Type Source Tests Collected by Time Destination   A : Left Modified Neck Dissection Tissue Neck PATHOLOGY SPECIMEN Chaitanya Saha M.D. 9/13/2021 1115    B : LEFT FACIAL LESION Tissue Face PATHOLOGY SPECIMEN Chaitanya Saha M.D. 9/13/2021 1141        Estimated Blood Loss: 20 cc    Findings: 2 cm Zone 2a lymph node.  No obvious nodes in zone 2b. 1 cm left facial lesion a1 cm superior to angle of mandible.     Complications: none        9/13/2021 1:45 PM Chaitanya Saha M.D.  
gen body aches  x 4-5 days. Pt states he ran out of pain medicine. Pt has a hx of hemophilia.

## (undated) DEVICE — SUTURE GENERAL

## (undated) DEVICE — 6-0 ETHILON CLEAR P-1 - (12/BX)

## (undated) DEVICE — STAPLER SKIN DISP - (6/BX 10BX/CA) VISISTAT

## (undated) DEVICE — GLOVE BIOGEL SZ 7.5 SURGICAL PF LTX - (50PR/BX 4BX/CA)

## (undated) DEVICE — SUTURE 3-0 SILK 12 X 18 IN - (36/BX)

## (undated) DEVICE — MASK ANESTHESIA ADULT  - (100/CA)

## (undated) DEVICE — ELECTRODE DUAL RETURN W/ CORD - (50/PK)

## (undated) DEVICE — DRESSING NON-ADHERING 8 X 3 - (50/BX)

## (undated) DEVICE — NEPTUNE 4 PORT MANIFOLD - (20/PK)

## (undated) DEVICE — SUTURE 3-0 VICRYL PLUS SH - 27 INCH (36/BX)

## (undated) DEVICE — KIT ANESTHESIA W/CIRCUIT & 3/LT BAG W/FILTER (20EA/CA)

## (undated) DEVICE — GOWN WARMING STANDARD FLEX - (30/CA)

## (undated) DEVICE — SPONGE XRAY 8X4 STERL. 12PL - (10EA/TY 80TY/CA)

## (undated) DEVICE — NERVE STIMULATOR VARI-STEM - 10/PK

## (undated) DEVICE — BOVIE NEEDLE TIP INSULATD NON-SAFETY 2CM (50/PK)

## (undated) DEVICE — DRAPE SURGICAL U 77X120 - (10/CA)

## (undated) DEVICE — SPONGE PEANUT - (5/PK 50PK/CA)

## (undated) DEVICE — CLOSURE SKIN STRIP 1/2 X 4 IN - (STERI STRIP) (50/BX 4BX/CA)

## (undated) DEVICE — BALL COTTON STERILE 5/PK - (5/PK 25PK/CA)

## (undated) DEVICE — CANISTER SUCTION RIGID RED 1500CC (40EA/CA)

## (undated) DEVICE — PROTECTOR ULNA NERVE - (36PR/CA)

## (undated) DEVICE — DRAPE MAGNETIC (INSTRA-MAG) - (30/CA)

## (undated) DEVICE — GLOVE SZ 7 BIOGEL PI MICRO - PF LF (50PR/BX 4BX/CA)

## (undated) DEVICE — SUTURE 3-0 ETHILON FS-1 - (36/BX) 30 INCH

## (undated) DEVICE — SUTURE 2-0 SILK 12 X 18" (36PK/BX)"

## (undated) DEVICE — WATER IRRIGATION STERILE 1000ML (12EA/CA)

## (undated) DEVICE — SLEEVE, VASO, THIGH, MED

## (undated) DEVICE — SUTURE 0 SILK 12 X 18 (36PK/BX)

## (undated) DEVICE — SUTURE 4-0 VICRYL PLUS FS-2 - 27 INCH (36/BX)

## (undated) DEVICE — SUTURE 2-0 SILK FS (12EA/BX)

## (undated) DEVICE — GLOVE LITE HANDLE DISP. - (1/PK 80PK/CS)

## (undated) DEVICE — SUCTION INSTRUMENT YANKAUER BULBOUS TIP W/O VENT (50EA/CA)

## (undated) DEVICE — ELECTRODE 850 FOAM ADHESIVE - HYDROGEL RADIOTRNSPRNT (50/PK)

## (undated) DEVICE — DRAPE STRLE REG TOWEL 18X24 - (10/BX 4BX/CA)"

## (undated) DEVICE — GLOVE SZ 6 BIOGEL PI MICRO - PF LF (50PR/BX 4BX/CA)

## (undated) DEVICE — DRAIN J-VAC 10MM FLAT - (10/CA)

## (undated) DEVICE — PACK ENT OR - (2EA/CA)

## (undated) DEVICE — BLADE SURGICAL #10 - (50/BX)

## (undated) DEVICE — SHEAR HS FOCUS 9CM CVD - (6/BX)

## (undated) DEVICE — TUBING CLEARLINK DUO-VENT - C-FLO (48EA/CA)

## (undated) DEVICE — CORDS BIPOLAR COAGULATION - 12FT STERILE DISP. (10EA/BX)

## (undated) DEVICE — RESERVOIR SUCTION 100 CC - SILICONE (20EA/CA)

## (undated) DEVICE — KIT  I.V. START (100EA/CA)

## (undated) DEVICE — SODIUM CHL IRRIGATION 0.9% 1000ML (12EA/CA)

## (undated) DEVICE — SUTURE 3-0 VICRYL PLUS SH - 8X 18 INCH (12/BX)

## (undated) DEVICE — HEAD HOLDER JUNIOR/ADULT

## (undated) DEVICE — BLADE SURGICAL #15 - (50/BX 3BX/CA)

## (undated) DEVICE — JAW BRA #93

## (undated) DEVICE — STAY ELASTIC BLUNT RETRACTOR 12MM (8EA/PK)

## (undated) DEVICE — SET LEADWIRE 5 LEAD BEDSIDE DISPOSABLE ECG (1SET OF 5/EA)

## (undated) DEVICE — CANISTER SUCTION 3000ML MECHANICAL FILTER AUTO SHUTOFF MEDI-VAC NONSTERILE LF DISP  (40EA/CA)

## (undated) DEVICE — CATHETER IV 20 GA X 1-1/4 ---SURG.& SDS ONLY--- (50EA/BX)

## (undated) DEVICE — LACTATED RINGERS INJ 1000 ML - (14EA/CA 60CA/PF)

## (undated) DEVICE — TRAY SRGPRP PVP IOD WT PRP - (20/CA)

## (undated) DEVICE — SUTURE 5-0 ETHILON P-3 18 (12PK/BX)"

## (undated) DEVICE — TOWEL STOP TIMEOUT SAFETY FLAG (40EA/CA)

## (undated) DEVICE — DRAIN PENROSE STERILE 1/4 X - 18 IN  (25EA/BX)

## (undated) DEVICE — SUTURE 4-0 VICRYL PLUS PC-3 18 (12PK/BX)"

## (undated) DEVICE — TUBE CONNECTING SUCTION - CLEAR PLASTIC STERILE 72 IN (50EA/CA)

## (undated) DEVICE — SENSOR SPO2 NEO LNCS ADHESIVE (20/BX) SEE USER NOTES

## (undated) DEVICE — PACK MINOR BASIN - (2EA/CA)